# Patient Record
Sex: FEMALE | Race: WHITE | NOT HISPANIC OR LATINO | Employment: OTHER | URBAN - METROPOLITAN AREA
[De-identification: names, ages, dates, MRNs, and addresses within clinical notes are randomized per-mention and may not be internally consistent; named-entity substitution may affect disease eponyms.]

---

## 2017-02-07 ENCOUNTER — ALLSCRIPTS OFFICE VISIT (OUTPATIENT)
Dept: OTHER | Facility: OTHER | Age: 82
End: 2017-02-07

## 2017-02-21 ENCOUNTER — ALLSCRIPTS OFFICE VISIT (OUTPATIENT)
Dept: OTHER | Facility: OTHER | Age: 82
End: 2017-02-21

## 2017-02-21 DIAGNOSIS — M25.511 PAIN IN RIGHT SHOULDER: ICD-10-CM

## 2017-02-24 ENCOUNTER — TRANSCRIBE ORDERS (OUTPATIENT)
Dept: ADMINISTRATIVE | Facility: HOSPITAL | Age: 82
End: 2017-02-24

## 2017-02-24 ENCOUNTER — APPOINTMENT (OUTPATIENT)
Dept: LAB | Facility: MEDICAL CENTER | Age: 82
End: 2017-02-24
Payer: MEDICARE

## 2017-02-24 DIAGNOSIS — E78.5 HYPERLIPIDEMIA, UNSPECIFIED HYPERLIPIDEMIA TYPE: Primary | ICD-10-CM

## 2017-02-24 DIAGNOSIS — D64.9 ANEMIA, UNSPECIFIED: ICD-10-CM

## 2017-02-24 DIAGNOSIS — G31.84 MCI (MILD COGNITIVE IMPAIRMENT) WITH MEMORY LOSS: ICD-10-CM

## 2017-02-24 DIAGNOSIS — E55.9 UNSPECIFIED VITAMIN D DEFICIENCY: ICD-10-CM

## 2017-02-24 DIAGNOSIS — E53.8 OTHER B-COMPLEX DEFICIENCIES: ICD-10-CM

## 2017-02-24 DIAGNOSIS — E78.5 HYPERLIPIDEMIA, UNSPECIFIED HYPERLIPIDEMIA TYPE: ICD-10-CM

## 2017-02-24 LAB
25(OH)D3 SERPL-MCNC: 34 NG/ML (ref 30–100)
ALBUMIN SERPL BCP-MCNC: 3.4 G/DL (ref 3.5–5)
ALP SERPL-CCNC: 54 U/L (ref 46–116)
ALT SERPL W P-5'-P-CCNC: 18 U/L (ref 12–78)
ANION GAP SERPL CALCULATED.3IONS-SCNC: 6 MMOL/L (ref 4–13)
AST SERPL W P-5'-P-CCNC: 17 U/L (ref 5–45)
BILIRUB SERPL-MCNC: 0.36 MG/DL (ref 0.2–1)
BUN SERPL-MCNC: 27 MG/DL (ref 5–25)
CALCIUM SERPL-MCNC: 9 MG/DL (ref 8.3–10.1)
CHLORIDE SERPL-SCNC: 106 MMOL/L (ref 100–108)
CHOLEST SERPL-MCNC: 248 MG/DL (ref 50–200)
CO2 SERPL-SCNC: 29 MMOL/L (ref 21–32)
CREAT SERPL-MCNC: 0.81 MG/DL (ref 0.6–1.3)
ERYTHROCYTE [DISTWIDTH] IN BLOOD BY AUTOMATED COUNT: 14.7 % (ref 11.6–15.1)
GFR SERPL CREATININE-BSD FRML MDRD: >60 ML/MIN/1.73SQ M
GLUCOSE SERPL-MCNC: 107 MG/DL (ref 65–140)
HCT VFR BLD AUTO: 42.6 % (ref 34.8–46.1)
HDLC SERPL-MCNC: 73 MG/DL (ref 40–60)
HGB BLD-MCNC: 13.9 G/DL (ref 11.5–15.4)
LDLC SERPL CALC-MCNC: 144 MG/DL (ref 0–100)
MCH RBC QN AUTO: 30.8 PG (ref 26.8–34.3)
MCHC RBC AUTO-ENTMCNC: 32.6 G/DL (ref 31.4–37.4)
MCV RBC AUTO: 94 FL (ref 82–98)
PLATELET # BLD AUTO: 207 THOUSANDS/UL (ref 149–390)
PMV BLD AUTO: 11.1 FL (ref 8.9–12.7)
POTASSIUM SERPL-SCNC: 4.3 MMOL/L (ref 3.5–5.3)
PROT SERPL-MCNC: 7.4 G/DL (ref 6.4–8.2)
RBC # BLD AUTO: 4.52 MILLION/UL (ref 3.81–5.12)
SODIUM SERPL-SCNC: 141 MMOL/L (ref 136–145)
TRIGL SERPL-MCNC: 154 MG/DL
TSH SERPL DL<=0.05 MIU/L-ACNC: 2.83 UIU/ML (ref 0.36–3.74)
VIT B12 SERPL-MCNC: 787 PG/ML (ref 100–900)
WBC # BLD AUTO: 4.93 THOUSAND/UL (ref 4.31–10.16)

## 2017-02-24 PROCEDURE — 85027 COMPLETE CBC AUTOMATED: CPT

## 2017-02-24 PROCEDURE — 80061 LIPID PANEL: CPT

## 2017-02-24 PROCEDURE — 84443 ASSAY THYROID STIM HORMONE: CPT

## 2017-02-24 PROCEDURE — 80053 COMPREHEN METABOLIC PANEL: CPT

## 2017-02-24 PROCEDURE — 82306 VITAMIN D 25 HYDROXY: CPT

## 2017-02-24 PROCEDURE — 82607 VITAMIN B-12: CPT

## 2017-02-24 PROCEDURE — 36415 COLL VENOUS BLD VENIPUNCTURE: CPT

## 2017-03-01 ENCOUNTER — APPOINTMENT (OUTPATIENT)
Dept: PHYSICAL THERAPY | Facility: MEDICAL CENTER | Age: 82
End: 2017-03-01
Payer: MEDICARE

## 2017-03-01 DIAGNOSIS — M25.511 PAIN IN RIGHT SHOULDER: ICD-10-CM

## 2017-03-01 PROCEDURE — G8991 OTHER PT/OT GOAL STATUS: HCPCS

## 2017-03-01 PROCEDURE — 97110 THERAPEUTIC EXERCISES: CPT

## 2017-03-01 PROCEDURE — 97162 PT EVAL MOD COMPLEX 30 MIN: CPT

## 2017-03-01 PROCEDURE — G8990 OTHER PT/OT CURRENT STATUS: HCPCS

## 2017-03-01 PROCEDURE — 97140 MANUAL THERAPY 1/> REGIONS: CPT

## 2017-03-06 ENCOUNTER — GENERIC CONVERSION - ENCOUNTER (OUTPATIENT)
Dept: OTHER | Facility: OTHER | Age: 82
End: 2017-03-06

## 2017-03-08 ENCOUNTER — APPOINTMENT (OUTPATIENT)
Dept: PHYSICAL THERAPY | Facility: MEDICAL CENTER | Age: 82
End: 2017-03-08
Payer: MEDICARE

## 2017-03-08 PROCEDURE — 97110 THERAPEUTIC EXERCISES: CPT

## 2017-03-08 PROCEDURE — 97140 MANUAL THERAPY 1/> REGIONS: CPT

## 2017-03-15 ENCOUNTER — APPOINTMENT (OUTPATIENT)
Dept: PHYSICAL THERAPY | Facility: MEDICAL CENTER | Age: 82
End: 2017-03-15
Payer: MEDICARE

## 2017-03-15 PROCEDURE — 97140 MANUAL THERAPY 1/> REGIONS: CPT

## 2017-03-15 PROCEDURE — 97110 THERAPEUTIC EXERCISES: CPT

## 2017-03-30 ENCOUNTER — GENERIC CONVERSION - ENCOUNTER (OUTPATIENT)
Dept: OTHER | Facility: OTHER | Age: 82
End: 2017-03-30

## 2017-06-12 ENCOUNTER — ALLSCRIPTS OFFICE VISIT (OUTPATIENT)
Dept: OTHER | Facility: OTHER | Age: 82
End: 2017-06-12

## 2017-08-01 ENCOUNTER — APPOINTMENT (OUTPATIENT)
Dept: RADIOLOGY | Facility: MEDICAL CENTER | Age: 82
End: 2017-08-01
Payer: MEDICARE

## 2017-08-01 ENCOUNTER — ALLSCRIPTS OFFICE VISIT (OUTPATIENT)
Dept: OTHER | Facility: OTHER | Age: 82
End: 2017-08-01

## 2017-08-01 DIAGNOSIS — M81.0 AGE-RELATED OSTEOPOROSIS WITHOUT CURRENT PATHOLOGICAL FRACTURE: ICD-10-CM

## 2017-08-01 DIAGNOSIS — M79.644 PAIN OF FINGER OF RIGHT HAND: ICD-10-CM

## 2017-08-01 PROCEDURE — 73140 X-RAY EXAM OF FINGER(S): CPT

## 2017-08-21 ENCOUNTER — ALLSCRIPTS OFFICE VISIT (OUTPATIENT)
Dept: OTHER | Facility: OTHER | Age: 82
End: 2017-08-21

## 2017-09-07 ENCOUNTER — HOSPITAL ENCOUNTER (OUTPATIENT)
Dept: RADIOLOGY | Facility: MEDICAL CENTER | Age: 82
Discharge: HOME | End: 2017-09-07
Payer: MEDICARE

## 2017-09-07 ENCOUNTER — GENERIC CONVERSION - ENCOUNTER (OUTPATIENT)
Dept: OTHER | Facility: OTHER | Age: 82
End: 2017-09-07

## 2017-09-07 DIAGNOSIS — M81.0 AGE-RELATED OSTEOPOROSIS WITHOUT CURRENT PATHOLOGICAL FRACTURE: ICD-10-CM

## 2017-09-07 PROCEDURE — 77080 DXA BONE DENSITY AXIAL: CPT

## 2017-10-11 ENCOUNTER — ALLSCRIPTS OFFICE VISIT (OUTPATIENT)
Dept: OTHER | Facility: OTHER | Age: 82
End: 2017-10-11

## 2017-10-11 DIAGNOSIS — Z12.31 ENCOUNTER FOR SCREENING MAMMOGRAM FOR MALIGNANT NEOPLASM OF BREAST: ICD-10-CM

## 2017-10-11 PROCEDURE — G0145 SCR C/V CYTO,THINLAYER,RESCR: HCPCS | Performed by: OBSTETRICS & GYNECOLOGY

## 2017-10-12 ENCOUNTER — LAB REQUISITION (OUTPATIENT)
Dept: LAB | Facility: HOSPITAL | Age: 82
End: 2017-10-12
Payer: MEDICARE

## 2017-10-12 DIAGNOSIS — Z01.419 ENCOUNTER FOR GYNECOLOGICAL EXAMINATION WITHOUT ABNORMAL FINDING: ICD-10-CM

## 2017-10-13 NOTE — PROGRESS NOTES
Assessment  1  Pap smear, as part of routine gynecological examination (V76 2) (Z01 419)   2  Encounter for routine gynecological examination (V72 31) (Z01 419)    Pelvic exam reveals the vaginal cuff to be well supported  No masses are identified the pelvis  Her ring pessary was in place  There was no blood or discharge identified  Was normal  Rectal exam showed no blood or masses in the rectum and no nodularity in the cul-de-sac  Plan  Encounter for routine gynecological examination    · Follow-up visit in 1 year Evaluation and Treatment  Follow-up  Status: Hold For -  Scheduling  Requested for: 07HGN7316   Ordered; For: Encounter for routine gynecological examination; Ordered By: Wilda Hackett Performed:  Due: 10PXT8484  Encounter for screening mammogram for malignant neoplasm of breast    · * MAMMO SCREENING BILATERAL W CAD; Status:Active - Retrospective Authorization; Requested for:11Oct2017;    Perform:[facility]iology; Due:11Oct2018; Last Updated By:Lisbeth Tejada; 10/11/2017 8:58:09 AM;Ordered;For:Encounter for screening mammogram for malignant neoplasm of breast; Ordered By:Gerber De Luna;  Pap smear, as part of routine gynecological examination    · (1) THIN PREP PAP WITH IMAGING; Status:Active - Retrospective Authorization; Requested for:11Oct2017;    Perform:[facility] Lab In Office Collection; Due:11Oct2018; Last Updated By:Cuauhtemoc Page; 10/11/2017 1:42:56 PM;Ordered; For:Pap smear, as part of routine gynecological examination; Ordered By:Gerber De Luna; Maturation index required? : No  : hysterectomy  HPV? : if ASCUS    A vaginal Pap smear was taken at this visit  The patient was given a slip for bilateral screening mammogram to be performed this year  Discussion/Summary    She will return in one year for follow-up GYN evaluation  She does see urogynecology every 3 months for pessary removal and cleaning and replacement        Chief Complaint  annual exam no problems History of Present Illness  HPI: This 80-year-old patient has no vaginal bleeding but may have some discharge from her pessary  She does have a pessary check every 3 months with 0 gynecologist  She doesn't ring pessary in place  She has normal bowel and bladder function  She has no chronic headaches hot flashes fainting spells  GYN HM, Adult Female [facility]:   General Health:   Lifestyle:  She does not use tobacco -She denies alcohol use  -She denies drug use  Reproductive health:  hysterectomy  Screening: Cervical cancer screening includes a pap smear performed 9/30/21015  Breast cancer screening includes uncertain timing of her last mammogram  Colorectal cancer screening includes uncertain timing of the last colonoscopy  Review of Systems    Constitutional: No fever, no chills, feels well, no tiredness, no recent weight gain or loss  ENT: no ear ache, no loss of hearing, no nosebleeds or nasal discharge, no sore throat or hoarseness  Cardiovascular: no complaints of slow or fast heart rate, no chest pain, no palpitations, no leg claudication or lower extremity edema  Respiratory: no complaints of shortness of breath, no wheezing, no dyspnea on exertion, no orthopnea or PND  Breasts: no complaints of breast pain, breast lump or nipple discharge  Gastrointestinal: no complaints of abdominal pain, no constipation, no nausea or diarrhea, no vomiting, no bloody stools  Genitourinary: no complaints of dysuria, no incontinence, no pelvic pain, no dysmenorrhea, no vaginal discharge or abnormal vaginal bleeding  Musculoskeletal: no complaints of arthralgia, no myalgia, no joint swelling or stiffness, no limb pain or swelling  Integumentary: no complaints of skin rash or lesion, no itching or dry skin, no skin wounds  Neurological: no complaints of headache, no confusion, no numbness or tingling, no dizziness or fainting  Active Problems  1  Anxiety (300 00) (F41 9)   2   Blepharospasm (333 81) (G24 5)   3  Cystocele   4  Depression (311) (F32 9)   5  Encounter for routine gynecological examination (V72 31) (Z01 419)   6  Encounter for screening mammogram for malignant neoplasm of breast (V76 12)   (Z12 31)   7  Functional urinary incontinence (788 91) (R39 81)   8  Hypercholesteremia (272 0) (E78 00)   9  Hypertension (401 9) (I10)   10  Need for pneumococcal vaccination (V03 82) (Z23)   11  Osteoporosis (733 00) (M81 0)   12  Pain of finger of right hand (729 5) (M79 644)   13  Pap smear, as part of routine gynecological examination (V76 2) (Z01 419)   14  Right shoulder pain (719 41) (M25 511)   15  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)   16  Special screening for other conditions (V82 89) (Z13 89)   17  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)    Past Medical History   · History of breast lump (V13 89) (Z87 898)   · History of osteopenia (V13 59) (Z87 39)   · History of Ovarian cyst (620 2) (N83 20)    Surgical History   · History of Biopsy Breast Open   · History of Bladder Surgery   · History of Cataract Surgery   · History of Endometrial Biopsy By Suction   · History of Hysterectomy    Family History  Child    · Family history of   Family History    · Family history of Hypertension (V17 49)   · Family history of Osteoporosis (V17 81)    Social History   · Denied: History of Alcohol use   · Denied: History of Coffee   · Daily Tea Consumption (___ Cups/Day)   · Never A Smoker   · Denied: History of Patient ingests cola containing caffeine    Current Meds   1  Alendronate Sodium 70 MG Oral Tablet; TAKE AS DIRECTED  Requested for:   70Iuw0898; Last Rx:74Dth0519 Ordered   2  Amlodipine Besy-Benazepril HCl - 5-20 MG Oral Capsule; take 1 capsule daily    Requested for: 33JRD1807; Last Rx:17Apn5467; Status: ACTIVE - Transmit to Pharmacy -   Awaiting Verification Ordered   3  Calcium + D TABS; Therapy: (Ariana Cast) to Recorded   4  Daily Value Multivitamin TABS;    Therapy: (Recorded:67Srp7961) to Recorded   5  Fish Oil CAPS; Therapy: (Recorded:21Feb2017) to Recorded   6  MiraLax Oral Packet; Therapy: (Recorded:21Feb2017) to Recorded   7  Myrbetriq 50 MG Oral Tablet Extended Release 24 Hour; Therapy: (Recorded:21Feb2017) to Recorded   8  Premarin 0 625 MG/GM Vaginal Cream;   Therapy: (Recorded:21Feb2017) to Recorded   9  Sertraline HCl - 100 MG Oral Tablet; Therapy: (Recorded:24Kuv9049) to Recorded    Allergies  1  No Known Drug Allergies    Vitals   Recorded: 51TMD7192 75:20OH   Systolic 606   Diastolic 64   Height 4 ft 10 in   Weight 133 lb    BMI Calculated 27 8   BSA Calculated 1 53   LMP hysterectomy     Physical Exam    Constitutional   General appearance: No acute distress, well appearing and well nourished  Neck   Neck: Normal, supple, trachea midline, no masses  Thyroid: Normal, no thyromegaly  Pulmonary   Respiratory effort: No increased work of breathing or signs of respiratory distress  Auscultation of lungs: Clear to auscultation  Cardiovascular   Auscultation of heart: Normal rate and rhythm, normal S1 and S2, no murmurs  Peripheral vascular exam: Normal pulses Throughout  Genitourinary   External genitalia: Normal and no lesions appreciated  Vagina: Normal, no lesions or dryness appreciated  Urethra: Normal     Urethral meatus: Normal     Bladder: Normal, soft, non-tender and no prolapse or masses appreciated  Cervix: Surgically absent  Uterus: Surgically absent  Adnexa/parametria: Normal, non-tender and no fullness or masses appreciated  Anus, perineum, and rectum: Normal sphincter tone, no masses, and no prolapse  Chest   Breasts: Normal and no dimpling or skin changes noted  Abdomen   Abdomen: Normal, non-tender, and no organomegaly noted  Liver and spleen: No hepatomegaly or splenomegaly  Examination for hernias: No hernias appreciated      Lymphatic   Palpation of lymph nodes in neck, axillae, groin and/or other locations: No lymphadenopathy or masses noted  Skin   Skin and subcutaneous tissue: Normal skin turgor and no rashes  Palpation of skin and subcutaneous tissue: Normal     Psychiatric   Orientation to person, place, and time: Normal     Mood and affect: Normal        Future Appointments    Date/Time Provider Specialty Site   08/23/2018 02:00 PM ROB Pablo   [address] [facility]   10/24/2017 12:00 PM Jenn Moss MD Neurology [place] [address]     Signatures   Electronically signed by : Edwar Bardales MD; Oct 11 2017  1:46PM EST                       (Author)

## 2017-10-24 ENCOUNTER — ALLSCRIPTS OFFICE VISIT (OUTPATIENT)
Dept: OTHER | Facility: OTHER | Age: 82
End: 2017-10-24

## 2017-10-25 LAB
LAB AP GYN PRIMARY INTERPRETATION: NORMAL
Lab: NORMAL

## 2017-10-25 NOTE — PROGRESS NOTES
Chief Complaint  blinking      Current Meds   1  Alendronate Sodium 70 MG Oral Tablet; TAKE AS DIRECTED  Requested for:   60Bie9530; Last Rx:58Awu3145 Ordered   2  Amlodipine Besy-Benazepril HCl - 5-20 MG Oral Capsule; take 1 capsule daily    Requested for: 96SUH7252; Last Rx:96Fvf6479; Status: ACTIVE - Transmit to Pharmacy -   Awaiting Verification Ordered   3  Calcium + D TABS; Therapy: (Paola Rose) to Recorded   4  Daily Value Multivitamin TABS; Therapy: (Recorded:08Cop5048) to Recorded   5  Fish Oil CAPS; Therapy: (Recorded:68Pgh3826) to Recorded   6  MiraLax Oral Packet; Therapy: (Recorded:85Rux2835) to Recorded   7  Myrbetriq 50 MG Oral Tablet Extended Release 24 Hour; Therapy: (Recorded:82Qik1543) to Recorded   8  Premarin 0 625 MG/GM Vaginal Cream;   Therapy: (Recorded:83Xfv0587) to Recorded   9  Sertraline HCl - 100 MG Oral Tablet; Therapy: (Recorded:55Bmh9500) to Recorded    Allergies  1  No Known Drug Allergies    Vitals   Recorded: 47CNP8693 12:04PM   Temperature 67 6 F   Systolic 980   Diastolic 72     Procedure  Procedure: Neurotoxin Procedure for Cervical Dystonia  Indication:  Spasmodic Torticollis  -- Exam: Slight right turn and latercollis  No tremor  -- Last injection was 06/12/2017 -- good response the last injections lasting about 4 months  Risks were discussed with the patient  We discussed possible complications, including infection  Written consent was obtained prior to the procedure  The skin overlying the muscles to be injected was cleansed with alcohol  Anesthesia: No anesthesia was needed  Procedure Note:   100 --Mml of Botulinum toxin and 2 ml of preservative free, sterile saline were mixed--   The final concentration was 50 units per cc -- EMG guidance was used in identifying the injection site  25 unit(s) was injected into the left sternocleidomastoid muscle  --    50 (25 side, 25 low) unit(s) was injected into the right splenius capitus muscle  --    25 unit(s) was injected into the right levator scapulae muscle  A total of 100units were used  Botox Lot:  Lot number: [id number] -- Expiration date: APR 2020  Patient Status:  the patient tolerated the procedure well  Complications:  there were no complications  Procedure: Neurotoxin Procedure for Blepharospasm  Indication:  Blepharospasm  -- Exam: Involuntary contractions of the orbicularis oculi with closure of the eyelids  -- Last injection was 06/12/2017  -- with good results  Risks were discussed with the patient  We discussed possible complications, including infection  Written consent was obtained prior to the procedure  The skin overlying the muscles to be injected was cleansed with alcohol  Procedure Note:   100 --Mml of Botulinum toxin and 2 ml of preservative free, sterile saline were mixed  -- The final concentration was 50 units per cc -- The injection was performed with a 30G, 1/2 inch needle  2 5 unit(s) in 1 injection(s) was injected into the right  muscle  2 5 unit(s) in 1 injection(s) was injected into the left  muscle  10 unit(s) in 4 (2 5 ) injection(s) was injected into the right orbicularis oculi   10 unit(s) in 4 (x2 5) injection(s) was injected into the left orbicularis oculi   A total of 25units were used  A total of 75units were discarded  Botox Lot:  Lot number: [id number] -- Expiration date: APR 2020  Patient Status:  the patient tolerated the procedure well  Complications:  there were no complications  Assessment  1  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)   2  Blepharospasm (333 81) (G24 5)    Plan  Blepharospasm, Spasmodic torticollis    · Botox 100 UNIT Injection Solution Reconstituted   Rx By: Courtney Guzman; For: Blepharospasm, Spasmodic torticollis;  Dose of 200 UNIT; Other; JAVON = N; Administered: 10/24/2017 12:03:00 PM; Last Updated By: Darrick Gardner; 10/24/2017 12:04:47 PM   · Follow-up visit in 3 months Evaluation and Treatment Follow-up 3-4 month BINJ  Status:  Hold For - Scheduling  Requested for: 63YGN0282   Ordered; For: Blepharospasm, Spasmodic torticollis; Ordered By: Florian Delgado Performed:  Due: 17DRC4100    Future Appointments    Date/Time Provider Specialty Site   08/23/2018 02:00 PM ROB Shah   [address] [facility]   10/17/2018 01:30 PM Arabella Mcdaniel MD Obstetrics/Gynecology [facility] AND SURGICAL [facility]     Signatures   Electronically signed by : Mariann Izquierdo MD; Oct 24 2017 12:14PM EST                       (Author)

## 2017-10-27 ENCOUNTER — GENERIC CONVERSION - ENCOUNTER (OUTPATIENT)
Dept: OTHER | Facility: OTHER | Age: 82
End: 2017-10-27

## 2018-01-10 NOTE — MISCELLANEOUS
Message   Recorded as Task   Date: 11/23/2016 10:28 AM, Created By: Jennifer Santamaria   Task Name: Med Renewal Request   Assigned To: Katlin Kingsley   Regarding Patient: Sav Palacios, Status: Active   CommentCy Vicki - 23 Nov 2016 10:28 AM     TASK CREATED    recvd auto renewal for pt sent to r87 to sign off on alendronate rx        Active Problems    1  Anxiety (300 00) (F41 9)   2  Blepharospasm (333 81) (G24 5)   3  Cystocele (618 01) (N81 10)   4  Depression (311) (F32 9)   5  Dizziness, nonspecific (780 4) (R42)   6  Encounter for routine gynecological examination (V72 31) (Z01 419)   7  Encounter for screening mammogram for malignant neoplasm of breast (V76 12)   (Z12 31)   8  Functional urinary incontinence (788 91) (R39 81)   9  Hypercholesteremia (272 0) (E78 00)   10  Hypertension (401 9) (I10)   11  Osteopenia (733 90) (M85 80)   12  Osteoporosis (733 00) (M81 0)   13  Pap smear, as part of routine gynecological examination (V76 2) (Z01 419)   14  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)   15  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)    Current Meds   1  Alendronate Sodium 70 MG Oral Tablet; TAKE 1 TABLET ONCE WEEKLY; Therapy: 22RMO8813 to (Last Rx:00Elf2148)  Requested for: 45Kea9744 Ordered   2  Alendronate Sodium 70 MG Oral Tablet; TAKE AS DIRECTED; Last Rx:63Vct8318   Ordered   3  Captopril 12 5 MG Oral Tablet; TAKE 1 TABLET 3 TIMES DAILY; Therapy: (Recorded:17Jan2014) to Recorded   4  Citalopram Hydrobromide 20 MG Oral Tablet; TAKE 1 TABLET DAILY AS DIRECTED; Therapy: (Recorded:17Jan2014) to Recorded   5  ClonazePAM 0 5 MG Oral Tablet; TAKE 1 TABLET DAILY; Therapy: (Recorded:17Jan2014) to Recorded   6  Fosamax Plus D  MG-UNIT Oral Tablet; TAKE 1 TABLET Weekly; Therapy: 14IFH3170 to (Last Rx:28Apr2014)  Requested for: 28Apr2014 Ordered   7  Meloxicam 15 MG Oral Tablet; TAKE 1 TABLET DAILY; Therapy: (Recorded:17Jan2014) to Recorded   8   Pravachol 40 MG Oral Tablet (Pravastatin Sodium); TAKE 1 TABLET DAILY; Therapy: (Recorded:17Jan2014) to Recorded   9  Verapamil HCl  MG Oral Capsule Extended Release 24 Hour; TAKE 1 CAPSULE   DAILY; Therapy: (Recorded:17Jan2014) to Recorded   10  Zoloft 25 MG Oral Tablet (Sertraline HCl); TAKE 1 TABLET DAILY; Therapy: (Recorded:17Jan2014) to Recorded    Allergies    1   No Known Drug Allergies    Plan  Osteopenia    · Alendronate Sodium 70 MG Oral Tablet; TAKE AS DIRECTED    Signatures   Electronically signed by : Malou Rodriguez, ; Nov 23 2016 10:28AM EST                       (Author)

## 2018-01-11 NOTE — MISCELLANEOUS
Message   Recorded as Task   Date: 03/30/2017 10:22 AM, Created By: Hoda Grajeda   Task Name: Med Renewal Request   Assigned To: Katlin Kingsley   Regarding Patient: Meenakshi Owusu, Status: Active   Comment:    Batool Hairston - 30 Mar 2017 10:22 AM     TASK CREATED  Caller: Macarena Lenz, Pharmacist; Renew Medication  Tom from [address] [place] [address] called to have new rx for Fosamax sent over  Pt transferred from [address] [place] [address] - 30 Mar 2017 10:35 AM     TASK EDITED  sent to new pharm        Active Problems    1  Anxiety (300 00) (F41 9)   2  Blepharospasm (333 81) (G24 5)   3  Cystocele (618 01) (N81 10)   4  Depression (311) (F32 9)   5  Encounter for routine gynecological examination (V72 31) (Z01 419)   6  Encounter for screening mammogram for malignant neoplasm of breast (V76 12)   (Z12 31)   7  Functional urinary incontinence (788 91) (R39 81)   8  Hypercholesteremia (272 0) (E78 00)   9  Hypertension (401 9) (I10)   10  Osteoporosis (733 00) (M81 0)   11  Pap smear, as part of routine gynecological examination (V76 2) (Z01 419)   12  Right shoulder pain (719 41) (M25 511)   13  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)   14  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)    Current Meds   1  Alendronate Sodium 70 MG Oral Tablet; TAKE AS DIRECTED  Requested for:   23Nov2016; Last Rx:23Nov2016 Ordered   2  Amlodipine Besy-Benazepril HCl - 5-20 MG Oral Capsule; take 1 capsule daily    Requested for: 21Feb2017; Last Rx:21Feb2017; Status: ACTIVE - Transmit to Pharmacy   - Awaiting Verification Ordered   3  Calcium + D TABS; Therapy: (Ariana Cast) to Recorded   4  Daily Value Multivitamin TABS; Therapy: (Recorded:03Hzq4398) to Recorded   5  Fish Oil CAPS; Therapy: (Recorded:21Feb2017) to Recorded   6  MiraLax Oral Packet (Polyethylene Glycol 3350); Therapy: (Recorded:21Feb2017) to Recorded   7  Mirtazapine 15 MG Oral Tablet; TAKE 1/2 TABLET AT BEDTIME; Therapy: (Recorded:34Ppu7422) to Recorded   8  Myrbetriq 50 MG Oral Tablet Extended Release 24 Hour; Therapy: (Recorded:91Fsi7163) to Recorded   9  Premarin 0 625 MG/GM Vaginal Cream;   Therapy: (Recorded:25Xlb4572) to Recorded   10  Sertraline HCl - 25 MG Oral Tablet; Therapy: (Recorded:77Xoo7480) to Recorded   11  Sertraline HCl - 50 MG Oral Tablet; Therapy: (Recorded:37Uxo2864) to Recorded    Allergies    1   No Known Drug Allergies    Plan  PMH: History of osteopenia    · Alendronate Sodium 70 MG Oral Tablet; TAKE AS DIRECTED    Signatures   Electronically signed by : Ruby Alonso, ; Mar 30 2017 10:35AM EST                       (Author)

## 2018-01-11 NOTE — PROGRESS NOTES
Assessment    1  Encounter for preventive health examination (V70 0) (Z00 00)    Plan  Health Maintenance    · Follow-up visit in 1 year Evaluation and Treatment  Follow-up  Status: Complete  Done:  21Aug2017  Need for pneumococcal vaccination    · Prevnar 13 Intramuscular Suspension  Osteoporosis    · * DXA BONE DENSITY SPINE HIP AND PELVIS; Status:Active; Requested  for:21Aug2017;   Special screening for other conditions    · *VB - Fall Risk Assessment  (Dx Z13 89 Screen for Neurologic Disorder);  Status:Complete;   Done: 21Aug2017 04:48PM   · *VB - PHQ-9 Tool; Status:Temporary Deferral - Managed by other physician ;    8/21/2018   · *VB - Urinary Incontinence Screen (Dx Z13 89 Screen for UI); Status:Complete;   Done:  21Aug2017 04:48PM   · The plan of care for urinary incontinence is detailed in the plan and/or discussion section  of today's note ; Status:Complete;   Done: 21Aug2017 04:49PM    Discussion/Summary    Followed by GYN for urinary incontinence  F/U in 1yr or sooner if needed  Impression: Initial Annual Wellness Visit  Cardiovascular screening and counseling: screening is current  Diabetes screening and counseling: screening is current  Colorectal cancer screening and counseling: screening not indicated  Breast cancer screening and counseling: Has order from GYN  Cervical cancer screening and counseling: screening not indicated  Osteoporosis screening and counseling: the risks and benefits of screening were discussed and due for DXA appendicular skeleton  Immunizations: the risks and benefits of pneumococcal vaccination were discussed with the patient, pneumococcal vaccine due today, the risks and benefits of the Zostavax vaccine were discussed with the patient and Pt will call insurance to assess for coverage  Advance Directive Planning: Pt to bring in copy of living will   Patient Discussion: plan discussed with the patient, plan discussed with the patient's family, follow-up visit needed in one year  History of Present Illness  HPI: Pt presents for a Medicare Exam  No acute concerns  Welcome to [employer] and Wellness Visits: The patient is being seen for the initial annual wellness visit  Medicare Screening and Risk Factors   Hospitalizations: no previous hospitalizations  Medicare Screening Tests Risk Questions   Osteoporosis risk assessment: female gender and over 48years of age  Drug and Alcohol Use: The patient has never smoked cigarettes  The patient reports never drinking alcohol  She has never used illicit drugs  Diet and Physical Activity: Current diet includes well balanced meals  She is sedentary  Mood Disorder and Cognitive Impairment Screening: She denies feeling down, depressed, or hopeless over the past two weeks  She denies feeling little interest or pleasure in doing things over the past two weeks  Functional Ability/Level of Safety: Hearing is significantly decreased in the right ear and significantly decreased in the left ear  She reports hearing difficulties  She uses a hearing aid  The patient is currently able to do activities of daily living without limitations, able to do instrumental activities of daily living without limitations, able to participate in social activities without limitations and able to drive without limitations  Fall risk factors: The patient fell 0 times in the past 12 months  Advance Directives: Advance directives: living will  Co-Managers and Medical Equipment/Suppliers: See Patient Care Team   Preventive Quality Program 65 and Older: Falls Risk: The patient fell 0 times in the past 12 months  Urinary Incontinence Symptoms includes: urinary incontinence    Followed by Dr Tanvi Espino         Patient Care Team    Care Team Member Role Specialty Office Number   [facility]  Neurology [phone]   [facility]   [phone] [address] [phone]   Ashley Olson MD  Internal Medicine [phone]   [address] [facility] [phone]     Review of Systems    Constitutional: no fever  Cardiovascular: no chest pain  Respiratory: no shortness of breath  Active Problems    1  Anxiety (300 00) (F41 9)   2  Blepharospasm (333 81) (G24 5)   3  Cystocele   4  Depression (311) (F32 9)   5  Encounter for routine gynecological examination (V72 31) (Z01 419)   6  Encounter for screening mammogram for malignant neoplasm of breast (V76 12)   (Z12 31)   7  Functional urinary incontinence (788 91) (R39 81)   8  Hypercholesteremia (272 0) (E78 00)   9  Hypertension (401 9) (I10)   10  Osteoporosis (733 00) (M81 0)   11  Pain of finger of right hand (729 5) (M79 644)   12  Pap smear, as part of routine gynecological examination (V76 2) (Z01 419)   13  Right shoulder pain (719 41) (M25 511)   14  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)   15  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)    Past Medical History    · History of breast lump (V13 89) (X77 862)   · History of osteopenia (V13 59) (Z87 39)   · History of Ovarian cyst (620 2) (N83 20)    The active problems and past medical history were reviewed and updated today  Surgical History    · History of Biopsy Breast Open   · History of Bladder Surgery   · History of Cataract Surgery   · History of Endometrial Biopsy By Suction   · History of Hysterectomy    The surgical history was reviewed and updated today  Family History  Child    · Family history of   Family History    · Family history of Hypertension (V17 49)   · Family history of Osteoporosis (V17 81)    The family history was reviewed and updated today  Social History    · Denied: History of Alcohol use   · Denied: History of Coffee   · Daily Tea Consumption (___ Cups/Day)   · Never A Smoker   · Denied: History of Patient ingests cola containing caffeine  The social history was reviewed and updated today  Current Meds   1   Alendronate Sodium 70 MG Oral Tablet; TAKE AS DIRECTED  Requested for:   15IQR1751; Last Rx:31Mar2017; Status: ACTIVE - Transmit to Pharmacy - Awaiting   Verification Ordered   2  Amlodipine Besy-Benazepril HCl - 5-20 MG Oral Capsule; take 1 capsule daily    Requested for: 40XWA5226; Last Rx:39Wxv1242; Status: ACTIVE - Transmit to Pharmacy -   Awaiting Verification Ordered   3  Calcium + D TABS; Therapy: (Juancarlos Clements) to Recorded   4  Daily Value Multivitamin TABS; Therapy: (Recorded:86Ylj5982) to Recorded   5  Fish Oil CAPS; Therapy: (Recorded:28Csn9394) to Recorded   6  MiraLax Oral Packet; Therapy: (Recorded:33Tbw6629) to Recorded   7  Myrbetriq 50 MG Oral Tablet Extended Release 24 Hour; Therapy: (Recorded:46Qem4309) to Recorded   8  Premarin 0 625 MG/GM Vaginal Cream;   Therapy: (Recorded:76Xjz7367) to Recorded   9  Sertraline HCl - 100 MG Oral Tablet; Therapy: (Recorded:79Zgo8786) to Recorded    The medication list was reviewed and updated today  Allergies    1  No Known Drug Allergies    Vitals  Signs    Heart Rate: 76  Respiration: 16  Systolic: 090  Diastolic: 70  Weight: 340 lb 4 oz  BMI Calculated: 27 64  BSA Calculated: 1 53    Physical Exam    Constitutional   General appearance: No acute distress, well appearing and well nourished  Head and Face   Head and face: Normal     Eyes   Conjunctiva and lids: No swelling, erythema or discharge  Pupils and irises: Equal, round, reactive to light  Ears, Nose, Mouth, and Throat   External inspection of ears and nose: Normal   B/L hearing aides  Nasal mucosa, septum, and turbinates: Normal without edema or erythema  Lips, teeth, and gums: Normal, good dentition  Oropharynx: Normal with no erythema, edema, exudate or lesions  Neck   Neck: Supple, symmetric, trachea midline, no masses  Pulmonary   Respiratory effort: No increased work of breathing or signs of respiratory distress  Auscultation of lungs: Clear to auscultation  Cardiovascular   Auscultation of heart: Normal rate and rhythm, normal S1 and S2, no murmurs  Examination of extremities for edema and/or varicosities: Normal     Musculoskeletal   Gait and station: Normal     Psychiatric   Orientation to person, place, and time: Normal     Mood and affect: Normal        Procedure    Procedure:   Results: 20/30 in both eyes with corrective device, 20/30 in the right eye with corrective device, 20/30 in the left eye with corrective device       Procedure: Audiometry:  wears bilateral hearing aids  Future Appointments    Date/Time Provider Specialty Site   08/23/2018 02:00 PM ROB Shultz   [address]   10/24/2017 12:00 PM Suzie Gresham MD Neurology [address]   10/11/2017 01:00 PM Jason Escalante MD Obstetrics/Gynecology [facility] OB/ [facility] AND SURGICAL [facility]     Signatures   Electronically signed by : ROB Gil ; Aug 21 2017  4:56PM EST                       (Author)

## 2018-01-12 VITALS
RESPIRATION RATE: 18 BRPM | WEIGHT: 131 LBS | HEART RATE: 94 BPM | DIASTOLIC BLOOD PRESSURE: 68 MMHG | SYSTOLIC BLOOD PRESSURE: 142 MMHG

## 2018-01-13 VITALS
BODY MASS INDEX: 27.59 KG/M2 | SYSTOLIC BLOOD PRESSURE: 144 MMHG | RESPIRATION RATE: 16 BRPM | WEIGHT: 132 LBS | DIASTOLIC BLOOD PRESSURE: 82 MMHG | HEART RATE: 88 BPM | TEMPERATURE: 98.4 F

## 2018-01-13 VITALS — TEMPERATURE: 98.5 F | HEART RATE: 77 BPM | SYSTOLIC BLOOD PRESSURE: 152 MMHG | DIASTOLIC BLOOD PRESSURE: 82 MMHG

## 2018-01-13 VITALS — DIASTOLIC BLOOD PRESSURE: 70 MMHG | TEMPERATURE: 97.5 F | SYSTOLIC BLOOD PRESSURE: 144 MMHG

## 2018-01-13 VITALS — DIASTOLIC BLOOD PRESSURE: 72 MMHG | TEMPERATURE: 97.6 F | SYSTOLIC BLOOD PRESSURE: 144 MMHG

## 2018-01-13 VITALS
SYSTOLIC BLOOD PRESSURE: 124 MMHG | BODY MASS INDEX: 27.92 KG/M2 | WEIGHT: 133 LBS | HEIGHT: 58 IN | DIASTOLIC BLOOD PRESSURE: 64 MMHG

## 2018-01-13 NOTE — RESULT NOTES
Verified Results  * DXA BONE DENSITY SPINE HIP AND PELVIS [id number] 12:46PM Dari Atkinson Order Number: [id number]    - Patient Instructions: To schedule this appointment, please contact Central Scheduling at [phone]  Test Name Result Flag Reference   DXA BONE DENSITY SPINE HIP AND PELVIS (Report)     CENTRAL DXA SCAN     CLINICAL HISTORY:  80year old post-menopausal  female who walks and who takes calcium and vitamin D supplements, as well as Fosamax  There is a history of left arm fracture after a fall  TECHNIQUE: Bone densitometry was performed using a Hologic Horizon A bone densitometer  Regions of interest appear properly placed  There is dextroscoliosis of the lumbar spine, associated with degenerative change, which can artificially elevate bone    mineral density results  COMPARISON: None  RESULTS:    LUMBAR SPINE: L1-L4:   BMD 1 007 gm/cm2   T-score -0 4   Z-score 2 5     LEFT TOTAL HIP:   BMD 0 796 gm/cm2   T-score -1 2   Z-score 1 1     LEFT FEMORAL NECK:   BMD 0 592 gm/cm2   T-score -2 3   Z-score 0 2     LEFT FOREARM-ONE 3RD REGION:   BMD 0 598 gm/cm2   T-score -1 6       IMPRESSION:   1  Based on the [facility] classification, the T-score of -2 3 in the left femoral neck is consistent with low bone mineral density  2  Any secondary causes of low bone mineral density should be excluded prior to treatment, if clinically indicated  3  As there are no prior studies for comparison, I cannot determine if the patient's bone mineral is stable on Fosamax, improved or whether she has lost bone despite antiresorptive therapy  In cases such as this, serum and/or urinary markers of bone    resorption can be of benefit  4  A daily intake of at least 1200 mg calcium and 800 to 1000 IU of Vitamin D, as well as weight bearing and muscle strengthening exercise, fall prevention and avoidance of tobacco and excessive alcohol intake as basic preventive measures are suggested     5  Repeat DXA in 18 - 24 months, on the same machine, as clinically indicated         WHO CLASSIFICATION:   Normal (a T-score of -1 0 or higher)   Low bone mineral density (a T-score of less than -1 0 but higher than -2 5)   Osteoporosis (a T-score of -2 5 or less)   Severe osteoporosis (a T-score of -2 5 or less with a fragility fracture)             Workstation performed: [id number]     Signed by:   Drake Martinez MD   9/7/17

## 2018-01-14 VITALS
WEIGHT: 132.25 LBS | SYSTOLIC BLOOD PRESSURE: 124 MMHG | DIASTOLIC BLOOD PRESSURE: 70 MMHG | BODY MASS INDEX: 27.64 KG/M2 | RESPIRATION RATE: 16 BRPM | HEART RATE: 76 BPM

## 2018-01-15 NOTE — RESULT NOTES
Verified Results  (1) THIN PREP PAP WITH IMAGING 25Oct2017 05:24PM Kim Parada     Test Name Result Flag Reference   LAB AP CASE REPORT (Report)     Gynecologic Cytology Report            Case: [id number]                  Authorizing Provider: Jose Ross MD     Collected:      10/11/2017           First Screen:     GHADA Shields    Received:      10/13/2017 1140        Specimen:  LIQUID-BASED PAP, SCREENING, Vaginal   LAB AP GYN PRIMARY INTERPRETATION      Negative for intraepithelial lesion or malignancy  Electronically signed by GHADA Shields on 10/25/2017 at 5:24 PM   LAB AP GYN SPECIMEN ADEQUACY      Satisfactory for evaluation  LAB AP GYN ADDITIONAL INFORMATION (Report)     [employer]'s FDA approved ,  and ThinPrep Imaging System are   utilized with strict adherence to the 's instruction manual to   prepare gynecologic and non-gynecologic cytology specimens for the   production of ThinPrep slides as well as for gynecologic ThinPrep imaging  These processes have been validated by our laboratory and/or by the     The Pap test is not a diagnostic procedure and should not be used as the   sole means to detect cervical cancer  It is only a screening procedure to   aid in the detection of cervical cancer and its precursors  Both   false-negative and false-positive results have been experienced  Your   patient's test result should be interpreted in this context together with   the history and clinical findings

## 2018-01-25 ENCOUNTER — TELEPHONE (OUTPATIENT)
Dept: NEUROLOGY | Facility: CLINIC | Age: 83
End: 2018-01-25

## 2018-01-25 NOTE — TELEPHONE ENCOUNTER
----- Message from Moraga Allyssa sent at 1/25/2018 10:53 AM EST -----  Regarding: Botox Injection  Contact: [phone]  Nicolaofelia Bryant,    The patients daughter has been calling to schedule her mothers Botox Injection  She stated Dr Gabino Colmenares has told her to set up an urgent appt,  before Victoriano Cheng so the botox is done, since it's done in the eyes  She said she will continue to call back and asked if you can call her as well       Thank You! :)

## 2018-01-26 NOTE — TELEPHONE ENCOUNTER
Spoke with patients daughter and patient is scheduled for 1/29/18 @ 36 with Dr Magdalena Thacker in the [facility]

## 2018-01-29 ENCOUNTER — PROCEDURE VISIT (OUTPATIENT)
Dept: NEUROLOGY | Facility: CLINIC | Age: 83
End: 2018-01-29
Payer: MEDICARE

## 2018-01-29 VITALS
TEMPERATURE: 97.7 F | HEIGHT: 60 IN | WEIGHT: 137 LBS | BODY MASS INDEX: 26.9 KG/M2 | HEART RATE: 78 BPM | SYSTOLIC BLOOD PRESSURE: 160 MMHG | DIASTOLIC BLOOD PRESSURE: 74 MMHG

## 2018-01-29 DIAGNOSIS — G24.3 CERVICAL DYSTONIA: ICD-10-CM

## 2018-01-29 DIAGNOSIS — G24.5 BLEPHAROSPASM: ICD-10-CM

## 2018-01-29 PROCEDURE — 64616 CHEMODENERV MUSC NECK DYSTON: CPT

## 2018-01-29 PROCEDURE — 95874 GUIDE NERV DESTR NEEDLE EMG: CPT

## 2018-01-29 PROCEDURE — 67345 DESTROY NERVE OF EYE MUSCLE: CPT

## 2018-01-29 RX ORDER — LANOLIN ALCOHOL/MO/W.PET/CERES
1 CREAM (GRAM) TOPICAL DAILY
COMMUNITY

## 2018-01-29 RX ORDER — AMLODIPINE BESYLATE AND BENAZEPRIL HYDROCHLORIDE 5; 20 MG/1; MG/1
1 CAPSULE ORAL DAILY
COMMUNITY
End: 2018-07-26 | Stop reason: SDUPTHER

## 2018-01-29 RX ORDER — SERTRALINE HYDROCHLORIDE 100 MG/1
100 TABLET, FILM COATED ORAL DAILY
COMMUNITY
Start: 2018-01-03 | End: 2018-08-24 | Stop reason: ALTCHOICE

## 2018-01-29 RX ORDER — ALENDRONATE SODIUM 70 MG/1
70 TABLET ORAL WEEKLY
COMMUNITY
End: 2018-08-24 | Stop reason: ALTCHOICE

## 2018-01-29 RX ORDER — MIRTAZAPINE 7.5 MG/1
7.5 TABLET, FILM COATED ORAL
COMMUNITY
End: 2018-08-24 | Stop reason: ALTCHOICE

## 2018-01-29 RX ORDER — POLYETHYLENE GLYCOL 3350 17 G/17G
1 POWDER, FOR SOLUTION ORAL 2 TIMES DAILY
COMMUNITY

## 2018-01-29 RX ORDER — MIRABEGRON 50 MG/1
1 TABLET, FILM COATED, EXTENDED RELEASE ORAL DAILY
COMMUNITY
Start: 2018-01-03 | End: 2022-03-07

## 2018-01-29 NOTE — PROGRESS NOTES
Chemodenervation  Date/Time: 1/29/2018 8:46 AM  Performed by: Dottie Hays  Authorized by: Dottie Hays     Pre-procedure details:     Prepped With: Alcohol    Anesthesia (see MAR for exact dosages): Anesthesia method:  None  Procedure details:     Position:  Upright and supine    Guidance: EMG    Botox:     Brand:  Botox    mL's of Botulinum Toxin:  100    mL's of preservative free sterile saline:  2    Final Concentration per CC:  50 units    Needle Gauge:  30 G 2 5 inch  Procedures:     Botox Procedures: blepharospasm and cervical dystonia      Indications: blepharospasm and spasmodic torticollis      Date of last exam:  10/24/2017    Date of last injection:  10/24/2017  Injection Location:     Head / Face:  L orbicularis oculi, R orbicularis oculi, R  and L     L orbicularis oculi injection amount:  10 unit(s)    R orbicularis oculi injection amount:  10 unit(s)    Cervical Dystonia / Salivary Gland:  L sternocleidomastoid, R splenius capitis and R levator scapulae unit(s)    R levator scapulae injection amount:  25 unit(s)    R splenius capitis injection amount:  50 unit(s)    L sternocleidomastoid injection amount:  25 unit(s)  Total Units:     Total units used:  125    Total units discarded:  75  Post-procedure details:     Patient tolerance of procedure:   Tolerated well, no immediate complications  Comments:      Left  2 5 units and right  2 5 units

## 2018-04-02 DIAGNOSIS — M81.0 AGE RELATED OSTEOPOROSIS, UNSPECIFIED PATHOLOGICAL FRACTURE PRESENCE: Primary | ICD-10-CM

## 2018-04-02 NOTE — TELEPHONE ENCOUNTER
Pt called office today, left voicemail message  Voicemail message was inaudible  Pt called to request prescription refill from Dr Tali Mcgowan? Pt can be reached @ [phone]

## 2018-04-03 ENCOUNTER — TELEPHONE (OUTPATIENT)
Dept: OBGYN CLINIC | Facility: CLINIC | Age: 83
End: 2018-04-03

## 2018-04-03 RX ORDER — ALENDRONATE SODIUM 70 MG/1
70 TABLET ORAL
Qty: 12 TABLET | Refills: 1 | Status: SHIPPED | OUTPATIENT
Start: 2018-04-03 | End: 2018-09-11 | Stop reason: SDUPTHER

## 2018-06-18 ENCOUNTER — PROCEDURE VISIT (OUTPATIENT)
Dept: NEUROLOGY | Facility: CLINIC | Age: 83
End: 2018-06-18
Payer: MEDICARE

## 2018-06-18 VITALS — SYSTOLIC BLOOD PRESSURE: 128 MMHG | DIASTOLIC BLOOD PRESSURE: 60 MMHG | TEMPERATURE: 97.9 F

## 2018-06-18 DIAGNOSIS — G24.3 CERVICAL DYSTONIA: ICD-10-CM

## 2018-06-18 DIAGNOSIS — G24.5 BLEPHAROSPASM: ICD-10-CM

## 2018-06-18 PROCEDURE — 64612 DESTROY NERVE FACE MUSCLE: CPT | Performed by: PSYCHIATRY & NEUROLOGY

## 2018-06-18 PROCEDURE — 95874 GUIDE NERV DESTR NEEDLE EMG: CPT | Performed by: PSYCHIATRY & NEUROLOGY

## 2018-06-18 NOTE — PROGRESS NOTES
Chemodenervation  Date/Time: 6/18/2018 3:59 PM  Performed by: Anamaria Powell  Authorized by: Anamaria Powell     Pre-procedure details:     Prepped With: Alcohol    Procedure details:     Position:  Supine and upright    Guidance: EMG    Botox:     Brand:  Botox    mL's of Botulinum Toxin:  125    Final Concentration per CC:  50 units    Needle Gauge:  30 G 2 5 inch (27 1/2 inch)  Procedures:     Botox Procedures: blepharospasm and cervical dystonia      Indications: blepharospasm and spasmodic torticollis      Date of last injection:  1/29/2018  Total Units:     Total units used:  125    Total units discarded:  75  Post-procedure details:     Chemodenervation:  Head or face    Facial Nerve Location[de-identified]  Bilateral facial nerve    Patient tolerance of procedure: Tolerated well, no immediate complications  Comments:      Injection lasting about 4 months  Exam today with increase blinking  : Slight right turn and latercollis  No tremor    2 5 unit(s) in 1 injection(s) was injected into the right  muscle  2 5 unit(s) in 1 injection(s) was injected into the left  muscle  10 unit(s) in 4 (2 5 ) injection(s) was injected into the right orbicularis oculi   10 unit(s) in 4 (x2 5) injection(s) was injected into the left orbicularis oculi     25 unit(s) was injected into the left sternocleidomastoid muscle  --    50 (25 side, 25 low) unit(s) was injected into the right splenius capitus muscle  --    25 unit(s) was injected into the right levator scapulae muscle

## 2018-07-26 DIAGNOSIS — I10 ESSENTIAL HYPERTENSION: Primary | ICD-10-CM

## 2018-07-27 RX ORDER — AMLODIPINE BESYLATE AND BENAZEPRIL HYDROCHLORIDE 5; 20 MG/1; MG/1
CAPSULE ORAL
Qty: 30 CAPSULE | Refills: 0 | Status: SHIPPED | OUTPATIENT
Start: 2018-07-27 | End: 2018-08-24 | Stop reason: SDUPTHER

## 2018-08-09 ENCOUNTER — APPOINTMENT (OUTPATIENT)
Dept: LAB | Facility: MEDICAL CENTER | Age: 83
End: 2018-08-09
Payer: MEDICARE

## 2018-08-09 ENCOUNTER — TRANSCRIBE ORDERS (OUTPATIENT)
Dept: ADMINISTRATIVE | Facility: HOSPITAL | Age: 83
End: 2018-08-09

## 2018-08-09 DIAGNOSIS — D64.9 ANEMIA, UNSPECIFIED TYPE: ICD-10-CM

## 2018-08-09 DIAGNOSIS — F34.1 DYSTHYMIC DISORDER: ICD-10-CM

## 2018-08-09 DIAGNOSIS — E78.5 HYPERLIPIDEMIA, UNSPECIFIED HYPERLIPIDEMIA TYPE: ICD-10-CM

## 2018-08-09 DIAGNOSIS — I10 ESSENTIAL HYPERTENSION, MALIGNANT: ICD-10-CM

## 2018-08-09 DIAGNOSIS — E55.9 AVITAMINOSIS D: ICD-10-CM

## 2018-08-09 DIAGNOSIS — I10 ESSENTIAL HYPERTENSION, MALIGNANT: Primary | ICD-10-CM

## 2018-08-09 DIAGNOSIS — E53.8 BIOTIN-(PROPIONYL-COA-CARBOXYLASE) LIGASE DEFICIENCY: ICD-10-CM

## 2018-08-09 LAB
25(OH)D3 SERPL-MCNC: 43.5 NG/ML (ref 30–100)
ALBUMIN SERPL BCP-MCNC: 3.6 G/DL (ref 3.5–5)
ALP SERPL-CCNC: 49 U/L (ref 46–116)
ALT SERPL W P-5'-P-CCNC: 22 U/L (ref 12–78)
ANION GAP SERPL CALCULATED.3IONS-SCNC: 5 MMOL/L (ref 4–13)
AST SERPL W P-5'-P-CCNC: 18 U/L (ref 5–45)
BASOPHILS # BLD AUTO: 0.01 THOUSANDS/ΜL (ref 0–0.1)
BASOPHILS NFR BLD AUTO: 0 % (ref 0–1)
BILIRUB SERPL-MCNC: 0.74 MG/DL (ref 0.2–1)
BUN SERPL-MCNC: 19 MG/DL (ref 5–25)
CALCIUM SERPL-MCNC: 9.2 MG/DL (ref 8.3–10.1)
CHLORIDE SERPL-SCNC: 104 MMOL/L (ref 100–108)
CHOLEST SERPL-MCNC: 242 MG/DL (ref 50–200)
CO2 SERPL-SCNC: 30 MMOL/L (ref 21–32)
CREAT SERPL-MCNC: 0.89 MG/DL (ref 0.6–1.3)
EOSINOPHIL # BLD AUTO: 0.05 THOUSAND/ΜL (ref 0–0.61)
EOSINOPHIL NFR BLD AUTO: 1 % (ref 0–6)
ERYTHROCYTE [DISTWIDTH] IN BLOOD BY AUTOMATED COUNT: 13.8 % (ref 11.6–15.1)
GFR SERPL CREATININE-BSD FRML MDRD: 59 ML/MIN/1.73SQ M
GLUCOSE P FAST SERPL-MCNC: 100 MG/DL (ref 65–99)
HCT VFR BLD AUTO: 42.5 % (ref 34.8–46.1)
HDLC SERPL-MCNC: 71 MG/DL (ref 40–60)
HGB BLD-MCNC: 13.4 G/DL (ref 11.5–15.4)
IMM GRANULOCYTES # BLD AUTO: 0.01 THOUSAND/UL (ref 0–0.2)
IMM GRANULOCYTES NFR BLD AUTO: 0 % (ref 0–2)
LDLC SERPL CALC-MCNC: 149 MG/DL (ref 0–100)
LYMPHOCYTES # BLD AUTO: 0.89 THOUSANDS/ΜL (ref 0.6–4.47)
LYMPHOCYTES NFR BLD AUTO: 21 % (ref 14–44)
MCH RBC QN AUTO: 30.5 PG (ref 26.8–34.3)
MCHC RBC AUTO-ENTMCNC: 31.5 G/DL (ref 31.4–37.4)
MCV RBC AUTO: 97 FL (ref 82–98)
MONOCYTES # BLD AUTO: 0.34 THOUSAND/ΜL (ref 0.17–1.22)
MONOCYTES NFR BLD AUTO: 8 % (ref 4–12)
NEUTROPHILS # BLD AUTO: 2.97 THOUSANDS/ΜL (ref 1.85–7.62)
NEUTS SEG NFR BLD AUTO: 70 % (ref 43–75)
NONHDLC SERPL-MCNC: 171 MG/DL
NRBC BLD AUTO-RTO: 0 /100 WBCS
PLATELET # BLD AUTO: 193 THOUSANDS/UL (ref 149–390)
PMV BLD AUTO: 10.6 FL (ref 8.9–12.7)
POTASSIUM SERPL-SCNC: 4.3 MMOL/L (ref 3.5–5.3)
PROT SERPL-MCNC: 7.9 G/DL (ref 6.4–8.2)
RBC # BLD AUTO: 4.4 MILLION/UL (ref 3.81–5.12)
SODIUM SERPL-SCNC: 139 MMOL/L (ref 136–145)
T4 FREE SERPL-MCNC: 0.98 NG/DL (ref 0.76–1.46)
TRIGL SERPL-MCNC: 110 MG/DL
TSH SERPL DL<=0.05 MIU/L-ACNC: 3.97 UIU/ML (ref 0.36–3.74)
VIT B12 SERPL-MCNC: 834 PG/ML (ref 100–900)
WBC # BLD AUTO: 4.27 THOUSAND/UL (ref 4.31–10.16)

## 2018-08-09 PROCEDURE — 84443 ASSAY THYROID STIM HORMONE: CPT

## 2018-08-09 PROCEDURE — 85025 COMPLETE CBC W/AUTO DIFF WBC: CPT

## 2018-08-09 PROCEDURE — 84439 ASSAY OF FREE THYROXINE: CPT

## 2018-08-09 PROCEDURE — 80061 LIPID PANEL: CPT

## 2018-08-09 PROCEDURE — 80053 COMPREHEN METABOLIC PANEL: CPT

## 2018-08-09 PROCEDURE — 82306 VITAMIN D 25 HYDROXY: CPT

## 2018-08-09 PROCEDURE — 82607 VITAMIN B-12: CPT

## 2018-08-09 PROCEDURE — 36415 COLL VENOUS BLD VENIPUNCTURE: CPT

## 2018-08-15 ENCOUNTER — TRANSCRIBE ORDERS (OUTPATIENT)
Dept: PHYSICAL THERAPY | Facility: MEDICAL CENTER | Age: 83
End: 2018-08-15

## 2018-08-15 ENCOUNTER — EVALUATION (OUTPATIENT)
Dept: PHYSICAL THERAPY | Facility: MEDICAL CENTER | Age: 83
End: 2018-08-15
Payer: MEDICARE

## 2018-08-15 DIAGNOSIS — R26.9 ABNORMALITY OF GAIT: Primary | ICD-10-CM

## 2018-08-15 DIAGNOSIS — R26.9 GAIT ABNORMALITY: Primary | ICD-10-CM

## 2018-08-15 PROCEDURE — 97161 PT EVAL LOW COMPLEX 20 MIN: CPT | Performed by: PHYSICAL THERAPIST

## 2018-08-15 PROCEDURE — G8979 MOBILITY GOAL STATUS: HCPCS | Performed by: PHYSICAL THERAPIST

## 2018-08-15 PROCEDURE — 97110 THERAPEUTIC EXERCISES: CPT | Performed by: PHYSICAL THERAPIST

## 2018-08-15 PROCEDURE — G8978 MOBILITY CURRENT STATUS: HCPCS | Performed by: PHYSICAL THERAPIST

## 2018-08-15 NOTE — PROGRESS NOTES
PT Evaluation     Today's date: 8/15/2018  Patient name: Uriah Patterson  : 1931  MRN: [id number]  Referring provider: Sharyn Nieto MD  Dx:   Encounter Diagnosis     ICD-10-CM    1  Gait abnormality R26 9                   Assessment  Impairments: abnormal gait, abnormal or restricted ROM, activity intolerance, impaired balance, impaired physical strength and lacks appropriate home exercise program    Assessment details: Pt is an alert and oriented, pleasant 79 yo female, referred to out-pt T with dx of gait abnormality  Pt states she had a fall approx 2 mo ago while walking too fast to try and catch up with someone, and did not pick her foot up to clear a step  Pt states she fell forward and landed on her nose  Since then, pt states she is more fearful of falling and uses furniture to walk at home  Upon examination, pt demonstrates gait and balance deficits, and decreased LE strength  Pt will benefit from skilled PT to address the deficits listed above  Thank you for your referral   Understanding of Dx/Px/POC: good   Prognosis: good    Goals  ST: Improve GOMEZ score by 2-3 points in 4 weeks  2: Improved ROM by 25% in 4 weeks to assist with all functional activities  3: Improve strength by 1/2 grade in 4 weeks to assist with all functional activities  LT:  Improve GOMEZ score by 4-5 points in 4-6 weeks  2: Improved ROM to WFL's in 4-6 weeks to assist with all functional activities  3:  Increase strength to WFL's in 4-6 weeks to assist with all functional activities    Plan  Patient would benefit from: skilled physical therapy  Planned therapy interventions: neuromuscular re-education, stretching, therapeutic activities, strengthening, therapeutic exercise, therapeutic training and home exercise program  Frequency: 2x week  Duration in visits: 8  Duration in weeks: 4  Plan of Care beginning date: 8/15/2018  Plan of Care expiration date: 2018  Treatment plan discussed with: patient        Subjective Evaluation    Quality of life: good    Pain  No pain reported    Social Support  Steps to enter house: yes  3  12  Lives in: multiple-level home  Lives with: adult children      Diagnostic Tests  No diagnostic tests performed  Treatments  Current treatment: physical therapy  Patient Goals  Patient goals for therapy: improved balance, independence with ADLs/IADLs and increased strength          Objective     Strength/Myotome Testing     Left Hip   Planes of Motion   Flexion: 4-  Extension: 4-  Abduction: 4-  Adduction: 4-    Right Hip   Planes of Motion   Flexion: 4-  Extension: 4-  Abduction: 4-  Adduction: 4-    Left Knee   Flexion: 4-  Extension: 4-    Right Knee   Flexion: 4-  Extension: 4-    Left Ankle/Foot   Dorsiflexion: 4-  Plantar flexion: 4-    Right Ankle/Foot   Dorsiflexion: 4-  Plantar flexion: 4-    Ambulation     Observational Gait     Additional Observational Gait Details  Pt amb ind without AD, good taina, heel strike present b/l, min trunk rot and arm swing observed,mild path deviation present,  mild trunk flexion in standing      GOMEZ/56 ( Moderate Falls Risk)        Flowsheet Rows      Most Recent Value   PT/OT G-Codes   Current Score  56   Projected Score  89   FOTO information reviewed  Yes   Assessment Type  Evaluation   G code set  Other PT/OT Primary   Mobility: Walking and Moving Around Current Status ()  CI   Mobility: Walking and Moving Around Goal Status ()  CI          Precautions: HTN, Falls risk,     Daily Treatment Diary     Manual                                                                                   Exercise Diary  8/15            bike nv            LAQ 5"x10ea            Hip add iso 5"x10            Seated marches x10            marching nv            Side stepping nv            Tandem gait nv            Balance on foam EO/EC nv Modalities

## 2018-08-15 NOTE — LETTER
August 15, 2018    Rebeca Gomez MD  [address] [place] [id number]    Patient: Christian Hill   YOB: 1931   Date of Visit: 8/15/2018     Encounter Diagnosis     ICD-10-CM    1  Gait abnormality R26 9        Dear Dr Wilfredo Rosas:    Please review the attached Plan of Care from Odette Kimble recent visit  Please verify that you agree therapy should continue by signing the attached document and sending it back to our office  If you have any questions or concerns, please don't hesitate to call  Sincerely,    Donneta Cranker, PT      Referring Provider:      I certify that I have read the below Plan of Care and certify the need for these services furnished under this plan of treatment while under my care  Rebeca Gomez MD  [address] [facility] Floor  [facility] [address]  VIA Facsimile: [phone]          PT Evaluation     Today's date: 8/15/2018  Patient name: Christian Hill  : 1931  MRN: [id number]  Referring provider: Jackson Pozo MD  Dx:   Encounter Diagnosis     ICD-10-CM    1  Gait abnormality R26 9                   Assessment  Impairments: abnormal gait, abnormal or restricted ROM, activity intolerance, impaired balance, impaired physical strength and lacks appropriate home exercise program    Assessment details: Pt is an alert and oriented, pleasant 81 yo female, referred to out-pt T with dx of gait abnormality  Pt states she had a fall approx 2 mo ago while walking too fast to try and catch up with someone, and did not pick her foot up to clear a step  Pt states she fell forward and landed on her nose  Since then, pt states she is more fearful of falling and uses furniture to walk at home  Upon examination, pt demonstrates gait and balance deficits, and decreased LE strength  Pt will benefit from skilled PT to address the deficits listed above    Thank you for your referral   Understanding of Dx/Px/POC: good   Prognosis: good    Goals  ST: Improve GOMEZ score by 2-3 points in 4 weeks  2: Improved ROM by 25% in 4 weeks to assist with all functional activities  3: Improve strength by 1/2 grade in 4 weeks to assist with all functional activities  LT:  Improve GOMEZ score by 4-5 points in 4-6 weeks  2: Improved ROM to WFL's in 4-6 weeks to assist with all functional activities  3:  Increase strength to WFL's in 4-6 weeks to assist with all functional activities    Plan  Patient would benefit from: skilled physical therapy  Planned therapy interventions: neuromuscular re-education, stretching, therapeutic activities, strengthening, therapeutic exercise, therapeutic training and home exercise program  Frequency: 2x week  Duration in visits: 8  Duration in weeks: 4  Plan of Care beginning date: 8/15/2018  Plan of Care expiration date: 2018  Treatment plan discussed with: patient        Subjective Evaluation    Quality of life: good    Pain  No pain reported    Social Support  Steps to enter house: yes  3  12  Lives in: multiple-level home  Lives with: adult children      Diagnostic Tests  No diagnostic tests performed  Treatments  Current treatment: physical therapy  Patient Goals  Patient goals for therapy: improved balance, independence with ADLs/IADLs and increased strength          Objective     Strength/Myotome Testing     Left Hip   Planes of Motion   Flexion: 4-  Extension: 4-  Abduction: 4-  Adduction: 4-    Right Hip   Planes of Motion   Flexion: 4-  Extension: 4-  Abduction: 4-  Adduction: 4-    Left Knee   Flexion: 4-  Extension: 4-    Right Knee   Flexion: 4-  Extension: 4-    Left Ankle/Foot   Dorsiflexion: 4-  Plantar flexion: 4-    Right Ankle/Foot   Dorsiflexion: 4-  Plantar flexion: 4-    Ambulation     Observational Gait     Additional Observational Gait Details  Pt amb ind without AD, good taina, heel strike present b/l, min trunk rot and arm swing observed,mild path deviation present,  mild trunk flexion in standing      GOMEZ/56 ( Moderate Falls Risk)        Flowsheet Rows      Most Recent Value   PT/OT G-Codes   Current Score  56   Projected Score  89   FOTO information reviewed  Yes   Assessment Type  Evaluation   G code set  Other PT/OT Primary   Mobility: Walking and Moving Around Current Status ()  CI   Mobility: Walking and Moving Around Goal Status ()  CI          Precautions: HTN, Falls risk,     Daily Treatment Diary     Manual                                                                                   Exercise Diary  8/15            bike nv            LAQ 5"x10ea            Hip add iso 5"x10            Seated marches x10            marching nv            Side stepping nv            Tandem gait nv            Balance on foam EO/EC nv                                                                                                                                                                            Modalities

## 2018-08-20 ENCOUNTER — OFFICE VISIT (OUTPATIENT)
Dept: PHYSICAL THERAPY | Facility: MEDICAL CENTER | Age: 83
End: 2018-08-20
Payer: MEDICARE

## 2018-08-20 DIAGNOSIS — R26.9 GAIT ABNORMALITY: Primary | ICD-10-CM

## 2018-08-20 PROCEDURE — 97110 THERAPEUTIC EXERCISES: CPT | Performed by: PHYSICAL THERAPIST

## 2018-08-20 PROCEDURE — 97112 NEUROMUSCULAR REEDUCATION: CPT | Performed by: PHYSICAL THERAPIST

## 2018-08-20 NOTE — PROGRESS NOTES
Daily Note     Today's date: 2018  Patient name: Leda Leong  : 1931  MRN: [id number]  Referring provider: All Rahman MD  Dx:   Encounter Diagnosis     ICD-10-CM    1  Gait abnormality R26 9                   Subjective: Pt reports no new complaints post IE last week  Objective: See treatment diary below  Exercise Diary  8/15 8/20           bike nv x10'           LAQ 5"x10ea 1 5# 5"x20ea           Hip add iso 5"x10 5"x20           Seated marches x10 1 5# x20           marching nv x4laps           Side stepping nv x4laps           Tandem gait nv x4laps           Balance on foam FA EO/EC nv 30"x2ea                                                                                                                                                                             Assessment: Tolerated treatment well  Patient exhibited good technique with therapeutic exercises and would benefit from continued PT  Plan: Continue per plan of care

## 2018-08-22 ENCOUNTER — OFFICE VISIT (OUTPATIENT)
Dept: PHYSICAL THERAPY | Facility: MEDICAL CENTER | Age: 83
End: 2018-08-22
Payer: MEDICARE

## 2018-08-22 DIAGNOSIS — R26.9 GAIT ABNORMALITY: Primary | ICD-10-CM

## 2018-08-22 PROCEDURE — 97110 THERAPEUTIC EXERCISES: CPT | Performed by: PHYSICAL THERAPIST

## 2018-08-22 PROCEDURE — 97112 NEUROMUSCULAR REEDUCATION: CPT | Performed by: PHYSICAL THERAPIST

## 2018-08-22 NOTE — PROGRESS NOTES
Daily Note     Today's date: 2018  Patient name: Nasim Lewis  : 1931  MRN: [id number]  Referring provider: Bonnie Gracia MD  Dx:   Encounter Diagnosis     ICD-10-CM    1  Gait abnormality R26 9                   Subjective: Pt states she feels "a little wobbly" upon presentation today  Objective: See treatment diary below  Exercise Diary  8/15 8/20 8/22          bike nv x10' x10'          LAQ 5"x10ea 1 5# [place] 1 5# 5"x20          Hip add iso 5"x10 5"x20 5"x20          Seated marches x10 1 5# x20 1 5# x20          marching nv x4laps x4laps          Side stepping nv x4laps x4laps          Tandem gait nv x4laps x4laps          Balance on foam FA EO/EC nv 30"x2ea 30"x2ea          Balance on foam FA EO/EC with perts   30"x2ea          Balance on foam FT EO/EC   30"x2ea                                                                                                                                                  Assessment: Tolerated treatment well  Patient exhibited good technique with therapeutic exercises and would benefit from continued PT  Pt requires min A with FT ex on foam stating she almost feels motion sick with her EC on foam       Plan: Continue per plan of care

## 2018-08-24 ENCOUNTER — OFFICE VISIT (OUTPATIENT)
Dept: FAMILY MEDICINE CLINIC | Facility: MEDICAL CENTER | Age: 83
End: 2018-08-24
Payer: MEDICARE

## 2018-08-24 VITALS
SYSTOLIC BLOOD PRESSURE: 136 MMHG | OXYGEN SATURATION: 98 % | BODY MASS INDEX: 26.33 KG/M2 | HEART RATE: 94 BPM | DIASTOLIC BLOOD PRESSURE: 76 MMHG | WEIGHT: 134.8 LBS

## 2018-08-24 DIAGNOSIS — I10 ESSENTIAL HYPERTENSION: Primary | ICD-10-CM

## 2018-08-24 DIAGNOSIS — Z23 NEED FOR PNEUMOCOCCAL VACCINE: ICD-10-CM

## 2018-08-24 PROBLEM — Z80.3 FAMILY HISTORY OF MALIGNANT NEOPLASM OF BREAST: Status: ACTIVE | Noted: 2018-08-24

## 2018-08-24 PROCEDURE — 99213 OFFICE O/P EST LOW 20 MIN: CPT | Performed by: FAMILY MEDICINE

## 2018-08-24 PROCEDURE — G0009 ADMIN PNEUMOCOCCAL VACCINE: HCPCS | Performed by: FAMILY MEDICINE

## 2018-08-24 PROCEDURE — 90732 PPSV23 VACC 2 YRS+ SUBQ/IM: CPT | Performed by: FAMILY MEDICINE

## 2018-08-24 RX ORDER — AMLODIPINE BESYLATE AND BENAZEPRIL HYDROCHLORIDE 5; 20 MG/1; MG/1
1 CAPSULE ORAL DAILY
Qty: 90 CAPSULE | Refills: 1 | Status: SHIPPED | OUTPATIENT
Start: 2018-08-24 | End: 2019-02-01 | Stop reason: SDUPTHER

## 2018-08-24 NOTE — PROGRESS NOTES
Assessment/Plan:    No problem-specific Assessment & Plan notes found for this encounter  Diagnoses and all orders for this visit:    Essential hypertension  -     amLODIPine-benazepril (LOTREL 5-20) 5-20 MG per capsule; Take 1 capsule by mouth daily  Blood pressure is well controlled  I reviewed patient's labs and her renal function is stable  Continue medication  At her request and her daughter's request, they would like patient to be seen on a yearly basis alternating with the geriatric office and therefore will see geriatrics in six months and myself in one year  I was agreeable to that  Need for pneumococcal vaccine  -     PNEUMOCOCCAL POLYSACCHARIDE VACCINE 23-VALENT =>1YO SQ IM  Patient received the Pneumovax today  She tolerated well  Follow-up in one year sooner if needed  Subjective:      Patient ID: Christopher Pino is a 80 y o  female  Patient presents for follow-up of hypertension  She is currently on amlodipine with benazepril 50-20 mg daily  She is tolerating the medication well  No side effects such as lightheadedness, dizziness or cough  She does need medication refill  She recently had labs done through the geriatric office          The following portions of the patient's history were reviewed and updated as appropriate:   She   Patient Active Problem List    Diagnosis Date Noted    Family history of malignant neoplasm of breast 08/24/2018    Anxiety 03/25/2016    Essential hypertension 03/25/2016    MCI (mild cognitive impairment) 03/25/2016    Blepharospasm 08/04/2015    Depression 06/16/2014    Functional urinary incontinence 06/16/2014    Hypercholesteremia 06/16/2014    Osteoporosis 06/16/2014    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    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     Current Outpatient Prescriptions   Medication Sig Dispense Refill    alendronate (FOSAMAX) 70 mg tablet Take 1 tablet (70 mg total) by mouth every 7 days 12 tablet 1    amLODIPine-benazepril (LOTREL 5-20) 5-20 MG per capsule Take 1 capsule by mouth daily 90 capsule 1    calcium citrate-vitamin D (CITRACAL+D) 315-200 MG-UNIT per tablet Take 1 tablet by mouth daily      conjugated estrogens (PREMARIN) vaginal cream Insert into the vagina Ose as directed      Multiple Vitamins-Minerals (MULTIVITAL) tablet Take 1 tablet by mouth daily      MYRBETRIQ 50 MG TB24 Take 1 tablet by mouth daily      Omega-3 Fatty Acids (FISH OIL) 645 MG CAPS Take 1 tablet by mouth daily      polyethylene glycol (MIRALAX) 17 g packet Take 1 Package by mouth 2 (two) times a day       Current Facility-Administered Medications   Medication Dose Route Frequency Provider Last Rate Last Dose    onabotulinumtoxin A (BOTOX) injection 200 Units  200 Units Intramuscular Q4 Months Bridger Rhodes MD   200 Units at 01/29/18 1059     She has No Known Allergies       Review of Systems   Constitutional: Negative for fever  Respiratory: Negative for shortness of breath  Cardiovascular: Negative for chest pain  Objective:      /76 (BP Location: Left arm, Patient Position: Sitting, Cuff Size: Adult)   Pulse 94   Wt 61 1 kg (134 lb 12 8 oz)   SpO2 98%   BMI 26 33 kg/m²          Physical Exam   Constitutional: She appears well-developed and well-nourished  Cardiovascular: Normal rate, regular rhythm and normal heart sounds      Pulmonary/Chest: Effort normal and breath sounds normal

## 2018-08-27 ENCOUNTER — OFFICE VISIT (OUTPATIENT)
Dept: PHYSICAL THERAPY | Facility: MEDICAL CENTER | Age: 83
End: 2018-08-27
Payer: MEDICARE

## 2018-08-27 DIAGNOSIS — R26.9 GAIT ABNORMALITY: Primary | ICD-10-CM

## 2018-08-27 PROCEDURE — 97112 NEUROMUSCULAR REEDUCATION: CPT | Performed by: PHYSICAL THERAPIST

## 2018-08-27 PROCEDURE — 97110 THERAPEUTIC EXERCISES: CPT | Performed by: PHYSICAL THERAPIST

## 2018-08-27 NOTE — PROGRESS NOTES
Daily Note     Today's date: 2018  Patient name: Kim Jiménez  : 1931  MRN: [id number]  Referring provider: Sandoval Servin MD  Dx:   Encounter Diagnosis     ICD-10-CM    1  Gait abnormality R26 9                   Subjective: Pt states she feels her balance is slightly improving, stating she feels more confident on her feet,    Objective: See treatment diary below  Exercise Diary  8/15 8/20 8/22 8/27         bike nv x10' x10' x10'         LAQ 5"x10ea 1 5# [place] 1 5# 5"x20 1 5# 5"x20         Hip add iso 5"x10 5"x20 5"x20 5"x20         Seated marches x10 1 5# x20 1 5# x20 1 5# x20         marching nv x4laps x4laps x4laps         Side stepping nv x4laps x4laps x4laps         Tandem gait nv x4laps x4laps x4laps         Balance on foam FA EO/EC nv 30"x2ea 30"x2ea 30"x2ea         Balance on foam FA EO/EC with perts   30"x2ea 30"x2ea         Balance on foam FT EO/EC   30"x2ea 30"x2ea                                                                                                                                                 Assessment: Tolerated treatment well  Patient exhibited good technique with therapeutic exercises and would benefit from continued PT  Give pt updated HEP nv       Plan: Continue per plan of care

## 2018-08-29 ENCOUNTER — OFFICE VISIT (OUTPATIENT)
Dept: PHYSICAL THERAPY | Facility: MEDICAL CENTER | Age: 83
End: 2018-08-29
Payer: MEDICARE

## 2018-08-29 DIAGNOSIS — R26.9 GAIT ABNORMALITY: Primary | ICD-10-CM

## 2018-08-29 PROCEDURE — G8978 MOBILITY CURRENT STATUS: HCPCS | Performed by: PHYSICAL THERAPIST

## 2018-08-29 PROCEDURE — G8979 MOBILITY GOAL STATUS: HCPCS | Performed by: PHYSICAL THERAPIST

## 2018-08-29 PROCEDURE — 97112 NEUROMUSCULAR REEDUCATION: CPT | Performed by: PHYSICAL THERAPIST

## 2018-08-29 PROCEDURE — 97110 THERAPEUTIC EXERCISES: CPT | Performed by: PHYSICAL THERAPIST

## 2018-08-29 NOTE — PROGRESS NOTES
Daily Note     Today's date: 2018  Patient name: Nasim Lewis  : 1931  MRN: [id number]  Referring provider: Bonnie Gracia MD  Dx:   Encounter Diagnosis     ICD-10-CM    1  Gait abnormality R26 9                   Subjective: Pt states she is fatigued from heat today  Objective: See treatment diary below  Exercise Diary  8/15 8/20 8/22 8/27 8/29        bike nv x10' x10' x10' x10'        [place] 1 5# [place] 1 5# 5"x20 1 5# 5"x20 1 5# 5"x20        Hip add iso 5"x10 5"x20 5"x20 5"x20 5"x20        Seated marches x10 1 5# x20 1 5# x20 1 5# x20 1 5# x20         marching nv x4laps x4laps x4laps 1 5# x4laps        Side stepping nv x4laps x4laps x4laps 1 5# x4laps        Tandem gait nv x4laps x4laps x4laps x4laps        Balance on foam FA EO/EC nv 30"x2ea 30"x2ea 30"x2ea 30"x2ea        Balance on foam FA EO/EC with perts   30"x2ea 30"x2ea 30"x2        Balance on foam FT EO/EC   30"x2ea 30"x2ea 30"x2                                                                                                                                                Assessment: Tolerated treatment well  Patient exhibited good technique with therapeutic exercises and would benefit from continued PT  Pt given updated HEP at this time  Plan: Continue per plan of care

## 2018-09-05 ENCOUNTER — OFFICE VISIT (OUTPATIENT)
Dept: PHYSICAL THERAPY | Facility: MEDICAL CENTER | Age: 83
End: 2018-09-05
Payer: MEDICARE

## 2018-09-05 DIAGNOSIS — R26.9 GAIT ABNORMALITY: Primary | ICD-10-CM

## 2018-09-05 PROCEDURE — 97110 THERAPEUTIC EXERCISES: CPT | Performed by: PHYSICAL THERAPIST

## 2018-09-05 PROCEDURE — 97112 NEUROMUSCULAR REEDUCATION: CPT | Performed by: PHYSICAL THERAPIST

## 2018-09-05 NOTE — PROGRESS NOTES
Daily Note     Today's date: 2018  Patient name: Arthuro Oar  : 1931  MRN: [id number]  Referring provider: Omar Morrow MD  Dx:   Encounter Diagnosis     ICD-10-CM    1  Gait abnormality R26 9                   Subjective: Pt states she feels mild muscle soreness post tx last visit  Objective: See treatment diary below  Exercise Diary  8/15 8/20 8/22 8/27 8/29 9/5       bike nv x10' x10' x10' x10' x10'       [place] 1 5# Syed Golder 1 5# 5"x20 1 5# 5"x20 1 5# 5"x20 1 5# 5"x20       Hip add iso 5"x10 5"x20 5"x20 5"x20 5"x20 5"x20       Seated marches x10 1 5# x20 1 5# x20 1 5# x20 1 5# x20  1 5# x20       marching nv x4laps x4laps x4laps 1 5# x4laps 1 5# x4laps       Side stepping nv x4laps x4laps x4laps 1 5# x4laps 1 5# x4laps       Tandem gait nv x4laps x4laps x4laps x4laps x4laps       Balance on foam FA EO/EC nv 30"x2ea 30"x2ea 30"x2ea 30"x2ea 30"x2ea       Balance on foam FA EO/EC with perts   30"x2ea 30"x2ea 30"x2 30"x2ea       Balance on foam FT EO/EC   30"x2ea 30"x2ea 30"x2 30"x2eas                                                                                                                                               Assessment: Tolerated treatment well  Patient exhibited good technique with therapeutic exercises and would benefit from continued PT  Pt states fatigue post TE and instructed to stay hydrated due to hot weather  Plan: Continue per plan of care

## 2018-09-10 ENCOUNTER — OFFICE VISIT (OUTPATIENT)
Dept: PHYSICAL THERAPY | Facility: MEDICAL CENTER | Age: 83
End: 2018-09-10
Payer: MEDICARE

## 2018-09-10 DIAGNOSIS — R26.9 GAIT ABNORMALITY: Primary | ICD-10-CM

## 2018-09-10 PROCEDURE — 97112 NEUROMUSCULAR REEDUCATION: CPT | Performed by: PHYSICAL THERAPIST

## 2018-09-10 PROCEDURE — 97110 THERAPEUTIC EXERCISES: CPT | Performed by: PHYSICAL THERAPIST

## 2018-09-10 PROCEDURE — 97116 GAIT TRAINING THERAPY: CPT | Performed by: PHYSICAL THERAPIST

## 2018-09-10 NOTE — PROGRESS NOTES
Daily Note     Today's date: 9/10/2018  Patient name: Tate Noyola  : 1931  MRN: [id number]  Referring provider: Keith Cifuentes MD  Dx:   Encounter Diagnosis     ICD-10-CM    1  Gait abnormality R26 9                   Subjective: Pt states "my legs feel heavy today" upon presentation  Pt states she feels slow progress with her balance, but states she feels she can do most things without difficulty  Pt states feeling most imbalance when walking on uneven surfaces or standing on foam exercises in PT  Objective: See treatment diary below  Exercise Diary  8/15 8/20 8/22 8/27 8/29 9/5 9/10      bike nv x10' x10' x10' x10' x10' x10'      LAQ 5"x10ea 1 5# [place] 1 5# 5"x20 1 5# 5"x20 1 5# 5"x20 1 5# 5"x20 2# 5"x15ea      Hip add iso 5"x10 5"x20 5"x20 5"x20 5"x20 5"x20 5"x20      Seated marches x10 1 5# x20 1 5# x20 1 5# x20 1 5# x20  1 5# x20 2# x20      marching nv x4laps x4laps x4laps 1 5# x4laps 1 5# x4laps 2# x4laps      Side stepping nv x4laps x4laps x4laps 1 5# x4laps 1 5# x4laps 2# x4laps      Tandem gait nv x4laps x4laps x4laps x4laps x4laps x4laps      Balance on foam FA EO/EC nv 30"x2ea 30"x2ea 30"x2ea 30"x2ea 30"x2ea 30"x2ea      Balance on foam FA EO/EC with perts   30"x2ea 30"x2ea 30"x2 30"x2ea 30"x2ea      Balance on foam FT EO/EC   30"x2ea 30"x2ea 30"x2 30"x2eas 30"x2ea      Gait training in hallway       x10'                                                                                                                                 Assessment: Tolerated treatment well  Patient exhibited good technique with therapeutic exercises and would benefit from continued PT  Observed pt amb in hallway, and given VC's to  feet/hips when walking to clear her toes  No path deviation observed  Will RA nv       Plan: Continue per plan of care

## 2018-09-11 DIAGNOSIS — M81.0 AGE RELATED OSTEOPOROSIS, UNSPECIFIED PATHOLOGICAL FRACTURE PRESENCE: ICD-10-CM

## 2018-09-11 RX ORDER — ALENDRONATE SODIUM 70 MG/1
70 TABLET ORAL
Qty: 12 TABLET | Refills: 1 | Status: SHIPPED | OUTPATIENT
Start: 2018-09-11 | End: 2019-02-01 | Stop reason: SDUPTHER

## 2018-09-11 NOTE — TELEPHONE ENCOUNTER
Pt need a prescription for alienate sodium refilled and would like to know if she can have more then 4 tablets  Please call to advice when it's refilled  Pharmacy on file

## 2018-09-12 ENCOUNTER — EVALUATION (OUTPATIENT)
Dept: PHYSICAL THERAPY | Facility: MEDICAL CENTER | Age: 83
End: 2018-09-12
Payer: MEDICARE

## 2018-09-12 DIAGNOSIS — R26.9 GAIT ABNORMALITY: Primary | ICD-10-CM

## 2018-09-12 PROCEDURE — 97110 THERAPEUTIC EXERCISES: CPT | Performed by: PHYSICAL THERAPIST

## 2018-09-12 PROCEDURE — G8978 MOBILITY CURRENT STATUS: HCPCS | Performed by: PHYSICAL THERAPIST

## 2018-09-12 PROCEDURE — G8979 MOBILITY GOAL STATUS: HCPCS | Performed by: PHYSICAL THERAPIST

## 2018-09-12 PROCEDURE — 97112 NEUROMUSCULAR REEDUCATION: CPT | Performed by: PHYSICAL THERAPIST

## 2018-09-12 NOTE — PROGRESS NOTES
PT Re-Evaluation     Today's date: 2018  Patient name: Leda Leong  : 1931  MRN: [id number]  Referring provider: All Rahman MD  Dx:   Encounter Diagnosis     ICD-10-CM    1  Gait abnormality R26 9                   Assessment    Assessment details: Pt has attended 8 visits of skilled PT over the past 4 weeks, and states she feels her LE strength and balance have improved  Pt states she feels more confident with walking  Upon reassessment, pt demonstrates improved gait and balance, and improved LE strength  Pt states she is pleased with her progress at this time, and in agreement with D/C POC to ind HEP  Thank you for your referral   Understanding of Dx/Px/POC: good   Prognosis: good    Goals  ST: Improve GOMEZ score by 2-3 points in 4 weeks-met as of   2: Improved ROM by 25% in 4 weeks to assist with all functional activities-met as of   3: Improve strength by 1/2 grade in 4 weeks to assist with all functional activities-met by   LT:  Improve GOMEZ score by 4-5 points in 4-6 weeks  2: Improved ROM to WFL's in 4-6 weeks to assist with all functional activities  3:  Increase strength to WFL's in 4-6 weeks to assist with all functional activities    Plan  Patient would benefit from: skilled physical therapy  Planned therapy interventions: neuromuscular re-education, stretching, therapeutic activities, strengthening, therapeutic exercise and therapeutic training  Frequency: 1x week  Plan of Care beginning date: 2018  Plan of Care expiration date: 2018  Treatment plan discussed with: patient        Subjective Evaluation    Quality of life: good    Pain  No pain reported    Social Support  Steps to enter house: yes  3  12  Lives in: multiple-level home  Lives with: adult children      Diagnostic Tests  No diagnostic tests performed  Treatments  Current treatment: physical therapy  Patient Goals  Patient goals for therapy: improved balance, independence with ADLs/IADLs and increased strength          Objective     Strength/Myotome Testing     Left Hip   Normal muscle strength  Planes of Motion   Flexion: 4  Extension: 4  Abduction: 4  Adduction: 4    Right Hip   Planes of Motion   Flexion: 4  Extension: 4  Abduction: 4  Adduction: 4    Left Knee   Flexion: 4  Extension: 4    Right Knee   Flexion: 4  Extension: 4    Left Ankle/Foot   Dorsiflexion: 4  Plantar flexion: 4    Right Ankle/Foot   Dorsiflexion: 4  Plantar flexion: 4    Ambulation     Observational Gait     Additional Observational Gait Details  Pt amb ind without AD, good taina, heel strike present b/l, min trunk rot and arm swing observed,mild path deviation present,  mild trunk flexion in standing      GOMEZ/56 ( Low Falls Risk)            Precautions: HTN, Falls risk,     Daily Treatment Diary     Objective: See treatment diary below  Exercise Diary  8/15 8/20 8/22 8/27 8/29 9/5 9/10 9/12       bike nv x10' x10' x10' x10' x10' x10'  x10'       LAQ 5"x10ea 1 5# 5"x20ea 1 5# 5"x20 1 5# 5"x20 1 5# 5"x20 1 5# 5"x20 2# 5"x15ea  2# x20       Hip add iso 5"x10 5"x20 5"x20 5"x20 5"x20 5"x20 5"x20         Seated marches x10 1 5# x20 1 5# x20 1 5# x20 1 5# x20  1 5# x20 2# x20         marching nv x4laps x4laps x4laps 1 5# x4laps 1 5# x4laps 2# x4laps  x4laps       Side stepping nv x4laps x4laps x4laps 1 5# x4laps 1 5# x4laps 2# x4laps  x4laps       Tandem gait nv x4laps x4laps x4laps x4laps x4laps x4laps x4laps       Balance on foam FA EO/EC nv 30"x2ea 30"x2ea 30"x2ea 30"x2ea 30"x2ea 30"x2ea  30"x2ea       Balance on foam FA EO/EC with perts     30"x2ea 30"x2ea 30"x2 30"x2ea 30"x2ea  30"x2ea       Balance on foam FT EO/EC     30"x2ea 30"x2ea 30"x2 30"x2eas 30"x2ea  30"x2       Gait training in hallway             x10'  NP

## 2018-09-17 ENCOUNTER — APPOINTMENT (OUTPATIENT)
Dept: PHYSICAL THERAPY | Facility: MEDICAL CENTER | Age: 83
End: 2018-09-17
Payer: MEDICARE

## 2018-09-19 ENCOUNTER — APPOINTMENT (OUTPATIENT)
Dept: PHYSICAL THERAPY | Facility: MEDICAL CENTER | Age: 83
End: 2018-09-19
Payer: MEDICARE

## 2018-09-24 ENCOUNTER — APPOINTMENT (OUTPATIENT)
Dept: PHYSICAL THERAPY | Facility: MEDICAL CENTER | Age: 83
End: 2018-09-24
Payer: MEDICARE

## 2018-09-26 ENCOUNTER — APPOINTMENT (OUTPATIENT)
Dept: PHYSICAL THERAPY | Facility: MEDICAL CENTER | Age: 83
End: 2018-09-26
Payer: MEDICARE

## 2018-10-02 ENCOUNTER — CLINICAL SUPPORT (OUTPATIENT)
Dept: FAMILY MEDICINE CLINIC | Facility: MEDICAL CENTER | Age: 83
End: 2018-10-02
Payer: MEDICARE

## 2018-10-02 DIAGNOSIS — Z23 ENCOUNTER FOR IMMUNIZATION: ICD-10-CM

## 2018-10-02 PROCEDURE — G0008 ADMIN INFLUENZA VIRUS VAC: HCPCS

## 2018-10-02 PROCEDURE — 90662 IIV NO PRSV INCREASED AG IM: CPT

## 2018-10-17 ENCOUNTER — ANNUAL EXAM (OUTPATIENT)
Dept: OBGYN CLINIC | Facility: MEDICAL CENTER | Age: 83
End: 2018-10-17
Payer: MEDICARE

## 2018-10-17 VITALS
SYSTOLIC BLOOD PRESSURE: 148 MMHG | HEIGHT: 58 IN | WEIGHT: 134 LBS | BODY MASS INDEX: 28.13 KG/M2 | DIASTOLIC BLOOD PRESSURE: 80 MMHG

## 2018-10-17 DIAGNOSIS — Z12.31 SCREENING MAMMOGRAM, ENCOUNTER FOR: Primary | ICD-10-CM

## 2018-10-17 DIAGNOSIS — N76.0 ACUTE VAGINITIS: ICD-10-CM

## 2018-10-17 PROCEDURE — 99213 OFFICE O/P EST LOW 20 MIN: CPT | Performed by: OBSTETRICS & GYNECOLOGY

## 2018-10-17 PROCEDURE — 87070 CULTURE OTHR SPECIMN AEROBIC: CPT | Performed by: OBSTETRICS & GYNECOLOGY

## 2018-10-17 NOTE — PATIENT INSTRUCTIONS
A vaginal culture was taken at this visit due to the presence of a small amount of white to creamy discharge in the vagina and the presence of a pessary for several months

## 2018-10-17 NOTE — PROGRESS NOTES
Assessment/Plan: this 80year-old patient is seen because she has a pessary in place  No problem-specific Assessment & Plan notes found for this encounter  Subjective:      Patient ID: Akilah Baxter is a 80 y o  female  10year-old patient was seen today as result of her having a pessary in the vagina  She  Is seen by a urogynecologist every 3 months to have the pessary cleaned and replaced  She does not notice any abnormal discharge vaginally or bleeding  The pessary does not fall out spontaneously  She has no headaches fainting spells or hot flashes  She is in bed about 8 hours at night  She has nocturia x2  She does wear a pad for urine stress incontinence  She has had no urinary tract infections over the year  Her bowel function is normal   She did have a DEXA scan September 7, 2017 which showed osteopenia  Gynecologic Exam           Review of Systems   Constitutional: Negative  HENT: Negative  Eyes: Negative  Respiratory: Negative  Cardiovascular: Negative  Gastrointestinal: Negative  Endocrine: Negative  Genitourinary: Negative  She does wear pads daily for urine stress incontinence  Musculoskeletal: Negative  Skin: Negative  Allergic/Immunologic: Negative  Neurological: Negative  Hematological: Negative  Psychiatric/Behavioral: Negative  Objective:      /80 (BP Location: Left arm, Patient Position: Sitting)   Ht 4' 10" (1 473 m)   Wt 60 8 kg (134 lb)   Breastfeeding? No   BMI 28 01 kg/m²          Physical Exam   Constitutional: She is oriented to person, place, and time  She appears well-developed and well-nourished  HENT:   Head: Normocephalic  Neck: Normal range of motion  Neck supple  Cardiovascular: Normal rate, regular rhythm, normal heart sounds and intact distal pulses  Pulmonary/Chest: Effort normal and breath sounds normal    Abdominal: Soft   Bowel sounds are normal    Genitourinary: Genitourinary Comments:   Pessary can be felt in the vagina  Musculoskeletal: Normal range of motion  Neurological: She is alert and oriented to person, place, and time  Skin: Skin is warm and dry  Psychiatric: She has a normal mood and affect  Nursing note and vitals reviewed  Prep exam is normal   Pelvic exam reveals no masses in the pelvis  The vagina which is well position  There is no blood or abnormal discharge in the vagina  There is a small discharge vulva is normal   Rectal exam shows no bladder masses in the rectum and no nodularity in the cul-de-sac

## 2018-10-19 ENCOUNTER — TELEPHONE (OUTPATIENT)
Dept: OBGYN CLINIC | Facility: CLINIC | Age: 83
End: 2018-10-19

## 2018-10-19 LAB — BACTERIA GENITAL AEROBE CULT: NORMAL

## 2018-10-19 NOTE — TELEPHONE ENCOUNTER
----- Message from Elenita Jeter MD sent at 10/19/2018  1:47 PM EDT -----  Please call the patient regarding her vaginal culture  Her vaginal culture showed no evidence of infection  This was done because she has a pessary in the vagina for years

## 2018-10-19 NOTE — TELEPHONE ENCOUNTER
Spoke with pt daughter Sabrina Abad - she is on pt HIPAA communication consent form - lm of culture results

## 2018-11-05 ENCOUNTER — PROCEDURE VISIT (OUTPATIENT)
Dept: NEUROLOGY | Facility: CLINIC | Age: 83
End: 2018-11-05
Payer: MEDICARE

## 2018-11-05 VITALS
WEIGHT: 135 LBS | HEIGHT: 60 IN | HEART RATE: 80 BPM | DIASTOLIC BLOOD PRESSURE: 80 MMHG | BODY MASS INDEX: 26.5 KG/M2 | SYSTOLIC BLOOD PRESSURE: 132 MMHG

## 2018-11-05 DIAGNOSIS — G24.3 CERVICAL DYSTONIA: Primary | ICD-10-CM

## 2018-11-05 PROCEDURE — 64616 CHEMODENERV MUSC NECK DYSTON: CPT | Performed by: PSYCHIATRY & NEUROLOGY

## 2018-11-05 PROCEDURE — 95874 GUIDE NERV DESTR NEEDLE EMG: CPT | Performed by: PSYCHIATRY & NEUROLOGY

## 2018-11-05 NOTE — PROGRESS NOTES
Patient ID: Em Hanley is a 80 y o  female  Assessment/Plan:    No problem-specific Assessment & Plan notes found for this encounter  {Assess/PlanSmartLinks:41215}       Subjective:    HPI    {[facility] HPI texts:55779}    {Common ambulatory SmartLinks:85812}         Objective:    Blood pressure 132/80, pulse 80, height 5' (1 524 m), weight 61 2 kg (135 lb), not currently breastfeeding  Physical Exam    Neurological Exam      ROS:    Review of Systems   Constitutional: Negative  HENT: Negative  Eyes: Negative  Respiratory: Negative  Cardiovascular: Negative  Gastrointestinal: Negative  Endocrine: Negative  Genitourinary: Negative  Musculoskeletal: Negative  Skin: Negative  Allergic/Immunologic: Negative  Neurological: Negative  Hematological: Negative  Psychiatric/Behavioral: Negative

## 2018-11-05 NOTE — PROGRESS NOTES
Chemodenervation  Date/Time: 11/5/2018 8:55 PM  Performed by: Zarina Jacobs  Authorized by: Zarina Jacobs     Pre-procedure details:     Prepped With: Alcohol    Anesthesia (see MAR for exact dosages): Anesthesia method:  None  Procedure details:     Guidance: EMG    Botox:     Brand:  Botox    mL's of preservative free sterile saline:  2    Final Concentration per CC:  50 units    Needle Gauge:  30 G 2 5 inch  Procedures:     Botox Procedures: blepharospasm and cervical dystonia      Indications: blepharospasm and spasmodic torticollis      Date of last injection:  6/18/2018  Total Units:     Total units discarded:  75  Post-procedure details:     Chemodenervation:  Neck, excluding muscles of the larynx and head or face    Patient tolerance of procedure: Tolerated well, no immediate complications  Comments:      Injection lasting about 4 months  Exam today with increase blinking  : Slight right turn toritcollis and latercollis  No tremor     2 5 unit(s) in 1 injection(s) was injected into the right  muscle  2 5 unit(s) in 1 injection(s) was injected into the left  muscle  10 unit(s) in 4 (2 5 ) injection(s) was injected into the right orbicularis oculi (upper inner, upper and lower outer and lateral canthus)  10 unit(s) in 4 (x2 5) injection(s) was injected into the left orbicularis oculi      25 unit(s) was injected into the left sternocleidomastoid muscle  --    50 (25 side, 25 low) unit(s) was injected into the right splenius capitus muscle  --    25 unit(s) was injected into the right levator scapulae muscle     Total injected 125 units  Discarded 75

## 2019-02-01 ENCOUNTER — TELEPHONE (OUTPATIENT)
Dept: OBGYN CLINIC | Facility: MEDICAL CENTER | Age: 84
End: 2019-02-01

## 2019-02-01 DIAGNOSIS — M81.0 AGE RELATED OSTEOPOROSIS, UNSPECIFIED PATHOLOGICAL FRACTURE PRESENCE: Primary | ICD-10-CM

## 2019-02-01 DIAGNOSIS — I10 ESSENTIAL HYPERTENSION: ICD-10-CM

## 2019-02-01 RX ORDER — AMLODIPINE BESYLATE AND BENAZEPRIL HYDROCHLORIDE 5; 20 MG/1; MG/1
1 CAPSULE ORAL DAILY
Qty: 90 CAPSULE | Refills: 1 | Status: SHIPPED | OUTPATIENT
Start: 2019-02-01 | End: 2019-02-18 | Stop reason: SDUPTHER

## 2019-02-01 RX ORDER — ALENDRONATE SODIUM 70 MG/1
70 TABLET ORAL
Qty: 12 TABLET | Refills: 3 | Status: SHIPPED | OUTPATIENT
Start: 2019-02-01 | End: 2020-01-29 | Stop reason: SDUPTHER

## 2019-02-01 NOTE — TELEPHONE ENCOUNTER
Patient's daughter is trying to get her prescriptions onto a mail order  She stated that a fax request was sent to the office for [facility]  Please advise patient

## 2019-02-07 ENCOUNTER — TELEPHONE (OUTPATIENT)
Dept: OBGYN CLINIC | Facility: CLINIC | Age: 84
End: 2019-02-07

## 2019-02-13 DIAGNOSIS — M81.0 AGE RELATED OSTEOPOROSIS, UNSPECIFIED PATHOLOGICAL FRACTURE PRESENCE: ICD-10-CM

## 2019-02-13 RX ORDER — ALENDRONATE SODIUM 70 MG/1
TABLET ORAL
Qty: 12 TABLET | Refills: 1 | Status: SHIPPED | OUTPATIENT
Start: 2019-02-13 | End: 2019-08-26 | Stop reason: SDUPTHER

## 2019-02-18 DIAGNOSIS — I10 ESSENTIAL HYPERTENSION: ICD-10-CM

## 2019-02-19 RX ORDER — AMLODIPINE BESYLATE AND BENAZEPRIL HYDROCHLORIDE 5; 20 MG/1; MG/1
1 CAPSULE ORAL DAILY
Qty: 90 CAPSULE | Refills: 1 | Status: SHIPPED | OUTPATIENT
Start: 2019-02-19 | End: 2019-08-12 | Stop reason: SDUPTHER

## 2019-02-20 ENCOUNTER — TELEPHONE (OUTPATIENT)
Dept: OBGYN CLINIC | Facility: CLINIC | Age: 84
End: 2019-02-20

## 2019-02-20 NOTE — TELEPHONE ENCOUNTER
[phone] call daughter back re rx on back order per [facility] called rx to [facility]

## 2019-03-11 ENCOUNTER — PROCEDURE VISIT (OUTPATIENT)
Dept: NEUROLOGY | Facility: CLINIC | Age: 84
End: 2019-03-11
Payer: MEDICARE

## 2019-03-11 VITALS
BODY MASS INDEX: 28.34 KG/M2 | HEIGHT: 58 IN | DIASTOLIC BLOOD PRESSURE: 78 MMHG | SYSTOLIC BLOOD PRESSURE: 122 MMHG | TEMPERATURE: 97.3 F | WEIGHT: 135 LBS

## 2019-03-11 DIAGNOSIS — G24.3 CERVICAL DYSTONIA: ICD-10-CM

## 2019-03-11 DIAGNOSIS — G24.5 BLEPHAROSPASM: Primary | ICD-10-CM

## 2019-03-11 PROCEDURE — 64616 CHEMODENERV MUSC NECK DYSTON: CPT | Performed by: PSYCHIATRY & NEUROLOGY

## 2019-03-11 PROCEDURE — 95874 GUIDE NERV DESTR NEEDLE EMG: CPT | Performed by: PSYCHIATRY & NEUROLOGY

## 2019-03-11 PROCEDURE — 64612 DESTROY NERVE FACE MUSCLE: CPT | Performed by: PSYCHIATRY & NEUROLOGY

## 2019-03-11 NOTE — PROGRESS NOTES
Chemodenervation  Date/Time: 3/11/2019 9:38 AM  Performed by: Ace Temple MD  Authorized by: Ace Temple MD     Pre-procedure details:     Prepped With: Alcohol    Anesthesia (see MAR for exact dosages): Anesthesia method:  None  Procedure details:     Guidance: EMG    Botox:     Brand:  Botox    mL's of preservative free sterile saline:  2    Final Concentration per CC:  50 units    Needle Gauge:  30 G 2 5 inch  Procedures:     Botox Procedures: blepharospasm and cervical dystonia      Indications: blepharospasm and spasmodic torticollis      Date of last injection:  11/5/2018  Total Units:     Total units discarded:  75  Post-procedure details:     Chemodenervation:  Neck, excluding muscles of the larynx and head or face    Patient tolerance of procedure: Tolerated well, no immediate complications  Comments:         Injection lasting about 4 months  Exam today with mild lower facial movements  Slight right turn toritcollis and latercollis  No tremor     2 5 unit(s) in 1 injection(s) was injected into the right  muscle  2 5 unit(s) in 1 injection(s) was injected into the left  muscle  10 unit(s) in 4 (2 5 ) injection(s) was injected into the right orbicularis oculi (upper inner, upper and lower outer and lateral canthus)  10 unit(s) in 4 (x2 5) injection(s) was injected into the left orbicularis oculi      25 unit(s) was injected into the left sternocleidomastoid muscle  --    50 (25 side, 25 low) unit(s) was injected into the right splenius capitus muscle  --    25 unit(s) was injected into the right levator scapulae muscle     Total injected 125 units  Discarded 75

## 2019-06-18 ENCOUNTER — DOCUMENTATION (OUTPATIENT)
Dept: NEUROLOGY | Facility: CLINIC | Age: 84
End: 2019-06-18

## 2019-07-15 ENCOUNTER — PROCEDURE VISIT (OUTPATIENT)
Dept: NEUROLOGY | Facility: CLINIC | Age: 84
End: 2019-07-15
Payer: MEDICARE

## 2019-07-15 VITALS — DIASTOLIC BLOOD PRESSURE: 73 MMHG | TEMPERATURE: 97.3 F | SYSTOLIC BLOOD PRESSURE: 145 MMHG

## 2019-07-15 DIAGNOSIS — G24.8 SEGMENTAL DYSTONIA: Primary | ICD-10-CM

## 2019-07-15 DIAGNOSIS — G24.5 BLEPHAROSPASM: ICD-10-CM

## 2019-07-15 DIAGNOSIS — G24.3 SPASMODIC TORTICOLLIS: ICD-10-CM

## 2019-07-15 PROCEDURE — 64616 CHEMODENERV MUSC NECK DYSTON: CPT | Performed by: PSYCHIATRY & NEUROLOGY

## 2019-07-15 PROCEDURE — 64612 DESTROY NERVE FACE MUSCLE: CPT | Performed by: PSYCHIATRY & NEUROLOGY

## 2019-07-15 NOTE — PROGRESS NOTES
Chemodenervation  Date/Time: 7/15/2019 3:32 PM  Performed by: Ramin Vu MD  Authorized by: Ramin Vu MD     Pre-procedure details:     Prepped With: Alcohol    Anesthesia (see MAR for exact dosages): Anesthesia method:  None  Procedure details:     Position:  Upright  Botox:     Brand:  Botox    mL's of preservative free sterile saline:  2    Final Concentration per CC:  50 units    Needle Gauge:  30 G 2 5 inch  Procedures:     Botox Procedures: cervical dystonia      Indications: spasmodic torticollis    Post-procedure details:     Chemodenervation:  Neck, excluding muscles of the larynx and head or face    Patient tolerance of procedure: Tolerated well, no immediate complications  Comments:      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        Interm History:   Injections are lasting about 4 months  Exam today with mild lower facial / oral movements  Slight right turn toritcollis and latercollis  No tremor     2 5 unit(s) in 1 injection(s) was injected into the right  muscle  2 5 unit(s) in 1 injection(s) was injected into the left  muscle  10 unit(s) in 4 (2 5 ) injection(s) was injected into the right orbicularis oculi (upper inner, upper and lower outer and lateral canthus)  10 unit(s) in 4 (x2 5) injection(s) was injected into the left orbicularis oculi      25 unit(s) was injected into the left sternocleidomastoid muscle  --    50 (25 side, 25 low) unit(s) was injected into the right splenius capitus muscle  --    25 unit(s) was injected into the right levator scapulae muscle     Total injected 125 units  Discarded 75

## 2019-08-09 DIAGNOSIS — M81.0 AGE RELATED OSTEOPOROSIS, UNSPECIFIED PATHOLOGICAL FRACTURE PRESENCE: Primary | ICD-10-CM

## 2019-08-09 RX ORDER — ALENDRONATE SODIUM 70 MG/1
TABLET ORAL
Qty: 12 TABLET | Refills: 1 | Status: SHIPPED | OUTPATIENT
Start: 2019-08-09 | End: 2019-08-26 | Stop reason: SDUPTHER

## 2019-08-12 DIAGNOSIS — I10 ESSENTIAL HYPERTENSION: ICD-10-CM

## 2019-08-14 DIAGNOSIS — I10 ESSENTIAL HYPERTENSION: ICD-10-CM

## 2019-08-14 RX ORDER — AMLODIPINE BESYLATE AND BENAZEPRIL HYDROCHLORIDE 5; 20 MG/1; MG/1
CAPSULE ORAL
Qty: 30 CAPSULE | Refills: 0 | Status: SHIPPED | OUTPATIENT
Start: 2019-08-14 | End: 2019-10-25 | Stop reason: SDUPTHER

## 2019-08-14 RX ORDER — AMLODIPINE BESYLATE AND BENAZEPRIL HYDROCHLORIDE 5; 20 MG/1; MG/1
1 CAPSULE ORAL DAILY
Qty: 90 CAPSULE | Refills: 0 | Status: SHIPPED | OUTPATIENT
Start: 2019-08-14 | End: 2019-08-26 | Stop reason: SDUPTHER

## 2019-08-26 ENCOUNTER — OFFICE VISIT (OUTPATIENT)
Dept: FAMILY MEDICINE CLINIC | Facility: MEDICAL CENTER | Age: 84
End: 2019-08-26
Payer: MEDICARE

## 2019-08-26 ENCOUNTER — APPOINTMENT (OUTPATIENT)
Dept: LAB | Facility: MEDICAL CENTER | Age: 84
End: 2019-08-26
Payer: MEDICARE

## 2019-08-26 VITALS
DIASTOLIC BLOOD PRESSURE: 80 MMHG | BODY MASS INDEX: 26.51 KG/M2 | RESPIRATION RATE: 16 BRPM | HEIGHT: 59 IN | HEART RATE: 90 BPM | WEIGHT: 131.5 LBS | SYSTOLIC BLOOD PRESSURE: 150 MMHG

## 2019-08-26 DIAGNOSIS — R39.81 FUNCTIONAL URINARY INCONTINENCE: ICD-10-CM

## 2019-08-26 DIAGNOSIS — Z23 ENCOUNTER FOR IMMUNIZATION: ICD-10-CM

## 2019-08-26 DIAGNOSIS — I10 ESSENTIAL HYPERTENSION: Primary | ICD-10-CM

## 2019-08-26 DIAGNOSIS — E78.00 HYPERCHOLESTEREMIA: ICD-10-CM

## 2019-08-26 DIAGNOSIS — Z00.00 MEDICARE ANNUAL WELLNESS VISIT, SUBSEQUENT: ICD-10-CM

## 2019-08-26 DIAGNOSIS — Z91.81 HX OF FALL: ICD-10-CM

## 2019-08-26 DIAGNOSIS — R73.9 HYPERGLYCEMIA: ICD-10-CM

## 2019-08-26 DIAGNOSIS — I10 ESSENTIAL HYPERTENSION: ICD-10-CM

## 2019-08-26 LAB
ALBUMIN SERPL BCP-MCNC: 3.7 G/DL (ref 3.5–5)
ALP SERPL-CCNC: 51 U/L (ref 46–116)
ALT SERPL W P-5'-P-CCNC: 22 U/L (ref 12–78)
ANION GAP SERPL CALCULATED.3IONS-SCNC: 6 MMOL/L (ref 4–13)
AST SERPL W P-5'-P-CCNC: 17 U/L (ref 5–45)
BILIRUB SERPL-MCNC: 0.44 MG/DL (ref 0.2–1)
BUN SERPL-MCNC: 20 MG/DL (ref 5–25)
CALCIUM SERPL-MCNC: 9.8 MG/DL (ref 8.3–10.1)
CHLORIDE SERPL-SCNC: 103 MMOL/L (ref 100–108)
CHOLEST SERPL-MCNC: 250 MG/DL (ref 50–200)
CO2 SERPL-SCNC: 28 MMOL/L (ref 21–32)
CREAT SERPL-MCNC: 0.89 MG/DL (ref 0.6–1.3)
CREAT UR-MCNC: 111 MG/DL
EST. AVERAGE GLUCOSE BLD GHB EST-MCNC: 120 MG/DL
GFR SERPL CREATININE-BSD FRML MDRD: 58 ML/MIN/1.73SQ M
GLUCOSE SERPL-MCNC: 110 MG/DL (ref 65–140)
HBA1C MFR BLD: 5.8 % (ref 4.2–6.3)
HDLC SERPL-MCNC: 77 MG/DL (ref 40–60)
LDLC SERPL CALC-MCNC: 137 MG/DL (ref 0–100)
MICROALBUMIN UR-MCNC: 16.1 MG/L (ref 0–20)
MICROALBUMIN/CREAT 24H UR: 15 MG/G CREATININE (ref 0–30)
POTASSIUM SERPL-SCNC: 4.3 MMOL/L (ref 3.5–5.3)
PROT SERPL-MCNC: 8.2 G/DL (ref 6.4–8.2)
SODIUM SERPL-SCNC: 137 MMOL/L (ref 136–145)
TRIGL SERPL-MCNC: 180 MG/DL

## 2019-08-26 PROCEDURE — 83036 HEMOGLOBIN GLYCOSYLATED A1C: CPT

## 2019-08-26 PROCEDURE — 99214 OFFICE O/P EST MOD 30 MIN: CPT | Performed by: FAMILY MEDICINE

## 2019-08-26 PROCEDURE — 82043 UR ALBUMIN QUANTITATIVE: CPT | Performed by: FAMILY MEDICINE

## 2019-08-26 PROCEDURE — G0439 PPPS, SUBSEQ VISIT: HCPCS | Performed by: FAMILY MEDICINE

## 2019-08-26 PROCEDURE — 80061 LIPID PANEL: CPT

## 2019-08-26 PROCEDURE — 36415 COLL VENOUS BLD VENIPUNCTURE: CPT

## 2019-08-26 PROCEDURE — 82570 ASSAY OF URINE CREATININE: CPT | Performed by: FAMILY MEDICINE

## 2019-08-26 PROCEDURE — 80053 COMPREHEN METABOLIC PANEL: CPT

## 2019-08-26 NOTE — PROGRESS NOTES
Assessment and Plan:       Pietro Jones was seen today for medicare wellness visit  Diagnoses and all orders for this visit:      Medicare annual wellness visit, subsequent  -     Comprehensive metabolic panel; Future  -     Lipid Panel with Direct LDL reflex; Future  -     Hemoglobin A1C; Future  BMI Counseling: Body mass index is 26 56 kg/m²  Discussed the patient's BMI with her  The BMI is above average  BMI counseling and education was provided to the patient  Nutrition recommendations include decreasing overall calorie intake  Exercise recommendations include moderate aerobic physical activity for 150 minutes/week  Encounter for immunization  -     Zoster Vac Recomb Adjuvanted ([address] [facility] [address]) 50 MCG/0 5ML SUSR; Inject 50 mcg into a muscle once for 1 dose Repeat in 6mo    Hx of fall  -     Ambulatory referral to Physical Therapy; Future  Falls Plan of Care: Patient referred to physical therapy  Functional urinary incontinence  Followed by Urogynecology  No additional intervention at this time    Follow-up in six months or sooner if needed              Problem List Items Addressed This Visit        Cardiovascular and Mediastinum    Essential hypertension - Primary    Relevant Orders    Comprehensive metabolic panel    Microalbumin / creatinine urine ratio       Other    Functional urinary incontinence    Hypercholesteremia    Relevant Orders    Lipid Panel with Direct LDL reflex    Hyperglycemia    Relevant Orders    Hemoglobin A1C      Other Visit Diagnoses     Medicare annual wellness visit, subsequent        Relevant Orders    Comprehensive metabolic panel    Lipid Panel with Direct LDL reflex    Hemoglobin A1C    Encounter for immunization        Relevant Medications    Zoster Vac Recomb Adjuvanted (SHINGRIX) 50 MCG/0 5ML SUSR    Hx of fall        Relevant Orders    Ambulatory referral to Physical Therapy         History of Present Illness:     Patient presents for Medicare Annual Wellness visit    Patient Care Team:  Pema Zhang DO as PCP - General  Margie Lei MD (Neurology)  Melodie Cervantes MD (Urogynecology)     Problem List:     Patient Active Problem List   Diagnosis    Blepharospasm    Spasmodic torticollis    Anxiety    Depression    Essential hypertension    Family history of malignant neoplasm of breast    Functional urinary incontinence    Hypercholesteremia    MCI (mild cognitive impairment)    Osteoporosis    Torsion dystonia fragments    Segmental dystonia    Hyperglycemia      Past Medical and Surgical History:     Past Medical History:   Diagnosis Date    Breast lump in female     Osteopenia     Ovarian cyst      Past Surgical History:   Procedure Laterality Date    BLADDER SURGERY      BREAST BIOPSY      CATARACT EXTRACTION Bilateral 01/01/2015    , Right Eye-2017, Left eye    ENDOMETRIAL BIOPSY      by Suction    HYSTERECTOMY        Family History:     Family History   Problem Relation Age of Onset    Heart disease Mother     Heart disease Father     Hypertension Family     Osteoporosis Family       Social History:     Social History     Tobacco Use   Smoking Status Never Smoker   Smokeless Tobacco Never Used     Social History     Substance and Sexual Activity   Alcohol Use No     Social History     Substance and Sexual Activity   Drug Use No      Medications and Allergies:     Current Outpatient Medications   Medication Sig Dispense Refill    alendronate (FOSAMAX) 70 mg tablet Take 1 tablet (70 mg total) by mouth every 7 days 12 tablet 3    amLODIPine-benazepril (LOTREL 5-20) 5-20 MG per capsule TAKE 1 CAPSULE DAILY 30 capsule 0    calcium citrate-vitamin D (CITRACAL+D) 315-200 MG-UNIT per tablet Take 1 tablet by mouth daily      conjugated estrogens (PREMARIN) vaginal cream Insert into the vagina Ose as directed      Magnesium Malate 1250 (141 7 Mg) MG TABS Take by mouth      Multiple Vitamins-Minerals (MULTIVITAL) tablet Take 1 tablet by mouth daily      MYRBETRIQ 50 MG TB24 Take 1 tablet by mouth daily      Omega-3 Fatty Acids (FISH OIL) 645 MG CAPS Take 1 tablet by mouth daily      polyethylene glycol (MIRALAX) 17 g packet Take 1 Package by mouth 2 (two) times a day      Zoster Vac Recomb Adjuvanted (SHINGRIX) 50 MCG/0 5ML SUSR Inject 50 mcg into a muscle once for 1 dose Repeat in 6mo 1 each 1     Current Facility-Administered Medications   Medication Dose Route Frequency Provider Last Rate Last Dose    onabotulinumtoxin A (BOTOX) injection 200 Units  200 Units Intramuscular Q4 Months Ambrocio York MD   200 Units at 01/29/18 8865    onabotulinumtoxin A (BOTOX) injection 200 Units  200 Units Intramuscular Once Ambrocio York MD         No Known Allergies   Immunizations:     Immunization History   Administered Date(s) Administered    INFLUENZA 09/27/2013, 10/17/2014, 10/16/2015, 10/25/2016, 09/26/2017, 01/03/2018    Influenza Split High Dose Preservative Free IM 09/26/2017, 01/03/2018    Influenza, high dose seasonal 0 5 mL 10/02/2018    Pneumococcal Conjugate 13-Valent 08/21/2017    Pneumococcal Polysaccharide PPV23 08/24/2018    Zoster 01/22/2018, 01/31/2018      Medicare Screening Tests and Risk Assessments:     Joaquín Carrington is here for her Subsequent Wellness visit  Health Risk Assessment:  Patient rates overall health as good  Patient feels that their physical health rating is Same  Eyesight was rated as Same  Hearing was rated as Same  Patient feels that their emotional and mental health rating is Same  Pain experienced by patient in the last 7 days has been None  Patient states that she has experienced no weight loss or gain in last 6 months  Emotional/Mental Health:  Patient has not been feeling nervous/anxious  PHQ-9 Depression Screening:    Frequency of the following problems over the past two weeks:      1  Little interest or pleasure in doing things: 1 - several days      2   Feeling down, depressed, or hopeless: 1 - several days  PHQ-2 Score: 2          Broken Bones/Falls: Fall Risk Assessment:    In the past year, patient has experienced: History of falling in past year    Number of falls: 1    Injured during fall: No      Patient feels steady when standing or walking  Patient is not worried about falling  Bladder/Bowel:  Patient has leaked urine accidently in the last six months  Patient reports no loss of bowel control  Immunizations:  Patient has had a flu vaccination within the last year  Patient has received a pneumonia shot  Patient has received a shingles shot  Patient has not received tetanus/diphtheria shot  Home Safety:  Patient does not have trouble with stairs inside or outside of their home  Patient currently reports that there are no safety hazards present in home, working smoke alarms, working carbon monoxide detectors  Preventative Screenings:   Breast cancer screening performed, colon cancer screen completed, cholesterol screen completed, glaucoma eye exam completed,     Nutrition:  Current diet: Regular with servings of the following:    Medications:  Patient is currently taking over-the-counter supplements  List of OTC medications includes: vitamins   Patient is able to manage medications  Lifestyle Choices:  Patient reports no tobacco use  Patient has not smoked or used tobacco in the past   Patient reports no alcohol use  Patient drives a vehicle  Patient wears seat belt  Current level of exercise of physical activity described by patient as: Yard and house work           Activities of Daily Living:  Can get out of bed by his or her self, able to dress self, able to make own meals, able to do own shopping, able to bathe self, can do own laundry/housekeeping, can manage own money, pay bills and track expenses    Previous Hospitalizations:  No hospitalization or ED visit in past 12 months        Advanced Directives:  Patient has decided on a power of     Patient has spoken to designated power of   Patient has completed advanced directive  Preventative Screening/Counseling:      Cardiovascular:      General: Risks and Benefits Discussed      Counseling: Healthy Diet and Healthy Weight     Due for Labs/Analytes/Optional EKG: Lipid Panel          Diabetes:      General: Risks and Benefits Discussed      Counseling: Healthy Diet and Healthy Weight      Due for labs: Blood Glucose          Colorectal Cancer:      General: Screening Not Indicated          Breast Cancer:      General: Patient Declines          Cervical Cancer:      General: Screening Not Indicated          Osteoporosis:      General: Patient Declines          AAA:      General: Screening Not Indicated          Glaucoma:      General: Risks and Benefits Discussed          HIV:      General: Screening Not Indicated          Hepatitis C:      General: Screening Not Indicated        Advanced Directives:   5 wishes given  Immunizations:      Influenza: Influenza UTD This Year      Pneumococcal: Lifetime Vaccine Completed      Shingrix: Risks & Benefits Discussed      Zostavax: Zostavax Vaccine UTD  Additional Comments: Rx provided for Shingrix vaccine

## 2019-08-26 NOTE — PROGRESS NOTES
Assessment/Plan:    No problem-specific Assessment & Plan notes found for this encounter  Diagnoses and all orders for this visit:    Essential hypertension  -     Comprehensive metabolic panel; Future  -     Microalbumin / creatinine urine ratio  Blood pressure acceptable for age  Continue amlodipine with benazepril 5-20 mg daily  Check labs to assess renal function  Hyperglycemia  -     Hemoglobin A1C; Future  Check A1c to assess for over diabetes  Patient advised to avoid foods high in sugar  Hypercholesteremia  -     Lipid Panel with Direct LDL reflex; Future  Elevated on previous labs  Check a lipid profile  Follow-up in six months or sooner if needed  Subjective:      Patient ID: Sadiq Gonzalez is a 80 y o  female  Patient presents for follow-up  She has hypertension  She is currently on amlodipine with benazepril 5-20 mg daily  Tolerating medication well without any lightheadedness, dizziness, leg swelling or cough  She has hyperglycemia  She is not currently on any medication at this time  Does enjoy her sweets  She has hypercholesterolemia  She is not currently on any medication at this time  No history of coronary artery disease        The following portions of the patient's history were reviewed and updated as appropriate:   She  has a past medical history of Breast lump in female, Osteopenia, and Ovarian cyst   She   Patient Active Problem List    Diagnosis Date Noted    Hyperglycemia 08/26/2019    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    Family history of malignant neoplasm of breast 08/24/2018    Anxiety 03/25/2016    Essential hypertension 03/25/2016    MCI (mild cognitive impairment) 03/25/2016    Blepharospasm 08/04/2015    Depression 06/16/2014    Functional urinary incontinence 06/16/2014    Hypercholesteremia 06/16/2014    Osteoporosis 06/16/2014    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    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     She  has a past surgical history that includes Breast biopsy; Bladder surgery; Cataract extraction (Bilateral, 01/01/2015); Endometrial biopsy; and Hysterectomy  Her family history includes Heart disease in her father and mother; Hypertension in her family; Osteoporosis in her family  She  reports that she has never smoked  She has never used smokeless tobacco  She reports that she does not drink alcohol or use drugs    Current Outpatient Medications   Medication Sig Dispense Refill    alendronate (FOSAMAX) 70 mg tablet Take 1 tablet (70 mg total) by mouth every 7 days 12 tablet 3    amLODIPine-benazepril (LOTREL 5-20) 5-20 MG per capsule TAKE 1 CAPSULE DAILY 30 capsule 0    calcium citrate-vitamin D (CITRACAL+D) 315-200 MG-UNIT per tablet Take 1 tablet by mouth daily      conjugated estrogens (PREMARIN) vaginal cream Insert into the vagina Ose as directed      Magnesium Malate 1250 (141 7 Mg) MG TABS Take by mouth      Multiple Vitamins-Minerals (MULTIVITAL) tablet Take 1 tablet by mouth daily      MYRBETRIQ 50 MG TB24 Take 1 tablet by mouth daily      Omega-3 Fatty Acids (FISH OIL) 645 MG CAPS Take 1 tablet by mouth daily      polyethylene glycol (MIRALAX) 17 g packet Take 1 Package by mouth 2 (two) times a day      Zoster Vac Recomb Adjuvanted (SHINGRIX) 50 MCG/0 5ML SUSR Inject 50 mcg into a muscle once for 1 dose Repeat in 6mo 1 each 1     Current Facility-Administered Medications   Medication Dose Route Frequency Provider Last Rate Last Dose    onabotulinumtoxin A (BOTOX) injection 200 Units  200 Units Intramuscular Q4 Months Bridger Rhodes MD   200 Units at 01/29/18 6225    onabotulinumtoxin A (BOTOX) injection 200 Units  200 Units Intramuscular Once Bridger Rhodes MD         Current Outpatient Medications on File Prior to Visit   Medication Sig    alendronate (FOSAMAX) 70 mg tablet Take 1 tablet (70 mg total) by mouth every 7 days    amLODIPine-benazepril (LOTREL 5-20) 5-20 MG per capsule TAKE 1 CAPSULE DAILY    calcium citrate-vitamin D (CITRACAL+D) 315-200 MG-UNIT per tablet Take 1 tablet by mouth daily    conjugated estrogens (PREMARIN) vaginal cream Insert into the vagina Ose as directed    Magnesium Malate 1250 (141 7 Mg) MG TABS Take by mouth    Multiple Vitamins-Minerals (MULTIVITAL) tablet Take 1 tablet by mouth daily    MYRBETRIQ 50 MG TB24 Take 1 tablet by mouth daily    Omega-3 Fatty Acids (FISH OIL) 645 MG CAPS Take 1 tablet by mouth daily    polyethylene glycol (MIRALAX) 17 g packet Take 1 Package by mouth 2 (two) times a day    [DISCONTINUED] alendronate (FOSAMAX) 70 mg tablet TAKE 1 TABLET BY MOUTH EVERY 7 DAYS    [DISCONTINUED] alendronate (FOSAMAX) 70 mg tablet TAKE 1 TABLET ONCE A WEEK    [DISCONTINUED] amLODIPine-benazepril (LOTREL 5-20) 5-20 MG per capsule Take 1 capsule by mouth daily     Current Facility-Administered Medications on File Prior to Visit   Medication    onabotulinumtoxin A (BOTOX) injection 200 Units    onabotulinumtoxin A (BOTOX) injection 200 Units     She has No Known Allergies       Review of Systems   Constitutional: Negative for fever  Respiratory: Negative for shortness of breath  Cardiovascular: Negative for chest pain  Objective:      /80   Pulse 90   Resp 16   Ht 4' 11" (1 499 m)   Wt 59 6 kg (131 lb 8 oz)   BMI 26 56 kg/m²          Physical Exam   Constitutional: She appears well-developed and well-nourished  Cardiovascular: Normal rate, regular rhythm and normal heart sounds     Pulmonary/Chest: Effort normal and breath sounds normal

## 2019-09-04 ENCOUNTER — EVALUATION (OUTPATIENT)
Dept: PHYSICAL THERAPY | Facility: MEDICAL CENTER | Age: 84
End: 2019-09-04
Payer: MEDICARE

## 2019-09-04 DIAGNOSIS — Z91.81 HX OF FALL: ICD-10-CM

## 2019-09-04 DIAGNOSIS — R26.9 GAIT ABNORMALITY: Primary | ICD-10-CM

## 2019-09-04 PROCEDURE — 97112 NEUROMUSCULAR REEDUCATION: CPT | Performed by: PHYSICAL THERAPIST

## 2019-09-04 PROCEDURE — G8978 MOBILITY CURRENT STATUS: HCPCS | Performed by: PHYSICAL THERAPIST

## 2019-09-04 PROCEDURE — 97162 PT EVAL MOD COMPLEX 30 MIN: CPT | Performed by: PHYSICAL THERAPIST

## 2019-09-04 PROCEDURE — G8979 MOBILITY GOAL STATUS: HCPCS | Performed by: PHYSICAL THERAPIST

## 2019-09-04 NOTE — PROGRESS NOTES
PT Evaluation     Today's date: 2019  Patient name: Mena Goldberg  : 1931  MRN: [id number]  Referring provider: Tim Rosado DO  Dx:   Encounter Diagnosis     ICD-10-CM    1  Gait abnormality R26 9    2  Hx of fall Z91 81 Ambulatory referral to Physical Therapy                  Assessment  Assessment details: Pt is an alert and oriented 79 yo female, referred to out-pt PT with dx of gait abnormality due to recent fall  Pt states she slipped on a wet porch and landed on her knees  Pt states mild bruisning only, but is now fearful for future falls  Pt states she lives with her daughter, but is able to do most of her daily activities independently  Pt states she feels her legs are getting weak, and that is her primary concern  Upon examination, pt demonstrates b/l LE weakness, gait and balance deficits  Pt will benefit from skilled PT to address the deficits listed above and maximize function  Thank you for your referral   Impairments: abnormal gait, abnormal or restricted ROM, activity intolerance, impaired balance, impaired physical strength and lacks appropriate home exercise program    Goals  ST: Improve GOMEZ balance score by 1-2 points in 4 weeks to improve function  2: Improved ROM by 25% in 4 weeks to assist with all functional activities  3: Improve strength by 1/2 grade in 4 weeks to assist with all functional activities  LT:  Improve GOMEZ balance score by 3-4  points to improve balance with functional activities  2: Improved ROM to WFL's in 4-6 weeks to assist with all functional activities  3:  Increase strength to WFL's in 4-6 weeks to assist with all functional activities    Plan  Patient would benefit from: skilled physical therapy  Frequency: 2x week  Duration in visits: 12  Duration in weeks: 6  Plan of Care beginning date: 10/16/2019  Treatment plan discussed with: patient        Subjective Evaluation    Pain  No pain reported    Social Support  Steps to enter house: yes  3  Stairs in house: yes   14  Lives in: multiple-level home  Lives with: adult children    Treatments  Current treatment: physical therapy  Patient Goals  Patient goals for therapy: improved balance, increased strength and independence with ADLs/IADLs          Objective     Observations     Additional Observation Details  Observation: small frame, 81 yo female, decreased lordosis, mild forward head posture and trunk flexion in standing    Gait:  Pt amb ind without AD, slowed taina,     Sit-stand transfers appear to be quick and impulsive    GOMEZ/56 (Moderate Falls Risk)    Neurological Testing     Sensation     Hip   Left Hip   Intact: light touch    Right Hip   Intact: light touch    Strength/Myotome Testing     Left Hip   Planes of Motion   Flexion: 4-  Extension: 3+  Abduction: 3+  Adduction: 3+    Right Hip   Planes of Motion   Flexion: 4-  Extension: 3+  Abduction: 3+  Adduction: 3+    Left Knee   Flexion: 3+  Extension: 4-    Right Knee   Flexion: 3+  Extension: 4-    Left Ankle/Foot   Dorsiflexion: 4-  Plantar flexion: 3+  Inversion: 3+  Eversion: 3+    Right Ankle/Foot   Dorsiflexion: 4-  Plantar flexion: 3+  Inversion: 3+  Eversion: 3+      Flowsheet Rows      Most Recent Value   PT/OT G-Codes   Current Score  48   Projected Score  70   FOTO information reviewed  Yes   Assessment Type  Evaluation   G code set  Mobility: Walking & Moving Around   Mobility: Walking and Moving Around Current Status ()  CJ   Mobility: Walking and Moving Around Goal Status ()  CJ             Precautions: HTN, Falls risk,     Manual                                                                                   Exercise Diary              bike             marching             sidestepping x4laps            Tandem gait             Bal on foam FA EO/EC             Mini squats x20             heel-toe raises x20            SLS             Hip add iso             LAQ 1 5#            Seated marches 1 5# TB hip abd                                                                                                                         Modalities

## 2019-09-09 ENCOUNTER — OFFICE VISIT (OUTPATIENT)
Dept: PHYSICAL THERAPY | Facility: MEDICAL CENTER | Age: 84
End: 2019-09-09
Payer: MEDICARE

## 2019-09-09 DIAGNOSIS — Z91.81 HX OF FALL: ICD-10-CM

## 2019-09-09 DIAGNOSIS — R26.9 GAIT ABNORMALITY: Primary | ICD-10-CM

## 2019-09-09 PROCEDURE — 97110 THERAPEUTIC EXERCISES: CPT | Performed by: PHYSICAL THERAPIST

## 2019-09-09 PROCEDURE — 97112 NEUROMUSCULAR REEDUCATION: CPT | Performed by: PHYSICAL THERAPIST

## 2019-09-09 NOTE — PROGRESS NOTES
Daily Note     Today's date: 2019  Patient name: Rick Parekh  : 1931  MRN: [id number]  Referring provider: Gabbie Muñoz DO  Dx:   Encounter Diagnosis     ICD-10-CM    1  Gait abnormality R26 9    2  Hx of fall Z91 81                   Subjective: Pt states she feels good upon presentation, and no leg pain post new ex initiated for HEP  Objective: See treatment diary below      Assessment: Tolerated treatment well  Patient demonstrated fatigue post treatment and would benefit from continued PT   Pt has most difficulty with tandem gait, and balance with EC on foam activities, requiring min A to maintain balance  Plan: Continue per plan of care        Precautions: HTN, Falls risk,     Manual                                                                                   Exercise Diary             bike  x5'           marching  x3laps           sidestepping x4laps x3laps           Tandem gait  x3laps           Bal on foam FA EO/EC  30"x2ea           Mini squats x20 20x             heel-toe raises x20 x20           SLS  10" x 5            Hip add iso  5"x20           LAQ 1 5# 1 5# 5"x20           Seated marches 1 5#  1 5# x20           TB hip abd                                                                                                                         Modalities

## 2019-09-13 ENCOUNTER — OFFICE VISIT (OUTPATIENT)
Dept: PHYSICAL THERAPY | Facility: MEDICAL CENTER | Age: 84
End: 2019-09-13
Payer: MEDICARE

## 2019-09-13 DIAGNOSIS — R26.9 GAIT ABNORMALITY: Primary | ICD-10-CM

## 2019-09-13 DIAGNOSIS — Z91.81 HX OF FALL: ICD-10-CM

## 2019-09-13 PROCEDURE — 97110 THERAPEUTIC EXERCISES: CPT | Performed by: PHYSICAL THERAPIST

## 2019-09-13 PROCEDURE — 97112 NEUROMUSCULAR REEDUCATION: CPT | Performed by: PHYSICAL THERAPIST

## 2019-09-13 NOTE — PROGRESS NOTES
Daily Note     Today's date: 2019  Patient name: Sadiq Gonzalez  : 1931  MRN: [id number]  Referring provider: Reinaldo Blount DO  Dx:   Encounter Diagnosis     ICD-10-CM    1  Gait abnormality R26 9    2  Hx of fall Z91 81                   Subjective: Pt states mild fatigue after last visit, but states "it's nothing I can't handle "  Pt denies any LOB or near falls since last visit  Objective: See treatment diary below      Assessment: Tolerated treatment well  Patient demonstrated fatigue post treatment and would benefit from continued PT   Pt states most difficulty with FT on foam with EC, stating she feels "very shaky"  Plan: Continue per plan of care        Precautions: HTN, Falls risk,     Manual                                                                                   Exercise Diary            bike  x5' x7'          marching  x3laps x3laps          sidestepping x4laps x3laps x3laps          Tandem gait  x3laps x3laps          Bal on foam FA EO/EC  30"x2ea 30"x2ea w/perts          Mini squats x20 20x  x20           heel-toe raises x20 x20 x20          SLS  10" x 5  10"x5          Hip add iso  5"x20 5"x20          LAQ 1 5# 1 5# 5"x20 1 5# 5"x20          Seated marches 1 5#  1 5# x20 1 5# x20          TB hip abd                                                                                                                         Modalities

## 2019-09-16 ENCOUNTER — OFFICE VISIT (OUTPATIENT)
Dept: PHYSICAL THERAPY | Facility: MEDICAL CENTER | Age: 84
End: 2019-09-16
Payer: MEDICARE

## 2019-09-16 DIAGNOSIS — Z91.81 HX OF FALL: ICD-10-CM

## 2019-09-16 DIAGNOSIS — R26.9 GAIT ABNORMALITY: Primary | ICD-10-CM

## 2019-09-16 PROCEDURE — 97112 NEUROMUSCULAR REEDUCATION: CPT | Performed by: PHYSICAL THERAPIST

## 2019-09-16 PROCEDURE — 97110 THERAPEUTIC EXERCISES: CPT | Performed by: PHYSICAL THERAPIST

## 2019-09-16 NOTE — PROGRESS NOTES
Daily Note     Today's date: 2019  Patient name: Juan Christiansen  : 1931  MRN: [id number]  Referring provider: Tyshawn Laura DO  Dx:   Encounter Diagnosis     ICD-10-CM    1  Gait abnormality R26 9    2  Hx of fall Z91 81                   Subjective: Pt states she is tired upon presentation due to doing a lot of laundry and housework prior to PT today  Pt states she very rarely feels off balance anymore  Objective: See treatment diary below      Assessment: Tolerated treatment well  Patient demonstrated fatigue post treatment and would benefit from continued PT   Initiated TB hip abd as charted  Pt states most difficulty/unsteadiness persists with foam activities  Plan: Continue per plan of care        Precautions: HTN, Falls risk,     Manual                                                                                   Exercise Diary           bike  x5' x7' x7'         marching  x3laps x3laps 1 5# x3laps         sidestepping x4laps x3laps x3laps 1 5# x3laps         Tandem gait  x3laps x3laps x3laps         Bal on foam FA EO/EC  30"x2ea 30"x2ea w/perts 30"x2ea w/perts         Mini squats x20 20x  x20 x20          heel-toe raises x20 x20 x20 x20         SLS  10" x 5  10"x5 10"x5         Hip add iso  5"x20 5"x20 5"x20         LAQ 1 5# 1 5# 5"x20 1 5# 5"x20 1 5# 5"x20         Seated marches 1 5#  1 5# x20 1 5# x20 1 5# x20         TB hip abd    GTB 5"x20                                                                                                                     Modalities

## 2019-09-18 ENCOUNTER — OFFICE VISIT (OUTPATIENT)
Dept: PHYSICAL THERAPY | Facility: MEDICAL CENTER | Age: 84
End: 2019-09-18
Payer: MEDICARE

## 2019-09-18 DIAGNOSIS — Z91.81 HX OF FALL: ICD-10-CM

## 2019-09-18 DIAGNOSIS — R26.9 GAIT ABNORMALITY: Primary | ICD-10-CM

## 2019-09-18 PROCEDURE — 97110 THERAPEUTIC EXERCISES: CPT | Performed by: PHYSICAL THERAPIST

## 2019-09-18 PROCEDURE — G8978 MOBILITY CURRENT STATUS: HCPCS | Performed by: PHYSICAL THERAPIST

## 2019-09-18 PROCEDURE — G8979 MOBILITY GOAL STATUS: HCPCS | Performed by: PHYSICAL THERAPIST

## 2019-09-18 PROCEDURE — 97112 NEUROMUSCULAR REEDUCATION: CPT | Performed by: PHYSICAL THERAPIST

## 2019-09-18 NOTE — PROGRESS NOTES
Daily Note     Today's date: 2019  Patient name: Shakeel Villegas  : 1931  MRN: [id number]  Referring provider: Ricci Fulton DO  Dx:   Encounter Diagnosis     ICD-10-CM    1  Gait abnormality R26 9    2  Hx of fall Z91 81                   Subjective: Pt states she feels her balance is improving and is not fearful of falling at this time  Discussed with pt RA next week with possible D/C POC  Objective: See treatment diary below      Assessment: Tolerated treatment well  Patient demonstrated fatigue post treatment and would benefit from continued PT   Pt required decreased UE support with marching and tandem gait activities, however balance on foam cont to be her most challenging  Plan: Continue per plan of care        Precautions: HTN, Falls risk,     Manual                                                                                   Exercise Diary          bike  x5' x7' x7' x7'        marching  x3laps x3laps 1 5# x3laps 1 5# x3laps        sidestepping x4laps x3laps x3laps 1 5# x3laps x3laps        Tandem gait  x3laps x3laps x3laps x3laps        Bal on foam FA EO/EC  30"x2ea 30"x2ea w/perts 30"x2ea w/perts 30"x2 ea with perts        Mini squats x20 20x  x20 x20 x20         heel-toe raises x20 x20 x20 x20 x20        SLS  10" x 5  10"x5 10"x5 NP        Hip add iso  5"x20 5"x20 5"x20 5"x20        LAQ 1 5# 1 5# 5"x20 1 5# 5"x20 1 5# 5"x20 1 5# 5"x20        Seated marches 1 5#  1 5# x20 1 5# x20 1 5# x20 1 5# x20        TB hip abd    GTB 5"x20 GTB 5"x20                                                                                                                    Modalities

## 2019-09-23 ENCOUNTER — OFFICE VISIT (OUTPATIENT)
Dept: PHYSICAL THERAPY | Facility: MEDICAL CENTER | Age: 84
End: 2019-09-23
Payer: MEDICARE

## 2019-09-23 DIAGNOSIS — Z91.81 HX OF FALL: ICD-10-CM

## 2019-09-23 DIAGNOSIS — R26.9 GAIT ABNORMALITY: Primary | ICD-10-CM

## 2019-09-23 PROCEDURE — 97110 THERAPEUTIC EXERCISES: CPT | Performed by: PHYSICAL THERAPIST

## 2019-09-23 PROCEDURE — 97112 NEUROMUSCULAR REEDUCATION: CPT | Performed by: PHYSICAL THERAPIST

## 2019-09-23 NOTE — PROGRESS NOTES
Daily Note     Today's date: 2019  Patient name: Christian Hill  : 1931  MRN: [id number]  Referring provider: Sera Ochoa DO  Dx:   Encounter Diagnosis     ICD-10-CM    1  Gait abnormality R26 9    2  Hx of fall Z91 81                   Subjective:   Pt states she feels more steady on her feet at this time and is no longer fearful of falling  Pt feels steady gains in LE strength, and states the only functional limitation she has is she has to go slow on the stairs otherwise feels unsteady  Pt is ind with all adl's/IADL's  Pt denies any difficulty with driving, car or tub transfers  Objective: See treatment diary below      Assessment: Tolerated treatment well  Patient exhibited good technique with therapeutic exercises   GOMEZ/56 (IE score was 31/56); pt is a low falls risk  Pt did well on stairs with use of 1 HR without feeling of unsteadiness  Plan: Potential discharge next visit       Precautions: HTN, Falls risk,     Manual                                                                                   Exercise Diary         bike  x5' x7' x7' x7' x7'       marching  x3laps x3laps 1 5# x3laps 1 5# x3laps 1 5# x20       sidestepping x4laps x3laps x3laps 1 5# x3laps x3laps x3laps       Tandem gait  x3laps x3laps x3laps x3laps x3laps       Bal on foam FA EO/EC  30"x2ea 30"x2ea w/perts 30"x2ea w/perts 30"x2 ea with perts 30"x2ea       Mini squats x20 20x  x20 x20 x20 x20        heel-toe raises x20 x20 x20 x20 x20 x20       SLS  10" x 5  10"x5 10"x5 NP        Hip add iso  5"x20 5"x20 5"x20 5"x20 5"x20       LAQ 1 5# 1 5# 5"x20 1 5# 5"x20 1 5# 5"x20 1 5# 5"x20 1 5# 5"x20       Seated marches 1 5#  1 5# x20 1 5# x20 1 5# x20 1 5# x20 1 5# x20       TB hip abd    GTB 5"x20 GTB 5"x20        Stair training      x5laps                                                                                                      Modalities

## 2019-09-25 ENCOUNTER — OFFICE VISIT (OUTPATIENT)
Dept: PHYSICAL THERAPY | Facility: MEDICAL CENTER | Age: 84
End: 2019-09-25
Payer: MEDICARE

## 2019-09-25 DIAGNOSIS — Z91.81 HX OF FALL: ICD-10-CM

## 2019-09-25 DIAGNOSIS — R26.9 GAIT ABNORMALITY: Primary | ICD-10-CM

## 2019-09-25 PROCEDURE — 97116 GAIT TRAINING THERAPY: CPT | Performed by: PHYSICAL THERAPIST

## 2019-09-25 PROCEDURE — 97112 NEUROMUSCULAR REEDUCATION: CPT | Performed by: PHYSICAL THERAPIST

## 2019-09-25 PROCEDURE — 97110 THERAPEUTIC EXERCISES: CPT | Performed by: PHYSICAL THERAPIST

## 2019-09-25 NOTE — PROGRESS NOTES
Daily Note     Today's date: 2019  Patient name: Leda Leong  : 1931  MRN: [id number]  Referring provider: Reese Chung DO  Dx:   Encounter Diagnosis     ICD-10-CM    1  Gait abnormality R26 9    2  Hx of fall Z91 81                   Subjective:   Spoke with pt's daughter earlier today regarding her concern that her mom has to ascend a curb and was fearful, and grabbed onto her daughter before ascending curb  Told daughter how she has been progressing in PT and that today is her last scheduled day of PT, and that her balance score sig improved over the past month  She also inquired about a fitness program for her mom to keep her active, and told her I would discuss at next appt  Objective: See treatment diary below      Assessment: Tolerated treatment well  Patient exhibited good technique with therapeutic exercises   Performed stair training today without use of UE support on 4" step and 6" step  Also performed stair case without UE support one foot at a time  Pt states she feels more confident that she would be able to ascend a curb out in community even if she didn't have her daughter to hold on to  Plan: D/C to ind HEP       Precautions: HTN, Falls risk,     Manual                                                                                   Exercise Diary        bike  x5' x7' x7' x7' x7'       marching  x3laps x3laps 1 5# x3laps 1 5# x3laps 1 5# x20 x3laps      sidestepping x4laps x3laps x3laps 1 5# x3laps x3laps x3laps x3laps      Tandem gait  x3laps x3laps x3laps x3laps x3laps x3laps      Bal on foam FA EO/EC  30"x2ea 30"x2ea w/perts 30"x2ea w/perts 30"x2 ea with perts 30"x2ea 30"x2      Mini squats x20 20x  x20 x20 x20 x20 x20       heel-toe raises x20 x20 x20 x20 x20 x20 x20      SLS  10" x 5  10"x5 10"x5 NP        Hip add iso  5"x20 5"x20 5"x20 5"x20 5"x20 5"x20      LAQ 1 5# 1 5# 5"x20 1 5# 5"x20 1 5# 5"x20 1 5# 5"x20 1 5# 5"x20 5"x20 Seated marches 1 5#  1 5# x20 1 5# x20 1 5# x20 1 5# x20 1 5# x20       TB hip abd    GTB 5"x20 GTB 5"x20        Stair training      x5laps No UE support x5 laps      Step ups no UE support       4"x20             6"x10                                                                           Modalities

## 2019-09-30 ENCOUNTER — APPOINTMENT (OUTPATIENT)
Dept: PHYSICAL THERAPY | Facility: MEDICAL CENTER | Age: 84
End: 2019-09-30
Payer: MEDICARE

## 2019-10-05 ENCOUNTER — OFFICE VISIT (OUTPATIENT)
Dept: URGENT CARE | Facility: MEDICAL CENTER | Age: 84
End: 2019-10-05
Payer: MEDICARE

## 2019-10-05 ENCOUNTER — APPOINTMENT (EMERGENCY)
Dept: CT IMAGING | Facility: HOSPITAL | Age: 84
End: 2019-10-05
Payer: MEDICARE

## 2019-10-05 ENCOUNTER — HOSPITAL ENCOUNTER (EMERGENCY)
Facility: HOSPITAL | Age: 84
Discharge: HOME | End: 2019-10-05
Attending: EMERGENCY MEDICINE | Admitting: EMERGENCY MEDICINE
Payer: MEDICARE

## 2019-10-05 VITALS
HEART RATE: 84 BPM | HEIGHT: 59 IN | DIASTOLIC BLOOD PRESSURE: 86 MMHG | RESPIRATION RATE: 16 BRPM | OXYGEN SATURATION: 96 % | WEIGHT: 129.6 LBS | TEMPERATURE: 98 F | SYSTOLIC BLOOD PRESSURE: 152 MMHG | BODY MASS INDEX: 26.13 KG/M2

## 2019-10-05 VITALS
WEIGHT: 130.6 LBS | SYSTOLIC BLOOD PRESSURE: 168 MMHG | OXYGEN SATURATION: 94 % | TEMPERATURE: 99.8 F | BODY MASS INDEX: 26.33 KG/M2 | HEART RATE: 84 BPM | RESPIRATION RATE: 18 BRPM | HEIGHT: 59 IN | DIASTOLIC BLOOD PRESSURE: 88 MMHG

## 2019-10-05 DIAGNOSIS — N39.0 URINARY TRACT INFECTION: Primary | ICD-10-CM

## 2019-10-05 DIAGNOSIS — R42 LIGHTHEADEDNESS: ICD-10-CM

## 2019-10-05 DIAGNOSIS — R42 DIZZINESS AND GIDDINESS: Primary | ICD-10-CM

## 2019-10-05 LAB
ALBUMIN SERPL BCP-MCNC: 3.7 G/DL (ref 3.5–5)
ALP SERPL-CCNC: 50 U/L (ref 46–116)
ALT SERPL W P-5'-P-CCNC: 20 U/L (ref 12–78)
ANION GAP SERPL CALCULATED.3IONS-SCNC: 8 MMOL/L (ref 4–13)
APTT PPP: 27 SECONDS (ref 23–37)
AST SERPL W P-5'-P-CCNC: 22 U/L (ref 5–45)
BACTERIA UR QL AUTO: ABNORMAL /HPF
BASOPHILS # BLD AUTO: 0.02 THOUSANDS/ΜL (ref 0–0.1)
BASOPHILS NFR BLD AUTO: 0 % (ref 0–1)
BILIRUB SERPL-MCNC: 0.4 MG/DL (ref 0.2–1)
BILIRUB UR QL STRIP: NEGATIVE
BUN SERPL-MCNC: 17 MG/DL (ref 5–25)
CALCIUM SERPL-MCNC: 9.7 MG/DL (ref 8.3–10.1)
CHLORIDE SERPL-SCNC: 102 MMOL/L (ref 100–108)
CLARITY UR: ABNORMAL
CO2 SERPL-SCNC: 30 MMOL/L (ref 21–32)
COLOR UR: YELLOW
CREAT SERPL-MCNC: 0.81 MG/DL (ref 0.6–1.3)
EOSINOPHIL # BLD AUTO: 0.06 THOUSAND/ΜL (ref 0–0.61)
EOSINOPHIL NFR BLD AUTO: 1 % (ref 0–6)
ERYTHROCYTE [DISTWIDTH] IN BLOOD BY AUTOMATED COUNT: 14 % (ref 11.6–15.1)
GFR SERPL CREATININE-BSD FRML MDRD: 66 ML/MIN/1.73SQ M
GLUCOSE SERPL-MCNC: 101 MG/DL (ref 65–140)
GLUCOSE UR STRIP-MCNC: NEGATIVE MG/DL
HCT VFR BLD AUTO: 40.5 % (ref 34.8–46.1)
HGB BLD-MCNC: 13.3 G/DL (ref 11.5–15.4)
HGB UR QL STRIP.AUTO: ABNORMAL
IMM GRANULOCYTES # BLD AUTO: 0.01 THOUSAND/UL (ref 0–0.2)
IMM GRANULOCYTES NFR BLD AUTO: 0 % (ref 0–2)
INR PPP: 0.98 (ref 0.84–1.19)
KETONES UR STRIP-MCNC: NEGATIVE MG/DL
LEUKOCYTE ESTERASE UR QL STRIP: ABNORMAL
LYMPHOCYTES # BLD AUTO: 0.94 THOUSANDS/ΜL (ref 0.6–4.47)
LYMPHOCYTES NFR BLD AUTO: 17 % (ref 14–44)
MCH RBC QN AUTO: 31.4 PG (ref 26.8–34.3)
MCHC RBC AUTO-ENTMCNC: 32.8 G/DL (ref 31.4–37.4)
MCV RBC AUTO: 96 FL (ref 82–98)
MONOCYTES # BLD AUTO: 0.48 THOUSAND/ΜL (ref 0.17–1.22)
MONOCYTES NFR BLD AUTO: 9 % (ref 4–12)
NEUTROPHILS # BLD AUTO: 4.06 THOUSANDS/ΜL (ref 1.85–7.62)
NEUTS SEG NFR BLD AUTO: 73 % (ref 43–75)
NITRITE UR QL STRIP: NEGATIVE
NON-SQ EPI CELLS URNS QL MICRO: ABNORMAL /HPF
NRBC BLD AUTO-RTO: 0 /100 WBCS
PH UR STRIP.AUTO: 7 [PH] (ref 4.5–8)
PLATELET # BLD AUTO: 213 THOUSANDS/UL (ref 149–390)
PMV BLD AUTO: 9.7 FL (ref 8.9–12.7)
POTASSIUM SERPL-SCNC: 4.5 MMOL/L (ref 3.5–5.3)
PROT SERPL-MCNC: 8.1 G/DL (ref 6.4–8.2)
PROT UR STRIP-MCNC: NEGATIVE MG/DL
PROTHROMBIN TIME: 12.4 SECONDS (ref 11.6–14.5)
RBC # BLD AUTO: 4.23 MILLION/UL (ref 3.81–5.12)
RBC #/AREA URNS AUTO: ABNORMAL /HPF
SODIUM SERPL-SCNC: 140 MMOL/L (ref 136–145)
SP GR UR STRIP.AUTO: 1.01 (ref 1–1.03)
TROPONIN I SERPL-MCNC: <0.02 NG/ML
UROBILINOGEN UR QL STRIP.AUTO: 0.2 E.U./DL
WBC # BLD AUTO: 5.57 THOUSAND/UL (ref 4.31–10.16)
WBC #/AREA URNS AUTO: ABNORMAL /HPF

## 2019-10-05 PROCEDURE — 80053 COMPREHEN METABOLIC PANEL: CPT | Performed by: PHYSICIAN ASSISTANT

## 2019-10-05 PROCEDURE — 85610 PROTHROMBIN TIME: CPT | Performed by: PHYSICIAN ASSISTANT

## 2019-10-05 PROCEDURE — 99284 EMERGENCY DEPT VISIT MOD MDM: CPT

## 2019-10-05 PROCEDURE — 99213 OFFICE O/P EST LOW 20 MIN: CPT | Performed by: PHYSICIAN ASSISTANT

## 2019-10-05 PROCEDURE — 84484 ASSAY OF TROPONIN QUANT: CPT | Performed by: PHYSICIAN ASSISTANT

## 2019-10-05 PROCEDURE — 81001 URINALYSIS AUTO W/SCOPE: CPT

## 2019-10-05 PROCEDURE — 96360 HYDRATION IV INFUSION INIT: CPT

## 2019-10-05 PROCEDURE — 70450 CT HEAD/BRAIN W/O DYE: CPT

## 2019-10-05 PROCEDURE — 93005 ELECTROCARDIOGRAM TRACING: CPT | Performed by: PHYSICIAN ASSISTANT

## 2019-10-05 PROCEDURE — 36415 COLL VENOUS BLD VENIPUNCTURE: CPT | Performed by: PHYSICIAN ASSISTANT

## 2019-10-05 PROCEDURE — 85730 THROMBOPLASTIN TIME PARTIAL: CPT | Performed by: PHYSICIAN ASSISTANT

## 2019-10-05 PROCEDURE — 93005 ELECTROCARDIOGRAM TRACING: CPT

## 2019-10-05 PROCEDURE — 87086 URINE CULTURE/COLONY COUNT: CPT

## 2019-10-05 PROCEDURE — 99284 EMERGENCY DEPT VISIT MOD MDM: CPT | Performed by: PHYSICIAN ASSISTANT

## 2019-10-05 PROCEDURE — 85025 COMPLETE CBC W/AUTO DIFF WBC: CPT | Performed by: PHYSICIAN ASSISTANT

## 2019-10-05 PROCEDURE — G0463 HOSPITAL OUTPT CLINIC VISIT: HCPCS | Performed by: PHYSICIAN ASSISTANT

## 2019-10-05 RX ORDER — CEPHALEXIN 500 MG/1
500 CAPSULE ORAL EVERY 12 HOURS SCHEDULED
Qty: 14 CAPSULE | Refills: 0 | Status: SHIPPED | OUTPATIENT
Start: 2019-10-05 | End: 2019-10-05 | Stop reason: SDUPTHER

## 2019-10-05 RX ORDER — CEPHALEXIN 500 MG/1
500 CAPSULE ORAL EVERY 12 HOURS SCHEDULED
Qty: 14 CAPSULE | Refills: 0 | Status: SHIPPED | OUTPATIENT
Start: 2019-10-05 | End: 2019-10-12

## 2019-10-05 RX ADMIN — SODIUM CHLORIDE 500 ML: 0.9 INJECTION, SOLUTION INTRAVENOUS at 17:30

## 2019-10-05 NOTE — ED NOTES
Pt  Ambulated out of dept , vss, normal gait, no acute distress       Sandy Arrington, LUCIUS  10/05/19 1921

## 2019-10-05 NOTE — PROGRESS NOTES
[facility]        NAME: Thong Trotter is a 80 y o  female  : 1931    MRN: [id number]  DATE: 2019  TIME: 2:56 PM    Assessment and Plan   Dizziness and giddiness [R42]  1  Dizziness and giddiness           Patient Instructions     Dizziness  Referred to ER for evaluation  Follow up with PCP in 3-5 days  Proceed to  ER if symptoms worsen  Chief Complaint     Chief Complaint   Patient presents with    Dizziness     Pt  C/O lightheadedness that began yesterday  History of Present Illness       81 y/o female presents with sister c/o dizziness "lightheadedness" that started yesterday  Patient denies chest pain, SOB, palpitations, nausea, vomiting, unsteady gait      Review of Systems   Review of Systems   Constitutional: Negative  HENT: Negative  Eyes: Negative  Respiratory: Negative  Negative for cough, chest tightness, shortness of breath, wheezing and stridor  Cardiovascular: Negative  Negative for chest pain, palpitations and leg swelling  Neurological: Positive for dizziness and light-headedness  Negative for tremors, seizures, syncope, facial asymmetry, speech difficulty, weakness, numbness and headaches           Current Medications       Current Outpatient Medications:     alendronate (FOSAMAX) 70 mg tablet, Take 1 tablet (70 mg total) by mouth every 7 days, Disp: 12 tablet, Rfl: 3    amLODIPine-benazepril (LOTREL 5-20) 5-20 MG per capsule, TAKE 1 CAPSULE DAILY, Disp: 30 capsule, Rfl: 0    calcium citrate-vitamin D (CITRACAL+D) 315-200 MG-UNIT per tablet, Take 1 tablet by mouth daily, Disp: , Rfl:     conjugated estrogens (PREMARIN) vaginal cream, Insert into the vagina Ose as directed, Disp: , Rfl:     Magnesium Malate 1250 (141 7 Mg) MG TABS, Take by mouth, Disp: , Rfl:     Multiple Vitamins-Minerals (MULTIVITAL) tablet, Take 1 tablet by mouth daily, Disp: , Rfl:     MYRBETRIQ 50 MG TB24, Take 1 tablet by mouth daily, Disp: , Rfl:     Omega-3 Fatty Acids (FISH OIL) 645 MG CAPS, Take 1 tablet by mouth daily, Disp: , Rfl:     polyethylene glycol (MIRALAX) 17 g packet, Take 1 Package by mouth 2 (two) times a day, Disp: , Rfl:     Current Facility-Administered Medications:     onabotulinumtoxin A (BOTOX) injection 200 Units, 200 Units, Intramuscular, Q4 Months, Khris Fritz MD, 200 Units at 01/29/18 2703    onabotulinumtoxin A (BOTOX) injection 200 Units, 200 Units, Intramuscular, Once, Khris Fritz MD    Current Allergies     Allergies as of 10/05/2019    (No Known Allergies)            The following portions of the patient's history were reviewed and updated as appropriate: allergies, current medications, past family history, past medical history, past social history, past surgical history and problem list      Past Medical History:   Diagnosis Date    Breast lump in female     Osteopenia     Ovarian cyst        Past Surgical History:   Procedure Laterality Date    BLADDER SURGERY      BREAST BIOPSY      CATARACT EXTRACTION Bilateral 01/01/2015    , Right Eye-2017, Left eye    ENDOMETRIAL BIOPSY      by Suction    HYSTERECTOMY         Family History   Problem Relation Age of Onset    Heart disease Mother     Heart disease Father     Hypertension Family     Osteoporosis Family          Medications have been verified  Objective   /88   Pulse 84   Temp 99 8 °F (37 7 °C) (Temporal)   Resp 18   Ht 4' 11" (1 499 m)   Wt 59 2 kg (130 lb 9 6 oz)   SpO2 94%   BMI 26 38 kg/m²        Physical Exam     Physical Exam   Constitutional: She is oriented to person, place, and time  She appears well-developed and well-nourished  HENT:   Head: Normocephalic and atraumatic  Right Ear: External ear normal    Left Ear: External ear normal    Nose: Nose normal    Mouth/Throat: Oropharynx is clear and moist  No oropharyngeal exudate  Neck: Normal range of motion  Neck supple     Cardiovascular: Normal rate, regular rhythm, normal heart sounds and intact distal pulses  Pulmonary/Chest: Effort normal and breath sounds normal  No respiratory distress  She has no wheezes  She has no rales  She exhibits no tenderness  Lymphadenopathy:     She has no cervical adenopathy  Neurological: She is alert and oriented to person, place, and time  She has normal strength  No cranial nerve deficit or sensory deficit  She displays a negative Romberg sign  GCS eye subscore is 4  GCS verbal subscore is 5  GCS motor subscore is 6         EKG sinus rhythm, rate 78, no ST abnormalities

## 2019-10-05 NOTE — ED NOTES
ORTHOSTATIC VITAL SIGNS BLOOD PRESSURE HEART RATE           LYING 174/79 73           SITTING 171/84 79           STANDING 152/86 87     Pt reports no changes in how she feels when going from laying to sitting as well as sitting to standing         [id number]  10/05/19 0617

## 2019-10-05 NOTE — ED PROVIDER NOTES
History  Chief Complaint   Patient presents with    Dizziness     Pt complains of light headedness since yesterday morning  Pt states that while she was reading the paper it was a bit blurry and thats when she started to feel this way  Pt has hx of dificulty speaking and is not a sudden change     The patient is an 80year old female who reports sudden onset of lightheadedness while reading the paper yesterday morning  The patient describes the lightheadedness as feeling like she is unable to focus and feels fuzzy, however denies feeling as if she is going to faint and denies dizziness  The patient states that as the day went on, the symptoms improved, however she continues to have ongoing symptoms  The patient was seen at an urgent care this morning and was advised to come to the ED for evaluation  The patient reports that exertion or movement does not change the symptoms  The patient denies headache, blurry vision, vertigo, chest pain, shortness of breath, nausea or vomiting  History provided by:  Patient and relative  Dizziness   Quality:  Unable to specify  Severity:  Mild  Onset quality:  Sudden  Duration:  2 days  Timing:  Intermittent  Progression:  Waxing and waning  Chronicity:  New  Context: not when bending over, not with head movement, not with loss of consciousness and not when standing up    Ineffective treatments:  None tried  Associated symptoms: no chest pain, no diarrhea, no headaches, no hearing loss, no nausea, no palpitations, no shortness of breath, no vision changes and no vomiting        Prior to Admission Medications   Prescriptions Last Dose Informant Patient Reported? Taking?    MYRBETRIQ 50 MG TB24   Yes No   Sig: Take 1 tablet by mouth daily   Magnesium Malate 1250 (141 7 Mg) MG TABS   Yes No   Sig: Take by mouth   Multiple Vitamins-Minerals (MULTIVITAL) tablet   Yes No   Sig: Take 1 tablet by mouth daily   Omega-3 Fatty Acids (FISH OIL) 645 MG CAPS   Yes No   Sig: Take 1 tablet by mouth daily   alendronate (FOSAMAX) 70 mg tablet   No No   Sig: Take 1 tablet (70 mg total) by mouth every 7 days   amLODIPine-benazepril (LOTREL 5-20) 5-20 MG per capsule   No No   Sig: TAKE 1 CAPSULE DAILY   calcium citrate-vitamin D (CITRACAL+D) 315-200 MG-UNIT per tablet   Yes No   Sig: Take 1 tablet by mouth daily   conjugated estrogens (PREMARIN) vaginal cream   Yes No   Sig: Insert into the vagina Ose as directed   polyethylene glycol (MIRALAX) 17 g packet   Yes No   Sig: Take 1 Package by mouth 2 (two) times a day      Facility-Administered Medications Last Administration Doses Remaining   onabotulinumtoxin A (BOTOX) injection 200 Units 1/29/2018  8:38 AM    onabotulinumtoxin A (BOTOX) injection 200 Units None recorded 1          Past Medical History:   Diagnosis Date    Breast lump in female     Osteopenia     Ovarian cyst        Past Surgical History:   Procedure Laterality Date    BLADDER SURGERY      BREAST BIOPSY      CATARACT EXTRACTION Bilateral 01/01/2015    , Right [id number], Left eye    ENDOMETRIAL BIOPSY      by Suction    HYSTERECTOMY         Family History   Problem Relation Age of Onset    Heart disease Mother     Heart disease Father     Hypertension Family     Osteoporosis Family      I have reviewed and agree with the history as documented  Social History     Tobacco Use    Smoking status: Never Smoker    Smokeless tobacco: Never Used   Substance Use Topics    Alcohol use: No    Drug use: No        Review of Systems   Constitutional: Negative for activity change, appetite change, chills and fever  HENT: Negative for congestion, ear discharge, ear pain and hearing loss  Eyes: Negative for pain, redness and visual disturbance  Respiratory: Negative for cough and shortness of breath  Cardiovascular: Negative for chest pain and palpitations  Gastrointestinal: Negative for abdominal pain, diarrhea, nausea and vomiting     Genitourinary: Negative for difficulty urinating, dysuria and frequency  Musculoskeletal: Negative for back pain, gait problem and neck pain  Skin: Negative for color change and rash  Neurological: Positive for light-headedness  Negative for dizziness, syncope and headaches  Physical Exam  Physical Exam   Constitutional: She is oriented to person, place, and time  She appears well-developed and well-nourished  She does not have a sickly appearance  No distress  HENT:   Head: Normocephalic and atraumatic  Right Ear: Tympanic membrane normal    Left Ear: A middle ear effusion is present  Nose: Nose normal    Mouth/Throat: Uvula is midline and oropharynx is clear and moist  Mucous membranes are dry  Eyes: Pupils are equal, round, and reactive to light  Conjunctivae, EOM and lids are normal    Neck: Normal range of motion  Neck supple  Cardiovascular: Normal rate, regular rhythm and normal pulses  Pulmonary/Chest: Effort normal and breath sounds normal    Neurological: She is alert and oriented to person, place, and time  She has normal strength  No cranial nerve deficit or sensory deficit  She displays a negative Romberg sign  Coordination and gait normal  GCS eye subscore is 4  GCS verbal subscore is 5  GCS motor subscore is 6  Skin: Skin is warm and dry         Vital Signs  ED Triage Vitals [10/05/19 1552]   Temperature Pulse Respirations Blood Pressure SpO2   98 °F (36 7 °C) 83 16 (!) 193/117 96 %      Temp Source Heart Rate Source Patient Position - Orthostatic VS BP Location FiO2 (%)   Oral Monitor Lying Left arm --      Pain Score       No Pain           Vitals:    10/05/19 1552 10/05/19 1715   BP: (!) 193/117 152/86   Pulse: 83 84   Patient Position - Orthostatic VS: Lying          Visual Acuity  Visual Acuity      Most Recent Value   Visual acuity R eye is  20/20   Visual acuity Left eye is  20/20   Visual acuity in both eyes is  20/20          ED Medications  Medications   sodium chloride 0 9 % bolus 500 mL (0 mL Intravenous Stopped 10/5/19 1830)       Diagnostic Studies  Results Reviewed     Procedure Component Value Units Date/Time    Urine Microscopic [[id number]]  (Abnormal) Collected:  10/05/19 1702    Lab Status:  Final result Specimen:  Urine, Clean Catch Updated:  10/05/19 1723     RBC, UA 10-20 /hpf      WBC, UA Innumerable /hpf      Epithelial Cells Moderate /hpf      Bacteria, UA Moderate /hpf     Urine culture [[id number]] Collected:  10/05/19 1702    Lab Status:   In process Specimen:  Urine, Clean Catch Updated:  10/05/19 1723    Troponin I [[id number]]  (Normal) Collected:  10/05/19 1654    Lab Status:  Final result Specimen:  Blood from Arm, Left Updated:  10/05/19 1722     Troponin I <0 02 ng/mL     Comprehensive metabolic panel [[id number]] Collected:  10/05/19 1654    Lab Status:  Final result Specimen:  Blood from Arm, Left Updated:  10/05/19 1720     Sodium 140 mmol/L      Potassium 4 5 mmol/L      Chloride 102 mmol/L      CO2 30 mmol/L      ANION GAP 8 mmol/L      BUN 17 mg/dL      Creatinine 0 81 mg/dL      Glucose 101 mg/dL      Calcium 9 7 mg/dL      AST 22 U/L      ALT 20 U/L      Alkaline Phosphatase 50 U/L      Total Protein 8 1 g/dL      Albumin 3 7 g/dL      Total Bilirubin 0 40 mg/dL      eGFR 66 ml/min/1 73sq m     Narrative:       [place] guidelines for Chronic Kidney Disease (CKD):     Stage 1 with normal or high GFR (GFR > 90 mL/min/1 73 square meters)    Stage 2 Mild CKD (GFR = 60-89 mL/min/1 73 square meters)    Stage 3A Moderate CKD (GFR = 45-59 mL/min/1 73 square meters)    Stage 3B Moderate CKD (GFR = 30-44 mL/min/1 73 square meters)    Stage 4 Severe CKD (GFR = 15-29 mL/min/1 73 square meters)    Stage 5 End Stage CKD (GFR <15 mL/min/1 73 square meters)  Note: GFR calculation is accurate only with a steady state creatinine    Protime-INR [[id number]]  (Normal) Collected:  10/05/19 1654    Lab Status:  Final result Specimen:  Blood from Arm, Left Updated: 10/05/19 1713     Protime 12 4 seconds      INR 0 98    APTT [[id number]]  (Normal) Collected:  10/05/19 1654    Lab Status:  Final result Specimen:  Blood from Arm, Left Updated:  10/05/19 1713     PTT 27 seconds     CBC and differential [[id number]] Collected:  10/05/19 1654    Lab Status:  Final result Specimen:  Blood from Arm, Left Updated:  10/05/19 1704     WBC 5 57 Thousand/uL      RBC 4 23 Million/uL      Hemoglobin 13 3 g/dL      Hematocrit 40 5 %      MCV 96 fL      MCH 31 4 pg      MCHC 32 8 g/dL      RDW 14 0 %      MPV 9 7 fL      Platelets 054 Thousands/uL      nRBC 0 /100 WBCs      Neutrophils Relative 73 %      Immat GRANS % 0 %      Lymphocytes Relative 17 %      Monocytes Relative 9 %      Eosinophils Relative 1 %      Basophils Relative 0 %      Neutrophils Absolute 4 06 Thousands/µL      Immature Grans Absolute 0 01 Thousand/uL      Lymphocytes Absolute 0 94 Thousands/µL      Monocytes Absolute 0 48 Thousand/µL      Eosinophils Absolute 0 06 Thousand/µL      Basophils Absolute 0 02 Thousands/µL     ED Urine Macroscopic [[id number]]  (Abnormal) Collected:  10/05/19 1702    Lab Status:  Final result Specimen:  Urine Updated:  10/05/19 1647     Color, UA Yellow     Clarity, UA Slightly Cloudy     pH, UA 7 0     Leukocytes, UA Large     Nitrite, UA Negative     Protein, UA Negative mg/dl      Glucose, UA Negative mg/dl      Ketones, UA Negative mg/dl      Urobilinogen, UA 0 2 E U /dl      Bilirubin, UA Negative     Blood, UA Trace     Specific [place], UA 1 015    Narrative:       CLINITEK RESULT                 CT head without contrast   Final Result by Mart Cochran MD (10/05 1753)      No acute intracranial abnormality  Mild to moderate chronic small vessel ischemic changes  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  Workstation performed: [id number]                    Procedures  ECG 12 Lead Documentation Only  Date/Time: 10/5/2019 9:01 PM  Performed by:  Dionne LARSEN Rachell Zimmerman PA-C  Authorized by: Dionne Blackman PA-C     Indications / Diagnosis:  Lightheadedness  ECG reviewed by me, the ED Provider: yes    Patient location:  ED  Previous ECG:     Previous ECG:  Compared to current    Similarity:  No change  Interpretation:     Interpretation: abnormal    Rate:     ECG rate:  72    ECG rate assessment: normal    Rhythm:     Rhythm: sinus rhythm    Ectopy:     Ectopy: PVCs      PVCs:  Infrequent  QRS:     QRS axis:  Normal  ST segments:     ST segments:  Normal  T waves:     T waves: non-specific             ED Course  ED Course as of Oct 05 2105   Sat Oct 05, 2019   1804 Patient reevaluated and is feeling well  BP improved to 156/88  500mL fluid ordered  1839 Patient reevaluated again  States she is feeling well and ready to go home  MDM  Number of Diagnoses or Management Options  Lightheadedness: new and requires workup  Urinary tract infection: new and requires workup  Diagnosis management comments: Patient with reported lightheadedness for two days  Differential includes but not limited to: dehydration vs stroke vs TIA    Patient initially noted to be hypertensive in the ED  The patient states she has a known history of hypertension and takes medication for it  She denies headache, chest pain or shortness of breath  Patient exhibits no neurologic deficit  Patient does have speech abnormality, however she and her sister state this is her baseline  Patient appears to be dehydrated on exam as mucous membranes are dry  Patient admits she does not drink enough water  On repeat check, BP is now in the 150s/80s  500 mL of fluids ordered  Orthostatics completed  While patient had a 26JLQS drop in systolic from sitting to standing, she denies any lightheadedness or dizziness  Labs reviewed and only notable for probable UTI  Patient denies any symptoms  WBC is within normal limits       On reevaluation, the patient states she is feeling well with no symptoms and would like to go home  Will treat patient's UTI with Keflex  Advised patient to follow up with her PMD next week for repeat BP check as well as to ensure her UTI is clearing  Additionally advised patient to return to the ED immediately with any new or worsening symptoms  Patient lives with her daughter and states she feels safe to go home  Patient given opportunity to ask questions  Patient appropriate for discharge at this time  Amount and/or Complexity of Data Reviewed  Clinical lab tests: ordered and reviewed  Tests in the radiology section of CPT®: ordered and reviewed  Obtain history from someone other than the patient: yes  Review and summarize past medical records: yes  Discuss the patient with other providers: yes    Risk of Complications, Morbidity, and/or Mortality  Presenting problems: low  Diagnostic procedures: low  Management options: low    Patient Progress  Patient progress: stable      Disposition  Final diagnoses:   Urinary tract infection   Lightheadedness     Time reflects when diagnosis was documented in both MDM as applicable and the Disposition within this note     Time User Action Codes Description Comment    10/5/2019  6:47 PM Yoshi Blow Add [N39 0] Urinary tract infection     10/5/2019  6:47 PM Yoshi Blow Add [[id number]] [address] [place] [address]       ED Disposition     ED Disposition Condition Date/Time Comment    Discharge Stable Sat Oct 5, 2019  6:47 PM Lena Vogt discharge to home/self care              Follow-up Information     Follow up With Specialties Details Why Contact Zoie Marie DO Family Medicine Schedule an appointment as soon as possible for a visit   [address]  [phone]            Discharge Medication List as of 10/5/2019  6:48 PM      START taking these medications    Details   cephalexin (KEFLEX) 500 mg capsule Take 1 capsule (500 mg total) by mouth every 12 (twelve) hours for 7 days, Starting Sat 10/5/2019, Until Sat 10/12/2019, Print         CONTINUE these medications which have NOT CHANGED    Details   alendronate (FOSAMAX) 70 mg tablet Take 1 tablet (70 mg total) by mouth every 7 days, Starting Fri 2/1/2019, Print      amLODIPine-benazepril (LOTREL 5-20) 5-20 MG per capsule TAKE 1 CAPSULE DAILY, Normal      calcium citrate-vitamin D (CITRACAL+D) 315-200 MG-UNIT per tablet Take 1 tablet by mouth daily, Historical Med      conjugated estrogens (PREMARIN) vaginal cream Insert into the vagina Ose as directed, Historical Med      Magnesium Malate 1250 (141 7 Mg) MG TABS Take by mouth, Historical Med      Multiple Vitamins-Minerals (MULTIVITAL) tablet Take 1 tablet by mouth daily, Historical Med      MYRBETRIQ 50 MG TB24 Take 1 tablet by mouth daily, Starting Wed 1/3/2018, Historical Med      Omega-3 Fatty Acids (FISH OIL) 645 MG CAPS Take 1 tablet by mouth daily, Historical Med      polyethylene glycol (MIRALAX) 17 g packet Take 1 Package by mouth 2 (two) times a day, Historical Med           No discharge procedures on file      ED Provider  Electronically Signed by           Dai Tran PA-C  10/05/19 4029

## 2019-10-06 LAB — BACTERIA UR CULT: NORMAL

## 2019-10-07 LAB
ATRIAL RATE: 72 BPM
P AXIS: 37 DEGREES
PR INTERVAL: 140 MS
QRS AXIS: -20 DEGREES
QRSD INTERVAL: 96 MS
QT INTERVAL: 364 MS
QTC INTERVAL: 398 MS
T WAVE AXIS: 24 DEGREES
VENTRICULAR RATE: 72 BPM

## 2019-10-07 PROCEDURE — 93010 ELECTROCARDIOGRAM REPORT: CPT | Performed by: INTERNAL MEDICINE

## 2019-10-08 LAB
ATRIAL RATE: 78 BPM
P AXIS: 27 DEGREES
PR INTERVAL: 138 MS
QRS AXIS: -29 DEGREES
QRSD INTERVAL: 104 MS
QT INTERVAL: 380 MS
QTC INTERVAL: 433 MS
T WAVE AXIS: 9 DEGREES
VENTRICULAR RATE: 78 BPM

## 2019-10-08 PROCEDURE — 93010 ELECTROCARDIOGRAM REPORT: CPT | Performed by: INTERNAL MEDICINE

## 2019-10-09 ENCOUNTER — OFFICE VISIT (OUTPATIENT)
Dept: FAMILY MEDICINE CLINIC | Facility: MEDICAL CENTER | Age: 84
End: 2019-10-09
Payer: MEDICARE

## 2019-10-09 VITALS
SYSTOLIC BLOOD PRESSURE: 154 MMHG | DIASTOLIC BLOOD PRESSURE: 80 MMHG | RESPIRATION RATE: 16 BRPM | WEIGHT: 130.6 LBS | BODY MASS INDEX: 26.38 KG/M2 | HEART RATE: 96 BPM | TEMPERATURE: 98.4 F

## 2019-10-09 DIAGNOSIS — R31.9 HEMATURIA, UNSPECIFIED TYPE: Primary | ICD-10-CM

## 2019-10-09 PROCEDURE — 99213 OFFICE O/P EST LOW 20 MIN: CPT | Performed by: FAMILY MEDICINE

## 2019-10-09 NOTE — PROGRESS NOTES
Assessment/Plan:    No problem-specific Assessment & Plan notes found for this encounter  Diagnoses and all orders for this visit:    Hematuria, unspecified type  -     UA (URINE) with reflex to Scope; Future    Urgent care and ER records reviewed  Patient did have hematuria on urinalysis in symptoms may have been secondary to a urinary tract infection causing her dizziness  Symptoms have since resolved  She is on antibiotics with Keflex  She was encouraged to finish the entire course of antibiotics  No additional medication at this time  If dizziness returns we can try adding an allergy medication to see if it helps symptoms such as loratadine  Will plan on repeating a urinalysis in about one month to assess for resolution of the hematuria  Patient was encouraged to get her flu vaccine from the pharmacy as we do not currently have high dose Fluzone in stock  Follow-up in one month if symptoms persist or sooner if needed  Subjective:      Patient ID: Jett Flanagan is a 80 y o  female  Patient presents for urgent care and ER follow-up  She was feeling dizzy and proceed to the urgent care who then referred patient to the ER for further evaluation  She was told she has fluid in the ear and also urinary tract infection causing her symptoms  She was started on antibiotics and is feeling better today  She is here with her daughter who wants to know what should be done for the fluid behind her ear        The following portions of the patient's history were reviewed and updated as appropriate:   She  has a past medical history of Breast lump in female, Osteopenia, and Ovarian cyst   She   Patient Active Problem List    Diagnosis Date Noted    Hyperglycemia 08/26/2019    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    Family history of malignant neoplasm of breast 08/24/2018    Anxiety 03/25/2016    Essential hypertension 03/25/2016    MCI (mild cognitive impairment) 03/25/2016    Blepharospasm 08/04/2015    Depression 06/16/2014    Functional urinary incontinence 06/16/2014    Hypercholesteremia 06/16/2014    Osteoporosis 06/16/2014    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    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     She  has a past surgical history that includes Breast biopsy; Bladder surgery; Cataract extraction (Bilateral, 01/01/2015); Endometrial biopsy; and Hysterectomy  Her family history includes Heart disease in her father and mother; Hypertension in her family; Osteoporosis in her family  She  reports that she has never smoked  She has never used smokeless tobacco  She reports that she does not drink alcohol or use drugs    Current Outpatient Medications   Medication Sig Dispense Refill    alendronate (FOSAMAX) 70 mg tablet Take 1 tablet (70 mg total) by mouth every 7 days 12 tablet 3    amLODIPine-benazepril (LOTREL 5-20) 5-20 MG per capsule TAKE 1 CAPSULE DAILY 30 capsule 0    calcium citrate-vitamin D (CITRACAL+D) 315-200 MG-UNIT per tablet Take 1 tablet by mouth daily      cephalexin (KEFLEX) 500 mg capsule Take 1 capsule (500 mg total) by mouth every 12 (twelve) hours for 7 days 14 capsule 0    conjugated estrogens (PREMARIN) vaginal cream Insert into the vagina Ose as directed      Magnesium Malate 1250 (141 7 Mg) MG TABS Take by mouth      Multiple Vitamins-Minerals (MULTIVITAL) tablet Take 1 tablet by mouth daily      MYRBETRIQ 50 MG TB24 Take 1 tablet by mouth daily      Omega-3 Fatty Acids (FISH OIL) 645 MG CAPS Take 1 tablet by mouth daily      polyethylene glycol (MIRALAX) 17 g packet Take 1 Package by mouth 2 (two) times a day       Current Facility-Administered Medications   Medication Dose Route Frequency Provider Last Rate Last Dose    onabotulinumtoxin A (BOTOX) injection 200 Units  200 Units Intramuscular Q4 Months Jazmyne Garcia MD   200 Units at 01/29/18 0838    onabotulinumtoxin A (BOTOX) injection 200 Units  200 Units Intramuscular Once Jazmyne Garcia MD Current Outpatient Medications on File Prior to Visit   Medication Sig    alendronate (FOSAMAX) 70 mg tablet Take 1 tablet (70 mg total) by mouth every 7 days    amLODIPine-benazepril (LOTREL 5-20) 5-20 MG per capsule TAKE 1 CAPSULE DAILY    calcium citrate-vitamin D (CITRACAL+D) 315-200 MG-UNIT per tablet Take 1 tablet by mouth daily    cephalexin (KEFLEX) 500 mg capsule Take 1 capsule (500 mg total) by mouth every 12 (twelve) hours for 7 days    conjugated estrogens (PREMARIN) vaginal cream Insert into the vagina Ose as directed    Magnesium Malate 1250 (141 7 Mg) MG TABS Take by mouth    Multiple Vitamins-Minerals (MULTIVITAL) tablet Take 1 tablet by mouth daily    MYRBETRIQ 50 MG TB24 Take 1 tablet by mouth daily    Omega-3 Fatty Acids (FISH OIL) 645 MG CAPS Take 1 tablet by mouth daily    polyethylene glycol (MIRALAX) 17 g packet Take 1 Package by mouth 2 (two) times a day     Current Facility-Administered Medications on File Prior to Visit   Medication    onabotulinumtoxin A (BOTOX) injection 200 Units    onabotulinumtoxin A (BOTOX) injection 200 Units     She has No Known Allergies       Review of Systems   Constitutional: Negative for fever  Respiratory: Negative for shortness of breath  Cardiovascular: Negative for chest pain  Objective:      /80 (Cuff Size: Standard)   Pulse 96   Temp 98 4 °F (36 9 °C)   Resp 16   Wt 59 2 kg (130 lb 9 6 oz)   BMI 26 38 kg/m²          Physical Exam   Constitutional: She appears well-developed and well-nourished  HENT:   Right Ear: Tympanic membrane, external ear and ear canal normal    Left Ear: Tympanic membrane, external ear and ear canal normal    Psychiatric: She has a normal mood and affect   Her behavior is normal  Judgment and thought content normal

## 2019-10-14 ENCOUNTER — TELEPHONE (OUTPATIENT)
Dept: FAMILY MEDICINE CLINIC | Facility: MEDICAL CENTER | Age: 84
End: 2019-10-14

## 2019-10-14 NOTE — TELEPHONE ENCOUNTER
Daughter Noreen Saucedo states pt finished her antibiotics on Saturday but she is still feeling woozy and dizzy from her earache  Noreen Saucedo stated Dr Nat Yepez can prescribe a histamine to be sent to [facility] in [address] [place] [address]  Noreen Saucedo will like a call back

## 2019-10-15 NOTE — TELEPHONE ENCOUNTER
No prescription is needed as antihistamines are over-the-counter such as Claritin, Zyrtec or Xyzal   Patient is free to try any of these to see if her symptoms improve

## 2019-10-16 PROBLEM — R42 VERTIGO: Status: ACTIVE | Noted: 2019-10-16

## 2019-10-16 PROBLEM — H90.3 SENSORINEURAL HEARING LOSS (SNHL), BILATERAL: Status: ACTIVE | Noted: 2019-10-16

## 2019-10-16 PROBLEM — IMO0001 ASYMMETRICAL HEARING LOSS OF RIGHT EAR: Status: ACTIVE | Noted: 2019-10-16

## 2019-10-25 DIAGNOSIS — I10 ESSENTIAL HYPERTENSION: ICD-10-CM

## 2019-10-25 RX ORDER — AMLODIPINE BESYLATE AND BENAZEPRIL HYDROCHLORIDE 5; 20 MG/1; MG/1
1 CAPSULE ORAL DAILY
Qty: 90 CAPSULE | Refills: 1 | Status: SHIPPED | OUTPATIENT
Start: 2019-10-25 | End: 2020-01-16 | Stop reason: SDUPTHER

## 2019-10-30 ENCOUNTER — ANNUAL EXAM (OUTPATIENT)
Dept: OBGYN CLINIC | Facility: MEDICAL CENTER | Age: 84
End: 2019-10-30
Payer: MEDICARE

## 2019-10-30 VITALS
SYSTOLIC BLOOD PRESSURE: 140 MMHG | WEIGHT: 130.6 LBS | HEIGHT: 59 IN | DIASTOLIC BLOOD PRESSURE: 80 MMHG | BODY MASS INDEX: 26.33 KG/M2

## 2019-10-30 DIAGNOSIS — Z01.419 ENCOUNTER FOR GYNECOLOGICAL EXAMINATION WITHOUT ABNORMAL FINDING: ICD-10-CM

## 2019-10-30 DIAGNOSIS — Z12.39 ENCOUNTER FOR SCREENING FOR MALIGNANT NEOPLASM OF BREAST: Primary | ICD-10-CM

## 2019-10-30 PROCEDURE — G0145 SCR C/V CYTO,THINLAYER,RESCR: HCPCS | Performed by: OBSTETRICS & GYNECOLOGY

## 2019-10-30 PROCEDURE — G0101 CA SCREEN;PELVIC/BREAST EXAM: HCPCS | Performed by: OBSTETRICS & GYNECOLOGY

## 2019-10-30 NOTE — PROGRESS NOTES
Assessment/Plan:    No problem-specific Assessment & Plan notes found for this encounter  Subjective:      Patient ID: Cherri Soto is a 80 y o  female      HPI    {Common ambulatory [email or web address]:[id number]}    Review of Systems      Objective:      /80 (BP Location: Left arm, Patient Position: Sitting, Cuff Size: Standard)   Ht 4' 11" (1 499 m)   Wt 59 2 kg (130 lb 9 6 oz)   BMI 26 38 kg/m²          Physical Exam

## 2019-10-30 NOTE — PATIENT INSTRUCTIONS
This 80year-old patient was told that her breast and exam normal   Will be she yearly due to the presence of a pessary and using Premarin vaginal cream

## 2019-10-30 NOTE — PROGRESS NOTES
Assessment/Plan: This 49-year-old patient is seen on a regular basis due to the presence of pessary vaginally  She also uses Premarin vaginal cream at times  No problem-specific Assessment & Plan notes found for this encounter  Subjective:      Patient ID: Timo Ellison is a 80 y o  female  She  has had no vaginal bleeding or episodes of vaginitis over the past year  She is seen by a urogynecologist every 3 months to have her pessary removed cleaned and reinserted  She has been told by her surgeon that she does not need mammograms any longer  She does wear pads daily for urine stress incontinence  She has had 1 urinary tract infection over the year  Her bowel function is normal   She was able to drive here on her own today  Review of Systems   Constitutional: Negative  HENT: Negative  Eyes: Negative  Respiratory: Negative  Cardiovascular: Negative  Gastrointestinal: Negative  Endocrine: Negative  Genitourinary: Negative  Musculoskeletal: Negative  Skin: Negative  Allergic/Immunologic: Negative  Neurological: Negative  Hematological: Negative  Psychiatric/Behavioral: Negative  Objective:      /80 (BP Location: Left arm, Patient Position: Sitting, Cuff Size: Standard)   Ht 4' 11" (1 499 m)   Wt 59 2 kg (130 lb 9 6 oz)   BMI 26 38 kg/m²          Physical Exam   Constitutional: She is oriented to person, place, and time  She appears well-developed and well-nourished  HENT:   Head: Normocephalic  Neck: Normal range of motion  Neck supple  Cardiovascular: Normal rate, regular rhythm, normal heart sounds and intact distal pulses  Pulmonary/Chest: Effort normal and breath sounds normal    Abdominal: Soft  Bowel sounds are normal    Genitourinary: Uterus normal    Genitourinary Comments: Ring pessary can be palpable inside the vagina  Musculoskeletal: Normal range of motion     Neurological: She is alert and oriented to person, place, and time  Skin: Skin is warm and dry  Psychiatric: She has a normal mood and affect  Nursing note and vitals reviewed  no pelvic masses are identifiable  The ring pessary is in place  There is a small amount of white creamy vaginal discharge  There is no blood in the vagina  The vulva is normal   The pessary prevents the cervix from being seen  Rectal exam shows no masses or blood in the rectum and no nodularity in the cul-de-sac

## 2019-10-31 ENCOUNTER — APPOINTMENT (OUTPATIENT)
Dept: LAB | Facility: MEDICAL CENTER | Age: 84
End: 2019-10-31
Payer: MEDICARE

## 2019-10-31 DIAGNOSIS — R31.9 HEMATURIA, UNSPECIFIED TYPE: ICD-10-CM

## 2019-10-31 LAB
BACTERIA UR QL AUTO: ABNORMAL /HPF
BILIRUB UR QL STRIP: NEGATIVE
CLARITY UR: ABNORMAL
COLOR UR: YELLOW
GLUCOSE UR STRIP-MCNC: NEGATIVE MG/DL
HGB UR QL STRIP.AUTO: NEGATIVE
HYALINE CASTS #/AREA URNS LPF: ABNORMAL /LPF
KETONES UR STRIP-MCNC: NEGATIVE MG/DL
LEUKOCYTE ESTERASE UR QL STRIP: ABNORMAL
NITRITE UR QL STRIP: NEGATIVE
NON-SQ EPI CELLS URNS QL MICRO: ABNORMAL /HPF
PH UR STRIP.AUTO: 7.5 [PH]
PROT UR STRIP-MCNC: NEGATIVE MG/DL
RBC #/AREA URNS AUTO: ABNORMAL /HPF
SP GR UR STRIP.AUTO: 1.01 (ref 1–1.03)
UROBILINOGEN UR QL STRIP.AUTO: 0.2 E.U./DL
WBC #/AREA URNS AUTO: ABNORMAL /HPF

## 2019-10-31 PROCEDURE — 81001 URINALYSIS AUTO W/SCOPE: CPT

## 2019-11-05 LAB
LAB AP GYN PRIMARY INTERPRETATION: NORMAL
Lab: NORMAL

## 2019-11-12 ENCOUNTER — DOCUMENTATION (OUTPATIENT)
Dept: NEUROLOGY | Facility: CLINIC | Age: 84
End: 2019-11-12

## 2019-11-12 NOTE — PROGRESS NOTES
Type Date User Summary Attachment   General 11/11/2019  3:33 PM Anjelica Fry care coordination  -   Note    Botox- no authorization needed   Please use our stock      Thank you,     Mera Holden

## 2019-11-27 ENCOUNTER — TELEPHONE (OUTPATIENT)
Dept: NEUROLOGY | Facility: CLINIC | Age: 84
End: 2019-11-27

## 2019-12-02 ENCOUNTER — PROCEDURE VISIT (OUTPATIENT)
Dept: NEUROLOGY | Facility: CLINIC | Age: 84
End: 2019-12-02

## 2019-12-02 ENCOUNTER — TELEPHONE (OUTPATIENT)
Dept: NEUROLOGY | Facility: CLINIC | Age: 84
End: 2019-12-02

## 2019-12-02 VITALS
TEMPERATURE: 97.8 F | DIASTOLIC BLOOD PRESSURE: 67 MMHG | HEART RATE: 79 BPM | SYSTOLIC BLOOD PRESSURE: 136 MMHG | HEIGHT: 59 IN | BODY MASS INDEX: 26.38 KG/M2

## 2019-12-02 DIAGNOSIS — G24.8 SEGMENTAL DYSTONIA: ICD-10-CM

## 2019-12-02 DIAGNOSIS — G24.5 BLEPHAROSPASM: Primary | ICD-10-CM

## 2019-12-02 DIAGNOSIS — G24.3 CERVICAL DYSTONIA: ICD-10-CM

## 2019-12-02 DIAGNOSIS — G24.3 SPASMODIC TORTICOLLIS: ICD-10-CM

## 2019-12-02 PROCEDURE — 64616 CHEMODENERV MUSC NECK DYSTON: CPT | Performed by: PSYCHIATRY & NEUROLOGY

## 2019-12-02 PROCEDURE — 95874 GUIDE NERV DESTR NEEDLE EMG: CPT | Performed by: PSYCHIATRY & NEUROLOGY

## 2019-12-02 PROCEDURE — 64612 DESTROY NERVE FACE MUSCLE: CPT | Performed by: PSYCHIATRY & NEUROLOGY

## 2020-01-16 DIAGNOSIS — I10 ESSENTIAL HYPERTENSION: ICD-10-CM

## 2020-01-16 RX ORDER — AMLODIPINE BESYLATE AND BENAZEPRIL HYDROCHLORIDE 5; 20 MG/1; MG/1
1 CAPSULE ORAL DAILY
Qty: 90 CAPSULE | Refills: 0 | Status: SHIPPED | OUTPATIENT
Start: 2020-01-16 | End: 2020-04-21 | Stop reason: SDUPTHER

## 2020-01-24 DIAGNOSIS — M81.0 AGE RELATED OSTEOPOROSIS, UNSPECIFIED PATHOLOGICAL FRACTURE PRESENCE: ICD-10-CM

## 2020-01-29 DIAGNOSIS — M81.0 AGE RELATED OSTEOPOROSIS, UNSPECIFIED PATHOLOGICAL FRACTURE PRESENCE: ICD-10-CM

## 2020-01-29 RX ORDER — ALENDRONATE SODIUM 70 MG/1
70 TABLET ORAL
Qty: 12 TABLET | Refills: 3 | Status: SHIPPED | OUTPATIENT
Start: 2020-01-29

## 2020-02-11 ENCOUNTER — TELEPHONE (OUTPATIENT)
Dept: OBGYN CLINIC | Facility: CLINIC | Age: 85
End: 2020-02-11

## 2020-02-26 ENCOUNTER — DOCUMENTATION (OUTPATIENT)
Dept: NEUROLOGY | Facility: CLINIC | Age: 85
End: 2020-02-26

## 2020-02-26 NOTE — PROGRESS NOTES
General 2020 11:11 AM Elen Buchanan MA CARE COORDINATION -   Note    BOTOX 200 UNITS - NO AUTH NEEDED    STOCK

## 2020-03-03 ENCOUNTER — OFFICE VISIT (OUTPATIENT)
Dept: FAMILY MEDICINE CLINIC | Facility: MEDICAL CENTER | Age: 85
End: 2020-03-03
Payer: MEDICARE

## 2020-03-03 VITALS
SYSTOLIC BLOOD PRESSURE: 160 MMHG | HEART RATE: 70 BPM | BODY MASS INDEX: 26.61 KG/M2 | HEIGHT: 59 IN | RESPIRATION RATE: 16 BRPM | WEIGHT: 132 LBS | DIASTOLIC BLOOD PRESSURE: 74 MMHG

## 2020-03-03 DIAGNOSIS — I10 ESSENTIAL HYPERTENSION: Primary | ICD-10-CM

## 2020-03-03 PROCEDURE — 1036F TOBACCO NON-USER: CPT | Performed by: FAMILY MEDICINE

## 2020-03-03 PROCEDURE — 1160F RVW MEDS BY RX/DR IN RCRD: CPT | Performed by: FAMILY MEDICINE

## 2020-03-03 PROCEDURE — 99213 OFFICE O/P EST LOW 20 MIN: CPT | Performed by: FAMILY MEDICINE

## 2020-03-03 PROCEDURE — 3077F SYST BP >= 140 MM HG: CPT | Performed by: FAMILY MEDICINE

## 2020-03-03 PROCEDURE — 4040F PNEUMOC VAC/ADMIN/RCVD: CPT | Performed by: FAMILY MEDICINE

## 2020-03-03 PROCEDURE — 3078F DIAST BP <80 MM HG: CPT | Performed by: FAMILY MEDICINE

## 2020-03-03 PROCEDURE — 3008F BODY MASS INDEX DOCD: CPT | Performed by: FAMILY MEDICINE

## 2020-03-03 NOTE — PROGRESS NOTES
Assessment/Plan:       Diagnoses and all orders for this visit:    Essential hypertension    Blood pressure remained elevated on repeat measurement but did improve  Blood pressures therefore acceptable for age  Continue current dose of amlodipine with benazepril  BMI Counseling: Body mass index is 26 66 kg/m²  The BMI is above normal  Exercise recommendations include moderate aerobic physical activity for 150 minutes/week  Follow-up in about six months for a Medicare wellness exam or sooner if needed  Subjective:      Patient ID: Reema Ribera is a 80 y o  female  Patient presents for follow-up of hypertension  Currently she is on amlodipine with benazepril for treatment  Tolerating medication well  Does not complain of lightheadedness, dizziness, cough or leg swelling  Believes her blood pressure is elevated as she does not like coming to the doctor's office  No acute concerns today  The following portions of the patient's history were reviewed and updated as appropriate: She  has a past medical history of Bladder infection (09/2019), Breast lump in female, Osteopenia, and Ovarian cyst   She   Patient Active Problem List    Diagnosis Date Noted    Vertigo 10/16/2019    Sensorineural hearing loss (SNHL), bilateral 10/16/2019    Asymmetrical hearing loss of right ear 10/16/2019    Hyperglycemia 08/26/2019    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    Family history of malignant neoplasm of breast 08/24/2018    Anxiety 03/25/2016    Essential hypertension 03/25/2016    MCI (mild cognitive impairment) 03/25/2016    Blepharospasm 08/04/2015    Depression 06/16/2014    Functional urinary incontinence 06/16/2014    Hypercholesteremia 06/16/2014    Osteoporosis 06/16/2014    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    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     She  has a past surgical history that includes Breast biopsy; Bladder surgery; Cataract extraction (Bilateral, 01/01/2015);  Endometrial biopsy; and Hysterectomy  Her family history includes Heart disease in her father and mother; Hypertension in her family; Osteoporosis in her family  She  reports that she has never smoked  She has never used smokeless tobacco  She reports that she does not drink alcohol or use drugs  Current Outpatient Medications   Medication Sig Dispense Refill    alendronate (FOSAMAX) 70 mg tablet Take 1 tablet (70 mg total) by mouth every 7 days 12 tablet 3    amLODIPine-benazepril (LOTREL 5-20) 5-20 MG per capsule Take 1 capsule by mouth daily 90 capsule 0    calcium citrate-vitamin D (CITRACAL+D) 315-200 MG-UNIT per tablet Take 1 tablet by mouth daily      conjugated estrogens (PREMARIN) vaginal cream Insert into the vagina Ose as directed      Multiple Vitamins-Minerals (MULTIVITAL) tablet Take 1 tablet by mouth daily      MYRBETRIQ 50 MG TB24 Take 1 tablet by mouth daily      Omega-3 Fatty Acids (FISH OIL) 645 MG CAPS Take 1 tablet by mouth daily      polyethylene glycol (MIRALAX) 17 g packet Take 1 Package by mouth 2 (two) times a day       No current facility-administered medications for this visit        Current Outpatient Medications on File Prior to Visit   Medication Sig    alendronate (FOSAMAX) 70 mg tablet Take 1 tablet (70 mg total) by mouth every 7 days    amLODIPine-benazepril (LOTREL 5-20) 5-20 MG per capsule Take 1 capsule by mouth daily    calcium citrate-vitamin D (CITRACAL+D) 315-200 MG-UNIT per tablet Take 1 tablet by mouth daily    conjugated estrogens (PREMARIN) vaginal cream Insert into the vagina Ose as directed    Multiple Vitamins-Minerals (MULTIVITAL) tablet Take 1 tablet by mouth daily    MYRBETRIQ 50 MG TB24 Take 1 tablet by mouth daily    Omega-3 Fatty Acids (FISH OIL) 645 MG CAPS Take 1 tablet by mouth daily    polyethylene glycol (MIRALAX) 17 g packet Take 1 Package by mouth 2 (two) times a day    [DISCONTINUED] Magnesium Malate 1250 (141 7 Mg) MG TABS Take by mouth     No current facility-administered medications on file prior to visit  She has No Known Allergies       Review of Systems   Constitutional: Negative for fever  Respiratory: Negative for shortness of breath  Cardiovascular: Negative for chest pain  Neurological: Negative for weakness  Objective:      /74 (BP Location: Left arm, Patient Position: Sitting, Cuff Size: Standard)   Pulse 70   Resp 16   Ht 4' 11" (1 499 m)   Wt 59 9 kg (132 lb)   BMI 26 66 kg/m²          Physical Exam   Constitutional: She appears well-developed and well-nourished  Cardiovascular: Normal rate, regular rhythm and normal heart sounds  Pulmonary/Chest: Effort normal and breath sounds normal    Psychiatric: She has a normal mood and affect

## 2020-03-25 ENCOUNTER — TELEPHONE (OUTPATIENT)
Dept: NEUROLOGY | Facility: CLINIC | Age: 85
End: 2020-03-25

## 2020-03-25 NOTE — TELEPHONE ENCOUNTER
Daughter called and wants to cancel botox injection that is to be scheduled for Monday, 3/30 due to virus  I reached out to [facility] via skype with number to call back and reschedule

## 2020-03-26 ENCOUNTER — TELEPHONE (OUTPATIENT)
Dept: NEUROLOGY | Facility: CLINIC | Age: 85
End: 2020-03-26

## 2020-03-26 NOTE — TELEPHONE ENCOUNTER
Noted thank you- please let me know once the patient is rescheduled so I can attach a referral     Thank you!

## 2020-03-26 NOTE — TELEPHONE ENCOUNTER
Received a voicemail from the patient's daughter, Pavel Galicia- she is requesting a call back to reschedule her mom's Botox appointment on Monday 3/30/2020  She states due to COVID 19 she would like to reschedule      Please contact her at [phone]

## 2020-03-27 ENCOUNTER — TELEPHONE (OUTPATIENT)
Dept: NEUROLOGY | Facility: CLINIC | Age: 85
End: 2020-03-27

## 2020-03-27 NOTE — TELEPHONE ENCOUNTER
Pt daughter called back to r/s Erika's botox appt for a month later so Erika's new appt is now on 4/30/2020 at 12 PM and I also scheduled the next botox appt as well which is now on 08/31/2020 at 3:00 PM  Pt daughter was thankful that we could get [facility] scheduled

## 2020-03-27 NOTE — TELEPHONE ENCOUNTER
Pt daughter called back to r/s Erika's botox appt for a month later so Erika's new appt is now on 4/30/2020 at 12 PM and I also scheduled the next botox appt as well which is now on 08/31/2020 at 3:00 PM  Pt daughter was thankful that we could get [facility] scheduled

## 2020-04-21 DIAGNOSIS — I10 ESSENTIAL HYPERTENSION: ICD-10-CM

## 2020-04-21 RX ORDER — AMLODIPINE BESYLATE AND BENAZEPRIL HYDROCHLORIDE 5; 20 MG/1; MG/1
1 CAPSULE ORAL DAILY
Qty: 90 CAPSULE | Refills: 1 | Status: SHIPPED | OUTPATIENT
Start: 2020-04-21 | End: 2020-09-01

## 2020-04-30 ENCOUNTER — PROCEDURE VISIT (OUTPATIENT)
Dept: NEUROLOGY | Facility: CLINIC | Age: 85
End: 2020-04-30
Payer: MEDICARE

## 2020-04-30 VITALS — TEMPERATURE: 97.6 F | SYSTOLIC BLOOD PRESSURE: 134 MMHG | DIASTOLIC BLOOD PRESSURE: 76 MMHG | HEART RATE: 79 BPM

## 2020-04-30 DIAGNOSIS — G24.3 CERVICAL DYSTONIA: ICD-10-CM

## 2020-04-30 DIAGNOSIS — G24.5 BLEPHAROSPASM: Primary | ICD-10-CM

## 2020-04-30 PROCEDURE — 64612 DESTROY NERVE FACE MUSCLE: CPT | Performed by: PSYCHIATRY & NEUROLOGY

## 2020-07-07 ENCOUNTER — DOCUMENTATION (OUTPATIENT)
Dept: NEUROLOGY | Facility: CLINIC | Age: 85
End: 2020-07-07

## 2020-07-07 NOTE — PROGRESS NOTES
General 07/07/2020 12:51 PM Rodrigo Campo MA CARE COORDINATION -   Note    BOTOX 200 UNITS - NO AUTH NEEDED    STOCK

## 2020-07-30 ENCOUNTER — TELEPHONE (OUTPATIENT)
Dept: GERIATRICS | Age: 85
End: 2020-07-30

## 2020-07-30 NOTE — TELEPHONE ENCOUNTER
Left voicemail for Luke Leaver to contact office to schedule ivy assessment (and family conference) WITH DR LOONEY per referral from Nelda Neal at [place] [id number] [facility]

## 2020-07-31 NOTE — TELEPHONE ENCOUNTER
Patient's daughter, Ciara Singleton, returned call  She will speak to patient and will call back if gives "ok"

## 2020-08-26 DIAGNOSIS — I10 ESSENTIAL HYPERTENSION: ICD-10-CM

## 2020-08-31 ENCOUNTER — PROCEDURE VISIT (OUTPATIENT)
Dept: NEUROLOGY | Facility: CLINIC | Age: 85
End: 2020-08-31
Payer: MEDICARE

## 2020-08-31 ENCOUNTER — TELEPHONE (OUTPATIENT)
Dept: NEUROLOGY | Facility: CLINIC | Age: 85
End: 2020-08-31

## 2020-08-31 VITALS
HEART RATE: 78 BPM | BODY MASS INDEX: 26.66 KG/M2 | DIASTOLIC BLOOD PRESSURE: 74 MMHG | SYSTOLIC BLOOD PRESSURE: 132 MMHG | TEMPERATURE: 97.8 F | HEIGHT: 59 IN

## 2020-08-31 DIAGNOSIS — G24.3 CERVICAL DYSTONIA: Primary | ICD-10-CM

## 2020-08-31 DIAGNOSIS — G24.5 BLEPHAROSPASM: ICD-10-CM

## 2020-08-31 PROCEDURE — 64612 DESTROY NERVE FACE MUSCLE: CPT | Performed by: PSYCHIATRY & NEUROLOGY

## 2020-08-31 NOTE — TELEPHONE ENCOUNTER
Guy Best,  Could you please help me schedule her for her botox in December and then a 3m and 6m botox after that? Talked to Dr Kandice Graves we cannot find anything and she does not want to come to [facility]  Thank you

## 2020-08-31 NOTE — PROGRESS NOTES
Chemodenervation    Date/Time: 8/31/2020 3:26 PM  Performed by: Iram Duncan MD  Authorized by: Iram Duncan MD     Pre-procedure details:     Prepped With: Alcohol    Anesthesia (see MAR for exact dosages): Anesthesia method:  None  Procedure details:     Position:  Supine  Botox:     Brand:  Botox    mL's of preservative free sterile saline:  2    Final Concentration per CC:  50 units    Needle gauge: 30G 1/2 inch  Procedures:     Botox Procedures: blepharospasm and cervical dystonia      Indications: blepharospasm and spasmodic torticollis      Date of last injection:  4/30/2020  Total Units:     Total units discarded:  75  Post-procedure details:     Chemodenervation:  Neck, excluding muscles of the larynx and head or face    Patient tolerance of procedure: Tolerated well, no immediate complications  Comments:      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         Injection lasting about 4 months  Exam today with mild blinking  Slight right turn toritcollis and latercollis  No spasmodic tremor     2 5 unit(s) in 1 injection(s) was injected into the right  muscle  2 5 unit(s) in 1 injection(s) was injected into the left  muscle  10 unit(s) in 4 (2 5 ) injection(s) was injected into the right orbicularis oculi (upper inner, upper and lower outer and lateral canthus)  10 unit(s) in 4 (x2 5) injection(s) was injected into the left orbicularis oculi      25 unit(s) was injected into the left sternocleidomastoid muscle  --    50 (25 side, 25 low) unit(s) was injected into the right splenius capitus muscle  --    25 unit(s) was injected into the right levator scapulae muscle     Total injected 125 units  Discarded 75

## 2020-09-01 ENCOUNTER — OFFICE VISIT (OUTPATIENT)
Dept: FAMILY MEDICINE CLINIC | Facility: MEDICAL CENTER | Age: 85
End: 2020-09-01
Payer: MEDICARE

## 2020-09-01 ENCOUNTER — APPOINTMENT (OUTPATIENT)
Dept: RADIOLOGY | Facility: MEDICAL CENTER | Age: 85
End: 2020-09-01
Payer: MEDICARE

## 2020-09-01 VITALS
BODY MASS INDEX: 26.3 KG/M2 | WEIGHT: 130.2 LBS | TEMPERATURE: 99 F | DIASTOLIC BLOOD PRESSURE: 70 MMHG | SYSTOLIC BLOOD PRESSURE: 142 MMHG | HEART RATE: 88 BPM | RESPIRATION RATE: 16 BRPM

## 2020-09-01 DIAGNOSIS — R06.02 SOB (SHORTNESS OF BREATH) ON EXERTION: ICD-10-CM

## 2020-09-01 DIAGNOSIS — R73.9 HYPERGLYCEMIA: ICD-10-CM

## 2020-09-01 DIAGNOSIS — R53.83 FATIGUE, UNSPECIFIED TYPE: ICD-10-CM

## 2020-09-01 DIAGNOSIS — Z00.00 MEDICARE ANNUAL WELLNESS VISIT, SUBSEQUENT: ICD-10-CM

## 2020-09-01 DIAGNOSIS — I10 ESSENTIAL HYPERTENSION: Primary | ICD-10-CM

## 2020-09-01 DIAGNOSIS — E78.2 MIXED HYPERLIPIDEMIA: ICD-10-CM

## 2020-09-01 PROCEDURE — 99214 OFFICE O/P EST MOD 30 MIN: CPT | Performed by: FAMILY MEDICINE

## 2020-09-01 PROCEDURE — 71046 X-RAY EXAM CHEST 2 VIEWS: CPT

## 2020-09-01 PROCEDURE — G0439 PPPS, SUBSEQ VISIT: HCPCS | Performed by: FAMILY MEDICINE

## 2020-09-01 RX ORDER — AMLODIPINE BESYLATE AND BENAZEPRIL HYDROCHLORIDE 5; 20 MG/1; MG/1
CAPSULE ORAL
Qty: 90 CAPSULE | Refills: 1 | Status: SHIPPED | OUTPATIENT
Start: 2020-09-01 | End: 2021-03-01 | Stop reason: SDUPTHER

## 2020-09-01 NOTE — PROGRESS NOTES
Assessment and Plan:     Problem List Items Addressed This Visit        Cardiovascular and Mediastinum    Essential hypertension - Primary    Relevant Orders    Comprehensive metabolic panel       Other    Hyperlipidemia    Relevant Orders    Lipid Panel with Direct LDL reflex    Hyperglycemia    Relevant Orders    Comprehensive metabolic panel    Hemoglobin A1C      Other Visit Diagnoses     Fatigue, unspecified type        Relevant Orders    TSH, 3rd generation    T4, free    CBC and differential    SOB (shortness of breath) on exertion        Relevant Orders    XR chest pa & lateral    Echo complete with contrast if indicated    Medicare annual wellness visit, subsequent        Relevant Orders    Lipid Panel with Direct LDL reflex    Comprehensive metabolic panel    Hemoglobin A1C    TSH, 3rd generation    T4, free    CBC and differential        BMI Counseling: Body mass index is 26 3 kg/m²  The BMI is above normal  Nutrition recommendations include encouraging healthy choices of fruits and vegetables  Exercise recommendations include moderate physical activity 150 minutes/week  Preventive health issues were discussed with patient, and age appropriate screening tests were ordered as noted in patient's After Visit Summary  Personalized health advice and appropriate referrals for health education or preventive services given if needed, as noted in patient's After Visit Summary  Patient was encouraged to get the flu vaccine the earliest the end of September but no later than Thanksgiving  Follow-up in six months or sooner if needed         History of Present Illness:     Patient presents for Medicare Annual Wellness visit    Patient Care Team:  Fauzia Barron DO as PCP - General  Alvin Floyd MD (Neurology)  Lisa Phillips MD (Urogynecology)     Problem List:     Patient Active Problem List   Diagnosis    Blepharospasm    Spasmodic torticollis    Anxiety    Depression    Essential hypertension    Family history of malignant neoplasm of breast    Functional urinary incontinence    Hyperlipidemia    MCI (mild cognitive impairment)    Osteoporosis    Torsion dystonia fragments    Segmental dystonia    Hyperglycemia    Vertigo    Sensorineural hearing loss (SNHL), bilateral    Asymmetrical hearing loss of right ear      Past Medical and Surgical History:     Past Medical History:   Diagnosis Date    Bladder infection 09/2019    Breast lump in female     Osteopenia     Ovarian cyst      Past Surgical History:   Procedure Laterality Date    BLADDER SURGERY      BREAST BIOPSY      CATARACT EXTRACTION Bilateral 01/01/2015    , Right Eye-2017, Left eye    ENDOMETRIAL BIOPSY      by Suction    HYSTERECTOMY        Family History:     Family History   Problem Relation Age of Onset    Heart disease Mother     Heart disease Father     Hypertension Family     Osteoporosis Family       Social History:        Social History     Socioeconomic History    Marital status: /Civil Union     Spouse name: None    Number of children: None    Years of education: None    Highest education level: None   Occupational History    None   Social Needs    Financial resource strain: None    Food insecurity     Worry: None     Inability: None    Transportation needs     Medical: None     Non-medical: None   Tobacco Use    Smoking status: Never Smoker    Smokeless tobacco: Never Used   Substance and Sexual Activity    Alcohol use: No    Drug use: No    Sexual activity: Never   Lifestyle    Physical activity     Days per week: None     Minutes per session: None    Stress: None   Relationships    Social connections     Talks on phone: None     Gets together: None     Attends Mandaeism service: None     Active member of club or organization: None     Attends meetings of clubs or organizations: None     Relationship status: None    Intimate partner violence     Fear of current or ex partner: None     Emotionally abused: None     Physically abused: None     Forced sexual activity: None   Other Topics Concern    None   Social History Narrative    Denied consumption of coffee    Active daily tea consumption    Denied ingesting cola containing caffeine          Medications and Allergies:     Current Outpatient Medications   Medication Sig Dispense Refill    alendronate (FOSAMAX) 70 mg tablet Take 1 tablet (70 mg total) by mouth every 7 days 12 tablet 3    calcium citrate-vitamin D (CITRACAL+D) 315-200 MG-UNIT per tablet Take 1 tablet by mouth daily      conjugated estrogens (PREMARIN) vaginal cream Insert into the vagina Ose as directed      Multiple Vitamins-Minerals (MULTIVITAL) tablet Take 1 tablet by mouth daily      MYRBETRIQ 50 MG TB24 Take 1 tablet by mouth daily      Omega-3 Fatty Acids (FISH OIL) 645 MG CAPS Take 1 tablet by mouth daily      polyethylene glycol (MIRALAX) 17 g packet Take 1 Package by mouth 2 (two) times a day      amLODIPine-benazepril (LOTREL 5-20) 5-20 MG per capsule TAKE 1 CAPSULE EVERY DAY 90 capsule 1     No current facility-administered medications for this visit  No Known Allergies   Immunizations:     Immunization History   Administered Date(s) Administered    INFLUENZA 09/27/2013, 10/17/2014, 10/16/2015, 10/25/2016, 09/26/2017, 01/03/2018, 10/09/2019    Influenza Split High Dose Preservative Free IM 09/26/2017, 01/03/2018    Influenza, high dose seasonal 0 7 mL 10/02/2018    Pneumococcal Conjugate 13-Valent 08/21/2017    Pneumococcal Polysaccharide PPV23 08/24/2018    Zoster 01/22/2018, 01/31/2018      Health Maintenance: There are no preventive care reminders to display for this patient        Topic Date Due    DTaP,Tdap,and Td Vaccines (1 - Tdap) 11/27/1952    Influenza Vaccine  07/01/2020      Medicare Health Risk Assessment:     /70 (Cuff Size: Standard)   Pulse 88   Temp 99 °F (37 2 °C)   Resp 16   Wt 59 1 kg (130 lb 3 2 oz) BMI 26 30 kg/m²      Savanah Buck is here for her Subsequent Wellness visit  Health Risk Assessment:   Patient rates overall health as good  Patient feels that their physical health rating is same  Eyesight was rated as same  Hearing was rated as same  Patient feels that their emotional and mental health rating is same  Pain experienced in the last 7 days has been none  Patient states that she has experienced no weight loss or gain in last 6 months  Depression Screening:   PHQ-2 Score: 1      Fall Risk Screening: In the past year, patient has experienced: history of falling in past year    Number of falls: 1  Injured during fall?: No      Urinary Incontinence Screening:   Patient has leaked urine accidently in the last six months  Followed by Urology    Home Safety:  Patient has trouble with stairs inside or outside of their home  Patient has working smoke alarms and has working carbon monoxide detector  Home safety hazards include: loose rugs on the floor  Nutrition:   Current diet is Regular  Medications:   Patient is currently taking over-the-counter supplements  OTC medications include: see medication list  Patient is able to manage medications  Activities of Daily Living (ADLs)/Instrumental Activities of Daily Living (IADLs):   Walk and transfer into and out of bed and chair?: Yes  Dress and groom yourself?: Yes    Bathe or shower yourself?: Yes    Feed yourself? Yes  Do your laundry/housekeeping?: Yes  Manage your money, pay your bills and track your expenses?: Yes  Make your own meals?: Yes    Do your own shopping?: No    Previous Hospitalizations:   Any hospitalizations or ED visits within the last 12 months?: No      Advance Care Planning:   Living will: Yes    Durable POA for healthcare:  Yes    Advanced directive: Yes      PREVENTIVE SCREENINGS      Cardiovascular Screening:    General: Screening Not Indicated, History Lipid Disorder and Risks and Benefits Discussed    Due for: Lipid Panel      Diabetes Screening:     General: Screening Current and Risks and Benefits Discussed    Due for: Blood Glucose      Colorectal Cancer Screening:     General: Screening Not Indicated      Breast Cancer Screening:     General: Patient Declines      Cervical Cancer Screening:    General: Screening Not Indicated      Osteoporosis Screening:    General: Screening Not Indicated and History Osteoporosis      Abdominal Aortic Aneurysm (AAA) Screening:        General: Screening Not Indicated      Lung Cancer Screening:     General: Screening Not Indicated      Hepatitis C Screening:    General: Screening Not Indicated      Denise Esparza DO

## 2020-09-01 NOTE — PROGRESS NOTES
Assessment/Plan:       Diagnoses and all orders for this visit:    Essential hypertension  -     Comprehensive metabolic panel; Future  Blood pressure acceptable for age  Continue amlodipine with benazepril  Check labs  Mixed hyperlipidemia  -     Lipid Panel with Direct LDL reflex; Future  Diet controlled  Check lipid profile  Patient encouraged to exercise more as well  Hyperglycemia  -     Comprehensive metabolic panel; Future  -     Hemoglobin A1C; Future  Diet controlled  Check labs  Patient encouraged to increase activity level  Fatigue, unspecified type  -     TSH, 3rd generation; Future  -     T4, free; Future  -     CBC and differential; Future  Likely secondary to sedentary lifestyle  Check thyroid labs and blood count  SOB (shortness of breath) on exertion  -     XR chest pa & lateral; Future  -     Echo complete with contrast if indicated; Future  Likely secondary to sedentary lifestyle  Patient encouraged to exercise more often by at least walking  Check a chest x-ray and echocardiogram to assess for pulmonary and cardiac problems  Follow-up in six months or sooner if needed  Subjective:      Patient ID: Arnoldo Kaminski is a 80 y o  female  Patient presents for follow-up  She has hypertension  Current treatment includes amlodipine with benazepril  Tolerating medication well  She has hyperlipidemia  Not on any medication at this time  Tries to eat healthy  She is not very physically active  She has hyperglycemia  Not on any medication at this time  Tries to eat healthy  Does not get much physical activity  She has fatigue  Admits to sedentary lifestyle  She is wondering if it could be her thyroid  She does complain of shortness of breath with exertion  Patient is here today with her daughter  She is very sedentary  Her daughter agrees with this  Patient will VAC but will get short of breath  No chest pain        The following portions of the patient's history were reviewed and updated as appropriate: She  has a past medical history of Bladder infection (09/2019), Breast lump in female, Osteopenia, and Ovarian cyst   She   Patient Active Problem List    Diagnosis Date Noted    Vertigo 10/16/2019    Sensorineural hearing loss (SNHL), bilateral 10/16/2019    Asymmetrical hearing loss of right ear 10/16/2019    Hyperglycemia 08/26/2019    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    Family history of malignant neoplasm of breast 08/24/2018    Anxiety 03/25/2016    Essential hypertension 03/25/2016    MCI (mild cognitive impairment) 03/25/2016    Blepharospasm 08/04/2015    Depression 06/16/2014    Functional urinary incontinence 06/16/2014    Hyperlipidemia 06/16/2014    Osteoporosis 06/16/2014    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    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     She  has a past surgical history that includes Breast biopsy; Bladder surgery; Cataract extraction (Bilateral, 01/01/2015); Endometrial biopsy; and Hysterectomy  Her family history includes Heart disease in her father and mother; Hypertension in her family; Osteoporosis in her family  She  reports that she has never smoked  She has never used smokeless tobacco  She reports that she does not drink alcohol or use drugs    Current Outpatient Medications   Medication Sig Dispense Refill    alendronate (FOSAMAX) 70 mg tablet Take 1 tablet (70 mg total) by mouth every 7 days 12 tablet 3    calcium citrate-vitamin D (CITRACAL+D) 315-200 MG-UNIT per tablet Take 1 tablet by mouth daily      conjugated estrogens (PREMARIN) vaginal cream Insert into the vagina Ose as directed      Multiple Vitamins-Minerals (MULTIVITAL) tablet Take 1 tablet by mouth daily      MYRBETRIQ 50 MG TB24 Take 1 tablet by mouth daily      Omega-3 Fatty Acids (FISH OIL) 645 MG CAPS Take 1 tablet by mouth daily      polyethylene glycol (MIRALAX) 17 g packet Take 1 Package by mouth 2 (two) times a day      amLODIPine-benazepril (LOTREL 5-20) 5-20 MG per capsule TAKE 1 CAPSULE EVERY DAY 90 capsule 1     No current facility-administered medications for this visit  Current Outpatient Medications on File Prior to Visit   Medication Sig    alendronate (FOSAMAX) 70 mg tablet Take 1 tablet (70 mg total) by mouth every 7 days    calcium citrate-vitamin D (CITRACAL+D) 315-200 MG-UNIT per tablet Take 1 tablet by mouth daily    conjugated estrogens (PREMARIN) vaginal cream Insert into the vagina Ose as directed    Multiple Vitamins-Minerals (MULTIVITAL) tablet Take 1 tablet by mouth daily    MYRBETRIQ 50 MG TB24 Take 1 tablet by mouth daily    Omega-3 Fatty Acids (FISH OIL) 645 MG CAPS Take 1 tablet by mouth daily    polyethylene glycol (MIRALAX) 17 g packet Take 1 Package by mouth 2 (two) times a day    [DISCONTINUED] amLODIPine-benazepril (LOTREL 5-20) 5-20 MG per capsule Take 1 capsule by mouth daily    amLODIPine-benazepril (LOTREL 5-20) 5-20 MG per capsule TAKE 1 CAPSULE EVERY DAY     Current Facility-Administered Medications on File Prior to Visit   Medication    [COMPLETED] onabotulinumtoxin A (BOTOX) injection 200 Units     She has No Known Allergies       Review of Systems   Constitutional: Positive for fatigue  Negative for fever  Respiratory: Positive for shortness of breath  Cardiovascular: Negative for chest pain  Objective:      /70 (Cuff Size: Standard)   Pulse 88   Temp 99 °F (37 2 °C)   Resp 16   Wt 59 1 kg (130 lb 3 2 oz)   BMI 26 30 kg/m²          Physical Exam  Constitutional:       Appearance: She is well-developed  Cardiovascular:      Rate and Rhythm: Normal rate and regular rhythm  Heart sounds: Normal heart sounds  Pulmonary:      Effort: Pulmonary effort is normal       Breath sounds: Normal breath sounds

## 2020-09-02 ENCOUNTER — APPOINTMENT (OUTPATIENT)
Dept: LAB | Facility: MEDICAL CENTER | Age: 85
End: 2020-09-02
Payer: MEDICARE

## 2020-09-02 DIAGNOSIS — R53.83 FATIGUE, UNSPECIFIED TYPE: ICD-10-CM

## 2020-09-02 DIAGNOSIS — R73.9 HYPERGLYCEMIA: ICD-10-CM

## 2020-09-02 DIAGNOSIS — I10 ESSENTIAL HYPERTENSION: ICD-10-CM

## 2020-09-02 DIAGNOSIS — Z00.00 MEDICARE ANNUAL WELLNESS VISIT, SUBSEQUENT: ICD-10-CM

## 2020-09-02 DIAGNOSIS — E78.2 MIXED HYPERLIPIDEMIA: ICD-10-CM

## 2020-09-02 LAB
ALBUMIN SERPL BCP-MCNC: 3.7 G/DL (ref 3.5–5)
ALP SERPL-CCNC: 40 U/L (ref 46–116)
ALT SERPL W P-5'-P-CCNC: 18 U/L (ref 12–78)
ANION GAP SERPL CALCULATED.3IONS-SCNC: 6 MMOL/L (ref 4–13)
AST SERPL W P-5'-P-CCNC: 18 U/L (ref 5–45)
BASOPHILS # BLD AUTO: 0.03 THOUSANDS/ΜL (ref 0–0.1)
BASOPHILS NFR BLD AUTO: 1 % (ref 0–1)
BILIRUB SERPL-MCNC: 0.57 MG/DL (ref 0.2–1)
BUN SERPL-MCNC: 20 MG/DL (ref 5–25)
CALCIUM SERPL-MCNC: 9.6 MG/DL (ref 8.3–10.1)
CHLORIDE SERPL-SCNC: 103 MMOL/L (ref 100–108)
CHOLEST SERPL-MCNC: 251 MG/DL (ref 50–200)
CO2 SERPL-SCNC: 30 MMOL/L (ref 21–32)
CREAT SERPL-MCNC: 0.82 MG/DL (ref 0.6–1.3)
EOSINOPHIL # BLD AUTO: 0.06 THOUSAND/ΜL (ref 0–0.61)
EOSINOPHIL NFR BLD AUTO: 1 % (ref 0–6)
ERYTHROCYTE [DISTWIDTH] IN BLOOD BY AUTOMATED COUNT: 13.9 % (ref 11.6–15.1)
EST. AVERAGE GLUCOSE BLD GHB EST-MCNC: 126 MG/DL
GFR SERPL CREATININE-BSD FRML MDRD: 64 ML/MIN/1.73SQ M
GLUCOSE P FAST SERPL-MCNC: 100 MG/DL (ref 65–99)
HBA1C MFR BLD: 6 %
HCT VFR BLD AUTO: 41.5 % (ref 34.8–46.1)
HDLC SERPL-MCNC: 72 MG/DL
HGB BLD-MCNC: 13.1 G/DL (ref 11.5–15.4)
IMM GRANULOCYTES # BLD AUTO: 0.02 THOUSAND/UL (ref 0–0.2)
IMM GRANULOCYTES NFR BLD AUTO: 1 % (ref 0–2)
LDLC SERPL CALC-MCNC: 155 MG/DL (ref 0–100)
LYMPHOCYTES # BLD AUTO: 1.11 THOUSANDS/ΜL (ref 0.6–4.47)
LYMPHOCYTES NFR BLD AUTO: 26 % (ref 14–44)
MCH RBC QN AUTO: 31.3 PG (ref 26.8–34.3)
MCHC RBC AUTO-ENTMCNC: 31.6 G/DL (ref 31.4–37.4)
MCV RBC AUTO: 99 FL (ref 82–98)
MONOCYTES # BLD AUTO: 0.4 THOUSAND/ΜL (ref 0.17–1.22)
MONOCYTES NFR BLD AUTO: 9 % (ref 4–12)
NEUTROPHILS # BLD AUTO: 2.71 THOUSANDS/ΜL (ref 1.85–7.62)
NEUTS SEG NFR BLD AUTO: 62 % (ref 43–75)
NRBC BLD AUTO-RTO: 0 /100 WBCS
PLATELET # BLD AUTO: 199 THOUSANDS/UL (ref 149–390)
PMV BLD AUTO: 10.4 FL (ref 8.9–12.7)
POTASSIUM SERPL-SCNC: 4.1 MMOL/L (ref 3.5–5.3)
PROT SERPL-MCNC: 7.7 G/DL (ref 6.4–8.2)
RBC # BLD AUTO: 4.19 MILLION/UL (ref 3.81–5.12)
SODIUM SERPL-SCNC: 139 MMOL/L (ref 136–145)
T4 FREE SERPL-MCNC: 1.06 NG/DL (ref 0.76–1.46)
TRIGL SERPL-MCNC: 118 MG/DL
TSH SERPL DL<=0.05 MIU/L-ACNC: 2.73 UIU/ML (ref 0.36–3.74)
WBC # BLD AUTO: 4.33 THOUSAND/UL (ref 4.31–10.16)

## 2020-09-02 PROCEDURE — 80053 COMPREHEN METABOLIC PANEL: CPT

## 2020-09-02 PROCEDURE — 80061 LIPID PANEL: CPT

## 2020-09-02 PROCEDURE — 83036 HEMOGLOBIN GLYCOSYLATED A1C: CPT

## 2020-09-02 PROCEDURE — 84439 ASSAY OF FREE THYROXINE: CPT

## 2020-09-02 PROCEDURE — 85025 COMPLETE CBC W/AUTO DIFF WBC: CPT

## 2020-09-02 PROCEDURE — 36415 COLL VENOUS BLD VENIPUNCTURE: CPT

## 2020-09-02 PROCEDURE — 84443 ASSAY THYROID STIM HORMONE: CPT

## 2020-09-04 ENCOUNTER — TELEPHONE (OUTPATIENT)
Dept: FAMILY MEDICINE CLINIC | Facility: MEDICAL CENTER | Age: 85
End: 2020-09-04

## 2020-09-09 ENCOUNTER — DOCUMENTATION (OUTPATIENT)
Dept: NEUROLOGY | Facility: CLINIC | Age: 85
End: 2020-09-09

## 2020-09-09 NOTE — PROGRESS NOTES
General  09/09/2020 11:59 AM  Adam Taylor MA  CARE COORDINATION  -    Note     BOTOX 200 UNITS - NO AUTH NEEDED    STOCK

## 2020-09-22 RX ORDER — ALENDRONATE SODIUM 70 MG/1
TABLET ORAL
Qty: 12 TABLET | Refills: 0 | OUTPATIENT
Start: 2020-09-22

## 2020-10-22 ENCOUNTER — HOSPITAL ENCOUNTER (OUTPATIENT)
Dept: NON INVASIVE DIAGNOSTICS | Facility: MEDICAL CENTER | Age: 85
Discharge: HOME | End: 2020-10-22
Payer: MEDICARE

## 2020-10-22 DIAGNOSIS — R06.02 SOB (SHORTNESS OF BREATH) ON EXERTION: ICD-10-CM

## 2020-10-22 PROCEDURE — 93306 TTE W/DOPPLER COMPLETE: CPT

## 2020-10-22 PROCEDURE — 93306 TTE W/DOPPLER COMPLETE: CPT | Performed by: INTERNAL MEDICINE

## 2020-10-23 ENCOUNTER — APPOINTMENT (OUTPATIENT)
Dept: LAB | Facility: MEDICAL CENTER | Age: 85
End: 2020-10-23
Payer: MEDICARE

## 2020-10-23 ENCOUNTER — OFFICE VISIT (OUTPATIENT)
Dept: FAMILY MEDICINE CLINIC | Facility: MEDICAL CENTER | Age: 85
End: 2020-10-23
Payer: MEDICARE

## 2020-10-23 VITALS
TEMPERATURE: 97.8 F | BODY MASS INDEX: 26 KG/M2 | WEIGHT: 129 LBS | HEART RATE: 91 BPM | OXYGEN SATURATION: 96 % | HEIGHT: 59 IN | SYSTOLIC BLOOD PRESSURE: 166 MMHG | DIASTOLIC BLOOD PRESSURE: 90 MMHG

## 2020-10-23 DIAGNOSIS — N89.8 VAGINAL DISCHARGE: Primary | ICD-10-CM

## 2020-10-23 DIAGNOSIS — R42 DIZZINESS: ICD-10-CM

## 2020-10-23 DIAGNOSIS — I27.20 PULMONARY HTN (HCC): ICD-10-CM

## 2020-10-23 LAB
ALBUMIN SERPL BCP-MCNC: 3.9 G/DL (ref 3.5–5)
ALP SERPL-CCNC: 48 U/L (ref 46–116)
ALT SERPL W P-5'-P-CCNC: 22 U/L (ref 12–78)
ANION GAP SERPL CALCULATED.3IONS-SCNC: 1 MMOL/L (ref 4–13)
AST SERPL W P-5'-P-CCNC: 17 U/L (ref 5–45)
BASOPHILS # BLD AUTO: 0.04 THOUSANDS/ΜL (ref 0–0.1)
BASOPHILS NFR BLD AUTO: 1 % (ref 0–1)
BILIRUB SERPL-MCNC: 0.48 MG/DL (ref 0.2–1)
BUN SERPL-MCNC: 20 MG/DL (ref 5–25)
CALCIUM SERPL-MCNC: 10.5 MG/DL (ref 8.3–10.1)
CHLORIDE SERPL-SCNC: 105 MMOL/L (ref 100–108)
CO2 SERPL-SCNC: 33 MMOL/L (ref 21–32)
CREAT SERPL-MCNC: 0.97 MG/DL (ref 0.6–1.3)
EOSINOPHIL # BLD AUTO: 0.03 THOUSAND/ΜL (ref 0–0.61)
EOSINOPHIL NFR BLD AUTO: 1 % (ref 0–6)
ERYTHROCYTE [DISTWIDTH] IN BLOOD BY AUTOMATED COUNT: 13.6 % (ref 11.6–15.1)
GFR SERPL CREATININE-BSD FRML MDRD: 52 ML/MIN/1.73SQ M
GLUCOSE SERPL-MCNC: 116 MG/DL (ref 65–140)
HCT VFR BLD AUTO: 42.5 % (ref 34.8–46.1)
HGB BLD-MCNC: 13.2 G/DL (ref 11.5–15.4)
IMM GRANULOCYTES # BLD AUTO: 0.01 THOUSAND/UL (ref 0–0.2)
IMM GRANULOCYTES NFR BLD AUTO: 0 % (ref 0–2)
LYMPHOCYTES # BLD AUTO: 1.2 THOUSANDS/ΜL (ref 0.6–4.47)
LYMPHOCYTES NFR BLD AUTO: 22 % (ref 14–44)
MCH RBC QN AUTO: 30.6 PG (ref 26.8–34.3)
MCHC RBC AUTO-ENTMCNC: 31.1 G/DL (ref 31.4–37.4)
MCV RBC AUTO: 99 FL (ref 82–98)
MONOCYTES # BLD AUTO: 0.49 THOUSAND/ΜL (ref 0.17–1.22)
MONOCYTES NFR BLD AUTO: 9 % (ref 4–12)
NEUTROPHILS # BLD AUTO: 3.81 THOUSANDS/ΜL (ref 1.85–7.62)
NEUTS SEG NFR BLD AUTO: 67 % (ref 43–75)
NRBC BLD AUTO-RTO: 0 /100 WBCS
PLATELET # BLD AUTO: 230 THOUSANDS/UL (ref 149–390)
PMV BLD AUTO: 10.3 FL (ref 8.9–12.7)
POTASSIUM SERPL-SCNC: 4.4 MMOL/L (ref 3.5–5.3)
PROT SERPL-MCNC: 8.7 G/DL (ref 6.4–8.2)
RBC # BLD AUTO: 4.31 MILLION/UL (ref 3.81–5.12)
SL AMB  POCT GLUCOSE, UA: ABNORMAL
SL AMB LEUKOCYTE ESTERASE,UA: ABNORMAL
SL AMB POCT BILIRUBIN,UA: ABNORMAL
SL AMB POCT BLOOD,UA: ABNORMAL
SL AMB POCT KETONES,UA: ABNORMAL
SL AMB POCT NITRITE,UA: ABNORMAL
SL AMB POCT PH,UA: 7
SL AMB POCT SPECIFIC GRAVITY,UA: 1.01
SL AMB POCT URINE PROTEIN: ABNORMAL
SL AMB POCT UROBILINOGEN: 3.5
SODIUM SERPL-SCNC: 139 MMOL/L (ref 136–145)
WBC # BLD AUTO: 5.58 THOUSAND/UL (ref 4.31–10.16)

## 2020-10-23 PROCEDURE — 36415 COLL VENOUS BLD VENIPUNCTURE: CPT

## 2020-10-23 PROCEDURE — 87086 URINE CULTURE/COLONY COUNT: CPT | Performed by: FAMILY MEDICINE

## 2020-10-23 PROCEDURE — 81002 URINALYSIS NONAUTO W/O SCOPE: CPT | Performed by: FAMILY MEDICINE

## 2020-10-23 PROCEDURE — 85025 COMPLETE CBC W/AUTO DIFF WBC: CPT

## 2020-10-23 PROCEDURE — 80053 COMPREHEN METABOLIC PANEL: CPT

## 2020-10-23 PROCEDURE — 99213 OFFICE O/P EST LOW 20 MIN: CPT | Performed by: FAMILY MEDICINE

## 2020-10-23 RX ORDER — SULFAMETHOXAZOLE AND TRIMETHOPRIM 800; 160 MG/1; MG/1
1 TABLET ORAL EVERY 12 HOURS SCHEDULED
Qty: 6 TABLET | Refills: 0 | Status: SHIPPED | OUTPATIENT
Start: 2020-10-23 | End: 2020-10-26

## 2020-10-24 LAB — BACTERIA UR CULT: NORMAL

## 2020-10-26 ENCOUNTER — TELEPHONE (OUTPATIENT)
Dept: FAMILY MEDICINE CLINIC | Facility: MEDICAL CENTER | Age: 85
End: 2020-10-26

## 2020-10-26 DIAGNOSIS — M81.0 OSTEOPOROSIS, UNSPECIFIED OSTEOPOROSIS TYPE, UNSPECIFIED PATHOLOGICAL FRACTURE PRESENCE: ICD-10-CM

## 2020-10-26 DIAGNOSIS — R73.9 HYPERGLYCEMIA: Primary | ICD-10-CM

## 2020-10-28 ENCOUNTER — LAB (OUTPATIENT)
Dept: LAB | Facility: MEDICAL CENTER | Age: 85
End: 2020-10-28
Payer: MEDICARE

## 2020-10-28 DIAGNOSIS — M81.0 OSTEOPOROSIS, UNSPECIFIED OSTEOPOROSIS TYPE, UNSPECIFIED PATHOLOGICAL FRACTURE PRESENCE: ICD-10-CM

## 2020-10-28 DIAGNOSIS — R73.9 HYPERGLYCEMIA: ICD-10-CM

## 2020-10-28 LAB
25(OH)D3 SERPL-MCNC: 35.9 NG/ML (ref 30–100)
CA-I BLD-SCNC: 1.2 MMOL/L (ref 1.12–1.32)
CALCIUM SERPL-MCNC: 10.4 MG/DL (ref 8.3–10.1)
PTH-INTACT SERPL-MCNC: 45.5 PG/ML (ref 18.4–80.1)

## 2020-10-28 PROCEDURE — 82310 ASSAY OF CALCIUM: CPT

## 2020-10-28 PROCEDURE — 82330 ASSAY OF CALCIUM: CPT

## 2020-10-28 PROCEDURE — 82306 VITAMIN D 25 HYDROXY: CPT

## 2020-10-28 PROCEDURE — 83970 ASSAY OF PARATHORMONE: CPT

## 2020-10-28 PROCEDURE — 36415 COLL VENOUS BLD VENIPUNCTURE: CPT

## 2020-11-03 ENCOUNTER — TELEPHONE (OUTPATIENT)
Dept: FAMILY MEDICINE CLINIC | Facility: MEDICAL CENTER | Age: 85
End: 2020-11-03

## 2020-11-03 DIAGNOSIS — R73.9 HYPERGLYCEMIA: Primary | ICD-10-CM

## 2020-11-03 DIAGNOSIS — E83.52 HYPERCALCEMIA: ICD-10-CM

## 2020-11-04 ENCOUNTER — ANNUAL EXAM (OUTPATIENT)
Dept: OBGYN CLINIC | Facility: MEDICAL CENTER | Age: 85
End: 2020-11-04
Payer: MEDICARE

## 2020-11-04 VITALS
HEIGHT: 59 IN | DIASTOLIC BLOOD PRESSURE: 80 MMHG | TEMPERATURE: 97.8 F | WEIGHT: 130 LBS | SYSTOLIC BLOOD PRESSURE: 160 MMHG | BODY MASS INDEX: 26.21 KG/M2

## 2020-11-04 DIAGNOSIS — M81.0 OSTEOPOROSIS WITHOUT CURRENT PATHOLOGICAL FRACTURE, UNSPECIFIED OSTEOPOROSIS TYPE: ICD-10-CM

## 2020-11-04 DIAGNOSIS — Z01.419 ENCOUNTER FOR GYNECOLOGICAL EXAMINATION (GENERAL) (ROUTINE) WITHOUT ABNORMAL FINDINGS: Primary | ICD-10-CM

## 2020-11-04 DIAGNOSIS — Z78.0 POSTMENOPAUSAL STATUS: ICD-10-CM

## 2020-11-04 DIAGNOSIS — Z96.0 PRESENCE OF PESSARY: ICD-10-CM

## 2020-11-04 PROCEDURE — G0101 CA SCREEN;PELVIC/BREAST EXAM: HCPCS | Performed by: NURSE PRACTITIONER

## 2020-11-10 ENCOUNTER — OFFICE VISIT (OUTPATIENT)
Dept: PULMONOLOGY | Facility: CLINIC | Age: 85
End: 2020-11-10
Payer: MEDICARE

## 2020-11-10 VITALS
DIASTOLIC BLOOD PRESSURE: 88 MMHG | HEART RATE: 100 BPM | BODY MASS INDEX: 26.25 KG/M2 | HEIGHT: 59 IN | TEMPERATURE: 96.6 F | OXYGEN SATURATION: 99 % | RESPIRATION RATE: 16 BRPM | WEIGHT: 130.2 LBS | SYSTOLIC BLOOD PRESSURE: 155 MMHG

## 2020-11-10 DIAGNOSIS — I27.20 PULMONARY HTN (HCC): ICD-10-CM

## 2020-11-10 DIAGNOSIS — R06.02 SHORTNESS OF BREATH: Primary | ICD-10-CM

## 2020-11-10 PROCEDURE — 99204 OFFICE O/P NEW MOD 45 MIN: CPT | Performed by: INTERNAL MEDICINE

## 2020-12-30 ENCOUNTER — PROCEDURE VISIT (OUTPATIENT)
Dept: NEUROLOGY | Facility: CLINIC | Age: 85
End: 2020-12-30
Payer: MEDICARE

## 2020-12-30 VITALS — SYSTOLIC BLOOD PRESSURE: 132 MMHG | HEART RATE: 86 BPM | DIASTOLIC BLOOD PRESSURE: 80 MMHG | TEMPERATURE: 98.4 F

## 2020-12-30 DIAGNOSIS — G24.5 BLEPHAROSPASM: ICD-10-CM

## 2020-12-30 DIAGNOSIS — G24.3 CERVICAL DYSTONIA: Primary | ICD-10-CM

## 2020-12-30 PROCEDURE — 64612 DESTROY NERVE FACE MUSCLE: CPT | Performed by: PSYCHIATRY & NEUROLOGY

## 2020-12-30 NOTE — PROGRESS NOTES
Universal Protocol   Consent: Written consent obtained  Risks and benefits: risks, benefits and alternatives were discussed  Consent given by: patient        Chemodenervation     Date/Time 12/30/2020 2:42 PM     Performed by  India Wylie MD     Authorized by India Wylie MD        Pre-procedure details      Prepped With: Alcohol     Anesthesia  (see MAR for exact dosages): Anesthesia method:  None   Procedure details     Position:  Upright   Botox     Brand:  Botox    mL's of preservative free sterile saline:  2    Final Concentration per CC:  50 units    Needle Gauge:  30 G 2 5 inch   Procedures     Botox Procedures: blepharospasm and cervical dystonia      Indications: blepharospasm and spasmodic torticollis      Date of last injection:  8/31/2020   Total Units     Total units discarded:  75   Post-procedure details      Chemodenervation:  Head or face and neck, excluding muscles of the larynx    Patient tolerance of procedure: Tolerated well, no immediate complications   Comments      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      Overall good response to injections  Injection lasting about 4 months  Exam today with mild blinking  Slight right turn toritcollis and latercollis  No spasmodic tremor  Mild dysphonia     2 5 unit(s) in 1 injection(s) was injected into the right  muscle  2 5 unit(s) in 1 injection(s) was injected into the left  muscle  10 unit(s) in 4 (2 5 ) injection(s) was injected into the right orbicularis oculi (upper inner, upper and lower outer and lateral canthus)  10 unit(s) in 4 (x2 5) injection(s) was injected into the left orbicularis oculi      25 unit(s) was injected into the left sternocleidomastoid muscle  --    50 (25 side, 25 low) unit(s) was injected into the right splenius capitus muscle  --    25 unit(s) was injected into the right levator scapulae muscle     Total injected 125 units  Discarded 75

## 2021-01-20 DIAGNOSIS — Z23 ENCOUNTER FOR IMMUNIZATION: ICD-10-CM

## 2021-01-21 ENCOUNTER — IMMUNIZATIONS (OUTPATIENT)
Dept: FAMILY MEDICINE CLINIC | Facility: HOSPITAL | Age: 86
End: 2021-01-21

## 2021-01-21 DIAGNOSIS — Z23 ENCOUNTER FOR IMMUNIZATION: Primary | ICD-10-CM

## 2021-01-21 PROCEDURE — 0001A SARS-COV-2 / COVID-19 MRNA VACCINE (PFIZER-BIONTECH) 30 MCG: CPT

## 2021-01-21 PROCEDURE — 91300 SARS-COV-2 / COVID-19 MRNA VACCINE (PFIZER-BIONTECH) 30 MCG: CPT

## 2021-02-11 ENCOUNTER — IMMUNIZATIONS (OUTPATIENT)
Dept: FAMILY MEDICINE CLINIC | Facility: HOSPITAL | Age: 86
End: 2021-02-11

## 2021-02-11 DIAGNOSIS — Z23 ENCOUNTER FOR IMMUNIZATION: Primary | ICD-10-CM

## 2021-02-11 PROCEDURE — 0002A SARS-COV-2 / COVID-19 MRNA VACCINE (PFIZER-BIONTECH) 30 MCG: CPT

## 2021-02-11 PROCEDURE — 91300 SARS-COV-2 / COVID-19 MRNA VACCINE (PFIZER-BIONTECH) 30 MCG: CPT

## 2021-03-01 ENCOUNTER — OFFICE VISIT (OUTPATIENT)
Dept: FAMILY MEDICINE CLINIC | Facility: MEDICAL CENTER | Age: 86
End: 2021-03-01
Payer: MEDICARE

## 2021-03-01 VITALS
HEART RATE: 100 BPM | DIASTOLIC BLOOD PRESSURE: 90 MMHG | RESPIRATION RATE: 16 BRPM | TEMPERATURE: 97.3 F | HEIGHT: 59 IN | BODY MASS INDEX: 27.01 KG/M2 | SYSTOLIC BLOOD PRESSURE: 150 MMHG | WEIGHT: 134 LBS

## 2021-03-01 DIAGNOSIS — I10 ESSENTIAL HYPERTENSION: Primary | ICD-10-CM

## 2021-03-01 DIAGNOSIS — E78.2 MIXED HYPERLIPIDEMIA: ICD-10-CM

## 2021-03-01 DIAGNOSIS — R73.9 HYPERGLYCEMIA: ICD-10-CM

## 2021-03-01 DIAGNOSIS — I27.20 PULMONARY HTN (HCC): ICD-10-CM

## 2021-03-01 PROCEDURE — 99214 OFFICE O/P EST MOD 30 MIN: CPT | Performed by: FAMILY MEDICINE

## 2021-03-01 RX ORDER — AMLODIPINE BESYLATE AND BENAZEPRIL HYDROCHLORIDE 5; 20 MG/1; MG/1
1 CAPSULE ORAL DAILY
Qty: 90 CAPSULE | Refills: 1 | Status: SHIPPED | OUTPATIENT
Start: 2021-03-01 | End: 2021-09-07 | Stop reason: SDUPTHER

## 2021-03-01 NOTE — PROGRESS NOTES
Assessment/Plan:       Diagnoses and all orders for this visit:    Essential hypertension  -     amLODIPine-benazepril (LOTREL 5-20) 5-20 MG per capsule; Take 1 capsule by mouth daily  Blood pressure acceptable for age  Continue amlodipine with benazepril  Mixed hyperlipidemia  Patient encouraged to eat healthy and stay physically active  No medication at this time  Hyperglycemia  Patient encouraged to eat healthy and stay physically active  No medication at this time  Pulmonary HTN ([facility] [address] )  Was seen by pulmonology  No additional intervention at this time  BMI Counseling: Body mass index is 27 06 kg/m²  The BMI is above normal  Nutrition recommendations include 3-5 servings of fruits/vegetables daily  Exercise recommendations include exercising 3-5 times per week  Follow-up in six months or sooner if needed  Subjective:      Patient ID: Lena Vogt is a 80 y o  female  Patient presents for follow-up  She has hypertension  Treatment includes amlodipine with benazepril  Tolerating medication well  Does need a refill  She has hyperlipidemia  Not on any medication at this time  Basically eats what she wants  She has hyperglycemia  Not on any medication at this time  She does like her sweet foods  She has pulmonary hypertension  Has seen pulmonology  No further workup at this time            The following portions of the patient's history were reviewed and updated as appropriate: She  has a past medical history of Bladder infection (09/2019), Breast lump in female, Osteopenia, and Ovarian cyst   She   Patient Active Problem List    Diagnosis Date Noted    Presence of pessary 11/04/2020    Osteoporosis without current pathological fracture 11/04/2020    Pulmonary HTN ([facility] [address] ) 10/23/2020    Vertigo 10/16/2019    Sensorineural hearing loss (SNHL), bilateral 10/16/2019    Asymmetrical hearing loss of right ear 10/16/2019    Hyperglycemia 08/26/2019    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    Family history of malignant neoplasm of breast 08/24/2018    Anxiety 03/25/2016    Essential hypertension 03/25/2016    MCI (mild cognitive impairment) 03/25/2016    Blepharospasm 08/04/2015    Depression 06/16/2014    Functional urinary incontinence 06/16/2014    Hyperlipidemia 06/16/2014    Osteoporosis 06/16/2014    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    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     She  has a past surgical history that includes Breast biopsy; Bladder surgery; Cataract extraction (Bilateral, 01/01/2015); Endometrial biopsy; and Hysterectomy  Her family history includes Heart disease in her father and mother; Hypertension in her family; Osteoporosis in her family  She  reports that she has never smoked  She has never used smokeless tobacco  She reports that she does not drink alcohol or use drugs  Current Outpatient Medications   Medication Sig Dispense Refill    alendronate (FOSAMAX) 70 mg tablet Take 1 tablet (70 mg total) by mouth every 7 days 12 tablet 3    amLODIPine-benazepril (LOTREL 5-20) 5-20 MG per capsule Take 1 capsule by mouth daily 90 capsule 1    calcium citrate-vitamin D (CITRACAL+D) 315-200 MG-UNIT per tablet Take 1 tablet by mouth daily      conjugated estrogens (PREMARIN) vaginal cream Insert into the vagina Ose as directed      Multiple Vitamins-Minerals (MULTIVITAL) tablet Take 1 tablet by mouth daily      MYRBETRIQ 50 MG TB24 Take 1 tablet by mouth daily      Omega-3 Fatty Acids (FISH OIL) 645 MG CAPS Take 1 tablet by mouth daily      polyethylene glycol (MIRALAX) 17 g packet Take 1 Package by mouth 2 (two) times a day       No current facility-administered medications for this visit        Current Outpatient Medications on File Prior to Visit   Medication Sig    alendronate (FOSAMAX) 70 mg tablet Take 1 tablet (70 mg total) by mouth every 7 days    calcium citrate-vitamin D (CITRACAL+D) 315-200 MG-UNIT per tablet Take 1 tablet by mouth daily    conjugated estrogens (PREMARIN) vaginal cream Insert into the vagina Ose as directed    Multiple Vitamins-Minerals (MULTIVITAL) tablet Take 1 tablet by mouth daily    MYRBETRIQ 50 MG TB24 Take 1 tablet by mouth daily    Omega-3 Fatty Acids (FISH OIL) 645 MG CAPS Take 1 tablet by mouth daily    polyethylene glycol (MIRALAX) 17 g packet Take 1 Package by mouth 2 (two) times a day    [DISCONTINUED] amLODIPine-benazepril (LOTREL 5-20) 5-20 MG per capsule TAKE 1 CAPSULE EVERY DAY     No current facility-administered medications on file prior to visit  She has No Known Allergies       Review of Systems   Constitutional: Negative for fever  Respiratory: Negative for shortness of breath  Cardiovascular: Negative for chest pain  Objective:      /90 (BP Location: Left arm, Patient Position: Sitting, Cuff Size: Adult)   Pulse 100   Temp (!) 97 3 °F (36 3 °C)   Resp 16   Ht 4' 11" (1 499 m)   Wt 60 8 kg (134 lb)   BMI 27 06 kg/m²          Physical Exam  Constitutional:       General: She is not in acute distress  Appearance: She is not ill-appearing  Cardiovascular:      Rate and Rhythm: Normal rate and regular rhythm  Heart sounds: Normal heart sounds  Pulmonary:      Effort: Pulmonary effort is normal       Breath sounds: Normal breath sounds

## 2021-03-19 ENCOUNTER — DOCUMENTATION (OUTPATIENT)
Dept: NEUROLOGY | Facility: CLINIC | Age: 86
End: 2021-03-19

## 2021-03-19 NOTE — PROGRESS NOTES
Type Date User Summary Attachment   General 03/18/2021 10:45 AM Grayson Yoon care coordination  -   Note    Botox- no authorization needed   Please use our stock      Thank you,     Eneida Duran

## 2021-03-29 ENCOUNTER — TELEPHONE (OUTPATIENT)
Dept: FAMILY MEDICINE CLINIC | Facility: MEDICAL CENTER | Age: 86
End: 2021-03-29

## 2021-03-29 DIAGNOSIS — M54.9 BACK PAIN, UNSPECIFIED BACK LOCATION, UNSPECIFIED BACK PAIN LATERALITY, UNSPECIFIED CHRONICITY: Primary | ICD-10-CM

## 2021-03-29 NOTE — TELEPHONE ENCOUNTER
Carlie Silva from [place] [id number] called to ask if Dr Radha Dye would please put a referral in for patient for physical therapy    She is having low back pain and they are scheduling her to start PT on 4/5/21    Routed to Dr Radha Dye - also, they said congratulations

## 2021-04-01 ENCOUNTER — TELEPHONE (OUTPATIENT)
Dept: FAMILY MEDICINE CLINIC | Facility: MEDICAL CENTER | Age: 86
End: 2021-04-01

## 2021-04-01 ENCOUNTER — APPOINTMENT (OUTPATIENT)
Dept: RADIOLOGY | Facility: MEDICAL CENTER | Age: 86
End: 2021-04-01
Payer: MEDICARE

## 2021-04-01 ENCOUNTER — OFFICE VISIT (OUTPATIENT)
Dept: FAMILY MEDICINE CLINIC | Facility: MEDICAL CENTER | Age: 86
End: 2021-04-01
Payer: MEDICARE

## 2021-04-01 VITALS
WEIGHT: 129.8 LBS | HEART RATE: 98 BPM | SYSTOLIC BLOOD PRESSURE: 155 MMHG | BODY MASS INDEX: 26.22 KG/M2 | OXYGEN SATURATION: 94 % | TEMPERATURE: 98 F | DIASTOLIC BLOOD PRESSURE: 88 MMHG

## 2021-04-01 DIAGNOSIS — M54.41 ACUTE RIGHT-SIDED LOW BACK PAIN WITH RIGHT-SIDED SCIATICA: Primary | ICD-10-CM

## 2021-04-01 DIAGNOSIS — M54.41 ACUTE RIGHT-SIDED LOW BACK PAIN WITH RIGHT-SIDED SCIATICA: ICD-10-CM

## 2021-04-01 PROCEDURE — 72100 X-RAY EXAM L-S SPINE 2/3 VWS: CPT

## 2021-04-01 PROCEDURE — 99213 OFFICE O/P EST LOW 20 MIN: CPT | Performed by: FAMILY MEDICINE

## 2021-04-01 RX ORDER — METHYLPREDNISOLONE 4 MG/1
TABLET ORAL
Qty: 21 EACH | Refills: 0 | Status: SHIPPED | OUTPATIENT
Start: 2021-04-01 | End: 2021-06-28

## 2021-04-01 NOTE — TELEPHONE ENCOUNTER
I called Abelino Mcbride and spoke with her, she said that it is her right buttock that hurts, only when she sits down  She said it "aches"  Denies leg pain and says she can walk  No redness or swelling, no known injury  Says she does not think she has had sciatica in the past  Please advise

## 2021-04-01 NOTE — PROGRESS NOTES
Patient complains of a 1 month history of low back pain  It hurts particularly on the right  Daughter thinks it started when she was picking up sticks  Pain is worse with getting up or with sleeping  Pain radiates to the posterior right thigh  There are no paresthesias  She is taking 2 Tylenol 3 times daily  O: /88 (BP Location: Left arm, Patient Position: Sitting, Cuff Size: Adult)   Pulse 98   Temp 98 °F (36 7 °C)   Wt 58 9 kg (129 lb 12 8 oz)   SpO2 94%   BMI 26 22 kg/m²      Mobility is normal   there is no spinal tenderness  No spasm palpable  There is positive straight leg raising on the left   motor and reflexes are normal in lower extremities  Assessment    Low back pain-likely sciatica    Plan    Check x-ray LS spine  Rx for Medrol Dosepak  She may continue Tylenol 3 times daily  May use Advil 1 dose daily as well  Will be trying IceeHot   Call with progress next week

## 2021-04-05 ENCOUNTER — TELEPHONE (OUTPATIENT)
Dept: FAMILY MEDICINE CLINIC | Facility: MEDICAL CENTER | Age: 86
End: 2021-04-05

## 2021-04-05 DIAGNOSIS — M54.41 ACUTE RIGHT-SIDED LOW BACK PAIN WITH RIGHT-SIDED SCIATICA: Primary | ICD-10-CM

## 2021-04-05 RX ORDER — TRAMADOL HYDROCHLORIDE 50 MG/1
50 TABLET ORAL EVERY 6 HOURS PRN
Qty: 30 TABLET | Refills: 1 | Status: SHIPPED | OUTPATIENT
Start: 2021-04-05 | End: 2021-06-28

## 2021-04-05 NOTE — TELEPHONE ENCOUNTER
Triaged- still with significant pain , pain scale a 8  I called the reading room for a reort on the L/s spine

## 2021-04-05 NOTE — TELEPHONE ENCOUNTER
Does show advanced degenerative changes  And possible old compression fracture L2  Will send in Rx for tramadol  May use up to 4 times daily  May take Tylenol with this if needed   If no better may need to refer to pain management

## 2021-04-05 NOTE — TELEPHONE ENCOUNTER
Pt called to ask to speak with Dr Alma Boyle about her sciatica  She didn't want to leave a message, she just wanted a phone call      Routed to Clinical

## 2021-04-06 ENCOUNTER — TELEPHONE (OUTPATIENT)
Dept: FAMILY MEDICINE CLINIC | Facility: MEDICAL CENTER | Age: 86
End: 2021-04-06

## 2021-04-06 DIAGNOSIS — R93.7 ABNORMAL X-RAY OF LUMBAR SPINE: ICD-10-CM

## 2021-04-06 DIAGNOSIS — M54.9 BACK PAIN, UNSPECIFIED BACK LOCATION, UNSPECIFIED BACK PAIN LATERALITY, UNSPECIFIED CHRONICITY: Primary | ICD-10-CM

## 2021-04-06 DIAGNOSIS — M54.41 ACUTE RIGHT-SIDED LOW BACK PAIN WITH RIGHT-SIDED SCIATICA: ICD-10-CM

## 2021-04-06 NOTE — TELEPHONE ENCOUNTER
mirthai-Discussed Erika's results and treatment plan  Did not start the Tramadol yet  Went over medication schedule  Asking if this is Sciatica what would be suggested  Should she have a muscle relaxer  Explained to June Jerez , they can cause sedation putting elderly at risk for falling   Explain to June Jerez the course of treatment is the same, again if she is not feeling better in a week we will need her to see pain management   Aware I will let Dr Clinton Tong know of her concerns and if further instructions will call her back    Can you send this back to me and I'll call her Thursday to see how she is

## 2021-04-07 ENCOUNTER — TELEPHONE (OUTPATIENT)
Dept: NEUROLOGY | Facility: CLINIC | Age: 86
End: 2021-04-07

## 2021-04-07 NOTE — TELEPHONE ENCOUNTER
Called pt and informed her that I do have an opening for a sooner injection if she would like as I have an appt opening on 04/12/2021 in the [place] location at 2:00 PM  The patient stated she will call back to inform us if she can make this appt

## 2021-04-08 NOTE — TELEPHONE ENCOUNTER
S/w January Herminio  Believes her mom is doing slightly better  Has taken medication for only 24 hours  Asked to call Monday with an update  January Durham is asking if another course of prednisone would be good?  Finished it yesterday

## 2021-04-09 NOTE — TELEPHONE ENCOUNTER
Generally do not use more prednisone    It was a call next week with progress; will decide if needs referral

## 2021-04-12 NOTE — TELEPHONE ENCOUNTER
Update- the pain is somewhat better when laying and sitting but when tryingto ambulate she c/o significant pain  Refer to pain management?

## 2021-04-29 ENCOUNTER — TRANSCRIBE ORDERS (OUTPATIENT)
Dept: PAIN MEDICINE | Facility: CLINIC | Age: 86
End: 2021-04-29

## 2021-04-29 ENCOUNTER — CONSULT (OUTPATIENT)
Dept: PAIN MEDICINE | Facility: CLINIC | Age: 86
End: 2021-04-29
Payer: MEDICARE

## 2021-04-29 VITALS
SYSTOLIC BLOOD PRESSURE: 170 MMHG | DIASTOLIC BLOOD PRESSURE: 89 MMHG | BODY MASS INDEX: 26.05 KG/M2 | HEART RATE: 115 BPM | WEIGHT: 129 LBS

## 2021-04-29 DIAGNOSIS — M54.41 ACUTE RIGHT-SIDED LOW BACK PAIN WITH RIGHT-SIDED SCIATICA: ICD-10-CM

## 2021-04-29 DIAGNOSIS — M54.9 BACK PAIN, UNSPECIFIED BACK LOCATION, UNSPECIFIED BACK PAIN LATERALITY, UNSPECIFIED CHRONICITY: Primary | ICD-10-CM

## 2021-04-29 DIAGNOSIS — R93.7 ABNORMAL X-RAY OF LUMBAR SPINE: ICD-10-CM

## 2021-04-29 DIAGNOSIS — S32.020A CLOSED COMPRESSION FRACTURE OF L2 VERTEBRA, INITIAL ENCOUNTER (HCC): ICD-10-CM

## 2021-04-29 PROCEDURE — 99204 OFFICE O/P NEW MOD 45 MIN: CPT | Performed by: PHYSICAL MEDICINE & REHABILITATION

## 2021-04-29 NOTE — PROGRESS NOTES
Assessment  1  Back pain, unspecified back location, unspecified back pain laterality, unspecified chronicity    2  Acute right-sided low back pain with right-sided sciatica    3  Abnormal x-ray of lumbar spine    4  Closed compression fracture of L2 vertebra, initial encounter [facility])        Plan  Ms Luis Felipe Jones is a pleasant 61-year-old female who presents for initial evaluation regarding several months duration of worsening low back pain with radiating symptoms into the right lower extremity  During today's evaluation she is demonstrating clinical evidence of suspected lumbar radiculopathy with x-ray evidence of multilevel degenerative spondylosis as well as a L2 superior endplate compression deformity  At this time further diagnostic workup would be beneficial and warranted  As such we will order a lumbar MRI without contrast to better identify disc and spine pathology contributing to her radicular symptoms  Will hold off on medication management as she is 80years old  Advised Tylenol no more than 3000 mg per day  She would likely benefit from an interventional approach pending MRI    My impressions and treatment recommendations were discussed in detail with the patient who verbalized understanding and had no further questions  Discharge instructions were provided  I personally saw and examined the patient and I agree with the above discussed plan of care  Orders Placed This Encounter   Procedures    MRI lumbar spine without contrast     Standing Status:   Future     Standing Expiration Date:   4/29/2025     Scheduling Instructions: There is no preparation for this test  Please leave your jewelry and valuables at home, wedding rings are the exception  Magnetic nail polish must be removed prior to arrival for your test  Please bring your insurance cards, a form of photo ID and a list of your medications with you  Arrive 15 minutes prior to your appointment time in order to register   Please bring any prior CT or MRI studies of this area that were not performed at a [facility] facility  To schedule this appointment, please contact Central Scheduling at [phone]  Prior to your appointment, please make sure you complete the MRI Screening Form when you e-Check in for your appointment  This will be available starting 7 days before your appointment in [address]  You may receive an e-mail with an activation code if you do not have a [employer] account  If you do not have access to a device, we will complete your screening at your appointment  Order Specific Question:   What is the patient's sedation requirement? Answer:   No Sedation     Order Specific Question:   Release to patient through [email or web address]     Answer:   Immediate     Order Specific Question:   Is order priority selected as STAT? Answer:   No     Order Specific Question:   Reason for Exam (FREE TEXT)     Answer:   L2 compression fracture, lumbar radiculopathy     No orders of the defined types were placed in this encounter  History of Present Illness    Marian Garg is a 80 y o  female presents to [place] [address] [facility] for initial evaluation regarding low back pain with radiating symptoms into the right lower extremity of several months duration  Patient denies any significant inciting event or recent trauma  Today she reports moderate to severe pain rated 5 to 8/10 and interfering with daily activities  Pain is intermittent 30-60% of the time with no specific pattern of morning, afternoon or evening  Describes symptoms of dull aching, throbbing pain  Admits to lower extremity weakness but denies falls  Requires a cane for ambulation  Symptoms are worse with standing, bending, walking, coughing, sneezing  She has had no significant relief with exercise  Heat and ice provide moderate relief  Previously had oral steroids and currently takes tramadol which is provided minimal relief    Presents today for initial evaluation  I have personally reviewed and/or updated the patient's past medical history, past surgical history, family history, social history, current medications, allergies, and vital signs today  Review of Systems   Constitutional: Negative for fever and unexpected weight change  HENT: Negative for trouble swallowing  Eyes: Negative for visual disturbance  Respiratory: Negative for shortness of breath and wheezing  Cardiovascular: Negative for chest pain and palpitations  Gastrointestinal: Negative for constipation, diarrhea, nausea and vomiting  Endocrine: Negative for cold intolerance, heat intolerance and polydipsia  Genitourinary: Negative for difficulty urinating and frequency  Musculoskeletal: Negative for arthralgias, gait problem, joint swelling and myalgias  Skin: Negative for rash  Neurological: Negative for dizziness, seizures, syncope, weakness and headaches  Hematological: Does not bruise/bleed easily  Psychiatric/Behavioral: Negative for dysphoric mood  All other systems reviewed and are negative        Patient Active Problem List   Diagnosis    Blepharospasm    Spasmodic torticollis    Anxiety    Depression    Essential hypertension    Family history of malignant neoplasm of breast    Functional urinary incontinence    Hyperlipidemia    MCI (mild cognitive impairment)    Osteoporosis    Torsion dystonia fragments    Segmental dystonia    Hyperglycemia    Vertigo    Sensorineural hearing loss (SNHL), bilateral    Asymmetrical hearing loss of right ear    Pulmonary HTN ([address] )    Presence of pessary    Osteoporosis without current pathological fracture       Past Medical History:   Diagnosis Date    Anxiety 2010    Bladder infection 09/2019    Breast lump in female     HL (hearing loss) 2010    Hypertension 2010    Osteopenia     Ovarian cyst        Past Surgical History:   Procedure Laterality Date    BLADDER SURGERY      BREAST BIOPSY      CATARACT EXTRACTION Bilateral 01/01/2015    , Right Eye-2017, Left eye    ENDOMETRIAL BIOPSY      by Suction    HYSTERECTOMY         Family History   Problem Relation Age of Onset    Heart disease Mother     Heart disease Father     Hypertension Family     Osteoporosis Family        Social History     Occupational History    Not on file   Tobacco Use    Smoking status: Never Smoker    Smokeless tobacco: Never Used   Substance and Sexual Activity    Alcohol use: No    Drug use: No    Sexual activity: Not Currently       Current Outpatient Medications on File Prior to Visit   Medication Sig    alendronate (FOSAMAX) 70 mg tablet Take 1 tablet (70 mg total) by mouth every 7 days    amLODIPine-benazepril (LOTREL 5-20) 5-20 MG per capsule Take 1 capsule by mouth daily    calcium citrate-vitamin D (CITRACAL+D) 315-200 MG-UNIT per tablet Take 1 tablet by mouth daily    conjugated estrogens (PREMARIN) vaginal cream Insert into the vagina Ose as directed    methylPREDNISolone 4 MG tablet therapy pack Use as directed on package    Multiple Vitamins-Minerals (MULTIVITAL) tablet Take 1 tablet by mouth daily    MYRBETRIQ 50 MG TB24 Take 1 tablet by mouth daily    Omega-3 Fatty Acids (FISH OIL) 645 MG CAPS Take 1 tablet by mouth daily    polyethylene glycol (MIRALAX) 17 g packet Take 1 Package by mouth 2 (two) times a day    traMADol (ULTRAM) 50 mg tablet Take 1 tablet (50 mg total) by mouth every 6 (six) hours as needed for moderate pain     No current facility-administered medications on file prior to visit  No Known Allergies    Physical Exam    /89   Pulse (!) 115   Wt 58 5 kg (129 lb)   BMI 26 05 kg/m²     Constitutional: normal, well developed, well nourished, alert, in no distress and non-toxic and no overt pain behavior    Eyes: anicteric  HEENT: grossly intact  Neck: supple, symmetric, trachea midline and no masses   Pulmonary:even and unlabored  Cardiovascular:No edema or pitting edema present  Skin:Normal without rashes or lesions and well hydrated  Psychiatric:Mood and affect appropriate  Neurologic:Cranial Nerves II-XII grossly intact  Musculoskeletal:antalgic, tenderness to palpation right-sided lumbar paraspinals, decreased active and passive range of motion with lumbar flexion and extension limited by pain, MMT 5/5 bilateral lower extremities, sensation grossly intact to light touch, DTRs within normal limits, straight leg raise positive with radicular symptoms into the right lower extremity in supine position    Imaging      LUMBAR SPINE     INDICATION:   [id number]: Lumbago with sciatica, right side      COMPARISON:  None     VIEWS:  XR SPINE LUMBAR 2 OR 3 VIEWS INJURY  Images: 2     FINDINGS:  Diffuse osteopenia  Mild superior endplate compression deformity left side L2, uncertain chronicity     There are 5 non rib bearing lumbar vertebral bodies       There is no evidence of destructive osseous lesion      Moderate dextroscoliosis, apex L3-4     Advanced multilevel degenerative spondylosis     The pedicles appear intact      Soft tissues are unremarkable  Extensive vascular calcifications     IMPRESSION:  Advanced multilevel degenerative spondylosis  Dextroscoliosis      Diffuse osteopenia    Mild superior endplate compression deformity L2, uncertain chronicity

## 2021-04-29 NOTE — PATIENT INSTRUCTIONS
Lumbar Radiculopathy   WHAT YOU NEED TO KNOW:   Lumbar radiculopathy is a painful condition that happens when a nerve in your lumbar spine (lower back) is pinched or irritated  Nerves control feeling and movement in your body  You may have numbness or pain that shoots down from your lower back towards your foot  DISCHARGE INSTRUCTIONS:   Medicines:   · Medicines:     ? NSAIDs , such as ibuprofen, help decrease swelling, pain, and fever  This medicine is available with or without a doctor's order  NSAIDs can cause stomach bleeding or kidney problems in certain people  If you take blood thinner medicine, always ask your healthcare provider if NSAIDs are safe for you  Always read the medicine label and follow directions  ? Muscle relaxers  help decrease pain and muscle spasms  ? Opioids: This is a strong medicine given to reduce severe pain  It is also called narcotic pain medicine  Take this medicine exactly as directed by your healthcare provider  ? Oral steroids: Steroids may also be given to reduce pain and swelling  ? Take your medicine as directed  Contact your healthcare provider if you think your medicine is not helping or if you have side effects  Tell him of her if you are allergic to any medicine  Keep a list of the medicines, vitamins, and herbs you take  Include the amounts, and when and why you take them  Bring the list or the pill bottles to follow-up visits  Carry your medicine list with you in case of an emergency  Follow up with your healthcare provider or spine specialist within 1 to 3 weeks:  After your first follow-up appointment, return to your healthcare provider or spine specialist every 2 weeks until you have healed  Ask for information about physical therapy for your condition  Write down your questions so you remember to ask them during your visits  Physical therapy:  You may need physical therapy to improve your condition   Your physical therapist may teach you certain exercises to improve posture (the way you stand and sit), flexibility, and strength in your lower back  Self care:   · Stay active: It is best to be active when you have lumbar radiculopathy  Your physical therapist or healthcare provider may tell you to take walks to ease yourself back into your daily routine  Avoid long periods of bed rest  Bed rest could worsen your symptoms  Do not move in ways that increase your pain  Ask for more information about the best ways to stay active  · Use ice or heat packs:  Use ice or heat packs as directed on the sore area of your body to decrease the pain and swelling  Put ice in a plastic bag covered with a towel on your low back  Cover heated items with a towel to avoid burns  Use ice and heat as directed  · Avoid heavy lifting: Your condition may worsen if you lift heavy things  Avoid lifting if possible  · Maintain a healthy weight:  Excess body weight may strain your back  Talk with your healthcare provider about ways to lose excess weight if you are overweight  Contact your healthcare provider or spine specialist if:   · Your pain does not improve within 1 to 3 weeks after treatment  · Your pain and weakness keep you from your normal activities at work, home, or school  · You lose more than 10 pounds in 6 months without trying  · You become depressed or sad because of the pain  · You have questions or concerns about your condition or care  Return to the emergency department if:   · You have a fever greater than 100 4°F for longer than 2 days  · You have new, severe back or leg pain, or your pain spreads to both legs  · You have any new signs of numbness or weakness, especially in your lower back, legs, arms, or genital area  · You have new trouble controlling your urine and bowel movements  · You do not feel like your bladder empties when you urinate      © Copyright [address] 2020 Information is for End User's use only and may not be sold, redistributed or otherwise used for commercial purposes  All illustrations and images included in CareNotes® are the copyrighted property of A D A M [employer]  or Lashanda Kelly  The above information is an  only  It is not intended as medical advice for individual conditions or treatments  Talk to your doctor, nurse or pharmacist before following any medical regimen to see if it is safe and effective for you

## 2021-05-03 ENCOUNTER — HOSPITAL ENCOUNTER (OUTPATIENT)
Dept: MRI IMAGING | Facility: HOSPITAL | Age: 86
Discharge: HOME | End: 2021-05-03
Attending: PHYSICAL MEDICINE & REHABILITATION
Payer: MEDICARE

## 2021-05-03 ENCOUNTER — PROCEDURE VISIT (OUTPATIENT)
Dept: NEUROLOGY | Facility: CLINIC | Age: 86
End: 2021-05-03
Payer: MEDICARE

## 2021-05-03 VITALS — DIASTOLIC BLOOD PRESSURE: 70 MMHG | TEMPERATURE: 91.2 F | HEART RATE: 100 BPM | SYSTOLIC BLOOD PRESSURE: 150 MMHG

## 2021-05-03 DIAGNOSIS — M54.41 ACUTE RIGHT-SIDED LOW BACK PAIN WITH RIGHT-SIDED SCIATICA: ICD-10-CM

## 2021-05-03 DIAGNOSIS — R93.7 ABNORMAL X-RAY OF LUMBAR SPINE: ICD-10-CM

## 2021-05-03 DIAGNOSIS — M54.9 BACK PAIN, UNSPECIFIED BACK LOCATION, UNSPECIFIED BACK PAIN LATERALITY, UNSPECIFIED CHRONICITY: ICD-10-CM

## 2021-05-03 DIAGNOSIS — G24.3 CERVICAL DYSTONIA: Primary | ICD-10-CM

## 2021-05-03 DIAGNOSIS — S32.020A CLOSED COMPRESSION FRACTURE OF L2 VERTEBRA, INITIAL ENCOUNTER (HCC): ICD-10-CM

## 2021-05-03 DIAGNOSIS — G24.5 BLEPHAROSPASM: ICD-10-CM

## 2021-05-03 PROCEDURE — G1004 CDSM NDSC: HCPCS

## 2021-05-03 PROCEDURE — 64612 DESTROY NERVE FACE MUSCLE: CPT | Performed by: PSYCHIATRY & NEUROLOGY

## 2021-05-03 PROCEDURE — 72148 MRI LUMBAR SPINE W/O DYE: CPT

## 2021-05-03 NOTE — PROGRESS NOTES
Universal Protocol   Consent: Written consent obtained  Consent given by: patient        Chemodenervation     Date/Time 5/3/2021 3:22 PM     Performed by  Ann Menendez MD     Authorized by Ann Menendez MD        Pre-procedure details      Prepped With: Alcohol     Anesthesia  (see MAR for exact dosages): Anesthesia method:  None   Procedure details     Position:  Upright   Botox     Brand:  Botox    mL's of preservative free sterile saline:  2    Final Concentration per CC:  50 units    Needle gauge: 30G 1/2 inch  Procedures     Botox Procedures: blepharospasm and cervical dystonia      Indications: blepharospasm and spasmodic torticollis      Date of last injection:  12/30/1921   Total Units     Total units discarded:  75   Post-procedure details      Chemodenervation:  Head or face and neck, excluding muscles of the larynx    Patient tolerance of procedure: Tolerated well, no immediate complications   Comments             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      Overall good response to injections  Injection lasting about 4 months  Exam today with mild blinking  Slight right turn toritcollis and latercollis  No spasmodic tremor  Mild dysphonia     2 5 unit(s) in 1 injection(s) was injected into the right  muscle  2 5 unit(s) in 1 injection(s) was injected into the left  muscle  10 unit(s) in 4 (2 5 ) injection(s) was injected into the right orbicularis oculi (upper inner, upper and lower outer and lateral canthus)  10 unit(s) in 4 (x2 5) injection(s) was injected into the left orbicularis oculi      25 unit(s) was injected into the left sternocleidomastoid muscle  --    50 (25 side, 25 low) unit(s) was injected into the right splenius capitus muscle  --    25 unit(s) was injected into the right levator scapulae muscle     Total injected 125 units  Discarded 75

## 2021-05-07 ENCOUNTER — TELEPHONE (OUTPATIENT)
Dept: PAIN MEDICINE | Facility: CLINIC | Age: 86
End: 2021-05-07

## 2021-05-07 DIAGNOSIS — G89.4 CHRONIC PAIN SYNDROME: ICD-10-CM

## 2021-05-07 DIAGNOSIS — M51.16 LUMBAR DISC DISEASE WITH RADICULOPATHY: ICD-10-CM

## 2021-05-07 DIAGNOSIS — R93.7 ABNORMAL MRI, LUMBAR SPINE: Primary | ICD-10-CM

## 2021-05-07 NOTE — TELEPHONE ENCOUNTER
MRI lumbar spine results reviewed   Order placed for MRI lumbar spine with contrast post gadolinium study   Order placed for neuro surgical evaluation   Results and plan discussed with patient   Please assist patient with arranging consult and MRI   Thank you

## 2021-05-22 ENCOUNTER — TELEPHONE (OUTPATIENT)
Dept: PAIN MEDICINE | Facility: MEDICAL CENTER | Age: 86
End: 2021-05-22

## 2021-05-22 NOTE — TELEPHONE ENCOUNTER
[place] [facility] radiologist tech Billie Alarcon called requesting blood work to be done first before patient does MRI with contrast due to her age  She will call patient to reschedule her MRI due to blood work       Thank you     [phone]

## 2021-05-24 DIAGNOSIS — R73.9 HYPERGLYCEMIA: ICD-10-CM

## 2021-05-24 DIAGNOSIS — R93.7 ABNORMAL MRI, LUMBAR SPINE: Primary | ICD-10-CM

## 2021-05-24 DIAGNOSIS — R93.89 IMAGING ABNORMALITY: ICD-10-CM

## 2021-05-24 NOTE — TELEPHONE ENCOUNTER
S/w pt's daughter, informed her that pt will be blood work prior to the MRI on Saturday  Pt will need to fast prior to blood work as per instructions  Pt's daughter will take her tomorrow to [id number] [facility] lab

## 2021-05-25 ENCOUNTER — APPOINTMENT (OUTPATIENT)
Dept: LAB | Facility: MEDICAL CENTER | Age: 86
End: 2021-05-25
Payer: MEDICARE

## 2021-05-25 DIAGNOSIS — R93.89 IMAGING ABNORMALITY: ICD-10-CM

## 2021-05-25 DIAGNOSIS — E83.52 HYPERCALCEMIA: ICD-10-CM

## 2021-05-25 DIAGNOSIS — R73.9 HYPERGLYCEMIA: ICD-10-CM

## 2021-05-25 DIAGNOSIS — R93.7 ABNORMAL MRI, LUMBAR SPINE: ICD-10-CM

## 2021-05-25 LAB
25(OH)D3 SERPL-MCNC: 37 NG/ML (ref 30–100)
ALBUMIN SERPL BCP-MCNC: 4 G/DL (ref 3.5–5)
ALP SERPL-CCNC: 41 U/L (ref 46–116)
ALT SERPL W P-5'-P-CCNC: 22 U/L (ref 12–78)
ANION GAP SERPL CALCULATED.3IONS-SCNC: 2 MMOL/L (ref 4–13)
AST SERPL W P-5'-P-CCNC: 18 U/L (ref 5–45)
BASOPHILS # BLD AUTO: 0.04 THOUSANDS/ΜL (ref 0–0.1)
BASOPHILS NFR BLD AUTO: 1 % (ref 0–1)
BILIRUB SERPL-MCNC: 0.57 MG/DL (ref 0.2–1)
BUN SERPL-MCNC: 19 MG/DL (ref 5–25)
CA-I BLD-SCNC: 1.18 MMOL/L (ref 1.12–1.32)
CALCIUM SERPL-MCNC: 10 MG/DL (ref 8.3–10.1)
CHLORIDE SERPL-SCNC: 107 MMOL/L (ref 100–108)
CO2 SERPL-SCNC: 30 MMOL/L (ref 21–32)
CREAT SERPL-MCNC: 0.78 MG/DL (ref 0.6–1.3)
EOSINOPHIL # BLD AUTO: 0.14 THOUSAND/ΜL (ref 0–0.61)
EOSINOPHIL NFR BLD AUTO: 2 % (ref 0–6)
ERYTHROCYTE [DISTWIDTH] IN BLOOD BY AUTOMATED COUNT: 14.7 % (ref 11.6–15.1)
GFR SERPL CREATININE-BSD FRML MDRD: 68 ML/MIN/1.73SQ M
GLUCOSE P FAST SERPL-MCNC: 111 MG/DL (ref 65–99)
HCT VFR BLD AUTO: 40.5 % (ref 34.8–46.1)
HGB BLD-MCNC: 12.8 G/DL (ref 11.5–15.4)
IMM GRANULOCYTES # BLD AUTO: 0.02 THOUSAND/UL (ref 0–0.2)
IMM GRANULOCYTES NFR BLD AUTO: 0 % (ref 0–2)
LYMPHOCYTES # BLD AUTO: 0.92 THOUSANDS/ΜL (ref 0.6–4.47)
LYMPHOCYTES NFR BLD AUTO: 16 % (ref 14–44)
MCH RBC QN AUTO: 31.4 PG (ref 26.8–34.3)
MCHC RBC AUTO-ENTMCNC: 31.6 G/DL (ref 31.4–37.4)
MCV RBC AUTO: 99 FL (ref 82–98)
MONOCYTES # BLD AUTO: 0.52 THOUSAND/ΜL (ref 0.17–1.22)
MONOCYTES NFR BLD AUTO: 9 % (ref 4–12)
NEUTROPHILS # BLD AUTO: 4.11 THOUSANDS/ΜL (ref 1.85–7.62)
NEUTS SEG NFR BLD AUTO: 72 % (ref 43–75)
NRBC BLD AUTO-RTO: 0 /100 WBCS
PLATELET # BLD AUTO: 252 THOUSANDS/UL (ref 149–390)
PMV BLD AUTO: 10 FL (ref 8.9–12.7)
POTASSIUM SERPL-SCNC: 3.9 MMOL/L (ref 3.5–5.3)
PROT SERPL-MCNC: 8 G/DL (ref 6.4–8.2)
PTH-INTACT SERPL-MCNC: 59.4 PG/ML (ref 18.4–80.1)
RBC # BLD AUTO: 4.08 MILLION/UL (ref 3.81–5.12)
SODIUM SERPL-SCNC: 139 MMOL/L (ref 136–145)
WBC # BLD AUTO: 5.75 THOUSAND/UL (ref 4.31–10.16)

## 2021-05-25 PROCEDURE — 85025 COMPLETE CBC W/AUTO DIFF WBC: CPT

## 2021-05-25 PROCEDURE — 36415 COLL VENOUS BLD VENIPUNCTURE: CPT

## 2021-05-25 PROCEDURE — 83970 ASSAY OF PARATHORMONE: CPT

## 2021-05-25 PROCEDURE — 80053 COMPREHEN METABOLIC PANEL: CPT

## 2021-05-25 PROCEDURE — 82306 VITAMIN D 25 HYDROXY: CPT

## 2021-05-25 PROCEDURE — 82330 ASSAY OF CALCIUM: CPT

## 2021-05-27 ENCOUNTER — TELEPHONE (OUTPATIENT)
Dept: FAMILY MEDICINE CLINIC | Facility: MEDICAL CENTER | Age: 86
End: 2021-05-27

## 2021-05-29 ENCOUNTER — HOSPITAL ENCOUNTER (OUTPATIENT)
Dept: RADIOLOGY | Facility: HOSPITAL | Age: 86
Discharge: HOME | End: 2021-05-29
Attending: PHYSICAL MEDICINE & REHABILITATION
Payer: MEDICARE

## 2021-05-29 DIAGNOSIS — G89.4 CHRONIC PAIN SYNDROME: ICD-10-CM

## 2021-05-29 DIAGNOSIS — R93.7 ABNORMAL MRI, LUMBAR SPINE: ICD-10-CM

## 2021-05-29 DIAGNOSIS — M51.16 LUMBAR DISC DISEASE WITH RADICULOPATHY: ICD-10-CM

## 2021-05-29 PROCEDURE — G1004 CDSM NDSC: HCPCS

## 2021-05-29 PROCEDURE — A9585 GADOBUTROL INJECTION: HCPCS | Performed by: PHYSICAL MEDICINE & REHABILITATION

## 2021-05-29 PROCEDURE — 72149 MRI LUMBAR SPINE W/DYE: CPT

## 2021-05-29 RX ADMIN — GADOBUTROL 6 ML: 604.72 INJECTION INTRAVENOUS at 14:04

## 2021-06-01 ENCOUNTER — OFFICE VISIT (OUTPATIENT)
Dept: NEUROSURGERY | Facility: CLINIC | Age: 86
End: 2021-06-01
Payer: MEDICARE

## 2021-06-01 ENCOUNTER — TELEPHONE (OUTPATIENT)
Dept: PAIN MEDICINE | Facility: CLINIC | Age: 86
End: 2021-06-01

## 2021-06-01 VITALS — RESPIRATION RATE: 16 BRPM

## 2021-06-01 DIAGNOSIS — G89.4 CHRONIC PAIN SYNDROME: ICD-10-CM

## 2021-06-01 DIAGNOSIS — D49.2 NERVE SHEATH TUMOR: ICD-10-CM

## 2021-06-01 DIAGNOSIS — R93.7 ABNORMAL MRI, LUMBAR SPINE: ICD-10-CM

## 2021-06-01 DIAGNOSIS — M51.16 LUMBAR DISC DISEASE WITH RADICULOPATHY: ICD-10-CM

## 2021-06-01 DIAGNOSIS — D49.2 LUMBAR SPINE TUMOR: Primary | ICD-10-CM

## 2021-06-01 PROCEDURE — 99203 OFFICE O/P NEW LOW 30 MIN: CPT | Performed by: NEUROLOGICAL SURGERY

## 2021-06-01 NOTE — TELEPHONE ENCOUNTER
Dr Isha Phillips told pt daughter Ba Garcia) that pt should come back to [facility], [facility]  Dr Isha Phillips told them it is whatever he decides  Could not get pt in until 6/29/21  Please call Ryann Bonner back      Ryann Bonner # [phone]

## 2021-06-01 NOTE — LETTER
June 1, 2021     Ashley Mcmanus DO  [facility] [address]    Patient: Leanne Ruiz   YOB: 1931   Date of Visit: 6/1/2021       Dear Dr Kendall Dela Cruz: Thank you for referring Florin Hayness to me for evaluation  Below are my notes for this consultation  If you have questions, please do not hesitate to call me  I look forward to following your patient along with you  Sincerely,        Josselyn Chen MD        CC: DO Rebeca Augustine PA-C  6/1/2021  2:09 PM  Incomplete  Patient ID: Leanne Ruiz is a 80 y o  female  Diagnoses and all orders for this visit:    Lumbar spine tumor    Abnormal MRI, lumbar spine  -     Ambulatory referral to Neurosurgery    Nerve sheath tumor    Lumbar disc disease with radiculopathy  -     Ambulatory referral to Neurosurgery    Chronic pain syndrome  -     Ambulatory referral to Neurosurgery          Assessment/Plan:  Very pleasant 51-year-old female, accompanied by her daughter Akua Encarnacion  Referred by Dr Soledad Treadwell to evaluate abnormal MRI lumbar spine  Patient has a history of right buttocks, right lateral thigh pain ongoing for several months  She denies gait or balance disturbance, bowel or bladder incontinence, perineal anesthesia, motor or sensory difficulties in the lower extremities  Her past medical history includes pulmonary hypertension, essential hypertension, hyperglycemia, (hemoglobin A1c 6 0, 9/2/20), osteoporosis, dyslipidemia, mild cognitive impairment, anxiety and depression  She had plain films of the lumbar spine, which revealed superior endplate compression deformity at L2  Uncertain in the chronicity and or etiology  MRI with and without contrast (5/3/21 and 5/29/21) was requested to further evaluate this abnormality    This study was very carefully reviewed in detail by Dr Kaylie Fernandez, and revealed a mass within the spinal canal extending his with wide left foramen is placed in the left psoas muscle, measuring approximately 5 5 cm transverse by 2 2 cm AP and 3 3 cm cranial caudally  This was thought to most likely represent a nerve sheath tumor, and based on its size is been there for an extended period time  The studies were reviewed in detail with the patient and her daughter  On examination today she is awake, alert, oriented x3, motor examination lower extremities is 5 x 5 for power, reflexes, patella and Achilles are intact and symmetric, there was no ankle clonus, straight leg raise is negative bilaterally, Luis F's test was also negative  Sensation was grossly intact throughout  Dr Durham reviewed this films with the patient, the most likely diagnosis, and recommendations for management, he suggested based on her age, multiple medical conditions, that observation may be the most prudent choice  Her case will be referred to [facility] for review and further management or follow-up recommendations  This was reviewed with Thuan Johnson RN, [facility] nurse navigator and will be reviewed at the 6/9/21 conference  Her daughter is aware that I will call with the recommendations after the committee has met to review and make further recommendations  In the interim the patient and her daughter understand to continue to follow with Dr Sam Santos for further management recommendations relative to her right buttock/hip pain  This may include trigger point injections, neuromodulators, or transforaminal injection  Further follow-up with Neurosurgery is to be determined  These findings, impressions recommendations reviewed in great detail with the patient and her daughter  They do understand I will contact them after the [facility] conference for further follow-up recommendation  They expressed understanding and agreement with this plan  Return if symptoms worsen or fail to improve  Chief Complaint  Right hip and buttocks pain, abnormal MRI lumbar spine      HPI       The following portions of the patient's history were reviewed and updated as appropriate: allergies, current medications, past family history, past medical history, past social history, past surgical history and problem list     Review of Systems   Constitutional: Negative  HENT: Negative  Eyes: Negative  Respiratory: Negative  Cardiovascular: Negative  Gastrointestinal: Negative  Endocrine: Negative  Genitourinary: Negative  Musculoskeletal: Positive for gait problem (uses cane)  Back pain: 2 months right buttock pain and pain radiates down right leg when walking  Unaware of any injury, but buttock pain for 2 months    0/10 sitting    7/10 bottock pain    ERICK: NONE, Seen by Ana Maria Wells who recommended Neurosurgery consult    PT: NONE, referral placed   Skin: Negative  Allergic/Immunologic: Negative  Neurological: Negative for weakness and numbness  Hematological: Does not bruise/bleed easily (fish oil)  Psychiatric/Behavioral: Negative  Objective:    Physical Exam  Constitutional:       Appearance: She is well-developed  HENT:      Head: Normocephalic and atraumatic  Eyes:      Pupils: Pupils are equal, round, and reactive to light  Cardiovascular:      Rate and Rhythm: Normal rate  Pulmonary:      Effort: Pulmonary effort is normal       Breath sounds: Normal breath sounds  Skin:     General: Skin is warm and dry  Neurological:      Mental Status: She is alert and oriented to person, place, and time  Neurologic Exam     Mental Status   Oriented to person, place, and time  Cranial Nerves     CN III, IV, VI   Pupils are equal, round, and reactive to light           MRI LUMBAR SPINE WITHOUT CONTRAST   5/3/21     INDICATION: M54 9: Dorsalgia, unspecified  M54 41: Lumbago with sciatica, right side  R93 7: Abnormal findings on diagnostic imaging of other parts of musculoskeletal system  S32 020A: Wedge compression fracture of second lumbar vertebra, initial encounter for closed fracture  Unspecified back pain  Acute right-sided low back pain with right-sided sciatica  Low back pain in right leg pain with buttock pain for 2 months      COMPARISON:  None      TECHNIQUE:  Sagittal T1, sagittal T2, sagittal inversion recovery, axial T1 and axial T2, coronal T2     IMAGE QUALITY:  Diagnostic     FINDINGS:     VERTEBRAL BODIES:  There are 5 lumbar type vertebral bodies  Dextroscoliosis apex L2-3 with anterolisthesis L5 on S1 measuring 5 mm  Normal marrow signal is identified within the visualized bony structures  No discrete marrow lesion      SACRUM:  Normal signal within the sacrum  No evidence of insufficiency or stress fracture      DISTAL CORD AND CONUS:  Normal size and signal within the distal cord and conus      PARASPINAL SOFT TISSUES:  Please see below under L2-3     LOWER THORACIC DISC SPACES:  Normal disc height and signal   No disc herniation, canal stenosis or foraminal narrowing      LUMBAR DISC SPACES:     L1-L2:  Degenerative disc osteophyte complex with marginal osteophytes result in moderate left foraminal narrowing  Central canal is patent      L2-L3:  There is a mass identified within the spinal canal extending through the widened left foramen and displacing the left psoas muscle  This T2 hyperintense mass measures 5 5 cm in transverse dimension by 2 2 cm in AP dimension by 3 3 cm in   craniocaudad dimension extending from the L1-2 endplate level 2 slightly inferior to the L2-3 disc level  Nerve roots of the filum terminale are displaced to the right and displaced anteriorly with thecal sac compression noted  This mass has a dumbbell   shape, remodeling the L2 vertebral body with imaging characteristics typical for nerve sheath tumor such as neurofibroma or schwannoma  Post gadolinium imaging is recommended to further evaluate  Spinal surgery consultation recommended  Severe   central stenosis noted  Right foramen is patent      L3-L4:  Mild annular bulge  Moderate facet hypertrophy is noted  There is minimal central stenosis  Foramina are patent      L4-L5:  Severe right and moderate left facet degenerative change is identified  There is minimal anterolisthesis  Minimal central stenosis noted without foraminal narrowing      L5-S1:  Severe facet degenerative change noted with slight anterolisthesis  Uncovering the posterior disc margin noted  Mild foraminal narrowing  Minimal central stenosis      IMPRESSION:     5 5 x 2 2 x 3 3 cm mass centered in the left foramen of L2-3 with widening of the foramen  The mass extends into the spinal canal displacing the thecal sac to the right and anteriorly and compressing the sac with lateral extension into displacing the   left psoas muscle  Imaging characteristics are typical for slow growing nerve sheath tumor such as schwannoma or neurofibroma and post gadolinium imaging is recommended to further evaluate  Spinal surgery consultation recommended      Severe facet degenerative changes are seen in L5-S1 resulting anterolisthesis  Spondylotic degenerative changes result in moderate left foraminal narrowing at L1-2      The study was marked in EPIC for significant notification  MRI lumbar spine with contrast   5/29/21     HISTORY: Follow-up mass seen on prior exam      COMPARISON: 5/3/2021      TECHNIQUE: Sagittal T1 precontrast   Sagittal and axial T1 postcontrast   6 mL of Gadavist was injected intravenously without immediate consequence      FINDINGS: The previously identified mass which appears within the central canal at the level of the L2-3 disc space and extend laterally through the left neural foramen and into the paraspinal soft tissues demonstrates homogeneous enhancement after   administration of contrast   This mass measures approximately 5 7 cm in long axis and 2 2 cm in short axis on series 5 image 24    Continued compression and displacement of the thecal sac and cauda equina anteriorly and towards the right  There is   expansion of the left neural foramen suggesting a chronic, slow growing process      No other abnormal enhancement noted within the lower thoracic canal or lumbar canal      Stable lumbar degenerative change better evaluated on the original study dated 5/3/2021      IMPRESSION:     Homogeneous enhancement of the large mass seen at the L2-3 level, as described above  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

## 2021-06-01 NOTE — PROGRESS NOTES
Patient ID: Akilah Baxter is a 80 y o  female  Diagnoses and all orders for this visit:    Lumbar spine tumor    Abnormal MRI, lumbar spine  -     Ambulatory referral to Neurosurgery    Nerve sheath tumor    Lumbar disc disease with radiculopathy  -     Ambulatory referral to Neurosurgery    Chronic pain syndrome  -     Ambulatory referral to Neurosurgery          Assessment/Plan:  Very pleasant 19-year-old female, accompanied by her daughter Treasure Blood  Referred by Dr Russell Doherty to evaluate abnormal MRI lumbar spine  Patient has a history of right buttocks, right lateral thigh pain ongoing for several months  She denies gait or balance disturbance, bowel or bladder incontinence, perineal anesthesia, motor or sensory difficulties in the lower extremities  Her past medical history includes pulmonary hypertension, essential hypertension, hyperglycemia, (hemoglobin A1c 6 0, 9/2/20), osteoporosis, dyslipidemia, mild cognitive impairment, anxiety and depression  She had plain films of the lumbar spine, which revealed superior endplate compression deformity at L2  Uncertain in the chronicity and or etiology  MRI with and without contrast (5/3/21 and 5/29/21) was requested to further evaluate this abnormality  This study was very carefully reviewed in detail by Dr Barry Yadav, and revealed a mass within the spinal canal extending his with wide left foramen is placed in the left psoas muscle, measuring approximately 5 5 cm transverse by 2 2 cm AP and 3 3 cm cranial caudally  This was thought to most likely represent a nerve sheath tumor, and based on its size is been there for an extended period time  The studies were reviewed in detail with the patient and her daughter      On examination today she is awake, alert, oriented x3, motor examination lower extremities is 5 x 5 for power, reflexes, patella and Achilles are intact and symmetric, there was no ankle clonus, straight leg raise is negative bilaterally, Luis F's test was also negative  Sensation was grossly intact throughout  Dr Shanell Castrejon reviewed this films with the patient, the most likely diagnosis, and recommendations for management, he suggested based on her age, multiple medical conditions, that observation may be the most prudent choice  Her case will be referred to [facility] for review and further management or follow-up recommendations  This was reviewed with Baron Antoinette RN, [facility] nurse navigator and will be reviewed at the 6/9/21 conference  Her daughter is aware that I will call with the recommendations after the committee has met to review and make further recommendations  In the interim the patient and her daughter understand to continue to follow with Dr Corrie Bunch for further management recommendations relative to her right buttock/hip pain  This may include trigger point injections, neuromodulators, or transforaminal injection  Further follow-up with Neurosurgery is to be determined  These findings, impressions recommendations reviewed in great detail with the patient and her daughter  They do understand I will contact them after the [facility] conference for further follow-up recommendation  They expressed understanding and agreement with this plan  Return if symptoms worsen or fail to improve  Chief Complaint  Right hip and buttocks pain, abnormal MRI lumbar spine  HPI       The following portions of the patient's history were reviewed and updated as appropriate: allergies, current medications, past family history, past medical history, past social history, past surgical history and problem list     Review of Systems   Constitutional: Negative  HENT: Negative  Eyes: Negative  Respiratory: Negative  Cardiovascular: Negative  Gastrointestinal: Negative  Endocrine: Negative  Genitourinary: Negative  Musculoskeletal: Positive for gait problem (uses cane)   Back pain: 2 months right buttock pain and pain radiates down right leg when walking  Unaware of any injury, but buttock pain for 2 months    0/10 sitting    7/10 bottock pain    ERICK: NONE, Seen by Marquis Hernandez who recommended Neurosurgery consult    PT: NONE, referral placed   Skin: Negative  Allergic/Immunologic: Negative  Neurological: Negative for weakness and numbness  Hematological: Does not bruise/bleed easily (fish oil)  Psychiatric/Behavioral: Negative  Objective:    Physical Exam  Constitutional:       Appearance: She is well-developed  HENT:      Head: Normocephalic and atraumatic  Eyes:      Pupils: Pupils are equal, round, and reactive to light  Cardiovascular:      Rate and Rhythm: Normal rate  Pulmonary:      Effort: Pulmonary effort is normal       Breath sounds: Normal breath sounds  Skin:     General: Skin is warm and dry  Neurological:      Mental Status: She is alert and oriented to person, place, and time  Neurologic Exam     Mental Status   Oriented to person, place, and time  Cranial Nerves     CN III, IV, VI   Pupils are equal, round, and reactive to light  MRI LUMBAR SPINE WITHOUT CONTRAST   5/3/21     INDICATION: M54 9: Dorsalgia, unspecified  M54 41: Lumbago with sciatica, right side  R93 7: Abnormal findings on diagnostic imaging of other parts of musculoskeletal system  S32 020A: Wedge compression fracture of second lumbar vertebra, initial encounter for closed fracture  Unspecified back pain  Acute right-sided low back pain with right-sided sciatica  Low back pain in right leg pain with buttock pain for 2 months      COMPARISON:  None      TECHNIQUE:  Sagittal T1, sagittal T2, sagittal inversion recovery, axial T1 and axial T2, coronal T2     IMAGE QUALITY:  Diagnostic     FINDINGS:     VERTEBRAL BODIES:  There are 5 lumbar type vertebral bodies  Dextroscoliosis apex L2-3 with anterolisthesis L5 on S1 measuring 5 mm    Normal marrow signal is identified within the visualized bony structures  No discrete marrow lesion      SACRUM:  Normal signal within the sacrum  No evidence of insufficiency or stress fracture      DISTAL CORD AND CONUS:  Normal size and signal within the distal cord and conus      PARASPINAL SOFT TISSUES:  Please see below under L2-3     LOWER THORACIC DISC SPACES:  Normal disc height and signal   No disc herniation, canal stenosis or foraminal narrowing      LUMBAR DISC SPACES:     L1-L2:  Degenerative disc osteophyte complex with marginal osteophytes result in moderate left foraminal narrowing  Central canal is patent      L2-L3:  There is a mass identified within the spinal canal extending through the widened left foramen and displacing the left psoas muscle  This T2 hyperintense mass measures 5 5 cm in transverse dimension by 2 2 cm in AP dimension by 3 3 cm in   craniocaudad dimension extending from the L1-2 endplate level 2 slightly inferior to the L2-3 disc level  Nerve roots of the filum terminale are displaced to the right and displaced anteriorly with thecal sac compression noted  This mass has a dumbbell   shape, remodeling the L2 vertebral body with imaging characteristics typical for nerve sheath tumor such as neurofibroma or schwannoma  Post gadolinium imaging is recommended to further evaluate  Spinal surgery consultation recommended  Severe   central stenosis noted  Right foramen is patent      L3-L4:  Mild annular bulge  Moderate facet hypertrophy is noted  There is minimal central stenosis  Foramina are patent      L4-L5:  Severe right and moderate left facet degenerative change is identified  There is minimal anterolisthesis  Minimal central stenosis noted without foraminal narrowing      L5-S1:  Severe facet degenerative change noted with slight anterolisthesis  Uncovering the posterior disc margin noted  Mild foraminal narrowing    Minimal central stenosis      IMPRESSION:     5 5 x 2 2 x 3 3 cm mass centered in the left foramen of L2-3 with widening of the foramen  The mass extends into the spinal canal displacing the thecal sac to the right and anteriorly and compressing the sac with lateral extension into displacing the   left psoas muscle  Imaging characteristics are typical for slow growing nerve sheath tumor such as schwannoma or neurofibroma and post gadolinium imaging is recommended to further evaluate  Spinal surgery consultation recommended      Severe facet degenerative changes are seen in L5-S1 resulting anterolisthesis  Spondylotic degenerative changes result in moderate left foraminal narrowing at L1-2      The study was marked in EPIC for significant notification  MRI lumbar spine with contrast   5/29/21     HISTORY: Follow-up mass seen on prior exam      COMPARISON: 5/3/2021      TECHNIQUE: Sagittal T1 precontrast   Sagittal and axial T1 postcontrast   6 mL of Gadavist was injected intravenously without immediate consequence      FINDINGS: The previously identified mass which appears within the central canal at the level of the L2-3 disc space and extend laterally through the left neural foramen and into the paraspinal soft tissues demonstrates homogeneous enhancement after   administration of contrast   This mass measures approximately 5 7 cm in long axis and 2 2 cm in short axis on series 5 image 24  Continued compression and displacement of the thecal sac and cauda equina anteriorly and towards the right  There is   expansion of the left neural foramen suggesting a chronic, slow growing process      No other abnormal enhancement noted within the lower thoracic canal or lumbar canal      Stable lumbar degenerative change better evaluated on the original study dated 5/3/2021      IMPRESSION:     Homogeneous enhancement of the large mass seen at the L2-3 level, as described above  This likely represents a nerve sheath tumor such as schwannoma or neurofibroma    No other enhancing lesions are identified

## 2021-06-01 NOTE — PATIENT INSTRUCTIONS
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

## 2021-06-01 NOTE — TELEPHONE ENCOUNTER
See below  Review MRI  Review Dr Dempsey's note from today  Should pt be brought in earlier than 6/29?   Please advise

## 2021-06-09 ENCOUNTER — DOCUMENTATION (OUTPATIENT)
Dept: NEUROSURGERY | Facility: CLINIC | Age: 86
End: 2021-06-09

## 2021-06-09 NOTE — PROGRESS NOTES
Patient was presented at the Neuro Oncology Case Review this morning  Marianela Smith PA-C and Dr Lilian Ace requested for case to be discussed  The neuroradiologist believes the mass is a schwannoma vs neurofibroma  Dr Alex Melendez does not recommend surgery and Dr Scott Ag does not recommend RT at this time since the patient is asymptomatic on the left side  Dr Alex Melendez said the patient should continue to follow up with pain management, Dr Marquis Hernandez, and they could initiate gabapentin if he thinks that is appropriate  Dr Alex Melendez said no MRI follow up is needed unless the patient become symptomatic  Notified Marianela Smith PA-C of the discussion  Agreed this RN will call the patient's daughter, Argentina Kingsley, to notify her of the discussion  Called Argentina Kingsley and notified her of the above  She expressed understanding and was very appreciative for the update  She will call with any questions or concerns  The final treatment plan will be left to the discretion of the patient and the treating physician  DISCLAIMERS:  TO THE TREATING PHYSICIAN:  This conference is a meeting of clinicians from various specialty areas who evaluate and discuss patients for whom a multidisciplinary treatment approach is being considered  Please note that the above opinion was a consensus of the conference attendees and is intended only to assist in quality care of the discussed patient  The responsibility for follow up on the input given during the conference, along with any final decisions regarding plan of care, is that of the patient and the patient's provider  Accordingly, appointments have only been recommended based on this information and have NOT been scheduled unless otherwise noted  TO THE PATIENT:  This summary is a brief record of major aspects of your cancer treatment  You may choose to share a copy with any of your doctors or nurses  However, this is not a detailed or comprehensive record of your care

## 2021-06-29 ENCOUNTER — OFFICE VISIT (OUTPATIENT)
Dept: PAIN MEDICINE | Facility: CLINIC | Age: 86
End: 2021-06-29
Payer: MEDICARE

## 2021-06-29 ENCOUNTER — TELEPHONE (OUTPATIENT)
Dept: PAIN MEDICINE | Facility: CLINIC | Age: 86
End: 2021-06-29

## 2021-06-29 ENCOUNTER — APPOINTMENT (OUTPATIENT)
Dept: RADIOLOGY | Facility: MEDICAL CENTER | Age: 86
End: 2021-06-29
Payer: MEDICARE

## 2021-06-29 VITALS
WEIGHT: 129 LBS | SYSTOLIC BLOOD PRESSURE: 156 MMHG | HEART RATE: 87 BPM | BODY MASS INDEX: 26.05 KG/M2 | DIASTOLIC BLOOD PRESSURE: 76 MMHG

## 2021-06-29 DIAGNOSIS — G89.4 CHRONIC PAIN SYNDROME: ICD-10-CM

## 2021-06-29 DIAGNOSIS — G89.29 HIP PAIN, CHRONIC, RIGHT: ICD-10-CM

## 2021-06-29 DIAGNOSIS — M25.551 HIP PAIN, CHRONIC, RIGHT: ICD-10-CM

## 2021-06-29 DIAGNOSIS — M70.61 GREATER TROCHANTERIC BURSITIS OF RIGHT HIP: Primary | ICD-10-CM

## 2021-06-29 DIAGNOSIS — F33.9 DEPRESSION, RECURRENT (HCC): ICD-10-CM

## 2021-06-29 DIAGNOSIS — M70.61 GREATER TROCHANTERIC BURSITIS OF RIGHT HIP: ICD-10-CM

## 2021-06-29 PROCEDURE — 73521 X-RAY EXAM HIPS BI 2 VIEWS: CPT

## 2021-06-29 PROCEDURE — 99214 OFFICE O/P EST MOD 30 MIN: CPT | Performed by: PHYSICAL MEDICINE & REHABILITATION

## 2021-06-29 NOTE — PATIENT INSTRUCTIONS
Hip Pain   WHAT YOU NEED TO KNOW:   Hip pain can be caused by a number of conditions, such as bursitis, arthritis, or muscle or tendon strain  You may have swelling in the fluid-filled sacs that protect your muscles and tendons  Hip pain can also be caused by a lower back problem  Hip pain may be caused by trauma, playing sports, or running  Pain may start in your hip and go to your thigh, buttock, or groin  DISCHARGE INSTRUCTIONS:   Medicines:   · NSAIDs , such as ibuprofen, help decrease swelling, pain, and fever  This medicine is available with or without a doctor's order  NSAIDs can cause stomach bleeding or kidney problems in certain people  If you take blood thinner medicine, always ask your healthcare provider if NSAIDs are safe for you  Always read the medicine label and follow directions  · Take your medicine as directed  Contact your healthcare provider if you think your medicine is not helping or if you have side effects  Tell him of her if you are allergic to any medicine  Keep a list of the medicines, vitamins, and herbs you take  Include the amounts, and when and why you take them  Bring the list or the pill bottles to follow-up visits  Carry your medicine list with you in case of an emergency  Return to the emergency department if:   · Your pain gets worse  · You have numbness in your leg or toes  · You cannot put any weight on or move your hip  Contact your healthcare provider if:   · You have a fever  · Your pain does not decrease, even after treatment  · You have questions or concerns about your condition or care  Follow up with your healthcare provider as directed: You may need physical therapy, an injection, or more testing  You may need to see an orthopedic specialist  Write down your questions so you remember to ask them during your visits    Manage your hip pain:   · Rest  your injured hip so that it can heal  You may need to avoid putting any weight on your hip for at least 48 hours  Return to normal activities as directed  · Ice  the injury for 20 minutes every 4 hours, or as directed  Use an ice pack, or put crushed ice in a plastic bag  Cover it with a towel to protect your skin  Ice helps prevent tissue damage and decreases swelling and pain  · Elevate  your injured hip above the level of your heart as often as you can  This will help decrease swelling and pain  If possible, prop your hip and leg on pillows or blankets to keep the area elevated comfortably  · Maintain a healthy weight  Extra body weight can cause pressure and pain in your hip, knee, and ankle joints  Ask your healthcare provider how much you should weigh  Ask him or her to help you create a weight loss plan if you are overweight  · Use assistive devices as directed  You may need to use a cane or crutches  Assistive devices help decrease pain and pressure on your hip when you walk  Ask your healthcare provider for more information about assistive devices and how to use them correctly  © Copyright [address] Information is for End User's use only and may not be sold, redistributed or otherwise used for commercial purposes  All illustrations and images included in CareNotes® are the copyrighted property of [employer]  or [address] [place], [place] [address]   The above information is an  only  It is not intended as medical advice for individual conditions or treatments  Talk to your doctor, nurse or pharmacist before following any medical regimen to see if it is safe and effective for you

## 2021-06-29 NOTE — PROGRESS NOTES
Assessment  1  Greater trochanteric bursitis of right hip    2  Hip pain, chronic, right    3  Chronic pain syndrome    4  Depression, recurrent [facility])        Plan  Ms Froilan Lombardo is a pleasant 79-year-old female who presents for follow-up and re-evaluation regarding right-sided low back and buttock pain  She previously saw Dr Enrico Boucher regarding the neural sheath tumor at L2-L3 to the left and nonsurgical management was advised especially as her pain is all right-sided  During today's evaluation she is demonstrating clinical evidence of right-sided greater trochanteric bursitis as well as right intra-articular hip pain  At this time we will plan for an ultrasound-guided right greater trochanteric bursa steroid injection and will also order a right hip x-ray to evaluate for bony pathology contributing to her ongoing pain  All questions answered, patient is agreeable with plan  Complete risks and benefits including bleeding, infection, tissue reaction, nerve injury and allergic reaction were discussed  The approach was demonstrated using models and literature was provided  Verbal and written consent was obtained  My impressions and treatment recommendations were discussed in detail with the patient who verbalized understanding and had no further questions  Discharge instructions were provided  I personally saw and examined the patient and I agree with the above discussed plan of care  Orders Placed This Encounter   Procedures    XR hips bilateral 2 vw w pelvis if performed     Standing Status:   Future     Standing Expiration Date:   6/29/2025     Scheduling Instructions:      Bring along any outside films relating to this procedure   US guidance     Right GTB USGI     Standing Status:   Future     Standing Expiration Date:   6/29/2025     Scheduling Instructions:      No prep required  Please bring your insurance cards, a form of photo ID and a list of your medications with you   Arrive 15 minutes prior to your appointment time in order to register  To schedule this appointment, please contact Central Scheduling at [phone]  Order Specific Question:   What is the patient's sedation requirement? Answer:   No Sedation     No orders of the defined types were placed in this encounter  History of Present Illness    Otf Chavira is a 80 y o  female presents to [place] [address] [facility] for follow-up and re-evaluation regarding right-sided low back and buttock pain currently rated 8/10 and interfering with daily activities  Pain is described as a constant throbbing, aching, shooting, stabbing pain  Presents today for follow-up and re-evaluation  I have personally reviewed and/or updated the patient's past medical history, past surgical history, family history, social history, current medications, allergies, and vital signs today  Review of Systems   Constitutional: Negative for fever and unexpected weight change  HENT: Negative for trouble swallowing  Eyes: Negative for visual disturbance  Respiratory: Negative for shortness of breath and wheezing  Cardiovascular: Positive for leg swelling  Negative for chest pain and palpitations  Gastrointestinal: Negative for constipation, diarrhea, nausea and vomiting  Endocrine: Negative for cold intolerance, heat intolerance and polydipsia  Genitourinary: Negative for difficulty urinating and frequency  Musculoskeletal: Positive for back pain, gait problem and joint swelling  Negative for arthralgias and myalgias  Skin: Negative for rash  Neurological: Negative for dizziness, seizures, syncope, weakness and headaches  Hematological: Does not bruise/bleed easily  Psychiatric/Behavioral: Negative for dysphoric mood  All other systems reviewed and are negative        Patient Active Problem List   Diagnosis    Blepharospasm    Spasmodic torticollis    Anxiety    Depression, recurrent ([facility] [address] )    Essential hypertension    Family history of malignant neoplasm of breast    Functional urinary incontinence    Hyperlipidemia    MCI (mild cognitive impairment)    Osteoporosis    Torsion dystonia fragments    Segmental dystonia    Hyperglycemia    Vertigo    Sensorineural hearing loss (SNHL), bilateral    Asymmetrical hearing loss of right ear    Pulmonary HTN ([address] )    Presence of pessary    Osteoporosis without current pathological fracture    Nerve sheath tumor    Lumbar spine tumor       Past Medical History:   Diagnosis Date    Anxiety 2010    Bladder infection 09/2019    Breast lump in female     HL (hearing loss) 2010    Hypertension 2010    Osteopenia     Ovarian cyst        Past Surgical History:   Procedure Laterality Date    BLADDER SURGERY      BREAST BIOPSY      CATARACT EXTRACTION Bilateral 01/01/2015    , Right Eye-2017, Left eye    ENDOMETRIAL BIOPSY      by Suction    HYSTERECTOMY         Family History   Problem Relation Age of Onset    Heart disease Mother     Heart disease Father     Hypertension Family     Osteoporosis Family        Social History     Occupational History    Not on file   Tobacco Use    Smoking status: Never Smoker    Smokeless tobacco: Never Used   Substance and Sexual Activity    Alcohol use: No    Drug use: No    Sexual activity: Not Currently       Current Outpatient Medications on File Prior to Visit   Medication Sig    alendronate (FOSAMAX) 70 mg tablet Take 1 tablet (70 mg total) by mouth every 7 days (Patient not taking: Reported on 6/1/2021)    amLODIPine-benazepril (LOTREL 5-20) 5-20 MG per capsule Take 1 capsule by mouth daily    calcium citrate-vitamin D (CITRACAL+D) 315-200 MG-UNIT per tablet Take 1 tablet by mouth daily    conjugated estrogens (PREMARIN) vaginal cream Insert into the vagina Ose as directed    Multiple Vitamins-Minerals (MULTIVITAL) tablet Take 1 tablet by mouth daily    MYRBETRIQ 50 MG TB24 Take 1 tablet by mouth daily    Omega-3 Fatty Acids (FISH OIL) 645 MG CAPS Take 1 tablet by mouth daily    polyethylene glycol (MIRALAX) 17 g packet Take 1 Package by mouth 2 (two) times a day     Current Facility-Administered Medications on File Prior to Visit   Medication    Botulinum Toxin Type A SOLR 200 Units       No Known Allergies    Physical Exam    /76   Pulse 87   Wt 58 5 kg (129 lb)   BMI 26 05 kg/m²     Constitutional: normal, well developed, well nourished, alert, in no distress and non-toxic and no overt pain behavior    Eyes: anicteric  HEENT: grossly intact  Neck: supple, symmetric, trachea midline and no masses   Pulmonary:even and unlabored  Cardiovascular:No edema or pitting edema present  Skin:Normal without rashes or lesions and well hydrated and  normal  Psychiatric:Mood and affect appropriate  Neurologic:Cranial Nerves II-XII grossly intact  Musculoskeletal:antalgic    Imaging

## 2021-06-29 NOTE — TELEPHONE ENCOUNTER
Pt was scheduled for rt hip injection for 8/2/21  Went over pre-procedure instructions below:  Nothing to eat or drink 1 hr prior to procedure  Need to arrange transportation  Proper clothing for procedure  If ill or placed on antibiotics please call to reschedule  Covid/travel/ and vaccine instructions      Pt was rescheduled for  RT GTB USGI for 8/24/21

## 2021-07-08 ENCOUNTER — TELEPHONE (OUTPATIENT)
Dept: PAIN MEDICINE | Facility: CLINIC | Age: 86
End: 2021-07-08

## 2021-07-08 NOTE — TELEPHONE ENCOUNTER
Zakiya Blood, DO  [facility]  Attempted to call patient today at both billable numbers at 1:50 p m  with no response   She is demonstrating severe hip arthritis as identified on the hip x-ray   Would advise being seen by Orthopedics for surgical consideration   If she is interested and amenable I will place a consult for Melvin Jean, if she does not want surgery and surgery is not an option we may try a right hip steroid injection under fluoro guidance   Please advise   Thank you

## 2021-07-12 NOTE — TELEPHONE ENCOUNTER
S/W Padmini Genao  Advised her of the same  She asked about getting the injection scheduled  Advised her the  will call to schedule  She verbalized understanding

## 2021-07-12 NOTE — TELEPHONE ENCOUNTER
S/W Fortino Mcclendon as per JULIENNE  Advised her of the same  She stated for the pt to try a hip injection first   Advised the  will call to schedule the injection  She is also asking if the pt should still get the injection on 8/3 too? She verbalized understanding  Please advise

## 2021-07-30 ENCOUNTER — DOCUMENTATION (OUTPATIENT)
Dept: NEUROLOGY | Facility: CLINIC | Age: 86
End: 2021-07-30

## 2021-07-30 NOTE — PROGRESS NOTES
Type Date User Summary Attachment   General 07/29/2021 11:30 AM [facility] cooridnation -   Note    Botox - no authorization needed   Please use our stock      Thanks  [place]

## 2021-08-02 ENCOUNTER — HOSPITAL ENCOUNTER (OUTPATIENT)
Dept: RADIOLOGY | Facility: CLINIC | Age: 86
Discharge: HOME | End: 2021-08-02
Attending: PHYSICAL MEDICINE & REHABILITATION | Admitting: PHYSICAL MEDICINE & REHABILITATION
Payer: MEDICARE

## 2021-08-02 VITALS
OXYGEN SATURATION: 93 % | RESPIRATION RATE: 18 BRPM | DIASTOLIC BLOOD PRESSURE: 79 MMHG | SYSTOLIC BLOOD PRESSURE: 166 MMHG | TEMPERATURE: 99.2 F | HEART RATE: 85 BPM

## 2021-08-02 DIAGNOSIS — M16.11 PRIMARY OSTEOARTHRITIS OF RIGHT HIP: ICD-10-CM

## 2021-08-02 PROCEDURE — 20610 DRAIN/INJ JOINT/BURSA W/O US: CPT | Performed by: PHYSICAL MEDICINE & REHABILITATION

## 2021-08-02 PROCEDURE — 77002 NEEDLE LOCALIZATION BY XRAY: CPT

## 2021-08-02 PROCEDURE — 77002 NEEDLE LOCALIZATION BY XRAY: CPT | Performed by: PHYSICAL MEDICINE & REHABILITATION

## 2021-08-02 RX ORDER — LIDOCAINE HYDROCHLORIDE 10 MG/ML
5 INJECTION, SOLUTION EPIDURAL; INFILTRATION; INTRACAUDAL; PERINEURAL ONCE
Status: DISCONTINUED | OUTPATIENT
Start: 2021-08-02 | End: 2021-08-02

## 2021-08-02 RX ORDER — BUPIVACAINE HCL/PF 2.5 MG/ML
10 VIAL (ML) INJECTION ONCE
Status: COMPLETED | OUTPATIENT
Start: 2021-08-02 | End: 2021-08-02

## 2021-08-02 RX ORDER — 0.9 % SODIUM CHLORIDE 0.9 %
10 VIAL (ML) INJECTION ONCE
Status: COMPLETED | OUTPATIENT
Start: 2021-08-02 | End: 2021-08-02

## 2021-08-02 RX ORDER — METHYLPREDNISOLONE ACETATE 80 MG/ML
80 INJECTION, SUSPENSION INTRA-ARTICULAR; INTRALESIONAL; INTRAMUSCULAR; PARENTERAL; SOFT TISSUE ONCE
Status: COMPLETED | OUTPATIENT
Start: 2021-08-02 | End: 2021-08-02

## 2021-08-02 RX ADMIN — METHYLPREDNISOLONE ACETATE 80 MG: 80 INJECTION, SUSPENSION INTRA-ARTICULAR; INTRALESIONAL; INTRAMUSCULAR; PARENTERAL; SOFT TISSUE at 14:04

## 2021-08-02 RX ADMIN — BUPIVACAINE HYDROCHLORIDE 4 ML: 2.5 INJECTION, SOLUTION EPIDURAL; INFILTRATION; INTRACAUDAL at 14:04

## 2021-08-02 RX ADMIN — SODIUM CHLORIDE 1 ML: 9 INJECTION, SOLUTION INTRAMUSCULAR; INTRAVENOUS; SUBCUTANEOUS at 14:03

## 2021-08-02 RX ADMIN — IOHEXOL 1 ML: 300 INJECTION, SOLUTION INTRAVENOUS at 14:04

## 2021-08-02 RX ADMIN — Medication 1 ML: at 14:03

## 2021-08-02 NOTE — H&P
History of Present Illness:  The patient is a 80 y o  female who presents with complaints of right hip pain    Patient Active Problem List   Diagnosis    Blepharospasm    Spasmodic torticollis    Anxiety    Depression, recurrent ([address] )    Essential hypertension    Family history of malignant neoplasm of breast    Functional urinary incontinence    Hyperlipidemia    MCI (mild cognitive impairment)    Osteoporosis    Torsion dystonia fragments    Segmental dystonia    Hyperglycemia    Vertigo    Sensorineural hearing loss (SNHL), bilateral    Asymmetrical hearing loss of right ear    Pulmonary HTN ([address] )    Presence of pessary    Osteoporosis without current pathological fracture    Nerve sheath tumor    Lumbar spine tumor       Past Medical History:   Diagnosis Date    Anxiety 2010    Bladder infection 09/2019    Breast lump in female     HL (hearing loss) 2010    Hypertension 2010    Osteopenia     Ovarian cyst        Past Surgical History:   Procedure Laterality Date    BLADDER SURGERY      BREAST BIOPSY      CATARACT EXTRACTION Bilateral 01/01/2015    , Right Eye-2017, Left eye    ENDOMETRIAL BIOPSY      by Suction    HYSTERECTOMY           Current Outpatient Medications:     alendronate (FOSAMAX) 70 mg tablet, Take 1 tablet (70 mg total) by mouth every 7 days (Patient not taking: Reported on 6/1/2021), Disp: 12 tablet, Rfl: 3    amLODIPine-benazepril (LOTREL 5-20) 5-20 MG per capsule, Take 1 capsule by mouth daily, Disp: 90 capsule, Rfl: 1    calcium citrate-vitamin D (CITRACAL+D) 315-200 MG-UNIT per tablet, Take 1 tablet by mouth daily, Disp: , Rfl:     conjugated estrogens (PREMARIN) vaginal cream, Insert into the vagina Ose as directed, Disp: , Rfl:     Multiple Vitamins-Minerals (MULTIVITAL) tablet, Take 1 tablet by mouth daily, Disp: , Rfl:     MYRBETRIQ 50 MG TB24, Take 1 tablet by mouth daily, Disp: , Rfl:     Omega-3 Fatty Acids (FISH OIL) 645 MG CAPS, Take 1 tablet by mouth daily, Disp: , Rfl:     polyethylene glycol (MIRALAX) 17 g packet, Take 1 Package by mouth 2 (two) times a day, Disp: , Rfl:     Current Facility-Administered Medications:     Botulinum Toxin Type A SOLR 200 Units, 200 Units, Injection, Q3 Months, Liberty Brewster MD, 200 Units at 05/03/21 1619    bupivacaine (PF) (MARCAINE) 0 25 % injection 10 mL, 10 mL, Intra-articular, Once, Jace Iraheta DO    iohexol (OMNIPAQUE) 300 mg/mL injection 50 mL, 50 mL, Intra-articular, Once, Jace Iraheta DO    lidocaine (PF) (XYLOCAINE-MPF) 1 % injection 5 mL, 5 mL, Infiltration, Once, Jace Iraheta DO    methylPREDNISolone acetate (DEPO-MEDROL) injection 80 mg, 80 mg, Intra-articular, Once, Jace Iraheta DO    No Known Allergies    Physical Exam: There were no vitals filed for this visit  General: Awake, Alert, Oriented x 3, Mood and affect appropriate  Respiratory: Respirations even and unlabored  Cardiovascular: Peripheral pulses intact; no edema  Musculoskeletal Exam:  Tenderness to palpation right lateral hip and medial groin    ASA Score: 2  Patient has been made explicitly aware that the injection will consist of steroid which may cause a temporary suppression in immune system activity  This, in turn, may increase the patient's risk of adalgisa corona virus  He was advised to continue with social distancing and self quarantine  Patient wishes to proceed with the injection         Assessment:   1   Primary osteoarthritis of right hip        Plan: Rt hip injection

## 2021-08-02 NOTE — DISCHARGE INSTR - LAB
1  Do not apply heat to any area that is numb  If you have discomfort or soreness at the injection site, you may apply ice today, 20 minutes on and 20 minutes off  Tomorrow you may use ice or warm, moist heat  Do not apply ice or heat directly to the skin  2  If you experience severe shortness of breath, go to the Emergency Room  3  You may have numbness for several hours from the local anesthetic  Please use caution and common sense, especially with weight-bearing activities  4  You may have an increase or change in the discomfort for 36-48 hours after your treatment  Apply ice and continue with any pain medicine you have been prescribed  5  Do not do anything strenuous today  You may shower, but no tub baths or hot tubs today  You may resume your normal activities tomorrow, but do not overdo it  Resume normal activities slowly when you are feeling better  6  If you experience redness, drainage or swelling at the injection site, or if you develop a fever above 100 degrees, please call [facility] at [phone] or go to the Emergency Room  7  Continue to take all routine medicines prescribed by your primary care physician unless otherwise instructed by our staff  Most blood thinners should be started again according to your regularly scheduled dosing  If you have any questions, please give our office a call  As no general anesthesia was used in today's procedure, you should not experience any side effects related to anesthesia  If you have a problem specifically related to your procedure, please call our office at [phone]  Problems not related to your procedure should be directed to your primary care physician

## 2021-08-09 ENCOUNTER — TELEPHONE (OUTPATIENT)
Dept: PAIN MEDICINE | Facility: CLINIC | Age: 86
End: 2021-08-09

## 2021-08-09 NOTE — TELEPHONE ENCOUNTER
Patient calling to report 20 % improvement and pain level 4/10     Question she caught her leg in her 4 prong manning should she use Heat or Ice for the pain?     Please advise    [phone]     Thank you

## 2021-08-09 NOTE — TELEPHONE ENCOUNTER
S/w with daughter Hilda (ok per JULIENNE). Per Hilda pt's skin is intact and there is no swelling. Hilda advised trying ice first 20 mins on 20 mins off then  use whichever feels better ice or heat. Daughter verbalized understanding.

## 2021-08-24 ENCOUNTER — PROCEDURE VISIT (OUTPATIENT)
Dept: PAIN MEDICINE | Facility: CLINIC | Age: 86
End: 2021-08-24
Payer: MEDICARE

## 2021-08-24 VITALS
BODY MASS INDEX: 26.05 KG/M2 | HEART RATE: 86 BPM | DIASTOLIC BLOOD PRESSURE: 74 MMHG | WEIGHT: 129 LBS | SYSTOLIC BLOOD PRESSURE: 132 MMHG

## 2021-08-24 DIAGNOSIS — M70.61 TROCHANTERIC BURSITIS OF RIGHT HIP: Primary | ICD-10-CM

## 2021-08-24 PROCEDURE — 20611 DRAIN/INJ JOINT/BURSA W/US: CPT | Performed by: PHYSICAL MEDICINE & REHABILITATION

## 2021-08-24 RX ORDER — BUPIVACAINE HYDROCHLORIDE 2.5 MG/ML
10 INJECTION, SOLUTION EPIDURAL; INFILTRATION; INTRACAUDAL ONCE
Status: COMPLETED | OUTPATIENT
Start: 2021-08-24 | End: 2021-08-24

## 2021-08-24 RX ORDER — METHYLPREDNISOLONE ACETATE 40 MG/ML
40 INJECTION, SUSPENSION INTRA-ARTICULAR; INTRALESIONAL; INTRAMUSCULAR; SOFT TISSUE ONCE
Status: COMPLETED | OUTPATIENT
Start: 2021-08-24 | End: 2021-08-24

## 2021-08-24 RX ADMIN — BUPIVACAINE HYDROCHLORIDE 10 ML: 2.5 INJECTION, SOLUTION EPIDURAL; INFILTRATION; INTRACAUDAL at 15:48

## 2021-08-24 RX ADMIN — METHYLPREDNISOLONE ACETATE 40 MG: 40 INJECTION, SUSPENSION INTRA-ARTICULAR; INTRALESIONAL; INTRAMUSCULAR; SOFT TISSUE at 15:48

## 2021-08-24 NOTE — PATIENT INSTRUCTIONS
Hip Bursitis   WHAT YOU NEED TO KNOW:   Hip bursitis is inflammation of the bursa in your hip  The bursa is a fluid-filled sac that acts as a cushion between a bone and a tendon  A tendon is a cord of strong tissue that connects muscles to bones  DISCHARGE INSTRUCTIONS:   Call your doctor if:   · Your pain and swelling increase  · Your symptoms do not improve with treatment  · You have a fever  · You have questions or concerns about your condition or care  Medicines: You may need any of the following:  · NSAIDs , such as ibuprofen, help decrease swelling, pain, and fever  NSAIDs can cause stomach bleeding or kidney problems in certain people  If you take blood thinner medicine, always ask your healthcare provider if NSAIDs are safe for you  Always read the medicine label and follow directions  · Aspirin  helps relieve pain and swelling  Take aspirin exactly as directed by your healthcare provider  · Antibiotics  help fight an infection caused by bacteria       · Steroid  pills may be given for a short time to relieve pain and swelling  · Take your medicine as directed  Contact your healthcare provider if you think your medicine is not helping or if you have side effects  Tell him of her if you are allergic to any medicine  Keep a list of the medicines, vitamins, and herbs you take  Include the amounts, and when and why you take them  Bring the list or the pill bottles to follow-up visits  Carry your medicine list with you in case of an emergency  Manage hip bursitis:   · Rest your hip as much as possible to decrease pain and swelling  Slowly start to do more each day  Return to your daily activities as directed  You may be able to use a cane or other device to take pressure off the hip as you walk  · Apply ice to help decrease swelling and pain  Use an ice pack, or put crushed ice in a plastic bag  Cover the bag with a towel before you place it on your elbow   Apply ice for 15 to 20 minutes, 3 to 4 times each day, as directed  · Do not lie on your injured hip  You may be more comfortable if you sleep on your stomach or back  · Go to physical therapy, if directed  A physical therapist teaches you exercises to help improve movement and strength, and to decrease pain  Prevent hip bursitis:   · Do not overuse your hips  Shorten the time you spend running, climbing stairs, or riding a bike  Take breaks as you do these activities  Try not to do the same activities each day  For example, run every other day or every 3 days instead of daily  · Stretch, warm up, and cool down  Always stretch and do warmup and cool-down exercises before and after you exercise  This will help loosen your muscles and decrease stress on your hip  Rest between workouts  · Wear proper shoes  Wear shoes that fit properly and support your feet  You may need to wear shoe inserts called orthotics  Orthotics help position your foot correctly as you walk or exercise  · Maintain a healthy weight  Ask your healthcare provider what a healthy weight is for you  Ask him or her to help you create a weight loss plan if you are overweight  · Keep pressure off your hips  Do not stand or sit for long periods of time  Sit on padded surfaces, such as a cushion or pad, whenever possible  Bend your knees when you  objects from the ground  Follow up with your doctor as directed:  Write down your questions so you remember to ask them during your visits  © Copyright [employer] 2021 Information is for End User's use only and may not be sold, redistributed or otherwise used for commercial purposes  All illustrations and images included in CareNotes® are the copyrighted property of [employer]  or Lashanda Gamino   The above information is an  only  It is not intended as medical advice for individual conditions or treatments   Talk to your doctor, nurse or pharmacist before following any medical regimen to see if it is safe and effective for you

## 2021-08-24 NOTE — PROGRESS NOTES
Indication:  Lateral hip pain  Preprocedure diagnosis:  1  Trochanteric bursitis                                                   2   Lateral hip pain  Postprocedure diagnosis:  1  Trochanteric bursitis                                                   2   Lateral hip pain    Procedure: Ultrasound-guided right trochanteric bursa injection(s)    After discussing the risks, benefits, and alternatives to the procedure, the patient expressed understanding and wished to proceed  The patient was brought to the procedure suite and placed in the sidelying position  A procedural pause was conducted to verify:  correct patient identity, procedure to be performed and as applicable, correct side and site, correct patient position, and availability of implants, special equipment or special requirements  A simple surgical tray was used  Prior to the procedure, the lateral hip was examined with a 3 75 MHz curvilinear transducer to visualize the greater trochanter, tendons, and bursa and to determine the optimal needle path  Following this, the lateral hip was prepared with a ChloraPrep scrub, then re-examined using the same transducer, a sterile ultrasound transducer cover, and sterile ultrasound transducer gel  Thereafter, using continuous ultrasound guidance, a 2 5 in 25 gauge needle was advanced into the peritrochanteric bursa region  After visualization of the tip in the target area and negative aspiration for blood, a mixture of 40 mg Depo-Medrol in 3 mL of 0 25% bupivacaine was injected into the trochanteric bursa  Following the injection, the needle was withdrawn  The patient tolerated the procedure well and there were no apparent complications  After an appropriate amount of observation, the patient was dismissed from the clinic in good condition under their own power

## 2021-08-25 ENCOUNTER — EVALUATION (OUTPATIENT)
Dept: PHYSICAL THERAPY | Facility: MEDICAL CENTER | Age: 86
End: 2021-08-25
Payer: MEDICARE

## 2021-08-25 DIAGNOSIS — M70.61 TROCHANTERIC BURSITIS OF RIGHT HIP: Primary | ICD-10-CM

## 2021-08-25 DIAGNOSIS — M76.31 ILIOTIBIAL BAND SYNDROME OF RIGHT SIDE: ICD-10-CM

## 2021-08-25 DIAGNOSIS — M54.9 BACK PAIN, UNSPECIFIED BACK LOCATION, UNSPECIFIED BACK PAIN LATERALITY, UNSPECIFIED CHRONICITY: ICD-10-CM

## 2021-08-25 PROCEDURE — 97140 MANUAL THERAPY 1/> REGIONS: CPT | Performed by: PHYSICAL THERAPIST

## 2021-08-25 PROCEDURE — 97162 PT EVAL MOD COMPLEX 30 MIN: CPT | Performed by: PHYSICAL THERAPIST

## 2021-08-25 NOTE — PROGRESS NOTES
PT Evaluation     Today's date: 2021  Patient name: Tate Noyola  : 1931  MRN: [id number]  Referring provider: Juan Francisco Suarez DO  Dx:   Encounter Diagnosis     ICD-10-CM    1  Trochanteric bursitis of right hip  M70 61    2  Iliotibial band syndrome of right side  M76 31                   Assessment  Assessment details: Pt is a pleasant 79 yo female, referred to out-pt PT with dx of R ITB syndrome and trochanteric bursitis  Pt states pain in R lat hip started approx 6 mo ago with insidious onset, and has severe pain when she steps onto R LE  Pt states since injection she feels steady relief of pain in R hip  MRI of l-spine shows 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 through L foramen  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  Upon examination, pt demonstrates pain, tissue tenderness, decreased R hip strength and ROM, gait and balance deficits and (+) ITB tightness  (+) R post pelvic rot present, which corrected with MET  Pt will benefit from skilled PT to address the deficits listed above and maximize function  Thank you for your referral   Impairments: abnormal gait, abnormal or restricted ROM, activity intolerance, impaired physical strength, lacks appropriate home exercise program, pain with function and weight-bearing intolerance  Understanding of Dx/Px/POC: good   Prognosis: good    Goals  ST: Decrease pain by 25% in 4 weeks with all functional activities  2: Improved ROM by 25% in 4 weeks to assist with all functional activities  3: Improve strength by 1/2 grade in 4 weeks to assist with all functional activities  LT:  Decrease pain to 0-1/10 with all functional activities in 4-6 weeks  2: Improved ROM to WFL's in 4-6 weeks to assist with all functional activities  3:  Increase strength to WFL's in 4-6 weeks to assist with all functional activities    Plan  Patient would benefit from: skilled physical therapy  Planned modality interventions: cryotherapy  Planned therapy interventions: manual therapy, joint mobilization, balance, patient education, neuromuscular re-education and home exercise program  Frequency: 2x week  Duration in visits: 12  Duration in weeks: 8  Plan of Care beginning date: 2021  Plan of Care expiration date: 10/13/2021  Treatment plan discussed with: family and patient        Subjective Evaluation    Pain  Current pain ratin  At best pain ratin  At worst pain ratin  Quality: dull ache  Relieving factors: ice and medications  Aggravating factors: standing and walking    Social Support  Steps to enter house: no  Stairs in house: yes   14  Lives in: multiple-level home  Lives with: adult children      Diagnostic Tests  MRI studies: normal  Treatments  Current treatment: injection treatment and physical therapy  Patient Goals  Patient goals for therapy: decreased pain, independence with ADLs/IADLs, increased strength, increased motion and improved balance          Objective     Observations     Additional Observation Details  Posture/observation:  Decreased lordosis, R hip ER in standing, R pelvis appears rotated with R ilium elevated    Gait:  Pt amb ind with WBQC, slowed taina, WBOS, mild out-toeing on R    Palpation     Additional Palpation Details  Palpation:  Mild tenderness R greater troch and glut med/min musculature    Neurological Testing     Sensation     Hip   Left Hip   Intact: light touch    Right Hip   Intact: light touch    Reflexes   Left   Patellar (L4): trace (1+)  Achilles (S1): normal (2+)    Right   Patellar (L4): normal (2+)  Achilles (S1): normal (2+)    Active Range of Motion     Right Hip   Flexion: 110 degrees   Extension: 10 degrees   Abduction: 30 degrees     Strength/Myotome Testing     Right Hip   Planes of Motion   Flexion: 3+  Extension: 4-  Abduction: 3+  Adduction: 4-    Tests     Right Hip   Positive long sit and Adriel  Negative BERNARDINO and FADIR  Additional Tests Details  (+) long sit for R post pelvic rot             Precautions: Hearing loss R, OP      Manuals 8/25            R MET for post pelvis BG            Foam roller to R ITB BG                                      Neuro Re-Ed             Step taps  6"           Step ups  4"           Bal FA/FT EO/EC                                                                 Ther Ex 8/25            ITB stretch             LTR             bridges             Hip add iso             TB hip abd                                                    Ther Activity                                       Gait Training                                       Modalities 8/25            CP x10'

## 2021-08-30 ENCOUNTER — OFFICE VISIT (OUTPATIENT)
Dept: PHYSICAL THERAPY | Facility: MEDICAL CENTER | Age: 86
End: 2021-08-30
Payer: MEDICARE

## 2021-08-30 DIAGNOSIS — M70.61 TROCHANTERIC BURSITIS OF RIGHT HIP: Primary | ICD-10-CM

## 2021-08-30 DIAGNOSIS — M76.31 ILIOTIBIAL BAND SYNDROME OF RIGHT SIDE: ICD-10-CM

## 2021-08-30 DIAGNOSIS — M54.9 BACK PAIN, UNSPECIFIED BACK LOCATION, UNSPECIFIED BACK PAIN LATERALITY, UNSPECIFIED CHRONICITY: ICD-10-CM

## 2021-08-30 PROCEDURE — 97140 MANUAL THERAPY 1/> REGIONS: CPT | Performed by: PHYSICAL THERAPIST

## 2021-08-30 PROCEDURE — 97110 THERAPEUTIC EXERCISES: CPT | Performed by: PHYSICAL THERAPIST

## 2021-08-30 NOTE — PROGRESS NOTES
Daily Note     Today's date: 2021  Patient name: Leda Leong  : 1931  MRN: [id number]  Referring provider: Reese Chung DO  Dx:   Encounter Diagnosis     ICD-10-CM    1  Trochanteric bursitis of right hip  M70 61    2  Iliotibial band syndrome of right side  M76 31    3  Back pain, unspecified back location, unspecified back pain laterality, unspecified chronicity  M54 9                   Subjective:  Pt states she was painfree for a couple of days, however lat hip pain returned  Pt states prolonged walking (>5-10min) causes increased pain  Objective: See treatment diary below    (+) long -sit for R post rot present    Assessment: Tolerated treatment well  Patient demonstrated fatigue post treatment and would benefit from continued PT   Pt's alignment corrected with MET  Mild soreness with MFR with foam roller to greater troch region  Progressed DLS ex as charted  Plan: Continue per plan of care        Precautions: Hearing loss R, OP      Manuals            R MET for post pelvis BG BG           Foam roller to R ITB BG BG                                     Neuro Re-Ed             Step taps  6" nv          Step ups  4" nv          Bal FA/FT EO/EC   nv                                                              Ther Ex            ITB stretch             LTR  10"x10ea           bridges  5"x10           Hip add iso  5"x20           TB hip abd  RTB 5"x10           clamshells  5"x10                                     Ther Activity                                       Gait Training                                       Modalities            CP x10' x10'

## 2021-09-01 ENCOUNTER — OFFICE VISIT (OUTPATIENT)
Dept: PHYSICAL THERAPY | Facility: MEDICAL CENTER | Age: 86
End: 2021-09-01
Payer: MEDICARE

## 2021-09-01 DIAGNOSIS — M76.31 ILIOTIBIAL BAND SYNDROME OF RIGHT SIDE: ICD-10-CM

## 2021-09-01 DIAGNOSIS — M70.61 TROCHANTERIC BURSITIS OF RIGHT HIP: Primary | ICD-10-CM

## 2021-09-01 PROCEDURE — 97110 THERAPEUTIC EXERCISES: CPT | Performed by: PHYSICAL THERAPIST

## 2021-09-01 PROCEDURE — 97140 MANUAL THERAPY 1/> REGIONS: CPT | Performed by: PHYSICAL THERAPIST

## 2021-09-01 NOTE — PROGRESS NOTES
Daily Note     Today's date: 2021  Patient name: Ciara Zaragoza  : 1931  MRN: [id number]  Referring provider: Yolanda Andrade DO  Dx:   Encounter Diagnosis     ICD-10-CM    1  Trochanteric bursitis of right hip  M70 61    2  Iliotibial band syndrome of right side  M76 31                   Subjective:  Pt states she has been painfree since Monday  Objective: See treatment diary below    (-) long -sit for R post rot present    Assessment: Tolerated treatment well  Patient demonstrated fatigue post treatment and would benefit from continued PT  Pt in correct alignment upon presentation  Mild soreness with MFR with foam roller to greater troch region  Progressed DLS ex as charted  Plan: Continue per plan of care        Precautions: Hearing loss R, OP      Manuals           R MET for post pelvis BG BG NP          Foam roller to R ITB BG BG BG                                    Neuro Re-Ed            Step taps  6" nv          Step ups  4" nv          Bal FA/FT EO/EC   nv                                                              Ther Ex           ITB stretch             LTR  10"x10ea 10"x10          bridges  5"x10 5"x10          Hip add iso  5"x20 5"x20          TB hip abd  RTB 5"x10 RTB 5"x15           clamshells  5"x10 5"x15                                    Ther Activity                                       Gait Training                                       Modalities           CP x10' x10' x10'

## 2021-09-03 NOTE — PROGRESS NOTES
Assessment and Plan:     Problem List Items Addressed This Visit     None           Preventive health issues were discussed with patient, and age appropriate screening tests were ordered as noted in patient's After Visit Summary  Personalized health advice and appropriate referrals for health education or preventive services given if needed, as noted in patient's After Visit Summary       History of Present Illness:     Patient presents for Medicare Annual Wellness visit    Patient Care Team:  Elias Krause DO as PCP - General  Charles Jackson MD (Neurology)  July Phillip MD (Urogynecology)     Problem List:     Patient Active Problem List   Diagnosis    Blepharospasm    Spasmodic torticollis    Anxiety    Depression, recurrent ([address] )    Essential hypertension    Family history of malignant neoplasm of breast    Functional urinary incontinence    Hyperlipidemia    MCI (mild cognitive impairment)    Osteoporosis    Torsion dystonia fragments    Segmental dystonia    Hyperglycemia    Vertigo    Sensorineural hearing loss (SNHL), bilateral    Asymmetrical hearing loss of right ear    Pulmonary HTN ([address] )    Presence of pessary    Osteoporosis without current pathological fracture    Nerve sheath tumor    Lumbar spine tumor    Primary osteoarthritis of right hip    Trochanteric bursitis of right hip      Past Medical and Surgical History:     Past Medical History:   Diagnosis Date    Anxiety 2010    Bladder infection 09/2019    Breast lump in female     HL (hearing loss) 2010    Hypertension 2010    Osteopenia     Ovarian cyst      Past Surgical History:   Procedure Laterality Date    BLADDER SURGERY      BREAST BIOPSY      CATARACT EXTRACTION Bilateral 01/01/2015    , Right Eye-2017, Left eye    ENDOMETRIAL BIOPSY      by Suction    HYSTERECTOMY        Family History:     Family History   Problem Relation Age of Onset    Heart disease Mother     Heart disease Father    Learta January Hypertension Family     Osteoporosis Family       Social History:     Social History     Socioeconomic History    Marital status:      Spouse name: Not on file    Number of children: Not on file    Years of education: Not on file    Highest education level: Not on file   Occupational History    Not on file   Tobacco Use    Smoking status: Never Smoker    Smokeless tobacco: Never Used   Substance and Sexual Activity    Alcohol use: No    Drug use: No    Sexual activity: Not Currently   Other Topics Concern    Not on file   Social History Narrative    Denied consumption of coffee    Active daily tea consumption    Denied ingesting cola containing caffeine         Social Determinants of Health     Financial Resource Strain:     Difficulty of Paying Living Expenses:    Food Insecurity:     Worried About Running Out of Food in the Last Year:     Ran Out of Food in the Last Year:    Transportation Needs:     Lack of Transportation (Medical):      Lack of Transportation (Non-Medical):    Physical Activity:     Days of Exercise per Week:     Minutes of Exercise per Session:    Stress:     Feeling of Stress :    Social Connections:     Frequency of Communication with Friends and Family:     Frequency of Social Gatherings with Friends and Family:     Attends Bahai Services:     Active Member of Clubs or Organizations:     Attends Club or Organization Meetings:     Marital Status:    Intimate Partner Violence:     Fear of Current or Ex-Partner:     Emotionally Abused:     Physically Abused:     Sexually Abused:       Medications and Allergies:     Current Outpatient Medications   Medication Sig Dispense Refill    alendronate (FOSAMAX) 70 mg tablet Take 1 tablet (70 mg total) by mouth every 7 days (Patient not taking: Reported on 6/1/2021) 12 tablet 3    amLODIPine-benazepril (LOTREL 5-20) 5-20 MG per capsule Take 1 capsule by mouth daily 90 capsule 1    calcium citrate-vitamin D (CITRACAL+D) 315-200 MG-UNIT per tablet Take 1 tablet by mouth daily      conjugated estrogens (PREMARIN) vaginal cream Insert into the vagina Ose as directed      Multiple Vitamins-Minerals (MULTIVITAL) tablet Take 1 tablet by mouth daily      MYRBETRIQ 50 MG TB24 Take 1 tablet by mouth daily      Omega-3 Fatty Acids (FISH OIL) 645 MG CAPS Take 1 tablet by mouth daily      polyethylene glycol (MIRALAX) 17 g packet Take 1 Package by mouth 2 (two) times a day       Current Facility-Administered Medications   Medication Dose Route Frequency Provider Last Rate Last Admin    Botulinum Toxin Type A SOLR 200 Units  200 Units Injection Q3 Months Rachel Turner MD   200 Units at 05/03/21 1619     No Known Allergies   Immunizations:     Immunization History   Administered Date(s) Administered    INFLUENZA 09/27/2013, 10/17/2014, 10/16/2015, 10/25/2016, 09/26/2017, 01/03/2018, 10/09/2019    Influenza Split High Dose Preservative Free IM 09/26/2017, 01/03/2018    Influenza, high dose seasonal 0 7 mL 10/02/2018, 10/05/2020    Pneumococcal Conjugate 13-Valent 08/21/2017    Pneumococcal Polysaccharide PPV23 08/24/2018    SARS-CoV-2 / COVID-19 mRNA IM (Pfizer-BioNTech) 01/21/2021, 02/11/2021    Zoster 01/22/2018, 01/31/2018      Health Maintenance: There are no preventive care reminders to display for this patient  Topic Date Due    DTaP,Tdap,and Td Vaccines (1 - Tdap) Never done    Influenza Vaccine (1) 09/01/2021      Medicare Health Risk Assessment:     There were no vitals taken for this visit           PREVENTIVE SCREENINGS      Cardiovascular Screening:    General: Screening Not Indicated and History Lipid Disorder      Diabetes Screening:     General: Screening Current      Colorectal Cancer Screening:     General: Screening Not Indicated      Cervical Cancer Screening:    General: Screening Not Indicated      Osteoporosis Screening:    General: Screening Not Indicated and History Osteoporosis Lung Cancer Screening:     General: Screening Not Indicated      Kailee Chilsd DO

## 2021-09-07 ENCOUNTER — OFFICE VISIT (OUTPATIENT)
Dept: FAMILY MEDICINE CLINIC | Facility: MEDICAL CENTER | Age: 86
End: 2021-09-07
Payer: MEDICARE

## 2021-09-07 VITALS
HEIGHT: 59 IN | HEART RATE: 92 BPM | TEMPERATURE: 98.7 F | WEIGHT: 129 LBS | SYSTOLIC BLOOD PRESSURE: 150 MMHG | BODY MASS INDEX: 26 KG/M2 | DIASTOLIC BLOOD PRESSURE: 86 MMHG | OXYGEN SATURATION: 96 %

## 2021-09-07 DIAGNOSIS — E78.2 MIXED HYPERLIPIDEMIA: ICD-10-CM

## 2021-09-07 DIAGNOSIS — Z00.00 MEDICARE ANNUAL WELLNESS VISIT, SUBSEQUENT: ICD-10-CM

## 2021-09-07 DIAGNOSIS — I27.20 PULMONARY HTN (HCC): ICD-10-CM

## 2021-09-07 DIAGNOSIS — I10 ESSENTIAL HYPERTENSION: Primary | ICD-10-CM

## 2021-09-07 DIAGNOSIS — R73.9 HYPERGLYCEMIA: ICD-10-CM

## 2021-09-07 LAB — SL AMB POCT HEMOGLOBIN AIC: 6 (ref ?–6.5)

## 2021-09-07 PROCEDURE — 1123F ACP DISCUSS/DSCN MKR DOCD: CPT | Performed by: FAMILY MEDICINE

## 2021-09-07 PROCEDURE — 83036 HEMOGLOBIN GLYCOSYLATED A1C: CPT | Performed by: FAMILY MEDICINE

## 2021-09-07 PROCEDURE — 99214 OFFICE O/P EST MOD 30 MIN: CPT | Performed by: FAMILY MEDICINE

## 2021-09-07 PROCEDURE — G0439 PPPS, SUBSEQ VISIT: HCPCS | Performed by: FAMILY MEDICINE

## 2021-09-07 RX ORDER — AMLODIPINE BESYLATE AND BENAZEPRIL HYDROCHLORIDE 5; 20 MG/1; MG/1
1 CAPSULE ORAL DAILY
Qty: 90 CAPSULE | Refills: 1 | Status: SHIPPED | OUTPATIENT
Start: 2021-09-07 | End: 2022-03-07 | Stop reason: SDUPTHER

## 2021-09-07 NOTE — PROGRESS NOTES
Assessment and Plan:     Problem List Items Addressed This Visit        Cardiovascular and Mediastinum    Essential hypertension - Primary    Relevant Medications    amLODIPine-benazepril (LOTREL 5-20) 5-20 MG per capsule    Pulmonary HTN ([address] )       Other    Hyperlipidemia    Hyperglycemia    Relevant Orders    POCT hemoglobin A1c (Completed)      Other Visit Diagnoses     Medicare annual wellness visit, subsequent            BMI Counseling: Body mass index is 26 05 kg/m²  The BMI is above normal  Nutrition recommendations include encouraging healthy choices of fruits and vegetables  Exercise recommendations include moderate physical activity 150 minutes/week  Preventive health issues were discussed with patient, and age appropriate screening tests were ordered as noted in patient's After Visit Summary  Personalized health advice and appropriate referrals for health education or preventive services given if needed, as noted in patient's After Visit Summary  Follow-up in six months or sooner if needed           History of Present Illness:     Patient presents for Medicare Annual Wellness visit    Patient Care Team:  Amada Andrade DO as PCP - General  Al Kearney MD (Neurology)  Jayson Wright MD (Urogynecology)     Problem List:     Patient Active Problem List   Diagnosis    Blepharospasm    Spasmodic torticollis    Anxiety    Depression, recurrent ([address] )    Essential hypertension    Family history of malignant neoplasm of breast    Functional urinary incontinence    Hyperlipidemia    MCI (mild cognitive impairment)    Osteoporosis    Torsion dystonia fragments    Segmental dystonia    Hyperglycemia    Vertigo    Sensorineural hearing loss (SNHL), bilateral    Asymmetrical hearing loss of right ear    Pulmonary HTN ([address] )    Presence of pessary    Osteoporosis without current pathological fracture    Nerve sheath tumor    Lumbar spine tumor    Primary osteoarthritis of right hip    Trochanteric bursitis of right hip      Past Medical and Surgical History:     Past Medical History:   Diagnosis Date    Anxiety 2010    Bladder infection 09/2019    Breast lump in female     HL (hearing loss) 2010    Hypertension 2010    Osteopenia     Ovarian cyst      Past Surgical History:   Procedure Laterality Date    BLADDER SURGERY      BREAST BIOPSY      CATARACT EXTRACTION Bilateral 01/01/2015    , Right Eye-2017, Left eye    ENDOMETRIAL BIOPSY      by Suction    HYSTERECTOMY        Family History:     Family History   Problem Relation Age of Onset    Heart disease Mother     Heart disease Father     Hypertension Family     Osteoporosis Family       Social History:     Social History     Socioeconomic History    Marital status:      Spouse name: None    Number of children: None    Years of education: None    Highest education level: None   Occupational History    None   Tobacco Use    Smoking status: Never Smoker    Smokeless tobacco: Never Used   Substance and Sexual Activity    Alcohol use: No    Drug use: No    Sexual activity: Not Currently   Other Topics Concern    None   Social History Narrative    Denied consumption of coffee    Active daily tea consumption    Denied ingesting cola containing caffeine         Social Determinants of Health     Financial Resource Strain:     Difficulty of Paying Living Expenses:    Food Insecurity:     Worried About Running Out of Food in the Last Year:     Ran Out of Food in the Last Year:    Transportation Needs:     Lack of Transportation (Medical):      Lack of Transportation (Non-Medical):    Physical Activity:     Days of Exercise per Week:     Minutes of Exercise per Session:    Stress:     Feeling of Stress :    Social Connections:     Frequency of Communication with Friends and Family:     Frequency of Social Gatherings with Friends and Family:     Attends Gnosticism Services:     Active Member of Clubs or Organizations:     Attends Club or Organization Meetings:     Marital Status:    Intimate Partner Violence:     Fear of Current or Ex-Partner:     Emotionally Abused:     Physically Abused:     Sexually Abused:       Medications and Allergies:     Current Outpatient Medications   Medication Sig Dispense Refill    alendronate (FOSAMAX) 70 mg tablet Take 1 tablet (70 mg total) by mouth every 7 days 12 tablet 3    amLODIPine-benazepril (LOTREL 5-20) 5-20 MG per capsule Take 1 capsule by mouth daily 90 capsule 1    calcium citrate-vitamin D (CITRACAL+D) 315-200 MG-UNIT per tablet Take 1 tablet by mouth daily      conjugated estrogens (PREMARIN) vaginal cream Insert into the vagina Ose as directed      Multiple Vitamins-Minerals (MULTIVITAL) tablet Take 1 tablet by mouth daily      Omega-3 Fatty Acids (FISH OIL) 645 MG CAPS Take 1 tablet by mouth daily      polyethylene glycol (MIRALAX) 17 g packet Take 1 Package by mouth 2 (two) times a day      MYRBETRIQ 50 MG TB24 Take 1 tablet by mouth daily       Current Facility-Administered Medications   Medication Dose Route Frequency Provider Last Rate Last Admin    Botulinum Toxin Type A SOLR 200 Units  200 Units Injection Q3 Months Liberty Brewster MD   200 Units at 05/03/21 1619     No Known Allergies   Immunizations:     Immunization History   Administered Date(s) Administered    INFLUENZA 09/27/2013, 10/17/2014, 10/16/2015, 10/25/2016, 09/26/2017, 01/03/2018, 10/09/2019    Influenza Split High Dose Preservative Free IM 09/26/2017, 01/03/2018    Influenza, high dose seasonal 0 7 mL 10/02/2018, 10/05/2020    Pneumococcal Conjugate 13-Valent 08/21/2017    Pneumococcal Polysaccharide PPV23 08/24/2018    SARS-CoV-2 / COVID-19 mRNA IM (Pfizer-BioNTech) 01/21/2021, 02/11/2021    Zoster 01/22/2018, 01/31/2018      Health Maintenance: There are no preventive care reminders to display for this patient        Topic Date Due    DTaP,Tdap,and Td Vaccines (1 - Tdap) Never done    Influenza Vaccine (1) 09/01/2021      Medicare Health Risk Assessment:     /86 (BP Location: Left arm, Patient Position: Sitting, Cuff Size: Standard)   Pulse 92   Temp 98 7 °F (37 1 °C)   Ht 4' 11" (1 499 m)   Wt 58 5 kg (129 lb)   SpO2 96%   BMI 26 05 kg/m²      Rg Nevarez is here for her Subsequent Wellness visit  Health Risk Assessment:   Patient rates overall health as good  Patient feels that their physical health rating is same  Patient is satisfied with their life  Eyesight was rated as same  Hearing was rated as same  Patient feels that their emotional and mental health rating is same  Patients states they are never, rarely angry  Patient states they are sometimes unusually tired/fatigued  Pain experienced in the last 7 days has been some  Patient's pain rating has been 5/10  Patient states that she has experienced no weight loss or gain in last 6 months  Currently doing physical therapy for pain in leg    Fall Risk Screening: In the past year, patient has experienced: no history of falling in past year      Urinary Incontinence Screening:   Patient has leaked urine accidently in the last six months  Being treated for this by another doctor  Home Safety:  Patient does not have trouble with stairs inside or outside of their home  Patient has working smoke alarms and has working carbon monoxide detector  Home safety hazards include: none  Nutrition:   Current diet is Regular  Medications:   Patient is not currently taking any over-the-counter supplements  Patient is able to manage medications  Activities of Daily Living (ADLs)/Instrumental Activities of Daily Living (IADLs):   Walk and transfer into and out of bed and chair?: Yes  Dress and groom yourself?: Yes    Bathe or shower yourself?: Yes    Feed yourself?  Yes  Do your laundry/housekeeping?: Yes  Manage your money, pay your bills and track your expenses?: Yes  Make your own meals?: Yes    Do your own shopping?: Yes    Previous Hospitalizations:   Any hospitalizations or ED visits within the last 12 months?: No      Advance Care Planning:   Living will: Yes    Durable POA for healthcare: Yes    Advanced directive: No      Cognitive Screening:   Provider or family/friend/caregiver concerned regarding cognition?: No    PREVENTIVE SCREENINGS      Cardiovascular Screening:    General: Screening Not Indicated and History Lipid Disorder      Diabetes Screening:     General: Screening Current      Colorectal Cancer Screening:     General: Screening Not Indicated      Breast Cancer Screening:     General: Patient Declines      Cervical Cancer Screening:    General: Screening Not Indicated      Osteoporosis Screening:    General: Screening Not Indicated and History Osteoporosis      Abdominal Aortic Aneurysm (AAA) Screening:        General: Screening Not Indicated      Lung Cancer Screening:     General: Screening Not Indicated      Hepatitis C Screening:    General: Screening Not Indicated    Screening, Brief Intervention, and Referral to Treatment (SBIRT)    Screening  Typical number of drinks in a day: 0  Typical number of drinks in a week: 0  Interpretation: Low risk drinking behavior      AUDIT-C Screenin) How often did you have a drink containing alcohol in the past year? never  2) How many drinks did you have on a typical day when you were drinking in the past year? never  3) How often did you have 6 or more drinks on one occasion in the past year? never    AUDIT-C Score: 0  Interpretation: Score 0-2 (female): Negative screen for alcohol misuse    Single Item Drug Screening:  How often have you used an illegal drug (including marijuana) or a prescription medication for non-medical reasons in the past year? never    Single Item Drug Screen Score: 0  Interpretation: Negative screen for possible drug use disorder      Jaya Delgado, DO

## 2021-09-07 NOTE — PROGRESS NOTES
Assessment/Plan:       Diagnoses and all orders for this visit:    Essential hypertension  -     amLODIPine-benazepril (LOTREL 5-20) 5-20 MG per capsule; Take 1 capsule by mouth daily  Blood pressure acceptable for age  Continue amlodipine with benazepril  Mixed hyperlipidemia  Continue with lifestyle modification  Hyperglycemia  -     POCT hemoglobin A1c  Stable  Continue with lifestyle modification  Pulmonary HTN ([facility] [address] )  Has seen pulmonology  No intervention at this time  Patient asymptomatic  Follow-up in six months or sooner if needed  Subjective:      Patient ID: Sabino Ybarra is a 80 y o  female  Patient presents for follow-up  She has hypertension  Currently on amlodipine with benazepril  Tolerating medication well  Does need a refill  She has hyperlipidemia  Not on medication  Controlled via lifestyle modification  She has hyperglycemia  Not on medication  Controlled via lifestyle modification            The following portions of the patient's history were reviewed and updated as appropriate: She  has a past medical history of Anxiety (2010), Bladder infection (09/2019), Breast lump in female, HL (hearing loss) (2010), Hypertension (2010), Osteopenia, and Ovarian cyst   She   Patient Active Problem List    Diagnosis Date Noted    Trochanteric bursitis of right hip     Primary osteoarthritis of right hip     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    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    Presence of pessary 11/04/2020    Osteoporosis without current pathological fracture 11/04/2020    [facility] HTN ([facility] ) 10/23/2020    Vertigo 10/16/2019    Sensorineural hearing loss (SNHL), bilateral 10/16/2019    Asymmetrical hearing loss of right ear 10/16/2019    Hyperglycemia 08/26/2019    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    Family history of malignant neoplasm of breast 08/24/2018    Anxiety 03/25/2016    Essential hypertension 03/25/2016    MCI (mild cognitive impairment) 03/25/2016    Blepharospasm 08/04/2015    Depression, recurrent ([facility] [address] ) 06/16/2014    Functional urinary incontinence 06/16/2014    Hyperlipidemia 06/16/2014    Osteoporosis 06/16/2014    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    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     She  has a past surgical history that includes Breast biopsy; Bladder surgery; Cataract extraction (Bilateral, 01/01/2015); Endometrial biopsy; and Hysterectomy  Her family history includes Heart disease in her father and mother; Hypertension in her family; Osteoporosis in her family  She  reports that she has never smoked  She has never used smokeless tobacco  She reports that she does not drink alcohol and does not use drugs    Current Outpatient Medications   Medication Sig Dispense Refill    alendronate (FOSAMAX) 70 mg tablet Take 1 tablet (70 mg total) by mouth every 7 days 12 tablet 3    amLODIPine-benazepril (LOTREL 5-20) 5-20 MG per capsule Take 1 capsule by mouth daily 90 capsule 1    calcium citrate-vitamin D (CITRACAL+D) 315-200 MG-UNIT per tablet Take 1 tablet by mouth daily      conjugated estrogens (PREMARIN) vaginal cream Insert into the vagina Ose as directed      Multiple Vitamins-Minerals (MULTIVITAL) tablet Take 1 tablet by mouth daily      Omega-3 Fatty Acids (FISH OIL) 645 MG CAPS Take 1 tablet by mouth daily      polyethylene glycol (MIRALAX) 17 g packet Take 1 Package by mouth 2 (two) times a day      MYRBETRIQ 50 MG TB24 Take 1 tablet by mouth daily       Current Facility-Administered Medications   Medication Dose Route Frequency Provider Last Rate Last Admin    Botulinum Toxin Type A SOLR 200 Units  200 Units Injection Q3 Months Rachel Turner MD   200 Units at 05/03/21 1619     Current Outpatient Medications on File Prior to Visit   Medication Sig    alendronate (FOSAMAX) 70 mg tablet Take 1 tablet (70 mg total) by mouth every 7 days    calcium citrate-vitamin D (CITRACAL+D) 315-200 MG-UNIT per tablet Take 1 tablet by mouth daily    conjugated estrogens (PREMARIN) vaginal cream Insert into the vagina Ose as directed    Multiple Vitamins-Minerals (MULTIVITAL) tablet Take 1 tablet by mouth daily    Omega-3 Fatty Acids (FISH OIL) 645 MG CAPS Take 1 tablet by mouth daily    polyethylene glycol (MIRALAX) 17 g packet Take 1 Package by mouth 2 (two) times a day    [DISCONTINUED] amLODIPine-benazepril (LOTREL 5-20) 5-20 MG per capsule Take 1 capsule by mouth daily    MYRBETRIQ 50 MG TB24 Take 1 tablet by mouth daily     Current Facility-Administered Medications on File Prior to Visit   Medication    Botulinum Toxin Type A SOLR 200 Units     She has No Known Allergies       Review of Systems   Constitutional: Negative for fever  Respiratory: Negative for shortness of breath  Cardiovascular: Negative for chest pain  Objective:      /86 (BP Location: Left arm, Patient Position: Sitting, Cuff Size: Standard)   Pulse 92   Temp 98 7 °F (37 1 °C)   Ht 4' 11" (1 499 m)   Wt 58 5 kg (129 lb)   SpO2 96%   BMI 26 05 kg/m²          Physical Exam  Constitutional:       General: She is not in acute distress  Appearance: She is not ill-appearing  Cardiovascular:      Rate and Rhythm: Normal rate and regular rhythm  Heart sounds: Normal heart sounds  Pulmonary:      Effort: Pulmonary effort is normal       Breath sounds: Normal breath sounds

## 2021-09-08 ENCOUNTER — OFFICE VISIT (OUTPATIENT)
Dept: PHYSICAL THERAPY | Facility: MEDICAL CENTER | Age: 86
End: 2021-09-08
Payer: MEDICARE

## 2021-09-08 DIAGNOSIS — M76.31 ILIOTIBIAL BAND SYNDROME OF RIGHT SIDE: ICD-10-CM

## 2021-09-08 DIAGNOSIS — M70.61 TROCHANTERIC BURSITIS OF RIGHT HIP: Primary | ICD-10-CM

## 2021-09-08 PROCEDURE — 97110 THERAPEUTIC EXERCISES: CPT | Performed by: PHYSICAL THERAPIST

## 2021-09-08 PROCEDURE — 97140 MANUAL THERAPY 1/> REGIONS: CPT | Performed by: PHYSICAL THERAPIST

## 2021-09-08 NOTE — PROGRESS NOTES
Daily Note     Today's date: 2021  Patient name: Kim Jiménez  : 1931  MRN: [id number]  Referring provider: Masood Carr DO  Dx:   Encounter Diagnosis     ICD-10-CM    1  Trochanteric bursitis of right hip  M70 61    2  Iliotibial band syndrome of right side  M76 31                   Subjective:  Pt states she has mild lat hip soreness with walking only  Pts daughter states she has much difficulty with stairs and grabs onto her due to fear of falling  She would like her to work on stair training in PT, as they have a lot of stairs at home  Objective: See treatment diary below    (-) long -sit for R post rot present    Assessment: Tolerated treatment well  Patient demonstrated fatigue post treatment and would benefit from continued PT  Mild soreness with MFR with foam roller to greater troch region persists  Progressed DLS ex as charted  Pt very anxious about step taps and step ups on low step without UE support  Instructed daughter to start with simple step taps and how to provide guarding to her for safety  Plan: Continue per plan of care        Precautions: Hearing loss R, OP      Manuals          R MET for post pelvis BG BG NP NP         Foam roller to R ITB BG BG BG BG                                   Neuro Re-Ed            Step taps  6" nv          Step ups  4" nv x10 no UE support         Bal FA/FT EO/EC   nv          Step ups    x5 no UE support                                                Ther Ex          ITB stretch             LTR  10"x10ea 10"x10 10"x10         bridges  5"x10 5"x10 5"x10         Hip add iso  5"x20 5"x20 5"x20         TB hip abd  RTB 5"x10 RTB 5"x15  RTB 5"x20         clamshells  5"x10 5"x15 5"x20         DLS marches    U62         S/l hip abd    x10         ITB stretch    nv                                   Gait Training                                       Modalities          CP x10' x10' x10' x10'

## 2021-09-13 ENCOUNTER — PROCEDURE VISIT (OUTPATIENT)
Dept: NEUROLOGY | Facility: CLINIC | Age: 86
End: 2021-09-13
Payer: MEDICARE

## 2021-09-13 ENCOUNTER — OFFICE VISIT (OUTPATIENT)
Dept: PHYSICAL THERAPY | Facility: MEDICAL CENTER | Age: 86
End: 2021-09-13
Payer: MEDICARE

## 2021-09-13 VITALS — HEART RATE: 88 BPM | SYSTOLIC BLOOD PRESSURE: 142 MMHG | TEMPERATURE: 97.6 F | DIASTOLIC BLOOD PRESSURE: 72 MMHG

## 2021-09-13 DIAGNOSIS — G24.5 BLEPHAROSPASM: ICD-10-CM

## 2021-09-13 DIAGNOSIS — M70.61 TROCHANTERIC BURSITIS OF RIGHT HIP: Primary | ICD-10-CM

## 2021-09-13 DIAGNOSIS — M76.31 ILIOTIBIAL BAND SYNDROME OF RIGHT SIDE: ICD-10-CM

## 2021-09-13 DIAGNOSIS — G24.3 CERVICAL DYSTONIA: Primary | ICD-10-CM

## 2021-09-13 PROCEDURE — 64616 CHEMODENERV MUSC NECK DYSTON: CPT | Performed by: PSYCHIATRY & NEUROLOGY

## 2021-09-13 PROCEDURE — 97116 GAIT TRAINING THERAPY: CPT | Performed by: PHYSICAL THERAPIST

## 2021-09-13 PROCEDURE — 97140 MANUAL THERAPY 1/> REGIONS: CPT | Performed by: PHYSICAL THERAPIST

## 2021-09-13 NOTE — PROGRESS NOTES
Daily Note     Today's date: 2021  Patient name: Leda Leong  : 1931  MRN: [id number]  Referring provider: Reese Chung DO  Dx:   Encounter Diagnosis     ICD-10-CM    1  Trochanteric bursitis of right hip  M70 61    2  Iliotibial band syndrome of right side  M76 31                   Subjective:  Pt states some soreness persists in lat hip when she is walking, but doesn't always happen  Objective: See treatment diary below    (+) long -sit for R post rot present    Assessment: Tolerated treatment well  Patient demonstrated fatigue post treatment and would benefit from continued PT  Pt states soreness with addition of manual ITb stretch  Performed reciprocal stair training with use of only 1 HR and without HR  Performed curb training outside with CG assist     Plan: Continue per plan of care        Precautions: Hearing loss R, OP    Pt unsupervised from 6-6:30pm  Manuals         R MET for post pelvis BG BG NP NP BG        Foam roller to R ITB BG BG BG BG BG        Manual ITB stretch     BG                     Neuro Re-Ed          Step taps  6" nv  6"x20        Step ups  4" nv x10 no UE support         Bal FA/FT EO/EC   nv          Step ups    x5 no UE support         Stair training      1 HR x3laps        Curb training     Outside x5'                     Ther Ex         ITB stretch             LTR  10"x10ea 10"x10 10"x10 10"x10        bridges  5"x10 5"x10 5"x10 5"x20        Hip add iso  5"x20 5"x20 5"x20 5'x20        TB hip abd  RTB 5"x10 RTB 5"x15  RTB 5"x20 GTB 5"x20        clamshells  5"x10 5"x15 5"x20 5"x20        DLS marches    E56 W26        S/l hip abd    x10         ITB stretch    nv manual                                  Gait Training                                       Modalities         CP x10' x10' x10' x10' x10'

## 2021-09-14 NOTE — PROGRESS NOTES
Universal Protocol   Consent: Written consent obtained  Consent given by: patient  Patient identity confirmed: verbally with patient        Chemodenervation     Date/Time 9/14/2021 11:49 AM     Performed by  Stoney Nieves MD     Authorized by Stoney Nieves MD        Pre-procedure details      Prepped With: Alcohol     Anesthesia  (see MAR for exact dosages): Anesthesia method:  None   Botox     Brand:  Botox    mL's of preservative free sterile saline:  2    Final Concentration per CC:  50 units and 100 units (1cc into every 100 units (NEXT TIME RETURN TO 2cc iinto every 100 units))    Needle gauge: 30G 1/2 inch  Procedures     Botox Procedures: cervical dystonia      Indications: spasmodic torticollis and orofacial dyskinesia      Date of last injection:  5/3/2021   Total Units     Total units discarded:  75   Post-procedure details      Chemodenervation:  Head or face and neck, excluding muscles of the larynx    Patient tolerance of procedure: Tolerated well, no immediate complications   Comments      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      Overall good response to injections  Injection lasting about 4 months  Exam today with mild blinking  Slight right turn toritcollis and latercollis  No spasmodic tremor  Mild dysphonia   Injections: Dilution was different this visit therefore eye injection may have received slightly more than typical    2 5 unit(s) in 1 injection(s) was injected into the right  muscle  2 5 unit(s) in 1 injection(s) was injected into the left  muscle  10 unit(s) in 4 (2 5 ) injection(s) was injected into the right orbicularis oculi (upper inner, upper and lower outer and lateral canthus)  10 unit(s) in 4 (x2 5) injection(s) was injected into the left orbicularis oculi      25 unit(s) was injected into the left sternocleidomastoid muscle  --    50 (25 side, 25 low) unit(s) was injected into the right splenius capitus muscle  --    25 unit(s) was injected into the right levator scapulae muscle     Total injected 125 units  Discarded 75

## 2021-09-15 ENCOUNTER — OFFICE VISIT (OUTPATIENT)
Dept: PHYSICAL THERAPY | Facility: MEDICAL CENTER | Age: 86
End: 2021-09-15
Payer: MEDICARE

## 2021-09-15 DIAGNOSIS — M76.31 ILIOTIBIAL BAND SYNDROME OF RIGHT SIDE: ICD-10-CM

## 2021-09-15 DIAGNOSIS — M70.61 TROCHANTERIC BURSITIS OF RIGHT HIP: Primary | ICD-10-CM

## 2021-09-15 PROCEDURE — 97530 THERAPEUTIC ACTIVITIES: CPT | Performed by: PHYSICAL THERAPIST

## 2021-09-15 PROCEDURE — 97110 THERAPEUTIC EXERCISES: CPT | Performed by: PHYSICAL THERAPIST

## 2021-09-15 NOTE — PROGRESS NOTES
Daily Note     Today's date: 9/15/2021  Patient name: Ayo Jackson  : 1931  MRN: [id number]  Referring provider: Ashley Anders DO  Dx:   Encounter Diagnosis     ICD-10-CM    1  Trochanteric bursitis of right hip  M70 61    2  Iliotibial band syndrome of right side  M76 31                   Subjective:  Pt amb to PT holding WBQC, not using it  Pt demonstrated good taina, and states she doesn't use it around her house  Objective: See treatment diary below    (-) long -sit for R post rot present    Assessment: Tolerated treatment well  Patient demonstrated fatigue post treatment and would benefit from continued PT  Performed gait training and curb training without any use of AD or HR, which pt states feeling nervous with but was able to perform without LOB  Plan: Continue per plan of care        Precautions: Hearing loss R, OP    Pt unsupervised from 6-6:30pm  Manuals 8/25 8/30 9/1 9/8 9/13 9/15       R MET for post pelvis BG BG NP NP BG BG       Foam roller to R ITB BG BG BG BG BG BG       Manual ITB stretch     BG BG                    Neuro Re-Ed  8/30 9/1  9/13 9/15       Step taps  6" nv  6"x20 8"       Step ups  4" nv x10 no UE support         Bal FA/FT EO/EC   nv          Step ups    x5 no UE support         Stair training      1 HR x3laps        Curb training     Outside x5' outside curbs and stairs no HR, CG assist x5'                    Ther Ex 8/25 8/30 9/1 9/8 9/13 9/15       ITB stretch             LTR  10"x10ea 10"x10 10"x10 10"x10 10"x10       bridges  5"x10 5"x10 5"x10 5"x20 5"x20       Hip add iso  5"x20 5"x20 5"x20 5'x20 5"x20       TB hip abd  RTB 5"x10 RTB 5"x15  RTB 5"x20 GTB 5"x20 GTB 5"x20       clamshells  5"x10 5"x15 5"x20 5"x20 5"x20       DLS marches    Q39 H28 x20       S/l hip abd    x10         ITB stretch    nv manual manual                                 Gait Training      No AD x5'                                 Modalities 8/25 8/30 9/1 9/8 9/13 9/15       CP x10' x10' x10' x10' x10' x10'

## 2021-09-20 ENCOUNTER — EVALUATION (OUTPATIENT)
Dept: PHYSICAL THERAPY | Facility: MEDICAL CENTER | Age: 86
End: 2021-09-20
Payer: MEDICARE

## 2021-09-20 DIAGNOSIS — M54.9 BACK PAIN, UNSPECIFIED BACK LOCATION, UNSPECIFIED BACK PAIN LATERALITY, UNSPECIFIED CHRONICITY: ICD-10-CM

## 2021-09-20 DIAGNOSIS — M70.61 TROCHANTERIC BURSITIS OF RIGHT HIP: Primary | ICD-10-CM

## 2021-09-20 DIAGNOSIS — M76.31 ILIOTIBIAL BAND SYNDROME OF RIGHT SIDE: ICD-10-CM

## 2021-09-20 PROCEDURE — 97110 THERAPEUTIC EXERCISES: CPT | Performed by: PHYSICAL THERAPIST

## 2021-09-20 PROCEDURE — 97140 MANUAL THERAPY 1/> REGIONS: CPT | Performed by: PHYSICAL THERAPIST

## 2021-09-20 PROCEDURE — 97116 GAIT TRAINING THERAPY: CPT | Performed by: PHYSICAL THERAPIST

## 2021-09-20 NOTE — PROGRESS NOTES
PT Re-Evaluation     Today's date: 2021  Patient name: Indiana Arshad  : 1931  MRN: [id number]  Referring provider: Jayant Trejo DO  Dx:   Encounter Diagnosis     ICD-10-CM    1  Trochanteric bursitis of right hip  M70 61    2  Iliotibial band syndrome of right side  M76 31    3  Back pain, unspecified back location, unspecified back pain laterality, unspecified chronicity  M54 9                   Assessment  Assessment details: Pt has attended 7 visits of skilled PT over the past 4 weeks  Pt states she feels steady improvements in R lat hip pain, gomez with stepping up using R LE  Pt states she cont to be anxious of stair training without UE support  Upon reassessment, pt demonstrates decreased pain/tissue tenderness, improved R hip strength and ROM, gait and balance deficits persists as well as (+) ITB tightness  (-) R post pelvic rot present, which was present at IE  Pt will benefit from skilled PT to address the deficits listed above and maximize function  Thank you for your referral   Impairments: abnormal gait, abnormal or restricted ROM, activity intolerance, impaired physical strength and pain with function  Understanding of Dx/Px/POC: good   Prognosis: good    Goals  ST: Decrease pain by 25% in 4 weeks with all functional activities-partially met as of   2: Improved ROM by 25% in 4 weeks to assist with all functional activities-partially met as of   3: Improve strength by 1/2 grade in 4 weeks to assist with all functional activities-partially met as of   LT:  Decrease pain to 0-1/10 with all functional activities in 4-6 weeks  2: Improved ROM to WFL's in 4-6 weeks to assist with all functional activities  3:  Increase strength to WFL's in 4-6 weeks to assist with all functional activities    Plan  Patient would benefit from: skilled physical therapy  Planned modality interventions: cryotherapy  Planned therapy interventions: manual therapy, joint mobilization, balance, patient education, neuromuscular re-education and home exercise program  Frequency: 2x week  Duration in visits: 12  Duration in weeks: 8  Plan of Care beginning date: 2021  Plan of Care expiration date: 10/25/2021  Treatment plan discussed with: patient        Subjective Evaluation    Pain  Current pain ratin  At best pain ratin  At worst pain ratin  Quality: dull ache  Relieving factors: ice and medications  Aggravating factors: standing and walking    Social Support  Steps to enter house: no  Stairs in house: yes   14  Lives in: multiple-level home  Lives with: adult children      Diagnostic Tests  MRI studies: normal  Treatments  Current treatment: injection treatment and physical therapy  Patient Goals  Patient goals for therapy: decreased pain, independence with ADLs/IADLs, increased strength and improved balance          Objective     Observations     Additional Observation Details  Posture/observation:  Decreased lordosis, R hip ER in standing, pelvis appears level and symmetrical    Gait:  Pt amb ind with WBQC, slowed taina, WBOS, mild out-toeing on R-persists as     GOMEZ/56 (Mod Falls risk)    Palpation     Additional Palpation Details  Palpation:  decreased tenderness R greater troch and glut med/min musculature    Neurological Testing     Sensation     Hip   Left Hip   Intact: light touch    Right Hip   Intact: light touch    Active Range of Motion     Right Hip   Flexion: 115 degrees   Extension: 12 degrees   Abduction: 30 degrees     Strength/Myotome Testing     Right Hip   Planes of Motion   Flexion: 4-  Extension: 4-  Abduction: 4-  Adduction: 4-    Tests     Right Hip   Positive Adriel  Negative BERNARDINO, FADIR and long sit                Precautions: Hearing loss R, OP    Manuals 8/25 8/30 9/1 9/8 9/13 9/15  9/20         R MET for post pelvis BG BG NP NP BG BG           Foam roller to R ITB BG BG BG BG BG BG  BG         Manual ITB stretch         BG BG  BG                                 Neuro Re-Ed   8/30 9/1   9/13 9/15 9/20         Step taps   6" nv   6"x20 8"           Step ups   4" nv x10 no UE support      stair training nv         Bal FA/FT EO/EC     nv        nv         Step ups       x5 no UE support               Stair training          1 HR x3laps             Curb training         Outside x5' outside curbs and stairs no HR, CG assist x5'  outside curbs no HR, x5'          sidestepping, marching, tandem gait             nv         Ther Ex 8/25 8/30 9/1 9/8 9/13 9/15  9/20         ITB stretch                       LTR   10"x10ea 10"x10 10"x10 10"x10 10"x10  10"x10         bridges   5"x10 5"x10 5"x10 5"x20 5"x20  5"x20         Hip add iso   5"x20 5"x20 5"x20 5'x20 5"x20  5"x20         TB hip abd   RTB 5"x10 RTB 5"x15  RTB 5"x20 GTB 5"x20 GTB 5"x20  GTB 5"x20         clamshells   5"x10 5"x15 5"x20 5"x20 5"x20 5"x20         DLS marches       A35 x20 x20  x20         S/l hip abd       x10               ITB stretch       nv manual manual  manual          mini squats              nv          HR              nv         Gait Training           No AD x5'  No AD x5'                                                         Modalities 8/25 8/30 9/1 9/8 9/13 9/15  9/20         CP x10' x10' x10' x10' x10' x10'  x10'

## 2021-09-22 ENCOUNTER — OFFICE VISIT (OUTPATIENT)
Dept: PHYSICAL THERAPY | Facility: MEDICAL CENTER | Age: 86
End: 2021-09-22
Payer: MEDICARE

## 2021-09-22 DIAGNOSIS — M54.9 BACK PAIN, UNSPECIFIED BACK LOCATION, UNSPECIFIED BACK PAIN LATERALITY, UNSPECIFIED CHRONICITY: ICD-10-CM

## 2021-09-22 DIAGNOSIS — M70.61 TROCHANTERIC BURSITIS OF RIGHT HIP: Primary | ICD-10-CM

## 2021-09-22 DIAGNOSIS — M76.31 ILIOTIBIAL BAND SYNDROME OF RIGHT SIDE: ICD-10-CM

## 2021-09-22 PROCEDURE — 97112 NEUROMUSCULAR REEDUCATION: CPT | Performed by: PHYSICAL THERAPIST

## 2021-09-22 PROCEDURE — 97140 MANUAL THERAPY 1/> REGIONS: CPT | Performed by: PHYSICAL THERAPIST

## 2021-09-22 NOTE — PROGRESS NOTES
Daily Note     Today's date: 2021  Patient name: Sabino Ybarra  : 1931  MRN: [id number]  Referring provider: Florin Washington DO  Dx:   Encounter Diagnosis     ICD-10-CM    1  Trochanteric bursitis of right hip  M70 61    2  Iliotibial band syndrome of right side  M76 31    3  Back pain, unspecified back location, unspecified back pain laterality, unspecified chronicity  M54 9                   Subjective: Pt reports no new complaints post RA on Monday  Objective: See treatment diary below      Assessment: Tolerated treatment well  Patient demonstrated fatigue post treatment and would benefit from continued PT  Pt states mild r lat hip soreness with curb training when ascending stair on R LE  Progressed balance as charted with CG assist required  Plan: Continue per plan of care        Precautions: Hearing loss R, OP    Manuals 8/25 8/30 9/1 9/8 9/13 9/15  9/20  9/22       R MET for post pelvis BG BG NP NP BG BG           Foam roller to R ITB BG BG BG BG BG BG  BG  BG       Manual ITB stretch         BG BG  BG  BG        inf hip mobs                BG       Neuro Re-Ed   8/30 9/1   9/13 9/15 9/20  9/22       Step taps   6" nv   6"x20 8"           Step ups   4" nv x10 no UE support      stair training nv  x3laps       Bal FA/FT EO/EC     nv        nv  foam FA EO/EC 30"x2, FT EO 30"x2       Step ups       x5 no UE support               Stair training          1 HR x3laps             Curb training         Outside x5' outside curbs and stairs no HR, CG assist x5'  outside curbs no HR, x5'  outside no HR x5'        sidestepping, marching, tandem gait             nv  x2laps ea CG assist       Ther Ex 8/25 8/30 9/1 9/8 9/13 9/15  9/20  9/22       ITB stretch                       LTR   10"x10ea 10"x10 10"x10 10"x10 10"x10  10"x10         bridges   5"x10 5"x10 5"x10 5"x20 5"x20  5"x20  5"x20       Hip add iso   5"x20 5"x20 5"x20 5'x20 5"x20  5"x20  5"x20       TB hip abd   RTB 5"x10 RTB 5"x15  RTB 5"x20 GTB 5"x20 GTB 5"x20  GTB 5"x20         clamshells   5"x10 5"x15 5"x20 5"x20 5"x20 5"x20         DLS marches       O21 x20 x20  x20 x20       S/l hip abd       x10               ITB stretch       nv manual manual  manual  manual        mini squats              nv  nv        HR              nv  nv       Gait Training           No AD x5'  No AD x5'                                                         Modalities 8/25 8/30 9/1 9/8 9/13 9/15  9/20  9/22       CP x10' x10' x10' x10' x10' x10'  x10'  x10'

## 2021-09-27 ENCOUNTER — OFFICE VISIT (OUTPATIENT)
Dept: PHYSICAL THERAPY | Facility: MEDICAL CENTER | Age: 86
End: 2021-09-27
Payer: MEDICARE

## 2021-09-27 DIAGNOSIS — M76.31 ILIOTIBIAL BAND SYNDROME OF RIGHT SIDE: ICD-10-CM

## 2021-09-27 DIAGNOSIS — M70.61 TROCHANTERIC BURSITIS OF RIGHT HIP: Primary | ICD-10-CM

## 2021-09-27 PROCEDURE — 97112 NEUROMUSCULAR REEDUCATION: CPT | Performed by: PHYSICAL THERAPIST

## 2021-09-27 PROCEDURE — 97140 MANUAL THERAPY 1/> REGIONS: CPT | Performed by: PHYSICAL THERAPIST

## 2021-09-27 PROCEDURE — 97116 GAIT TRAINING THERAPY: CPT | Performed by: PHYSICAL THERAPIST

## 2021-09-27 NOTE — PROGRESS NOTES
Daily Note     Today's date: 2021  Patient name: Leda Leong  : 1931  MRN: [id number]  Referring provider: Reese Chung DO  Dx:   Encounter Diagnosis     ICD-10-CM    1  Trochanteric bursitis of right hip  M70 61    2  Iliotibial band syndrome of right side  M76 31                   Subjective: Pt reports she woke up this morning, and her initial steps she had mod pain in R lat hip and foot  Objective: See treatment diary below      Assessment: Tolerated treatment well  Patient demonstrated fatigue post treatment and would benefit from continued PT  Pt has most difficulty with foam activities, and requires cueing to maintain EC  Pt is able to amb curbs outside with cueing and close supervision assist, however curbs are not normal height  Pt states relief of hip pain post manual intervention  Plan: Continue per plan of care        Precautions: Hearing loss R, OP    Pt supervised by PT PK from 5:55-6:15pm    Manuals 8/25 8/30 9/1 9/8 9/13 9/15  9/20  9/22 9/27     R MET for post pelvis BG BG NP NP BG BG           Foam roller to R ITB BG BG BG BG BG BG  BG  BG  BG     Manual ITB stretch         BG BG  BG  BG  BG      inf hip mobs                BG  BG     Neuro Re-Ed   8/30 9/1   9/13 9/15 9/20  9/22  9/27     Step taps   6" nv   6"x20 8"           Step ups   4" nv x10 no UE support      stair training nv  x3laps  x3laps     Bal FA/FT EO/EC     nv        nv  foam FA EO/EC 30"x2, FT EO 30"x2  foam FA/FT EO/EC 30"x2ea     Step ups       x5 no UE support               Stair training          1 HR x3laps             Curb training         Outside x5' outside curbs and stairs no HR, CG assist x5'  outside curbs no HR, x5'  outside no HR x5'  outside no HR x5'      sidestepping, marching, tandem gait             nv  x2laps ea CG assist  x2laps ea CG A     Ther Ex 8/25 8/30 9/1 9/8 9/13 9/15  9/20  9/22  9/27     ITB stretch                       LTR   10"x10ea 10"x10 10"x10 10"x10 10"x10  10"x10    10"x10     bridges   5"x10 5"x10 5"x10 5"x20 5"x20  5"x20  5"x20  5"x20     Hip add iso   5"x20 5"x20 5"x20 5'x20 5"x20  5"x20  5"x20  5"x20     TB hip abd   RTB 5"x10 RTB 5"x15  RTB 5"x20 GTB 5"x20 GTB 5"x20  GTB 5"x20         clamshells   5"x10 5"x15 5"x20 5"x20 5"x20 5"x20    held     DLS marches       A86 x20 x20  x20 x20  x20     S/l hip abd       x10               ITB stretch       nv manual manual  manual  manual  manual      mini squats              nv  nv  x10      HR              nv  nv  x10     Gait Training           No AD x5'  No AD x5'                                                         Modalities 8/25 8/30 9/1 9/8 9/13 9/15  9/20  9/22  9/27     CP x10' x10' x10' x10' x10' x10'  x10'  x10'  x10'

## 2021-09-29 ENCOUNTER — OFFICE VISIT (OUTPATIENT)
Dept: PHYSICAL THERAPY | Facility: MEDICAL CENTER | Age: 86
End: 2021-09-29
Payer: MEDICARE

## 2021-09-29 DIAGNOSIS — M70.61 TROCHANTERIC BURSITIS OF RIGHT HIP: Primary | ICD-10-CM

## 2021-09-29 DIAGNOSIS — M76.31 ILIOTIBIAL BAND SYNDROME OF RIGHT SIDE: ICD-10-CM

## 2021-09-29 PROCEDURE — 97112 NEUROMUSCULAR REEDUCATION: CPT | Performed by: PHYSICAL THERAPIST

## 2021-09-29 PROCEDURE — 97110 THERAPEUTIC EXERCISES: CPT | Performed by: PHYSICAL THERAPIST

## 2021-09-29 PROCEDURE — 97140 MANUAL THERAPY 1/> REGIONS: CPT | Performed by: PHYSICAL THERAPIST

## 2021-09-29 NOTE — PROGRESS NOTES
Daily Note     Today's date: 2021  Patient name: Mariella Walker  : 1931  MRN: [id number]  Referring provider: Sandra Oviedo DO  Dx:   Encounter Diagnosis     ICD-10-CM    1  Trochanteric bursitis of right hip  M70 61    2  Iliotibial band syndrome of right side  M76 31                   Subjective: Pt reports she cont to have mild lat hip pain, and states she doesn't use her cane at home  Objective: See treatment diary below      Assessment: Tolerated treatment well  Patient demonstrated fatigue post treatment and would benefit from continued PT  Pt does report feeling unsteady on her feet, however requires less support with gait, curb and stair training  Plan: Continue per plan of care        Precautions: Hearing loss R, OP        Manuals 8/25 8/30 9/1 9/8 9/13 9/15  9/20  9/22 9/27  9/29   R MET for post pelvis BG BG NP NP BG BG           Foam roller to R ITB BG BG BG BG BG BG  BG  BG  BG  BG   Manual ITB stretch         BG BG  BG  BG  BG  BG    inf hip mobs                BG  BG BG   Neuro Re-Ed   8/30 9/1   9/13 9/15 9/20  9/22  9/27  9/29   Step taps   6" nv   6"x20 8"           Step ups   4" nv x10 no UE support      stair training nv  x3laps  x3laps  x3laps   Bal FA/FT EO/EC     nv        nv  foam FA EO/EC 30"x2, FT EO 30"x2  foam FA/FT EO/EC 30"x2ea  foam FA/FT EO/EC 30"x2ea   Step ups       x5 no UE support               Stair training          1 HR x3laps             Curb training         Outside x5' outside curbs and stairs no HR, CG assist x5'  outside curbs no HR, x5'  outside no HR x5'  outside no HR x5'  outside no HR x5'    sidestepping, marching, tandem gait             nv  x2laps ea CG assist  x2laps ea CG A  x3laps ea, CS A   Ther Ex 8/25 8/30 9/1 9/8 9/13 9/15  9/20  9/22  9/27  9/29   ITB stretch                       LTR   10"x10ea 10"x10 10"x10 10"x10 10"x10  10"x10    10"x10  10"x10   bridges   5"x10 5"x10 5"x10 5"x20 5"x20  5"x20  5"x20  5"x20  5"x20   Hip add iso   5"x20 5"x20 5"x20 5'x20 5"x20  5"x20  5"x20  5"x20 5"x20   TB hip abd   RTB 5"x10 RTB 5"x15  RTB 5"x20 GTB 5"x20 GTB 5"x20  GTB 5"x20         clamshells   5"x10 5"x15 5"x20 5"x20 5"x20 5"x20    held  5"x20   DLS marches       C59 x20 x20  x20 x20  x20  x20   S/l hip abd       x10               ITB stretch       nv manual manual  manual  manual  manual  manual    mini squats              nv  nv  x10  x10    HR              nv  nv  x10  x10   Gait Training           No AD x5'  No AD x5'                                                         Modalities 8/25 8/30 9/1 9/8 9/13 9/15  9/20  9/22  9/27  9/29   CP x10' x10' x10' x10' x10' x10'  x10'  x10'  x10'  x10'

## 2021-10-04 ENCOUNTER — OFFICE VISIT (OUTPATIENT)
Dept: PHYSICAL THERAPY | Facility: MEDICAL CENTER | Age: 86
End: 2021-10-04
Payer: MEDICARE

## 2021-10-04 DIAGNOSIS — M76.31 ILIOTIBIAL BAND SYNDROME OF RIGHT SIDE: ICD-10-CM

## 2021-10-04 DIAGNOSIS — M70.61 TROCHANTERIC BURSITIS OF RIGHT HIP: Primary | ICD-10-CM

## 2021-10-04 DIAGNOSIS — M54.9 BACK PAIN, UNSPECIFIED BACK LOCATION, UNSPECIFIED BACK PAIN LATERALITY, UNSPECIFIED CHRONICITY: ICD-10-CM

## 2021-10-04 PROCEDURE — 97112 NEUROMUSCULAR REEDUCATION: CPT | Performed by: PHYSICAL THERAPIST

## 2021-10-04 PROCEDURE — 97140 MANUAL THERAPY 1/> REGIONS: CPT | Performed by: PHYSICAL THERAPIST

## 2021-10-06 ENCOUNTER — OFFICE VISIT (OUTPATIENT)
Dept: PHYSICAL THERAPY | Facility: MEDICAL CENTER | Age: 86
End: 2021-10-06
Payer: MEDICARE

## 2021-10-06 DIAGNOSIS — M70.61 TROCHANTERIC BURSITIS OF RIGHT HIP: Primary | ICD-10-CM

## 2021-10-06 DIAGNOSIS — M76.31 ILIOTIBIAL BAND SYNDROME OF RIGHT SIDE: ICD-10-CM

## 2021-10-06 PROCEDURE — 97140 MANUAL THERAPY 1/> REGIONS: CPT | Performed by: PHYSICAL THERAPIST

## 2021-10-06 PROCEDURE — 97110 THERAPEUTIC EXERCISES: CPT | Performed by: PHYSICAL THERAPIST

## 2021-10-11 ENCOUNTER — OFFICE VISIT (OUTPATIENT)
Dept: PHYSICAL THERAPY | Facility: MEDICAL CENTER | Age: 86
End: 2021-10-11
Payer: MEDICARE

## 2021-10-11 DIAGNOSIS — M70.61 TROCHANTERIC BURSITIS OF RIGHT HIP: Primary | ICD-10-CM

## 2021-10-11 PROCEDURE — 97140 MANUAL THERAPY 1/> REGIONS: CPT | Performed by: PHYSICAL THERAPIST

## 2021-10-11 PROCEDURE — 97110 THERAPEUTIC EXERCISES: CPT | Performed by: PHYSICAL THERAPIST

## 2021-10-13 ENCOUNTER — OFFICE VISIT (OUTPATIENT)
Dept: PHYSICAL THERAPY | Facility: MEDICAL CENTER | Age: 86
End: 2021-10-13
Payer: MEDICARE

## 2021-10-13 DIAGNOSIS — M54.9 BACK PAIN, UNSPECIFIED BACK LOCATION, UNSPECIFIED BACK PAIN LATERALITY, UNSPECIFIED CHRONICITY: ICD-10-CM

## 2021-10-13 DIAGNOSIS — M76.31 ILIOTIBIAL BAND SYNDROME OF RIGHT SIDE: ICD-10-CM

## 2021-10-13 DIAGNOSIS — M70.61 TROCHANTERIC BURSITIS OF RIGHT HIP: Primary | ICD-10-CM

## 2021-10-13 PROCEDURE — 97110 THERAPEUTIC EXERCISES: CPT | Performed by: PHYSICAL THERAPIST

## 2021-10-13 PROCEDURE — 97140 MANUAL THERAPY 1/> REGIONS: CPT | Performed by: PHYSICAL THERAPIST

## 2021-10-18 ENCOUNTER — EVALUATION (OUTPATIENT)
Dept: PHYSICAL THERAPY | Facility: MEDICAL CENTER | Age: 86
End: 2021-10-18
Payer: MEDICARE

## 2021-10-18 DIAGNOSIS — M70.61 TROCHANTERIC BURSITIS OF RIGHT HIP: Primary | ICD-10-CM

## 2021-10-18 DIAGNOSIS — M76.31 ILIOTIBIAL BAND SYNDROME OF RIGHT SIDE: ICD-10-CM

## 2021-10-18 DIAGNOSIS — M54.9 BACK PAIN, UNSPECIFIED BACK LOCATION, UNSPECIFIED BACK PAIN LATERALITY, UNSPECIFIED CHRONICITY: ICD-10-CM

## 2021-10-18 PROCEDURE — 97110 THERAPEUTIC EXERCISES: CPT | Performed by: PHYSICAL THERAPIST

## 2021-10-18 PROCEDURE — 97140 MANUAL THERAPY 1/> REGIONS: CPT | Performed by: PHYSICAL THERAPIST

## 2021-10-18 PROCEDURE — 97116 GAIT TRAINING THERAPY: CPT | Performed by: PHYSICAL THERAPIST

## 2021-10-20 ENCOUNTER — OFFICE VISIT (OUTPATIENT)
Dept: PHYSICAL THERAPY | Facility: MEDICAL CENTER | Age: 86
End: 2021-10-20
Payer: MEDICARE

## 2021-10-20 DIAGNOSIS — M70.61 TROCHANTERIC BURSITIS OF RIGHT HIP: Primary | ICD-10-CM

## 2021-10-20 DIAGNOSIS — M76.31 ILIOTIBIAL BAND SYNDROME OF RIGHT SIDE: ICD-10-CM

## 2021-10-20 DIAGNOSIS — M54.9 BACK PAIN, UNSPECIFIED BACK LOCATION, UNSPECIFIED BACK PAIN LATERALITY, UNSPECIFIED CHRONICITY: ICD-10-CM

## 2021-10-20 PROCEDURE — 97110 THERAPEUTIC EXERCISES: CPT | Performed by: PHYSICAL THERAPIST

## 2021-10-20 PROCEDURE — 97140 MANUAL THERAPY 1/> REGIONS: CPT | Performed by: PHYSICAL THERAPIST

## 2021-10-25 ENCOUNTER — OFFICE VISIT (OUTPATIENT)
Dept: PHYSICAL THERAPY | Facility: MEDICAL CENTER | Age: 86
End: 2021-10-25
Payer: MEDICARE

## 2021-10-25 DIAGNOSIS — M70.61 TROCHANTERIC BURSITIS OF RIGHT HIP: Primary | ICD-10-CM

## 2021-10-25 DIAGNOSIS — M54.9 BACK PAIN, UNSPECIFIED BACK LOCATION, UNSPECIFIED BACK PAIN LATERALITY, UNSPECIFIED CHRONICITY: ICD-10-CM

## 2021-10-25 DIAGNOSIS — M76.31 ILIOTIBIAL BAND SYNDROME OF RIGHT SIDE: ICD-10-CM

## 2021-10-25 PROCEDURE — 97140 MANUAL THERAPY 1/> REGIONS: CPT | Performed by: PHYSICAL THERAPIST

## 2021-10-25 PROCEDURE — 97110 THERAPEUTIC EXERCISES: CPT | Performed by: PHYSICAL THERAPIST

## 2021-10-27 ENCOUNTER — OFFICE VISIT (OUTPATIENT)
Dept: PHYSICAL THERAPY | Facility: MEDICAL CENTER | Age: 86
End: 2021-10-27
Payer: MEDICARE

## 2021-10-27 DIAGNOSIS — M76.31 ILIOTIBIAL BAND SYNDROME OF RIGHT SIDE: ICD-10-CM

## 2021-10-27 DIAGNOSIS — M70.61 TROCHANTERIC BURSITIS OF RIGHT HIP: Primary | ICD-10-CM

## 2021-10-27 PROCEDURE — 97140 MANUAL THERAPY 1/> REGIONS: CPT | Performed by: PHYSICAL THERAPIST

## 2021-10-27 PROCEDURE — 97110 THERAPEUTIC EXERCISES: CPT | Performed by: PHYSICAL THERAPIST

## 2021-10-27 PROCEDURE — 97112 NEUROMUSCULAR REEDUCATION: CPT | Performed by: PHYSICAL THERAPIST

## 2021-11-08 ENCOUNTER — OFFICE VISIT (OUTPATIENT)
Dept: PHYSICAL THERAPY | Facility: MEDICAL CENTER | Age: 86
End: 2021-11-08
Payer: MEDICARE

## 2021-11-08 DIAGNOSIS — M76.31 ILIOTIBIAL BAND SYNDROME OF RIGHT SIDE: ICD-10-CM

## 2021-11-08 DIAGNOSIS — M54.9 BACK PAIN, UNSPECIFIED BACK LOCATION, UNSPECIFIED BACK PAIN LATERALITY, UNSPECIFIED CHRONICITY: ICD-10-CM

## 2021-11-08 DIAGNOSIS — M70.61 TROCHANTERIC BURSITIS OF RIGHT HIP: Primary | ICD-10-CM

## 2021-11-08 PROCEDURE — 97140 MANUAL THERAPY 1/> REGIONS: CPT | Performed by: PHYSICAL THERAPIST

## 2021-11-08 PROCEDURE — 97110 THERAPEUTIC EXERCISES: CPT | Performed by: PHYSICAL THERAPIST

## 2021-11-08 PROCEDURE — 97116 GAIT TRAINING THERAPY: CPT | Performed by: PHYSICAL THERAPIST

## 2021-11-10 ENCOUNTER — OFFICE VISIT (OUTPATIENT)
Dept: PHYSICAL THERAPY | Facility: MEDICAL CENTER | Age: 86
End: 2021-11-10
Payer: MEDICARE

## 2021-11-10 DIAGNOSIS — M70.61 TROCHANTERIC BURSITIS OF RIGHT HIP: Primary | ICD-10-CM

## 2021-11-10 DIAGNOSIS — M54.9 BACK PAIN, UNSPECIFIED BACK LOCATION, UNSPECIFIED BACK PAIN LATERALITY, UNSPECIFIED CHRONICITY: ICD-10-CM

## 2021-11-10 DIAGNOSIS — M76.31 ILIOTIBIAL BAND SYNDROME OF RIGHT SIDE: ICD-10-CM

## 2021-11-10 PROCEDURE — 97116 GAIT TRAINING THERAPY: CPT | Performed by: PHYSICAL THERAPIST

## 2021-11-10 PROCEDURE — 97140 MANUAL THERAPY 1/> REGIONS: CPT | Performed by: PHYSICAL THERAPIST

## 2021-11-10 PROCEDURE — 97110 THERAPEUTIC EXERCISES: CPT | Performed by: PHYSICAL THERAPIST

## 2021-11-15 ENCOUNTER — OFFICE VISIT (OUTPATIENT)
Dept: PHYSICAL THERAPY | Facility: MEDICAL CENTER | Age: 86
End: 2021-11-15
Payer: MEDICARE

## 2021-11-15 DIAGNOSIS — M76.31 ILIOTIBIAL BAND SYNDROME OF RIGHT SIDE: ICD-10-CM

## 2021-11-15 DIAGNOSIS — M70.61 TROCHANTERIC BURSITIS OF RIGHT HIP: Primary | ICD-10-CM

## 2021-11-15 DIAGNOSIS — M54.9 BACK PAIN, UNSPECIFIED BACK LOCATION, UNSPECIFIED BACK PAIN LATERALITY, UNSPECIFIED CHRONICITY: ICD-10-CM

## 2021-11-15 PROCEDURE — 97112 NEUROMUSCULAR REEDUCATION: CPT | Performed by: PHYSICAL THERAPIST

## 2021-11-15 PROCEDURE — 97110 THERAPEUTIC EXERCISES: CPT | Performed by: PHYSICAL THERAPIST

## 2021-11-15 PROCEDURE — 97140 MANUAL THERAPY 1/> REGIONS: CPT | Performed by: PHYSICAL THERAPIST

## 2021-11-17 ENCOUNTER — EVALUATION (OUTPATIENT)
Dept: PHYSICAL THERAPY | Facility: MEDICAL CENTER | Age: 86
End: 2021-11-17
Payer: MEDICARE

## 2021-11-17 DIAGNOSIS — M76.31 ILIOTIBIAL BAND SYNDROME OF RIGHT SIDE: ICD-10-CM

## 2021-11-17 DIAGNOSIS — M70.61 TROCHANTERIC BURSITIS OF RIGHT HIP: Primary | ICD-10-CM

## 2021-11-17 DIAGNOSIS — M54.9 BACK PAIN, UNSPECIFIED BACK LOCATION, UNSPECIFIED BACK PAIN LATERALITY, UNSPECIFIED CHRONICITY: ICD-10-CM

## 2021-11-17 PROCEDURE — 97110 THERAPEUTIC EXERCISES: CPT | Performed by: PHYSICAL THERAPIST

## 2021-11-17 PROCEDURE — 97112 NEUROMUSCULAR REEDUCATION: CPT | Performed by: PHYSICAL THERAPIST

## 2021-11-17 PROCEDURE — 97140 MANUAL THERAPY 1/> REGIONS: CPT | Performed by: PHYSICAL THERAPIST

## 2021-11-18 ENCOUNTER — ANNUAL EXAM (OUTPATIENT)
Dept: OBGYN CLINIC | Facility: MEDICAL CENTER | Age: 86
End: 2021-11-18
Payer: MEDICARE

## 2021-11-18 VITALS
HEIGHT: 59 IN | DIASTOLIC BLOOD PRESSURE: 80 MMHG | SYSTOLIC BLOOD PRESSURE: 140 MMHG | BODY MASS INDEX: 26.81 KG/M2 | WEIGHT: 133 LBS

## 2021-11-18 DIAGNOSIS — Z01.419 ENCOUNTER FOR GYNECOLOGICAL EXAMINATION WITHOUT ABNORMAL FINDING: Primary | ICD-10-CM

## 2021-11-18 PROCEDURE — G0101 CA SCREEN;PELVIC/BREAST EXAM: HCPCS | Performed by: OBSTETRICS & GYNECOLOGY

## 2021-11-22 ENCOUNTER — OFFICE VISIT (OUTPATIENT)
Dept: PHYSICAL THERAPY | Facility: MEDICAL CENTER | Age: 86
End: 2021-11-22
Payer: MEDICARE

## 2021-11-22 DIAGNOSIS — M70.61 TROCHANTERIC BURSITIS OF RIGHT HIP: Primary | ICD-10-CM

## 2021-11-22 DIAGNOSIS — M54.9 BACK PAIN, UNSPECIFIED BACK LOCATION, UNSPECIFIED BACK PAIN LATERALITY, UNSPECIFIED CHRONICITY: ICD-10-CM

## 2021-11-22 DIAGNOSIS — M76.31 ILIOTIBIAL BAND SYNDROME OF RIGHT SIDE: ICD-10-CM

## 2021-11-22 PROCEDURE — 97140 MANUAL THERAPY 1/> REGIONS: CPT | Performed by: PHYSICAL THERAPIST

## 2021-11-22 PROCEDURE — 97110 THERAPEUTIC EXERCISES: CPT | Performed by: PHYSICAL THERAPIST

## 2021-11-22 PROCEDURE — 97116 GAIT TRAINING THERAPY: CPT | Performed by: PHYSICAL THERAPIST

## 2021-11-24 ENCOUNTER — OFFICE VISIT (OUTPATIENT)
Dept: PHYSICAL THERAPY | Facility: MEDICAL CENTER | Age: 86
End: 2021-11-24
Payer: MEDICARE

## 2021-11-24 DIAGNOSIS — M70.61 TROCHANTERIC BURSITIS OF RIGHT HIP: Primary | ICD-10-CM

## 2021-11-24 DIAGNOSIS — M54.9 BACK PAIN, UNSPECIFIED BACK LOCATION, UNSPECIFIED BACK PAIN LATERALITY, UNSPECIFIED CHRONICITY: ICD-10-CM

## 2021-11-24 DIAGNOSIS — M76.31 ILIOTIBIAL BAND SYNDROME OF RIGHT SIDE: ICD-10-CM

## 2021-11-24 PROCEDURE — 97140 MANUAL THERAPY 1/> REGIONS: CPT | Performed by: PHYSICAL THERAPIST

## 2021-11-24 PROCEDURE — 97112 NEUROMUSCULAR REEDUCATION: CPT | Performed by: PHYSICAL THERAPIST

## 2021-11-24 PROCEDURE — 97110 THERAPEUTIC EXERCISES: CPT | Performed by: PHYSICAL THERAPIST

## 2021-11-29 ENCOUNTER — APPOINTMENT (OUTPATIENT)
Dept: PHYSICAL THERAPY | Facility: MEDICAL CENTER | Age: 86
End: 2021-11-29
Payer: MEDICARE

## 2022-01-17 ENCOUNTER — PROCEDURE VISIT (OUTPATIENT)
Dept: NEUROLOGY | Facility: CLINIC | Age: 87
End: 2022-01-17
Payer: MEDICARE

## 2022-01-17 VITALS — DIASTOLIC BLOOD PRESSURE: 76 MMHG | SYSTOLIC BLOOD PRESSURE: 124 MMHG

## 2022-01-17 DIAGNOSIS — G24.5 BLEPHAROSPASM: ICD-10-CM

## 2022-01-17 DIAGNOSIS — G24.3 CERVICAL DYSTONIA: Primary | ICD-10-CM

## 2022-01-17 PROCEDURE — 64612 DESTROY NERVE FACE MUSCLE: CPT | Performed by: PSYCHIATRY & NEUROLOGY

## 2022-01-17 NOTE — PROGRESS NOTES
Chemodenervation     Date/Time 1/17/2022 1:17 PM     Performed by  Luisito Zaragoza MD     Authorized by Luisito Zaragoza MD        Pre-procedure details      Prepped With: Alcohol     Anesthesia  (see MAR for exact dosages): Anesthesia method:  None   Procedure details     Position:  Upright   Botox     Brand:  Botox    mL's of preservative free sterile saline:  2    Final Concentration per CC:  50 units    Needle gauge: 30G  Procedures     Date of last injection:  9/13/2021   Post-procedure details      Chemodenervation:  Neck, excluding muscles of the larynx and head or face   Comments           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      Overall good response to injections  Injection lasting about 4 months  Exam today with mild blinking  Slight right turn toritcollis and latercollis  No spasmodic tremor  Mild dysphonia   Injections: Dilution was different this visit therefore eye injection may have received slightly more than typical    2 5 unit(s) in 1 injection(s) was injected into the right  muscle  2 5 unit(s) in 1 injection(s) was injected into the left  muscle  10 unit(s) in 4 (2 5 ) injection(s) was injected into the right orbicularis oculi (upper inner, upper and lower outer and lateral canthus)  10 unit(s) in 4 (x2 5) injection(s) was injected into the left orbicularis oculi      25 unit(s) was injected into the left sternocleidomastoid muscle  --    50 (25 side, 25 low) unit(s) was injected into the right splenius capitus muscle  --    25 unit(s) was injected into the right levator scapulae muscle     Total injected 125 units  Discarded 75

## 2022-03-07 ENCOUNTER — OFFICE VISIT (OUTPATIENT)
Dept: FAMILY MEDICINE CLINIC | Facility: MEDICAL CENTER | Age: 87
End: 2022-03-07
Payer: MEDICARE

## 2022-03-07 ENCOUNTER — APPOINTMENT (OUTPATIENT)
Dept: LAB | Facility: MEDICAL CENTER | Age: 87
End: 2022-03-07
Payer: MEDICARE

## 2022-03-07 VITALS
HEART RATE: 80 BPM | TEMPERATURE: 98.4 F | DIASTOLIC BLOOD PRESSURE: 80 MMHG | BODY MASS INDEX: 27.21 KG/M2 | HEIGHT: 59 IN | RESPIRATION RATE: 18 BRPM | SYSTOLIC BLOOD PRESSURE: 142 MMHG | WEIGHT: 135 LBS

## 2022-03-07 DIAGNOSIS — R73.9 HYPERGLYCEMIA: ICD-10-CM

## 2022-03-07 DIAGNOSIS — I10 ESSENTIAL HYPERTENSION: Primary | ICD-10-CM

## 2022-03-07 DIAGNOSIS — I10 ESSENTIAL HYPERTENSION: ICD-10-CM

## 2022-03-07 LAB
ANION GAP SERPL CALCULATED.3IONS-SCNC: 5 MMOL/L (ref 4–13)
BUN SERPL-MCNC: 37 MG/DL (ref 5–25)
CALCIUM SERPL-MCNC: 10.5 MG/DL (ref 8.3–10.1)
CHLORIDE SERPL-SCNC: 105 MMOL/L (ref 100–108)
CO2 SERPL-SCNC: 26 MMOL/L (ref 21–32)
CREAT SERPL-MCNC: 0.97 MG/DL (ref 0.6–1.3)
EST. AVERAGE GLUCOSE BLD GHB EST-MCNC: 134 MG/DL
GFR SERPL CREATININE-BSD FRML MDRD: 51 ML/MIN/1.73SQ M
GLUCOSE SERPL-MCNC: 198 MG/DL (ref 65–140)
HBA1C MFR BLD: 6.3 %
POTASSIUM SERPL-SCNC: 4.9 MMOL/L (ref 3.5–5.3)
SODIUM SERPL-SCNC: 136 MMOL/L (ref 136–145)

## 2022-03-07 PROCEDURE — 83036 HEMOGLOBIN GLYCOSYLATED A1C: CPT

## 2022-03-07 PROCEDURE — 80048 BASIC METABOLIC PNL TOTAL CA: CPT

## 2022-03-07 PROCEDURE — 99213 OFFICE O/P EST LOW 20 MIN: CPT | Performed by: FAMILY MEDICINE

## 2022-03-07 PROCEDURE — 36415 COLL VENOUS BLD VENIPUNCTURE: CPT

## 2022-03-07 RX ORDER — AMLODIPINE BESYLATE AND BENAZEPRIL HYDROCHLORIDE 5; 20 MG/1; MG/1
1 CAPSULE ORAL DAILY
Qty: 90 CAPSULE | Refills: 1 | Status: SHIPPED | OUTPATIENT
Start: 2022-03-07

## 2022-03-07 NOTE — PROGRESS NOTES
Assessment/Plan:       Diagnoses and all orders for this visit:    Essential hypertension  -     Basic metabolic panel; Future  -     amLODIPine-benazepril (LOTREL 5-20) 5-20 MG per capsule; Take 1 capsule by mouth daily  Blood pressure acceptable  Continue amlodipine with benazepril  Check labs to monitor renal function  Hyperglycemia  -     Hemoglobin A1C; Future  Check A1c to monitor glucose levels  Follow-up in six months or sooner if needed  Subjective:      Patient ID: Elgin Juarez is a 80 y o  female  Patient presents for follow-up of hypertension  Currently on amlodipine with benazepril  Tolerating medication well  Also has hyperglycemia  Not on medication at this time        The following portions of the patient's history were reviewed and updated as appropriate: She  has a past medical history of Anxiety (2010), Bladder infection (09/2019), Breast lump in female, HL (hearing loss) (2010), Hypertension (2010), Osteopenia, and Ovarian cyst   She   Patient Active Problem List    Diagnosis Date Noted    Encounter for gynecological examination without abnormal finding 11/18/2021    Trochanteric bursitis of right hip     Primary osteoarthritis of right hip     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    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    Presence of pessary 11/04/2020    Osteoporosis without current pathological fracture 11/04/2020    Pulmonary HTN ([facility] [address] ) 10/23/2020    Vertigo 10/16/2019    Sensorineural hearing loss (SNHL), bilateral 10/16/2019    Asymmetrical hearing loss of right ear 10/16/2019    Hyperglycemia 08/26/2019    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    Family history of malignant neoplasm of breast 08/24/2018    Anxiety 03/25/2016    Essential hypertension 03/25/2016    MCI (mild cognitive impairment) 03/25/2016    Blepharospasm 08/04/2015    Depression, recurrent ([facility] [address] ) 06/16/2014    Functional urinary incontinence 06/16/2014    Hyperlipidemia 06/16/2014    Osteoporosis 06/16/2014    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    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     She  has a past surgical history that includes Breast biopsy; Bladder surgery; Cataract extraction (Bilateral, 01/01/2015); Endometrial biopsy; and Hysterectomy  Her family history includes Heart disease in her father and mother; Hypertension in her family; Osteoporosis in her family  She  reports that she has never smoked  She has never used smokeless tobacco  She reports that she does not drink alcohol and does not use drugs    Current Outpatient Medications   Medication Sig Dispense Refill    alendronate (FOSAMAX) 70 mg tablet Take 1 tablet (70 mg total) by mouth every 7 days 12 tablet 3    amLODIPine-benazepril (LOTREL 5-20) 5-20 MG per capsule Take 1 capsule by mouth daily 90 capsule 1    calcium citrate-vitamin D (CITRACAL+D) 315-200 MG-UNIT per tablet Take 1 tablet by mouth daily      conjugated estrogens (PREMARIN) vaginal cream Insert into the vagina Ose as directed      Multiple Vitamins-Minerals (MULTIVITAL) tablet Take 1 tablet by mouth daily      Omega-3 Fatty Acids (FISH OIL) 645 MG CAPS Take 1 tablet by mouth daily      polyethylene glycol (MIRALAX) 17 g packet Take 1 Package by mouth 2 (two) times a day       Current Facility-Administered Medications   Medication Dose Route Frequency Provider Last Rate Last Admin    Botulinum Toxin Type A SOLR 200 Units  200 Units Injection Q3 Months Mitch Bhatia MD   200 Units at 05/03/21 1619     Current Outpatient Medications on File Prior to Visit   Medication Sig    alendronate (FOSAMAX) 70 mg tablet Take 1 tablet (70 mg total) by mouth every 7 days    calcium citrate-vitamin D (CITRACAL+D) 315-200 MG-UNIT per tablet Take 1 tablet by mouth daily    conjugated estrogens (PREMARIN) vaginal cream Insert into the vagina Ose as directed    Multiple Vitamins-Minerals (MULTIVITAL) tablet Take 1 tablet by mouth daily    Omega-3 Fatty Acids (FISH OIL) 645 MG CAPS Take 1 tablet by mouth daily    polyethylene glycol (MIRALAX) 17 g packet Take 1 Package by mouth 2 (two) times a day    [DISCONTINUED] amLODIPine-benazepril (LOTREL 5-20) 5-20 MG per capsule Take 1 capsule by mouth daily    [DISCONTINUED] MYRBETRIQ 50 MG TB24 Take 1 tablet by mouth daily     Current Facility-Administered Medications on File Prior to Visit   Medication    Botulinum Toxin Type A SOLR 200 Units     She has No Known Allergies       Review of Systems   Constitutional: Negative for fever  Respiratory: Negative for shortness of breath  Cardiovascular: Negative for chest pain  Objective:      /80 (BP Location: Left arm, Patient Position: Sitting, Cuff Size: Adult)   Pulse 80   Temp 98 4 °F (36 9 °C)   Resp 18   Ht 4' 11" (1 499 m)   Wt 61 2 kg (135 lb)   BMI 27 27 kg/m²          Physical Exam  Constitutional:       General: She is not in acute distress  Appearance: She is not ill-appearing  Cardiovascular:      Rate and Rhythm: Normal rate and regular rhythm  Heart sounds: Normal heart sounds  Pulmonary:      Effort: Pulmonary effort is normal       Breath sounds: Normal breath sounds

## 2022-06-06 ENCOUNTER — PROCEDURE VISIT (OUTPATIENT)
Dept: NEUROLOGY | Facility: CLINIC | Age: 87
End: 2022-06-06
Payer: MEDICARE

## 2022-06-06 VITALS — HEART RATE: 77 BPM | TEMPERATURE: 97.6 F | SYSTOLIC BLOOD PRESSURE: 128 MMHG | DIASTOLIC BLOOD PRESSURE: 78 MMHG

## 2022-06-06 DIAGNOSIS — G24.3 CERVICAL DYSTONIA: Primary | ICD-10-CM

## 2022-06-06 DIAGNOSIS — G24.5 BLEPHAROSPASM: ICD-10-CM

## 2022-06-06 PROCEDURE — 64612 DESTROY NERVE FACE MUSCLE: CPT | Performed by: PSYCHIATRY & NEUROLOGY

## 2022-06-06 NOTE — PROGRESS NOTES
Universal Protocol   Consent: Written consent obtained  Consent given by: patient        Chemodenervation     Date/Time 6/6/2022 7:20 AM     Performed by  Aleksandar Manley MD     Authorized by Aleksandar Manley MD        Pre-procedure details      Prepped With: Alcohol     Anesthesia  (see MAR for exact dosages): Anesthesia method:  None   Procedure details     Position:  Upright   Botox     Brand:  Botox    mL's of preservative free sterile saline:  2    Final Concentration per CC:  50 units    Needle gauge: 30G  Procedures     Botox Procedures: blepharospasm and cervical dystonia      Indications: blepharospasm and spasmodic torticollis      Date of last injection:  1/17/2022   Post-procedure details      Chemodenervation:  Head or face and neck, excluding muscles of the larynx    Patient tolerance of procedure: Tolerated well, no immediate complications   Comments       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      Overall good response to injections  Response lasts about 4 months with mild return of blinking and head turn  Exam: Little in the form of blinking  Slight right turn toritcollis and latercollis  No  tremor  Mild dysphonia      Injections:   2 5 unit(s) in 1 injection(s) was injected into the right  muscle  2 5 unit(s) in 1 injection(s) was injected into the left  muscle  10 unit(s) in 4 (2 5 ) injection(s) was injected into the right orbicularis oculi (upper inner, upper and lower outer and lateral canthus)  10 unit(s) in 4 (x2 5) injection(s) was injected into the left orbicularis oculi      25 unit(s) was injected into the left sternocleidomastoid muscle  --    50 (25 side, 25 low) unit(s) was injected into the right splenius capitus muscle  --    25 unit(s) was injected into the right levator scapulae muscle     Total injected 125 units  Discarded 75

## 2022-08-26 ENCOUNTER — RA CDI HCC (OUTPATIENT)
Dept: OTHER | Facility: HOSPITAL | Age: 87
End: 2022-08-26

## 2022-08-26 NOTE — PROGRESS NOTES
[facility] [address]  coding opportunities       Chart reviewed, no opportunity found: CHART REVIEWED, NO OPPORTUNITY FOUND        Patients Insurance     Medicare Insurance: Medicare

## 2022-09-06 ENCOUNTER — TELEPHONE (OUTPATIENT)
Dept: FAMILY MEDICINE CLINIC | Facility: MEDICAL CENTER | Age: 87
End: 2022-09-06

## 2022-09-06 NOTE — TELEPHONE ENCOUNTER
Pt's dtr called to reschedule pt's AWV appt, but also inquired about getting pt a new walker  Pt is using a very old walker right now, and they would like to get her something newer  Dtr doesn't feel pt needs one with a seat, but would like one with wheels on it  Routed to Dr Jackson Ruiz - pt will be establishing with you in November; can this be ordered before she sees you?

## 2022-09-07 NOTE — TELEPHONE ENCOUNTER
Spoke with Marcia Salter to make her aware order has been placed    She will check back at the end of the month if she has not heard anything

## 2022-09-08 LAB
DME PARACHUTE DELIVERY DATE REQUESTED: NORMAL
DME PARACHUTE ITEM DESCRIPTION: NORMAL
DME PARACHUTE ORDER STATUS: NORMAL
DME PARACHUTE SUPPLIER NAME: NORMAL
DME PARACHUTE SUPPLIER PHONE: NORMAL

## 2022-09-12 LAB
DME PARACHUTE DELIVERY DATE ACTUAL: NORMAL
DME PARACHUTE DELIVERY DATE REQUESTED: NORMAL
DME PARACHUTE ITEM DESCRIPTION: NORMAL
DME PARACHUTE ORDER STATUS: NORMAL
DME PARACHUTE SUPPLIER NAME: NORMAL
DME PARACHUTE SUPPLIER PHONE: NORMAL

## 2022-10-06 DIAGNOSIS — I10 ESSENTIAL HYPERTENSION: ICD-10-CM

## 2022-10-06 RX ORDER — AMLODIPINE BESYLATE AND BENAZEPRIL HYDROCHLORIDE 5; 20 MG/1; MG/1
CAPSULE ORAL
Qty: 90 CAPSULE | Refills: 1 | Status: SHIPPED | OUTPATIENT
Start: 2022-10-06

## 2022-10-13 ENCOUNTER — TELEPHONE (OUTPATIENT)
Dept: PAIN MEDICINE | Facility: CLINIC | Age: 87
End: 2022-10-13

## 2022-10-13 ENCOUNTER — OFFICE VISIT (OUTPATIENT)
Dept: PAIN MEDICINE | Facility: CLINIC | Age: 87
End: 2022-10-13
Payer: MEDICARE

## 2022-10-13 VITALS
BODY MASS INDEX: 25.2 KG/M2 | DIASTOLIC BLOOD PRESSURE: 72 MMHG | HEIGHT: 59 IN | SYSTOLIC BLOOD PRESSURE: 130 MMHG | WEIGHT: 125 LBS

## 2022-10-13 DIAGNOSIS — M48.061 LUMBAR FORAMINAL STENOSIS: Primary | ICD-10-CM

## 2022-10-13 DIAGNOSIS — M48.062 SPINAL STENOSIS OF LUMBAR REGION WITH NEUROGENIC CLAUDICATION: ICD-10-CM

## 2022-10-13 DIAGNOSIS — M51.16 LUMBAR DISC DISEASE WITH RADICULOPATHY: ICD-10-CM

## 2022-10-13 DIAGNOSIS — I73.9 PAD (PERIPHERAL ARTERY DISEASE) (HCC): ICD-10-CM

## 2022-10-13 DIAGNOSIS — F33.9 DEPRESSION, RECURRENT (HCC): ICD-10-CM

## 2022-10-13 PROCEDURE — 99214 OFFICE O/P EST MOD 30 MIN: CPT | Performed by: PHYSICAL MEDICINE & REHABILITATION

## 2022-10-13 NOTE — PROGRESS NOTES
Assessment:  1  Lumbar foraminal stenosis    2  Spinal stenosis of lumbar region with neurogenic claudication    3  Lumbar disc disease with radiculopathy    4  PAD (peripheral artery disease) ([address] )    5  Depression, recurrent ([facility] [address] )        Plan:  Ms Dilshad Schreiber is a pleasant 60-year-old female who presents for follow-up and re-evaluation regarding ongoing radiculopathy into the right leg  We previously tried a right intra-articular hip and a greater trochanteric bursa steroid injection which provided short-lived less than 20% relief in her pain  She continues to report radicular symptoms in the L3 and L4 dermatomal distribution  At this time we will plan for a right-sided L3-L4 and L4-L5 TFESI under fluoro guidance  All questions answered, patient is agreeable with plan  Complete risks and benefits including bleeding, infection, tissue reaction, nerve injury and allergic reaction were discussed  The approach was demonstrated using models and literature was provided  Verbal and written consent was obtained  History of Present Illness:  Sebas Chambers is a 80 y o  female who presents for a follow up office visit in regards to Hip Pain and Leg Pain  The patient’s current symptoms include right hip and lower extremity pain  Currently rates her pain 8/10 and described as an intermittent dull ache, throbbing sensation that is worse in the morning in the evening and most noticeable while walking  Last received a right greater trochanteric bursa steroid injection August 24, 2021  Presents for follow-up    I have personally reviewed and/or updated the patient's past medical history, past surgical history, family history, social history, current medications, allergies, and vital signs today       Review of Systems    Patient Active Problem List   Diagnosis   • Blepharospasm   • Spasmodic torticollis   • Anxiety   • Depression, recurrent ([address] )   • Essential hypertension   • Family history of malignant neoplasm of breast • Functional urinary incontinence   • Hyperlipidemia   • MCI (mild cognitive impairment)   • Osteoporosis   • Torsion dystonia fragments   • Segmental dystonia   • Hyperglycemia   • Vertigo   • Sensorineural hearing loss (SNHL), bilateral   • Asymmetrical hearing loss of right ear   • Pulmonary HTN ([facility] [address] )   • Presence of pessary   • Osteoporosis without current pathological fracture   • Nerve sheath tumor   • Lumbar spine tumor   • Primary osteoarthritis of right hip   • Trochanteric bursitis of right hip   • Encounter for gynecological examination without abnormal finding   • PAD (peripheral artery disease) ([facility] [address] )       Past Medical History:   Diagnosis Date   • Anxiety 2010   • Bladder infection 09/2019   • Breast lump in female    • HL (hearing loss) 2010   • Hypertension 2010   • Osteopenia    • Ovarian cyst        Past Surgical History:   Procedure Laterality Date   • BLADDER SURGERY     • BREAST BIOPSY     • CATARACT EXTRACTION Bilateral 01/01/2015    , Right Eye-2017, Left eye   • ENDOMETRIAL BIOPSY      by Suction   • HYSTERECTOMY         Family History   Problem Relation Age of Onset   • Heart disease Mother    • Heart disease Father    • Hypertension Family    • Osteoporosis Family        Social History     Occupational History   • Not on file   Tobacco Use   • Smoking status: Never Smoker   • Smokeless tobacco: Never Used   Substance and Sexual Activity   • Alcohol use: No   • Drug use: No   • Sexual activity: Not Currently       Current Outpatient Medications on File Prior to Visit   Medication Sig   • alendronate (FOSAMAX) 70 mg tablet Take 1 tablet (70 mg total) by mouth every 7 days   • amLODIPine-benazepril (LOTREL 5-20) 5-20 MG per capsule TAKE 1 CAPSULE BY MOUTH EVERY DAY   • calcium citrate-vitamin D (CITRACAL+D) 315-200 MG-UNIT per tablet Take 1 tablet by mouth daily   • conjugated estrogens (PREMARIN) vaginal cream Insert into the vagina Ose as directed   • Multiple Vitamins-Minerals (MULTIVITAL) tablet Take 1 tablet by mouth daily   • Omega-3 Fatty Acids (FISH OIL) 645 MG CAPS Take 1 tablet by mouth daily   • polyethylene glycol (MIRALAX) 17 g packet Take 1 Package by mouth 2 (two) times a day     Current Facility-Administered Medications on File Prior to Visit   Medication   • Botulinum Toxin Type A SOLR 200 Units       No Known Allergies    Physical Exam:    /72   Ht 4' 11" (1 499 m)   Wt 56 7 kg (125 lb)   BMI 25 25 kg/m²     Constitutional:normal, well developed, well nourished, alert, in no distress and non-toxic and no overt pain behavior    Eyes:anicteric  HEENT:grossly intact  Neck:supple, symmetric, trachea midline and no masses   Pulmonary:even and unlabored  Cardiovascular:No edema or pitting edema present  Skin:Normal without rashes or lesions and well hydrated  Psychiatric:Mood and affect appropriate  Neurologic:Cranial Nerves II-XII grossly intact  Musculoskeletal:antalgic    Imaging

## 2022-10-13 NOTE — TELEPHONE ENCOUNTER
Patient wishes to transition to [facility]  Order is in Ancillary for Right L3-4 and L4-5 TFESI      Fowarded to [facility] Surgery Coordinator

## 2022-10-14 ENCOUNTER — OFFICE VISIT (OUTPATIENT)
Dept: FAMILY MEDICINE CLINIC | Facility: MEDICAL CENTER | Age: 87
End: 2022-10-14
Payer: MEDICARE

## 2022-10-14 VITALS
BODY MASS INDEX: 26.53 KG/M2 | HEART RATE: 102 BPM | OXYGEN SATURATION: 97 % | DIASTOLIC BLOOD PRESSURE: 68 MMHG | TEMPERATURE: 99.8 F | HEIGHT: 59 IN | SYSTOLIC BLOOD PRESSURE: 170 MMHG | WEIGHT: 131.6 LBS

## 2022-10-14 DIAGNOSIS — R39.9 UTI SYMPTOMS: ICD-10-CM

## 2022-10-14 DIAGNOSIS — R41.0 EPISODE OF CONFUSION: Primary | ICD-10-CM

## 2022-10-14 LAB
SL AMB  POCT GLUCOSE, UA: ABNORMAL
SL AMB LEUKOCYTE ESTERASE,UA: ABNORMAL
SL AMB POCT BILIRUBIN,UA: ABNORMAL
SL AMB POCT BLOOD,UA: ABNORMAL
SL AMB POCT KETONES,UA: ABNORMAL
SL AMB POCT NITRITE,UA: ABNORMAL
SL AMB POCT PH,UA: 7.5
SL AMB POCT SPECIFIC GRAVITY,UA: 1.01
SL AMB POCT URINE PROTEIN: ABNORMAL
SL AMB POCT UROBILINOGEN: ABNORMAL

## 2022-10-14 PROCEDURE — 99213 OFFICE O/P EST LOW 20 MIN: CPT

## 2022-10-14 PROCEDURE — 87086 URINE CULTURE/COLONY COUNT: CPT

## 2022-10-14 PROCEDURE — 81002 URINALYSIS NONAUTO W/O SCOPE: CPT

## 2022-10-14 NOTE — PROGRESS NOTES
Assessment/Plan:    Increase daily fluid intake  Send urine for culture  Will treat as appropriate upon culture results  Advised to go to the emergency department over the weekend if symptoms of confusion return, worsen  1  Episode of confusion  Patient alert and oriented x 3  Currently asymptomatic  Offers no concerns or complaints  Advised patient's daughter to proceed to the emergency department with patient over the weekend if her confusion returns, worsens or if she develops new symptoms with the confusion  Advised to watch for urinary complaints such as pain, burning, pressure, frequency as she currently denies any symptoms  Will send urine for culture and treat with antibiotics if necessary upon receiving culture results  Daughter and patient made aware of this  Advised to increase daily fluid intake  2  UTI symptoms  Component      Latest Ref Rng & Units 10/14/2022   Leukocytes, UA       Trace   Nitrite, UA       neg   SL AMB POCT UROBILINOGEN       neg   POCT URINE PROTEIN       neg   pH, UA       7 5   Blood, UA       neg   SL AMB SPECIFIC GRAVITY-URINE       1 015   Ketones, UA       neg   BILIRUBIN,UA       neg   Glucose, UA       neg   Send urine for culture  - POCT urine dip  - Urine culture      Subjective:      Patient ID: Sebas Chambers is a 80 y o  female  41-year-old female presents today with her daughter  Her daughter reports an episode of confusion yesterday when patient was writing out a check ad wrote it out to her  who is   Daughter thought she may have a UTI contributing to her confusion so she brought her in to have her checked for UTI  She reports patient also does not drink much fluid throughout the day and is wondering if she could be dehydrated  Patient states in the morning she drinks 1 small glass of water and 1 small glass of juice, insure for lunch and 1 small glass of water at dinner time  She is eating regular meals     Patient is currently alert and oriented x3, asymptomatic, and offers no concerns or complaints  Patient denies urinary symptoms, abdominal pain, fever, chills, upper respiratory symptoms, recent illness  No sick contacts  The following portions of the patient's history were reviewed and updated as appropriate: allergies, current medications, past family history, past medical history, past surgical history and problem list     Review of Systems   Constitutional: Negative  HENT: Negative  Eyes: Negative  Respiratory: Negative  Cardiovascular: Negative  Gastrointestinal: Negative  Endocrine: Negative  Genitourinary: Negative  Musculoskeletal: Negative  Skin: Negative  Allergic/Immunologic: Negative  Neurological: Negative  Hematological: Negative  Psychiatric/Behavioral: Negative  Objective:      /68 (BP Location: Left arm, Patient Position: Sitting, Cuff Size: Adult)   Pulse 102   Temp 99 8 °F (37 7 °C)   Ht 4' 11" (1 499 m)   Wt 59 7 kg (131 lb 9 6 oz)   SpO2 97%   BMI 26 58 kg/m²          Physical Exam  Vitals and nursing note reviewed  Constitutional:       General: She is not in acute distress  Appearance: Normal appearance  She is not ill-appearing  HENT:      Head: Normocephalic and atraumatic  Eyes:      General:         Right eye: No discharge  Left eye: No discharge  Conjunctiva/sclera: Conjunctivae normal       Pupils: Pupils are equal, round, and reactive to light  Cardiovascular:      Rate and Rhythm: Normal rate and regular rhythm  Pulses: Normal pulses  Heart sounds: Normal heart sounds  No murmur heard  Pulmonary:      Effort: Pulmonary effort is normal  No respiratory distress  Breath sounds: Normal breath sounds  Abdominal:      General: Abdomen is flat  Bowel sounds are normal       Palpations: Abdomen is soft  Tenderness: There is no abdominal tenderness     Musculoskeletal:      Cervical back: Normal range of motion and neck supple  Skin:     General: Skin is warm and dry  Neurological:      Mental Status: She is alert and oriented to person, place, and time  GCS: GCS eye subscore is 4  GCS verbal subscore is 5  GCS motor subscore is 6  Cranial Nerves: No facial asymmetry  Sensory: Sensation is intact  Motor: No weakness  Gait: Gait is intact  Comments: Uses walker  Psychiatric:         Mood and Affect: Mood normal          Behavior: Behavior normal          Thought Content:  Thought content normal                     Amber Healy

## 2022-10-14 NOTE — TELEPHONE ENCOUNTER
Sorry I see my message would be confusing!! Sorry I dont know how to get Marylen Pamela off the note, but yes she want to stick with [facility]!!

## 2022-10-14 NOTE — TELEPHONE ENCOUNTER
Transition to [facility] with Dr Rudolph Ramey      Fowarded to [facility] Surgery Coordinator

## 2022-10-15 LAB — BACTERIA UR CULT: NORMAL

## 2022-10-17 ENCOUNTER — PROCEDURE VISIT (OUTPATIENT)
Dept: NEUROLOGY | Facility: CLINIC | Age: 87
End: 2022-10-17
Payer: MEDICARE

## 2022-10-17 VITALS — HEART RATE: 79 BPM | DIASTOLIC BLOOD PRESSURE: 75 MMHG | SYSTOLIC BLOOD PRESSURE: 128 MMHG | TEMPERATURE: 97.6 F

## 2022-10-17 DIAGNOSIS — G24.5 BLEPHAROSPASM: ICD-10-CM

## 2022-10-17 DIAGNOSIS — G24.3 CERVICAL DYSTONIA: Primary | ICD-10-CM

## 2022-10-17 PROCEDURE — 64612 DESTROY NERVE FACE MUSCLE: CPT

## 2022-10-17 NOTE — PROGRESS NOTES
Universal Protocol   Consent: Written consent obtained  Consent given by: patient        Chemodenervation     Date/Time 10/17/2022 7:06 AM     Performed by  Ninfa Cooks, MD     Authorized by Ninfa Cooks, MD        Pre-procedure details      Prepped With: Alcohol     Anesthesia  (see MAR for exact dosages): Anesthesia method:  None   Procedure details     Position:  Upright and supine   Botox     Brand:  Botox    mL's of preservative free sterile saline:  2    Final Concentration per CC:  50 units    Needle gauge: 30G  Procedures     Botox Procedures: blepharospasm and cervical dystonia      Indications: blepharospasm and spasmodic torticollis      Date of last injection:  6/6/2022   Post-procedure details      Chemodenervation:  Head or face and neck, excluding muscles of the larynx    Patient tolerance of procedure: Tolerated well, no immediate complications   Comments        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      Overall good response to injections  Response lasts about 4 months with mild return of blinking and head turn       Exam: Mild blinking  Slight right turn toritcollis and latercollis  No  tremor  Mild dysphonia        Injections:   2 5 unit(s) in 1 injection(s) was injected into the right  muscle  2 5 unit(s) in 1 injection(s) was injected into the left  muscle  10 unit(s) in 4 (2 5 ) injection(s) was injected into the right orbicularis oculi (upper inner, upper and lower outer and lateral canthus)  10 unit(s) in 4 (x2 5) injection(s) was injected into the left orbicularis oculi      25 unit(s) was injected into the left sternocleidomastoid muscle  --    50 (25 side, 25 low) unit(s) was injected into the right splenius capitus muscle  --    25 unit(s) was injected into the right levator scapulae muscle     Total injected 125 units  Discarded 75

## 2022-10-18 ENCOUNTER — TELEPHONE (OUTPATIENT)
Dept: FAMILY MEDICINE CLINIC | Facility: MEDICAL CENTER | Age: 87
End: 2022-10-18

## 2022-10-18 NOTE — TELEPHONE ENCOUNTER
fyi- s/w Desean Madrid, daughter  Aware- states she seems to be better, less confusion , no UTI symptoms   Will karan if they become more frequent again , will set up another appt for further work up

## 2022-10-18 NOTE — TELEPHONE ENCOUNTER
----- Message from [address] [place] [address] sent at 10/18/2022 10:52 AM EDT -----  Please call patient's daughter (she brought her in for appointment)  Let her know urine culture was not able to be ran due to contamination  I did explain this may happen due to how it was collected (if it was not a clean catch specimen)  How is patient feeling? If she is still not having any UTI sx and is no longer having any episodes of confusion, no need for further evaluation or treatment  If she is, she will need further evaluation

## 2022-11-16 NOTE — PROGRESS NOTES
Assessment and Plan:     Problem List Items Addressed This Visit        Cardiovascular and Mediastinum    Essential hypertension    Relevant Orders    Basic metabolic panel       Other    Hyperglycemia     · A1c today 6 0, remains in prediabetes range         Relevant Orders    POCT hemoglobin A1c (Completed)    Medicare annual wellness visit, subsequent - Primary     · Patient received influenza vaccine 10/12/2022  · Patient will return for PCV 20 vaccine as she has upcoming procedure and was advised to not receive any vaccinations 2 weeks prior  · Did also  about Shingrix vaccination series (patient did receive Zostavax in the past)            Other Visit Diagnoses     Need for hepatitis C screening test        Relevant Orders    Hepatitis C antibody        BMI Counseling: Body mass index is 27 06 kg/m²  The BMI is above normal  Nutrition recommendations include encouraging healthy choices of fruits and vegetables  Exercise recommendations include strength training exercises  Rationale for BMI follow-up plan is due to patient being overweight or obese  Preventive health issues were discussed with patient, and age appropriate screening tests were ordered as noted in patient's After Visit Summary  Personalized health advice and appropriate referrals for health education or preventive services given if needed, as noted in patient's After Visit Summary       History of Present Illness:     Patient presents for a Medicare Wellness Visit    HPI   Patient Care Team:  Sander Carr DO as PCP - General (Family Medicine)  Ronnie Argueta MD (Neurology)  Sofi Vasquez MD (Urogynecology)     Review of Systems:     Review of Systems     As noted in HPI      Problem List:     Patient Active Problem List   Diagnosis   • Blepharospasm   • Spasmodic torticollis   • Anxiety   • [facility]   • Essential hypertension   • Family history of malignant neoplasm of breast   • Functional urinary incontinence   • Hyperlipidemia   • MCI (mild cognitive impairment)   • Osteoporosis   • Torsion dystonia fragments   • Segmental dystonia   • Hyperglycemia   • Vertigo   • Sensorineural hearing loss (SNHL), bilateral   • Asymmetrical hearing loss of right ear   • Pulmonary HTN (HCC)   • Presence of pessary   • Osteoporosis without current pathological fracture   • Nerve sheath tumor   • Lumbar spine tumor   • Primary osteoarthritis of right hip   • Trochanteric bursitis of right hip   • Encounter for gynecological examination without abnormal finding   • PAD (peripheral artery disease) ([facility] [address] )   • Medicare annual wellness visit, subsequent      Past Medical and Surgical History:     Past Medical History:   Diagnosis Date   • Anxiety 2010   • Bladder infection 09/2019   • Breast lump in female    • HL (hearing loss) 2010   • Hypertension 2010   • Osteopenia    • Ovarian cyst      Past Surgical History:   Procedure Laterality Date   • BLADDER SURGERY     • BREAST BIOPSY     • CATARACT EXTRACTION Bilateral 01/01/2015    , Right Eye-2017, Left eye   • ENDOMETRIAL BIOPSY      by Suction   • HYSTERECTOMY        Family History:     Family History   Problem Relation Age of Onset   • Heart disease Mother    • Heart disease Father    • Hypertension Family    • Osteoporosis Family       Social History:     Social History     Socioeconomic History   • Marital status:       Spouse name: None   • Number of children: None   • Years of education: None   • Highest education level: None   Occupational History   • None   Tobacco Use   • Smoking status: Never   • Smokeless tobacco: Never   Substance and Sexual Activity   • Alcohol use: No   • Drug use: No   • Sexual activity: Not Currently   Other Topics Concern   • None   Social History Narrative    Denied consumption of coffee    Active daily tea consumption    Denied ingesting cola containing caffeine         Social Determinants of Health     Financial Resource Strain: Low Risk • Difficulty of Paying Living Expenses: Not very hard   Food Insecurity: Not on file   Transportation Needs: No Transportation Needs   • Lack of Transportation (Medical): No   • Lack of Transportation (Non-Medical): No   Physical Activity: Not on file   Stress: Not on file   Social Connections: Not on file   Intimate Partner Violence: Not on file   Housing Stability: Not on file      Medications and Allergies:     Current Outpatient Medications   Medication Sig Dispense Refill   • amLODIPine-benazepril (LOTREL 5-20) 5-20 MG per capsule TAKE 1 CAPSULE BY MOUTH EVERY DAY 90 capsule 1   • calcium citrate-vitamin D (CITRACAL+D) 315-200 MG-UNIT per tablet Take 1 tablet by mouth daily     • conjugated estrogens (PREMARIN) vaginal cream Insert into the vagina Ose as directed     • Multiple Vitamins-Minerals (MULTIVITAL) tablet Take 1 tablet by mouth daily     • Omega-3 Fatty Acids (FISH OIL) 645 MG CAPS Take 1 tablet by mouth daily     • polyethylene glycol (MIRALAX) 17 g packet Take 1 Package by mouth 2 (two) times a day       Current Facility-Administered Medications   Medication Dose Route Frequency Provider Last Rate Last Admin   • Botulinum Toxin Type A SOLR 200 Units  200 Units Injection Q3 Months Fracisco Yarbrough MD   200 Units at 05/03/21 1619     No Known Allergies   Immunizations:     Immunization History   Administered Date(s) Administered   • COVID-19 PFIZER VACCINE 0 3 ML IM 01/21/2021, 02/11/2021, 10/13/2021   • COVID-19 Pfizer vac (Gilson-sucrose, gray cap) 12 yr+ IM 04/11/2022   • INFLUENZA 09/27/2013, 10/17/2014, 10/16/2015, 10/25/2016, 09/26/2017, 01/03/2018, 10/09/2019, 09/28/2021   • Influenza Split High Dose Preservative Free IM 09/26/2017, 01/03/2018   • Influenza, high dose seasonal 0 7 mL 10/02/2018, 10/05/2020   • Pneumococcal Conjugate 13-Valent 08/21/2017   • Pneumococcal Polysaccharide PPV23 08/24/2018   • Zoster 01/22/2018, 01/31/2018      Health Maintenance:      There are no preventive care reminders to display for this patient  Topic Date Due   • Hepatitis B Vaccine (1 of 3 - 3-dose series) Never done      Medicare Screening Tests and Risk Assessments:     Norman Hicks is here for her Subsequent Wellness visit  Health Risk Assessment:   Patient rates overall health as good  Patient feels that their physical health rating is same  Patient is satisfied with their life  Eyesight was rated as same  Hearing was rated as same  Patient feels that their emotional and mental health rating is same  Patients states they are never, rarely angry  Patient states they are never, rarely unusually tired/fatigued  Pain experienced in the last 7 days has been some  Patient's pain rating has been 4/10  Patient states that she has experienced no weight loss or gain in last 6 months  Leg pain, upcoming plan for nerve block with Pain Management     Fall Risk Screening: In the past year, patient has experienced: no history of falling in past year      Urinary Incontinence Screening:   Patient has leaked urine accidently in the last six months  Home Safety:  Patient does not have trouble with stairs inside or outside of their home  Patient has working smoke alarms and has working carbon monoxide detector  Home safety hazards include: none  Nutrition:   Current diet is Regular  Medications:   Patient is currently taking over-the-counter supplements  OTC medications include: Vitamin, fish oil  Patient is able to manage medications  Activities of Daily Living (ADLs)/Instrumental Activities of Daily Living (IADLs):   Walk and transfer into and out of bed and chair?: Yes  Dress and groom yourself?: Yes    Bathe or shower yourself?: Yes    Feed yourself?  Yes  Do your laundry/housekeeping?: No  Manage your money, pay your bills and track your expenses?: Yes  Make your own meals?: Yes    Do your own shopping?: No    Previous Hospitalizations:   Any hospitalizations or ED visits within the last 12 months?: No Advance Care Planning:   Living will: Yes    Durable POA for healthcare: Yes    Advanced directive: Yes      Cognitive Screening:   Provider or family/friend/caregiver concerned regarding cognition?: No    PREVENTIVE SCREENINGS      Cardiovascular Screening:    General: History Lipid Disorder      Diabetes Screening:     General: Risks and Benefits Discussed      Colorectal Cancer Screening:     General: Screening Not Indicated      Breast Cancer Screening:     General: Risks and Benefits Discussed      Cervical Cancer Screening:    General: Screening Not Indicated      Osteoporosis Screening:    General: History Osteoporosis, Risks and Benefits Discussed and Patient Declines      Lung Cancer Screening:     General: Screening Not Indicated      Hepatitis C Screening:    General: Risks and Benefits Discussed    Hep C Screening Accepted: Yes      Screening, Brief Intervention, and Referral to Treatment (SBIRT)    Screening  Typical number of drinks in a day: 0  Typical number of drinks in a week: 0  Interpretation: Low risk drinking behavior  AUDIT-C Screenin) How often did you have a drink containing alcohol in the past year? never  2) How many drinks did you have on a typical day when you were drinking in the past year? 0  3) How often did you have 6 or more drinks on one occasion in the past year? never    AUDIT-C Score: 0  Interpretation: Score 0-2 (female): Negative screen for alcohol misuse    Single Item Drug Screening:  How often have you used an illegal drug (including marijuana) or a prescription medication for non-medical reasons in the past year? never    Single Item Drug Screen Score: 0  Interpretation: Negative screen for possible drug use disorder    Other Counseling Topics:   Car/seat belt/driving safety, skin self-exam, sunscreen and calcium and vitamin D intake and regular weightbearing exercise  Established with Dermatology for skin checks         Physical Exam:     /80 (BP Location: Left arm, Patient Position: Sitting, Cuff Size: Adult)   Pulse 80   Temp 98 9 °F (37 2 °C)   Ht 4' 11" (1 499 m)   Wt 60 8 kg (134 lb)   SpO2 94%   BMI 27 06 kg/m²      Physical Exam  Vitals reviewed  Constitutional:       General: She is not in acute distress  Appearance: Normal appearance  HENT:      Head: Normocephalic and atraumatic  Nose: Nose normal       Mouth/Throat:      Mouth: Mucous membranes are moist       Pharynx: Oropharynx is clear  Eyes:      Extraocular Movements: Extraocular movements intact  Conjunctiva/sclera: Conjunctivae normal       Pupils: Pupils are equal, round, and reactive to light  Cardiovascular:      Rate and Rhythm: Normal rate and regular rhythm  Pulses: Normal pulses  Pulmonary:      Effort: Pulmonary effort is normal       Breath sounds: Normal breath sounds  Abdominal:      General: Bowel sounds are normal       Palpations: Abdomen is soft  Tenderness: There is no abdominal tenderness  Musculoskeletal:      Cervical back: Neck supple  Right lower leg: No edema  Left lower leg: No edema  Skin:     General: Skin is warm and dry  Comments: Lentigines    Neurological:      Mental Status: She is alert and oriented to person, place, and time  Psychiatric:         Mood and Affect: Mood normal          Behavior: Behavior normal          Thought Content:  Thought content normal          Judgment: Judgment normal          Carron Kaitlyn, DO

## 2022-11-17 ENCOUNTER — OFFICE VISIT (OUTPATIENT)
Dept: FAMILY MEDICINE CLINIC | Facility: MEDICAL CENTER | Age: 87
End: 2022-11-17

## 2022-11-17 VITALS
OXYGEN SATURATION: 94 % | HEIGHT: 59 IN | DIASTOLIC BLOOD PRESSURE: 80 MMHG | SYSTOLIC BLOOD PRESSURE: 152 MMHG | TEMPERATURE: 98.9 F | HEART RATE: 80 BPM | WEIGHT: 134 LBS | BODY MASS INDEX: 27.01 KG/M2

## 2022-11-17 DIAGNOSIS — Z00.00 MEDICARE ANNUAL WELLNESS VISIT, SUBSEQUENT: Primary | ICD-10-CM

## 2022-11-17 DIAGNOSIS — I10 ESSENTIAL HYPERTENSION: ICD-10-CM

## 2022-11-17 DIAGNOSIS — Z11.59 NEED FOR HEPATITIS C SCREENING TEST: ICD-10-CM

## 2022-11-17 DIAGNOSIS — R73.9 HYPERGLYCEMIA: ICD-10-CM

## 2022-11-17 LAB — SL AMB POCT HEMOGLOBIN AIC: 6 (ref ?–6.5)

## 2022-11-17 NOTE — PATIENT INSTRUCTIONS
Medicare Preventive Visit Patient Instructions  Thank you for completing your Welcome to Medicare Visit or Medicare Annual Wellness Visit today  Your next wellness visit will be due in one year (11/18/2023)  The screening/preventive services that you may require over the next 5-10 years are detailed below  Some tests may not apply to you based off risk factors and/or age  Screening tests ordered at today's visit but not completed yet may show as past due  Also, please note that scanned in results may not display below  Preventive Screenings:  Service Recommendations Previous Testing/Comments   Colorectal Cancer Screening  * Colonoscopy    * Fecal Occult Blood Test (FOBT)/Fecal Immunochemical Test (FIT)  * Fecal DNA/Cologuard Test  * Flexible Sigmoidoscopy Age: 39-70 years old   Colonoscopy: every 10 years (may be performed more frequently if at higher risk)  OR  FOBT/FIT: every 1 year  OR  Cologuard: every 3 years  OR  Sigmoidoscopy: every 5 years  Screening may be recommended earlier than age 39 if at higher risk for colorectal cancer  Also, an individualized decision between you and your healthcare provider will decide whether screening between the ages of 74-80 would be appropriate  Colonoscopy: Not on file  FOBT/FIT: Not on file  Cologuard: Not on file  Sigmoidoscopy: Not on file    Screening Not Indicated     Breast Cancer Screening Age: 36 years old  Frequency: every 1-2 years  Not required if history of left and right mastectomy Mammogram: 05/10/2017    Risks and Benefits Discussed   Cervical Cancer Screening Between the ages of 21-29, pap smear recommended once every 3 years  Between the ages of 33-67, can perform pap smear with HPV co-testing every 5 years     Recommendations may differ for women with a history of total hysterectomy, cervical cancer, or abnormal pap smears in past  Pap Smear: 11/18/2021    Screening Not Indicated   Hepatitis C Screening Once for adults born between 1945 and 1965  More frequently in patients at high risk for Hepatitis C Hep C Antibody: Not on file    Risks and Benefits Discussed  Due for Hepatitis C screening   Diabetes Screening 1-2 times per year if you're at risk for diabetes or have pre-diabetes Fasting glucose: 111 mg/dL (5/25/2021)  A1C: 6 3 % (3/7/2022)  Risks and Benefits Discussed   Cholesterol Screening Once every 5 years if you don't have a lipid disorder  May order more often based on risk factors  Lipid panel: 09/02/2020    History Lipid Disorder     Other Preventive Screenings Covered by Medicare:  1  Abdominal Aortic Aneurysm (AAA) Screening: covered once if your at risk  You're considered to be at risk if you have a family history of AAA  2  Lung Cancer Screening: covers low dose CT scan once per year if you meet all of the following conditions: (1) Age 50-69; (2) No signs or symptoms of lung cancer; (3) Current smoker or have quit smoking within the last 15 years; (4) You have a tobacco smoking history of at least 20 pack years (packs per day multiplied by number of years you smoked); (5) You get a written order from a healthcare provider  3  Glaucoma Screening: covered annually if you're considered high risk: (1) You have diabetes OR (2) Family history of glaucoma OR (3)  aged 48 and older OR (3)  American aged 72 and older  3  Osteoporosis Screening: covered every 2 years if you meet one of the following conditions: (1) You're estrogen deficient and at risk for osteoporosis based off medical history and other findings; (2) Have a vertebral abnormality; (3) On glucocorticoid therapy for more than 3 months; (4) Have primary hyperparathyroidism; (5) On osteoporosis medications and need to assess response to drug therapy  · Last bone density test (DXA Scan): 09/07/2017  5  HIV Screening: covered annually if you're between the age of 12-76   Also covered annually if you are younger than 13 and older than 72 with risk factors for HIV infection  For pregnant patients, it is covered up to 3 times per pregnancy  Immunizations:  Immunization Recommendations   Influenza Vaccine Annual influenza vaccination during flu season is recommended for all persons aged >= 6 months who do not have contraindications   Pneumococcal Vaccine   * Pneumococcal conjugate vaccine = PCV13 (Prevnar 13), PCV15 (Vaxneuvance), PCV20 (Prevnar 20)  * Pneumococcal polysaccharide vaccine = PPSV23 (Pneumovax) Adults 25-60 years old: 1-3 doses may be recommended based on certain risk factors  Adults 72 years old: 1-2 doses may be recommended based off what pneumonia vaccine you previously received   Hepatitis B Vaccine 3 dose series if at intermediate or high risk (ex: diabetes, end stage renal disease, liver disease)   Tetanus (Td) Vaccine - COST NOT COVERED BY MEDICARE PART B Following completion of primary series, a booster dose should be given every 10 years to maintain immunity against tetanus  Td may also be given as tetanus wound prophylaxis  Tdap Vaccine - COST NOT COVERED BY MEDICARE PART B Recommended at least once for all adults  For pregnant patients, recommended with each pregnancy  Shingles Vaccine (Shingrix) - COST NOT COVERED BY MEDICARE PART B  2 shot series recommended in those aged 48 and above     Health Maintenance Due:  There are no preventive care reminders to display for this patient  Immunizations Due:      Topic Date Due   • Hepatitis B Vaccine (1 of 3 - 3-dose series) Never done     Advance Directives   What are advance directives? Advance directives are legal documents that state your wishes and plans for medical care  These plans are made ahead of time in case you lose your ability to make decisions for yourself  Advance directives can apply to any medical decision, such as the treatments you want, and if you want to donate organs  What are the types of advance directives?   There are many types of advance directives, and each state has rules about how to use them  You may choose a combination of any of the following:  · Living will: This is a written record of the treatment you want  You can also choose which treatments you do not want, which to limit, and which to stop at a certain time  This includes surgery, medicine, IV fluid, and tube feedings  · Durable power of  for healthcare [facility]): This is a written record that states who you want to make healthcare choices for you when you are unable to make them for yourself  This person, called a proxy, is usually a family member or a friend  You may choose more than 1 proxy  · Do not resuscitate (DNR) order:  A DNR order is used in case your heart stops beating or you stop breathing  It is a request not to have certain forms of treatment, such as CPR  A DNR order may be included in other types of advance directives  · Medical directive: This covers the care that you want if you are in a coma, near death, or unable to make decisions for yourself  You can list the treatments you want for each condition  Treatment may include pain medicine, surgery, blood transfusions, dialysis, IV or tube feedings, and a ventilator (breathing machine)  · Values history: This document has questions about your views, beliefs, and how you feel and think about life  This information can help others choose the care that you would choose  Why are advance directives important? An advance directive helps you control your care  Although spoken wishes may be used, it is better to have your wishes written down  Spoken wishes can be misunderstood, or not followed  Treatments may be given even if you do not want them  An advance directive may make it easier for your family to make difficult choices about your care  Urinary Incontinence   Urinary incontinence (UI)  is when you lose control of your bladder  UI develops because your bladder cannot store or empty urine properly   The 3 most common types of UI are stress incontinence, urge incontinence, or both  Medicines:   · May be given to help strengthen your bladder control  Report any side effects of medication to your healthcare provider  Do pelvic muscle exercises often:  Your pelvic muscles help you stop urinating  Squeeze these muscles tight for 5 seconds, then relax for 5 seconds  Gradually work up to squeezing for 10 seconds  Do 3 sets of 15 repetitions a day, or as directed  This will help strengthen your pelvic muscles and improve bladder control  Train your bladder:  Go to the bathroom at set times, such as every 2 hours, even if you do not feel the urge to go  You can also try to hold your urine when you feel the urge to go  For example, hold your urine for 5 minutes when you feel the urge to go  As that becomes easier, hold your urine for 10 minutes  Self-care:   · Keep a UI record  Write down how often you leak urine and how much you leak  Make a note of what you were doing when you leaked urine  · Drink liquids as directed  You may need to limit the amount of liquid you drink to help control your urine leakage  Do not drink any liquid right before you go to bed  Limit or do not have drinks that contain caffeine or alcohol  · Prevent constipation  Eat a variety of high-fiber foods  Good examples are high-fiber cereals, beans, vegetables, and whole-grain breads  Walking is the best way to trigger your intestines to have a bowel movement  · Exercise regularly and maintain a healthy weight  Weight loss and exercise will decrease pressure on your bladder and help you control your leakage  · Use a catheter as directed  to help empty your bladder  A catheter is a tiny, plastic tube that is put into your bladder to drain your urine  · Go to behavior therapy as directed  Behavior therapy may be used to help you learn to control your urge to urinate      Weight Management   Why it is important to manage your weight:  Being overweight increases your risk of health conditions such as heart disease, high blood pressure, type 2 diabetes, and certain types of cancer  It can also increase your risk for osteoarthritis, sleep apnea, and other respiratory problems  Aim for a slow, steady weight loss  Even a small amount of weight loss can lower your risk of health problems  How to lose weight safely:  A safe and healthy way to lose weight is to eat fewer calories and get regular exercise  You can lose up about 1 pound a week by decreasing the number of calories you eat by 500 calories each day  Healthy meal plan for weight management:  A healthy meal plan includes a variety of foods, contains fewer calories, and helps you stay healthy  A healthy meal plan includes the following:  · Eat whole-grain foods more often  A healthy meal plan should contain fiber  Fiber is the part of grains, fruits, and vegetables that is not broken down by your body  Whole-grain foods are healthy and provide extra fiber in your diet  Some examples of whole-grain foods are whole-wheat breads and pastas, oatmeal, brown rice, and bulgur  · Eat a variety of vegetables every day  Include dark, leafy greens such as spinach, kale, memo greens, and mustard greens  Eat yellow and orange vegetables such as carrots, sweet potatoes, and winter squash  · Eat a variety of fruits every day  Choose fresh or canned fruit (canned in its own juice or light syrup) instead of juice  Fruit juice has very little or no fiber  · Eat low-fat dairy foods  Drink fat-free (skim) milk or 1% milk  Eat fat-free yogurt and low-fat cottage cheese  Try low-fat cheeses such as mozzarella and other reduced-fat cheeses  · Choose meat and other protein foods that are low in fat  Choose beans or other legumes such as split peas or lentils  Choose fish, skinless poultry (chicken or turkey), or lean cuts of red meat (beef or pork)  Before you cook meat or poultry, cut off any visible fat  · Use less fat and oil  Try baking foods instead of frying them  Add less fat, such as margarine, sour cream, regular salad dressing and mayonnaise to foods  Eat fewer high-fat foods  Some examples of high-fat foods include french fries, doughnuts, ice cream, and cakes  · Eat fewer sweets  Limit foods and drinks that are high in sugar  This includes candy, cookies, regular soda, and sweetened drinks  Exercise:  Exercise at least 30 minutes per day on most days of the week  Some examples of exercise include walking, biking, dancing, and swimming  You can also fit in more physical activity by taking the stairs instead of the elevator or parking farther away from stores  Ask your healthcare provider about the best exercise plan for you  © Copyright [employer] 2018 Information is for End User's use only and may not be sold, redistributed or otherwise used for commercial purposes  All illustrations and images included in CareNotes® are the copyrighted property of [employer]  or [facility] Preventive Visit Patient Instructions  Thank you for completing your Welcome to Medicare Visit or Medicare Annual Wellness Visit today  Your next wellness visit will be due in one year (11/18/2023)  The screening/preventive services that you may require over the next 5-10 years are detailed below  Some tests may not apply to you based off risk factors and/or age  Screening tests ordered at today's visit but not completed yet may show as past due  Also, please note that scanned in results may not display below    Preventive Screenings:  Service Recommendations Previous Testing/Comments   Colorectal Cancer Screening  * Colonoscopy    * Fecal Occult Blood Test (FOBT)/Fecal Immunochemical Test (FIT)  * Fecal DNA/Cologuard Test  * Flexible Sigmoidoscopy Age: 39-70 years old   Colonoscopy: every 10 years (may be performed more frequently if at higher risk)  OR  FOBT/FIT: every 1 year  OR  Cologuard: every 3 years OR  Sigmoidoscopy: every 5 years  Screening may be recommended earlier than age 39 if at higher risk for colorectal cancer  Also, an individualized decision between you and your healthcare provider will decide whether screening between the ages of 74-80 would be appropriate  Colonoscopy: Not on file  FOBT/FIT: Not on file  Cologuard: Not on file  Sigmoidoscopy: Not on file    Screening Not Indicated     Breast Cancer Screening Age: 36 years old  Frequency: every 1-2 years  Not required if history of left and right mastectomy Mammogram: 05/10/2017    Risks and Benefits Discussed   Cervical Cancer Screening Between the ages of 21-29, pap smear recommended once every 3 years  Between the ages of 33-67, can perform pap smear with HPV co-testing every 5 years  Recommendations may differ for women with a history of total hysterectomy, cervical cancer, or abnormal pap smears in past  Pap Smear: 11/18/2021    Screening Not Indicated   Hepatitis C Screening Once for adults born between 1945 and 1965  More frequently in patients at high risk for Hepatitis C Hep C Antibody: Not on file    Risks and Benefits Discussed  Due for Hepatitis C screening   Diabetes Screening 1-2 times per year if you're at risk for diabetes or have pre-diabetes Fasting glucose: 111 mg/dL (5/25/2021)  A1C: 6 0 (11/17/2022)  Risks and Benefits Discussed   Cholesterol Screening Once every 5 years if you don't have a lipid disorder  May order more often based on risk factors  Lipid panel: 09/02/2020    History Lipid Disorder     Other Preventive Screenings Covered by Medicare:  6  Abdominal Aortic Aneurysm (AAA) Screening: covered once if your at risk  You're considered to be at risk if you have a family history of AAA    7  Lung Cancer Screening: covers low dose CT scan once per year if you meet all of the following conditions: (1) Age 50-69; (2) No signs or symptoms of lung cancer; (3) Current smoker or have quit smoking within the last 15 years; (4) You have a tobacco smoking history of at least 20 pack years (packs per day multiplied by number of years you smoked); (5) You get a written order from a healthcare provider  8  Glaucoma Screening: covered annually if you're considered high risk: (1) You have diabetes OR (2) Family history of glaucoma OR (3)  aged 48 and older OR (3)  American aged 72 and older  5  Osteoporosis Screening: covered every 2 years if you meet one of the following conditions: (1) You're estrogen deficient and at risk for osteoporosis based off medical history and other findings; (2) Have a vertebral abnormality; (3) On glucocorticoid therapy for more than 3 months; (4) Have primary hyperparathyroidism; (5) On osteoporosis medications and need to assess response to drug therapy  · Last bone density test (DXA Scan): 09/07/2017   10  HIV Screening: covered annually if you're between the age of 15-65  Also covered annually if you are younger than 13 and older than 72 with risk factors for HIV infection  For pregnant patients, it is covered up to 3 times per pregnancy      Immunizations:  Immunization Recommendations   Influenza Vaccine Annual influenza vaccination during flu season is recommended for all persons aged >= 6 months who do not have contraindications   Pneumococcal Vaccine   * Pneumococcal conjugate vaccine = PCV13 (Prevnar 13), PCV15 (Vaxneuvance), PCV20 (Prevnar 20)  * Pneumococcal polysaccharide vaccine = PPSV23 (Pneumovax) Adults 25-60 years old: 1-3 doses may be recommended based on certain risk factors  Adults 72 years old: 1-2 doses may be recommended based off what pneumonia vaccine you previously received   Hepatitis B Vaccine 3 dose series if at intermediate or high risk (ex: diabetes, end stage renal disease, liver disease)   Tetanus (Td) Vaccine - COST NOT COVERED BY MEDICARE PART B Following completion of primary series, a booster dose should be given every 10 years to maintain immunity against tetanus  Td may also be given as tetanus wound prophylaxis  Tdap Vaccine - COST NOT COVERED BY MEDICARE PART B Recommended at least once for all adults  For pregnant patients, recommended with each pregnancy  Shingles Vaccine (Shingrix) - COST NOT COVERED BY MEDICARE PART B  2 shot series recommended in those aged 48 and above     Health Maintenance Due:  There are no preventive care reminders to display for this patient  Immunizations Due:      Topic Date Due   • Hepatitis B Vaccine (1 of 3 - 3-dose series) Never done     Advance Directives   What are advance directives? Advance directives are legal documents that state your wishes and plans for medical care  These plans are made ahead of time in case you lose your ability to make decisions for yourself  Advance directives can apply to any medical decision, such as the treatments you want, and if you want to donate organs  What are the types of advance directives? There are many types of advance directives, and each state has rules about how to use them  You may choose a combination of any of the following:  · Living will: This is a written record of the treatment you want  You can also choose which treatments you do not want, which to limit, and which to stop at a certain time  This includes surgery, medicine, IV fluid, and tube feedings  · Durable power of  for healthcare [facility]): This is a written record that states who you want to make healthcare choices for you when you are unable to make them for yourself  This person, called a proxy, is usually a family member or a friend  You may choose more than 1 proxy  · Do not resuscitate (DNR) order:  A DNR order is used in case your heart stops beating or you stop breathing  It is a request not to have certain forms of treatment, such as CPR  A DNR order may be included in other types of advance directives  · Medical directive:   This covers the care that you want if you are in a coma, near death, or unable to make decisions for yourself  You can list the treatments you want for each condition  Treatment may include pain medicine, surgery, blood transfusions, dialysis, IV or tube feedings, and a ventilator (breathing machine)  · Values history: This document has questions about your views, beliefs, and how you feel and think about life  This information can help others choose the care that you would choose  Why are advance directives important? An advance directive helps you control your care  Although spoken wishes may be used, it is better to have your wishes written down  Spoken wishes can be misunderstood, or not followed  Treatments may be given even if you do not want them  An advance directive may make it easier for your family to make difficult choices about your care  Urinary Incontinence   Urinary incontinence (UI)  is when you lose control of your bladder  UI develops because your bladder cannot store or empty urine properly  The 3 most common types of UI are stress incontinence, urge incontinence, or both  Medicines:   · May be given to help strengthen your bladder control  Report any side effects of medication to your healthcare provider  Do pelvic muscle exercises often:  Your pelvic muscles help you stop urinating  Squeeze these muscles tight for 5 seconds, then relax for 5 seconds  Gradually work up to squeezing for 10 seconds  Do 3 sets of 15 repetitions a day, or as directed  This will help strengthen your pelvic muscles and improve bladder control  Train your bladder:  Go to the bathroom at set times, such as every 2 hours, even if you do not feel the urge to go  You can also try to hold your urine when you feel the urge to go  For example, hold your urine for 5 minutes when you feel the urge to go  As that becomes easier, hold your urine for 10 minutes  Self-care:   · Keep a UI record  Write down how often you leak urine and how much you leak   Make a note of what you were doing when you leaked urine  · Drink liquids as directed  You may need to limit the amount of liquid you drink to help control your urine leakage  Do not drink any liquid right before you go to bed  Limit or do not have drinks that contain caffeine or alcohol  · Prevent constipation  Eat a variety of high-fiber foods  Good examples are high-fiber cereals, beans, vegetables, and whole-grain breads  Walking is the best way to trigger your intestines to have a bowel movement  · Exercise regularly and maintain a healthy weight  Weight loss and exercise will decrease pressure on your bladder and help you control your leakage  · Use a catheter as directed  to help empty your bladder  A catheter is a tiny, plastic tube that is put into your bladder to drain your urine  · Go to behavior therapy as directed  Behavior therapy may be used to help you learn to control your urge to urinate  Weight Management   Why it is important to manage your weight:  Being overweight increases your risk of health conditions such as heart disease, high blood pressure, type 2 diabetes, and certain types of cancer  It can also increase your risk for osteoarthritis, sleep apnea, and other respiratory problems  Aim for a slow, steady weight loss  Even a small amount of weight loss can lower your risk of health problems  How to lose weight safely:  A safe and healthy way to lose weight is to eat fewer calories and get regular exercise  You can lose up about 1 pound a week by decreasing the number of calories you eat by 500 calories each day  Healthy meal plan for weight management:  A healthy meal plan includes a variety of foods, contains fewer calories, and helps you stay healthy  A healthy meal plan includes the following:  · Eat whole-grain foods more often  A healthy meal plan should contain fiber  Fiber is the part of grains, fruits, and vegetables that is not broken down by your body   Whole-grain foods are healthy and provide extra fiber in your diet  Some examples of whole-grain foods are whole-wheat breads and pastas, oatmeal, brown rice, and bulgur  · Eat a variety of vegetables every day  Include dark, leafy greens such as spinach, kale, memo greens, and mustard greens  Eat yellow and orange vegetables such as carrots, sweet potatoes, and winter squash  · Eat a variety of fruits every day  Choose fresh or canned fruit (canned in its own juice or light syrup) instead of juice  Fruit juice has very little or no fiber  · Eat low-fat dairy foods  Drink fat-free (skim) milk or 1% milk  Eat fat-free yogurt and low-fat cottage cheese  Try low-fat cheeses such as mozzarella and other reduced-fat cheeses  · Choose meat and other protein foods that are low in fat  Choose beans or other legumes such as split peas or lentils  Choose fish, skinless poultry (chicken or turkey), or lean cuts of red meat (beef or pork)  Before you cook meat or poultry, cut off any visible fat  · Use less fat and oil  Try baking foods instead of frying them  Add less fat, such as margarine, sour cream, regular salad dressing and mayonnaise to foods  Eat fewer high-fat foods  Some examples of high-fat foods include french fries, doughnuts, ice cream, and cakes  · Eat fewer sweets  Limit foods and drinks that are high in sugar  This includes candy, cookies, regular soda, and sweetened drinks  Exercise:  Exercise at least 30 minutes per day on most days of the week  Some examples of exercise include walking, biking, dancing, and swimming  You can also fit in more physical activity by taking the stairs instead of the elevator or parking farther away from stores  Ask your healthcare provider about the best exercise plan for you  © Copyright [employer] 2018 Information is for End User's use only and may not be sold, redistributed or otherwise used for commercial purposes   All illustrations and images included in CareNotes® are the copyrighted property of A D A M , Inc  or [address] [place], [place] [address]brian [facility]

## 2022-11-17 NOTE — ASSESSMENT & PLAN NOTE
· Patient received influenza vaccine 10/12/2022  · Patient will return for PCV 20 vaccine as she has upcoming procedure and was advised to not receive any vaccinations 2 weeks prior  · Did also  about Shingrix vaccination series (patient did receive Zostavax in the past)

## 2022-11-21 ENCOUNTER — HOSPITAL ENCOUNTER (OUTPATIENT)
Dept: RADIOLOGY | Facility: CLINIC | Age: 87
Discharge: HOME | End: 2022-11-21

## 2022-11-21 VITALS
TEMPERATURE: 98.3 F | HEART RATE: 87 BPM | DIASTOLIC BLOOD PRESSURE: 78 MMHG | RESPIRATION RATE: 18 BRPM | SYSTOLIC BLOOD PRESSURE: 164 MMHG | OXYGEN SATURATION: 92 %

## 2022-11-21 DIAGNOSIS — M48.062 SPINAL STENOSIS OF LUMBAR REGION WITH NEUROGENIC CLAUDICATION: ICD-10-CM

## 2022-11-21 DIAGNOSIS — M51.16 LUMBAR DISC DISEASE WITH RADICULOPATHY: ICD-10-CM

## 2022-11-21 DIAGNOSIS — M48.061 LUMBAR FORAMINAL STENOSIS: ICD-10-CM

## 2022-11-21 RX ORDER — 0.9 % SODIUM CHLORIDE 0.9 %
10 VIAL (ML) INJECTION ONCE
Status: COMPLETED | OUTPATIENT
Start: 2022-11-21 | End: 2022-11-21

## 2022-11-21 RX ORDER — PAPAVERINE HCL 150 MG
20 CAPSULE, EXTENDED RELEASE ORAL ONCE
Status: COMPLETED | OUTPATIENT
Start: 2022-11-21 | End: 2022-11-21

## 2022-11-21 RX ORDER — BUPIVACAINE HCL/PF 2.5 MG/ML
10 VIAL (ML) INJECTION ONCE
Status: COMPLETED | OUTPATIENT
Start: 2022-11-21 | End: 2022-11-21

## 2022-11-21 RX ADMIN — IOHEXOL 1 ML: 300 INJECTION, SOLUTION INTRAVENOUS at 13:52

## 2022-11-21 RX ADMIN — DEXAMETHASONE SODIUM PHOSPHATE 10 MG: 10 INJECTION, SOLUTION INTRAMUSCULAR; INTRAVENOUS at 13:53

## 2022-11-21 RX ADMIN — Medication 5 ML: at 13:49

## 2022-11-21 RX ADMIN — BUPIVACAINE HYDROCHLORIDE 10 ML: 2.5 INJECTION, SOLUTION EPIDURAL; INFILTRATION; INTRACAUDAL at 13:53

## 2022-11-21 RX ADMIN — SODIUM CHLORIDE 10 ML: 9 INJECTION, SOLUTION INTRAMUSCULAR; INTRAVENOUS; SUBCUTANEOUS at 13:49

## 2022-11-21 NOTE — DISCHARGE INSTR - LAB
Epidural Steroid Injection   WHAT YOU NEED TO KNOW:   An epidural steroid injection (ERICK) is a procedure to inject steroid medicine into the epidural space  The epidural space is between your spinal cord and vertebrae  Steroids reduce inflammation and fluid buildup in your spine that may be causing pain  You may be given pain medicine along with the steroids  ACTIVITY  Do not drive or operate machinery today  No strenuous activity today - bending, lifting, etc   You may resume normal activites starting tomorrow - start slowly and as tolerated  You may shower today, but no tub baths or hot tubs  You may have numbness for several hours from the local anesthetic  Please use caution and common sense, especially with weight-bearing activities  CARE OF THE INJECTION SITE  If you have soreness or pain, apply ice to the area today (20 minutes on/20 minutes off)  Starting tomorrow, you may use warm, moist heat or ice if needed  You may have an increase or change in your discomfort for 36-48 hours after your treatment  Apply ice and continue with any pain medication you have been prescribed  Notify the [facility] if you have any of the following: redness, drainage, swelling, headache, stiff neck or fever above 100°F     SPECIAL INSTRUCTIONS  Our office will contact you in approximately 7 days for a progress report  MEDICATIONS  Continue to take all routine medications  Our office may have instructed you to hold some medications  As no general anesthesia was used in today's procedure, you should not experience any side effects related to anesthesia  If you are diabetic, the steroids used in today's injection may temporarily increase your blood sugar levels after the first few days after your injection  Please keep a close eye on your sugars and alert the doctor who manages your diabetes if your sugars are significantly high from your baseline or you are symptomatic       If you have a problem specifically related to your procedure, please call our office at [phone]  Problems not related to your procedure should be directed to your primary care physician

## 2022-11-21 NOTE — H&P
History of Present Illness:  The patient is a 80 y o  female who presents with complaints of right-sided low back pain    Past Medical History:   Diagnosis Date   • Anxiety 2010   • Bladder infection 09/2019   • Breast lump in female    • HL (hearing loss) 2010   • Hypertension 2010   • Osteopenia    • Ovarian cyst        Past Surgical History:   Procedure Laterality Date   • BLADDER SURGERY     • BREAST BIOPSY     • CATARACT EXTRACTION Bilateral 01/01/2015    , Right Eye-2017, Left eye   • ENDOMETRIAL BIOPSY      by Suction   • HYSTERECTOMY           Current Outpatient Medications:   •  amLODIPine-benazepril (LOTREL 5-20) 5-20 MG per capsule, TAKE 1 CAPSULE BY MOUTH EVERY DAY, Disp: 90 capsule, Rfl: 1  •  calcium citrate-vitamin D (CITRACAL+D) 315-200 MG-UNIT per tablet, Take 1 tablet by mouth daily, Disp: , Rfl:   •  conjugated estrogens (PREMARIN) vaginal cream, Insert into the vagina Ose as directed, Disp: , Rfl:   •  Multiple Vitamins-Minerals (MULTIVITAL) tablet, Take 1 tablet by mouth daily, Disp: , Rfl:   •  Omega-3 Fatty Acids (FISH OIL) 645 MG CAPS, Take 1 tablet by mouth daily, Disp: , Rfl:   •  polyethylene glycol (MIRALAX) 17 g packet, Take 1 Package by mouth 2 (two) times a day, Disp: , Rfl:     Current Facility-Administered Medications:   •  Botulinum Toxin Type A SOLR 200 Units, 200 Units, Injection, Q3 Months, Debbie Fulton MD, 200 Units at 05/03/21 1619  •  bupivacaine (PF) (MARCAINE) 0 25 % injection 10 mL, 10 mL, Epidural, Once, Adelaida Bays, DO  •  dexamethasone (PF) (DECADRON) injection 20 mg, 20 mg, Epidural, Once, Adelaida Bays, DO  •  iohexol (OMNIPAQUE) 300 mg/mL injection 50 mL, 50 mL, Epidural, Once, Adelaida Bays, DO  •  lidocaine (PF) (XYLOCAINE-MPF) 2 % injection 5 mL, 5 mL, Infiltration, Once, Adelaida Bays, DO  •  sodium chloride (PF) 0 9 % injection 10 mL, 10 mL, Infiltration, Once, Adelaida Vnans, DO    No Known Allergies    Physical Exam: There were no vitals filed for this visit  General: Awake, Alert, Oriented x 3, Mood and affect appropriate  Respiratory: Respirations even and unlabored  Cardiovascular: Peripheral pulses intact; no edema  Musculoskeletal Exam:  Tenderness to palpation right-sided lumbar paraspinals    ASA Score: 2         Assessment:   1  Spinal stenosis of lumbar region with neurogenic claudication    2  Lumbar disc disease with radiculopathy    3   Lumbar foraminal stenosis        Plan: Right sided L3-L4 and L4-L5 TFESI

## 2022-11-28 ENCOUNTER — TELEPHONE (OUTPATIENT)
Dept: PAIN MEDICINE | Facility: CLINIC | Age: 87
End: 2022-11-28

## 2022-12-04 ENCOUNTER — HOSPITAL ENCOUNTER (INPATIENT)
Facility: HOSPITAL | Age: 87
LOS: 3 days | Discharge: NURSING, REHAB OR LONG-TERM CARE | End: 2022-12-07
Attending: EMERGENCY MEDICINE | Admitting: SURGERY

## 2022-12-04 ENCOUNTER — APPOINTMENT (EMERGENCY)
Dept: CT IMAGING | Facility: HOSPITAL | Age: 87
End: 2022-12-04

## 2022-12-04 ENCOUNTER — APPOINTMENT (INPATIENT)
Dept: MRI IMAGING | Facility: HOSPITAL | Age: 87
End: 2022-12-04

## 2022-12-04 ENCOUNTER — APPOINTMENT (EMERGENCY)
Dept: RADIOLOGY | Facility: HOSPITAL | Age: 87
End: 2022-12-04

## 2022-12-04 DIAGNOSIS — S12.300A C4 CERVICAL FRACTURE (HCC): ICD-10-CM

## 2022-12-04 DIAGNOSIS — S12.300A CLOSED DISPLACED FRACTURE OF FOURTH CERVICAL VERTEBRA, UNSPECIFIED FRACTURE MORPHOLOGY, INITIAL ENCOUNTER (HCC): ICD-10-CM

## 2022-12-04 DIAGNOSIS — S06.4XAA EPIDURAL HEMATOMA: ICD-10-CM
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
## 2022-12-04 LAB
2HR DELTA HS TROPONIN: 1 NG/L
ALBUMIN SERPL BCP-MCNC: 4.4 G/DL (ref 3.5–5)
ALP SERPL-CCNC: 48 U/L (ref 34–104)
ALT SERPL W P-5'-P-CCNC: 24 U/L (ref 7–52)
ANION GAP SERPL CALCULATED.3IONS-SCNC: 9 MMOL/L (ref 4–13)
AST SERPL W P-5'-P-CCNC: 38 U/L (ref 13–39)
ATRIAL RATE: 95 BPM
BACTERIA UR QL AUTO: NORMAL /HPF
BASOPHILS # BLD AUTO: 0.02 THOUSANDS/ÂΜL (ref 0–0.1)
BASOPHILS NFR BLD AUTO: 0 % (ref 0–1)
BILIRUB SERPL-MCNC: 0.68 MG/DL (ref 0.2–1)
BILIRUB UR QL STRIP: NEGATIVE
BUN SERPL-MCNC: 26 MG/DL (ref 5–25)
CALCIUM SERPL-MCNC: 9.8 MG/DL (ref 8.4–10.2)
CARDIAC TROPONIN I PNL SERPL HS: 8 NG/L
CARDIAC TROPONIN I PNL SERPL HS: 9 NG/L
CHLORIDE SERPL-SCNC: 104 MMOL/L (ref 96–108)
CK MB SERPL-MCNC: 1.2 % (ref 0–2.5)
CK MB SERPL-MCNC: 7.9 NG/ML (ref 0.6–6.3)
CK SERPL-CCNC: 633 U/L (ref 26–192)
CLARITY UR: ABNORMAL
CO2 SERPL-SCNC: 26 MMOL/L (ref 21–32)
COLOR UR: YELLOW
CREAT SERPL-MCNC: 0.79 MG/DL (ref 0.6–1.3)
EOSINOPHIL # BLD AUTO: 0 THOUSAND/ÂΜL (ref 0–0.61)
EOSINOPHIL NFR BLD AUTO: 0 % (ref 0–6)
ERYTHROCYTE [DISTWIDTH] IN BLOOD BY AUTOMATED COUNT: 14.2 % (ref 11.6–15.1)
GFR SERPL CREATININE-BSD FRML MDRD: 65 ML/MIN/1.73SQ M
GLUCOSE SERPL-MCNC: 186 MG/DL (ref 65–140)
GLUCOSE UR STRIP-MCNC: NEGATIVE MG/DL
HCT VFR BLD AUTO: 39.5 % (ref 34.8–46.1)
HGB BLD-MCNC: 12.9 G/DL (ref 11.5–15.4)
HGB UR QL STRIP.AUTO: NEGATIVE
IMM GRANULOCYTES # BLD AUTO: 0.14 THOUSAND/UL (ref 0–0.2)
IMM GRANULOCYTES NFR BLD AUTO: 1 % (ref 0–2)
INR PPP: 0.9 (ref 0.84–1.19)
KETONES UR STRIP-MCNC: ABNORMAL MG/DL
LEUKOCYTE ESTERASE UR QL STRIP: NEGATIVE
LYMPHOCYTES # BLD AUTO: 0.47 THOUSANDS/ÂΜL (ref 0.6–4.47)
LYMPHOCYTES NFR BLD AUTO: 4 % (ref 14–44)
MAGNESIUM SERPL-MCNC: 2.1 MG/DL (ref 1.9–2.7)
MCH RBC QN AUTO: 31.3 PG (ref 26.8–34.3)
MCHC RBC AUTO-ENTMCNC: 32.7 G/DL (ref 31.4–37.4)
MCV RBC AUTO: 96 FL (ref 82–98)
MONOCYTES # BLD AUTO: 1.4 THOUSAND/ÂΜL (ref 0.17–1.22)
MONOCYTES NFR BLD AUTO: 12 % (ref 4–12)
NEUTROPHILS # BLD AUTO: 9.55 THOUSANDS/ÂΜL (ref 1.85–7.62)
NEUTS SEG NFR BLD AUTO: 83 % (ref 43–75)
NITRITE UR QL STRIP: NEGATIVE
NON-SQ EPI CELLS URNS QL MICRO: NORMAL /HPF
NRBC BLD AUTO-RTO: 0 /100 WBCS
P AXIS: 32 DEGREES
PH UR STRIP.AUTO: 8.5 [PH]
PLATELET # BLD AUTO: 193 THOUSANDS/UL (ref 149–390)
PMV BLD AUTO: 9.7 FL (ref 8.9–12.7)
POTASSIUM SERPL-SCNC: 4.2 MMOL/L (ref 3.5–5.3)
PR INTERVAL: 124 MS
PROT SERPL-MCNC: 7.9 G/DL (ref 6.4–8.4)
PROT UR STRIP-MCNC: ABNORMAL MG/DL
PROTHROMBIN TIME: 12.4 SECONDS (ref 11.6–14.5)
QRS AXIS: 2 DEGREES
QRSD INTERVAL: 86 MS
QT INTERVAL: 344 MS
QTC INTERVAL: 432 MS
RBC # BLD AUTO: 4.12 MILLION/UL (ref 3.81–5.12)
RBC #/AREA URNS AUTO: NORMAL /HPF
SODIUM SERPL-SCNC: 139 MMOL/L (ref 135–147)
SP GR UR STRIP.AUTO: >=1.05 (ref 1–1.03)
T WAVE AXIS: -8 DEGREES
UROBILINOGEN UR STRIP-ACNC: <2 MG/DL
VENTRICULAR RATE: 95 BPM
WBC # BLD AUTO: 11.58 THOUSAND/UL (ref 4.31–10.16)
WBC #/AREA URNS AUTO: NORMAL /HPF

## 2022-12-04 RX ORDER — OXYCODONE HYDROCHLORIDE 5 MG/1
2.5 TABLET ORAL EVERY 4 HOURS PRN
Status: DISCONTINUED | OUTPATIENT
Start: 2022-12-04 | End: 2022-12-07 | Stop reason: HOSPADM

## 2022-12-04 RX ORDER — DOCUSATE SODIUM 100 MG/1
100 CAPSULE, LIQUID FILLED ORAL 2 TIMES DAILY
Status: DISCONTINUED | OUTPATIENT
Start: 2022-12-04 | End: 2022-12-07 | Stop reason: HOSPADM

## 2022-12-04 RX ORDER — LANOLIN ALCOHOL/MO/W.PET/CERES
3 CREAM (GRAM) TOPICAL
Status: DISCONTINUED | OUTPATIENT
Start: 2022-12-04 | End: 2022-12-07 | Stop reason: HOSPADM

## 2022-12-04 RX ORDER — SENNOSIDES 8.6 MG
2 TABLET ORAL DAILY
Status: DISCONTINUED | OUTPATIENT
Start: 2022-12-05 | End: 2022-12-07 | Stop reason: HOSPADM

## 2022-12-04 RX ORDER — AMLODIPINE BESYLATE 2.5 MG/1
2.5 TABLET ORAL ONCE
Status: COMPLETED | OUTPATIENT
Start: 2022-12-04 | End: 2022-12-04

## 2022-12-04 RX ORDER — ACETAMINOPHEN 325 MG/1
975 TABLET ORAL EVERY 8 HOURS SCHEDULED
Status: DISCONTINUED | OUTPATIENT
Start: 2022-12-04 | End: 2022-12-07 | Stop reason: HOSPADM

## 2022-12-04 RX ORDER — GABAPENTIN 100 MG/1
100 CAPSULE ORAL
Status: DISCONTINUED | OUTPATIENT
Start: 2022-12-04 | End: 2022-12-07 | Stop reason: HOSPADM

## 2022-12-04 RX ORDER — HYDROMORPHONE HCL IN WATER/PF 6 MG/30 ML
0.2 PATIENT CONTROLLED ANALGESIA SYRINGE INTRAVENOUS EVERY 4 HOURS PRN
Status: DISCONTINUED | OUTPATIENT
Start: 2022-12-04 | End: 2022-12-07 | Stop reason: HOSPADM

## 2022-12-04 RX ORDER — ONDANSETRON 2 MG/ML
4 INJECTION INTRAMUSCULAR; INTRAVENOUS EVERY 6 HOURS PRN
Status: DISCONTINUED | OUTPATIENT
Start: 2022-12-04 | End: 2022-12-07 | Stop reason: HOSPADM

## 2022-12-04 RX ORDER — OXYCODONE HYDROCHLORIDE 5 MG/1
5 TABLET ORAL EVERY 4 HOURS PRN
Status: DISCONTINUED | OUTPATIENT
Start: 2022-12-04 | End: 2022-12-07 | Stop reason: HOSPADM

## 2022-12-04 RX ADMIN — DOCUSATE SODIUM 100 MG: 100 CAPSULE, LIQUID FILLED ORAL at 22:49

## 2022-12-04 RX ADMIN — AMLODIPINE BESYLATE 2.5 MG: 2.5 TABLET ORAL at 22:43

## 2022-12-04 RX ADMIN — GABAPENTIN 100 MG: 100 CAPSULE ORAL at 22:49

## 2022-12-04 RX ADMIN — Medication 3 MG: at 23:17

## 2022-12-04 RX ADMIN — IOHEXOL 100 ML: 350 INJECTION, SOLUTION INTRAVENOUS at 11:42

## 2022-12-04 NOTE — H&P
[facility]  H&P- Evie Devi 11/27/1931, 80 y o  female MRN: [id number]  Unit/Bed#: ED-39 Encounter: [id number]  Primary Care Provider: Kandis Mccall DO   Date and time admitted to hospital: 12/4/2022  9:41 AM    [address]  - Fall backward down 2 steps landing on her head then body  - Below noted injuries  - Generalized weakness and worsening ambulatory dysfunction prior to the fall  - PT/OT evaluation and treatment as indicated  - Geriatric consultation  - Admit to trauma service SD1     * Closed displaced fracture of fourth cervical vertebra ([facility] [address] )  Assessment & Plan  - C4 bilateral lamina fractures with anterior widening at C4/C5 previously fused osteophyte  - Right sided Upper>lower extremity weakness R: 3/5 , 4/5 tri/biceps, 4-5/5 lower extremity plantar/dorsiflexion  - Sensation intact throughout without paresthesias   - Stat MRI C spine pending  - Neurosurgery consult  - Maintain cervical collar at all times  - C-Spine precautions  - Multimodal pain regimen    Essential hypertension  Assessment & Plan  - Home medications on hold with possible cervical cord injury  - Monitor blood pressures    Trauma Alert: Evaluation; trauma team notified at [phone] via text   Model of Arrival: Ambulance    Trauma Team: Attending Héctor Rizzo  Consultants:     Neurosurgery: routine consult; Epic consult order placed; History of Present Illness     Chief Complaint: neck stiffness  Mechanism:Fall     HPI:    Evie Devi is a 80 y o  female with PMH mild dementia and htn who presents with neck stiffness following a fall 2 days prior to arrival  Her daughters are present bedside and provide history  Patient was slumped down into her chair Friday afternoon and when her daughters picked her up she was very weak and unable to bear her own weight   She had some mild improvement after eating and then tried to walk up the stairs of her house when she lost her balance and fell backward striking her head  After the fall she had persistent generalized R>L sided weakness and when that did not improve over the next two days her daughters brought her in for evaluation  Patient complains of neck stiffness with no other complaints  Review of Systems   Constitutional: Negative for activity change, fatigue and fever  HENT: Negative for congestion, ear pain, facial swelling, nosebleeds, postnasal drip, rhinorrhea, sinus pressure, sinus pain, sneezing and sore throat  Respiratory: Negative  Negative for chest tightness, shortness of breath and wheezing  Cardiovascular: Negative for chest pain and palpitations  Gastrointestinal: Negative for abdominal distention, abdominal pain, constipation, diarrhea, nausea and vomiting  Genitourinary: Negative for dysuria, flank pain, hematuria and urgency  Musculoskeletal: Positive for neck stiffness  Negative for arthralgias, back pain, joint swelling and neck pain  Skin: Negative for color change, rash and wound  Neurological: Positive for weakness  Negative for dizziness, seizures, light-headedness, numbness and headaches  12-point, complete review of systems was reviewed and negative except as stated above       Historical Information     Past Medical History:   Diagnosis Date   • Anxiety 2010   • Bladder infection 09/2019   • Breast lump in female    • HL (hearing loss) 2010   • Hypertension 2010   • Osteopenia    • Ovarian cyst      Past Surgical History:   Procedure Laterality Date   • BLADDER SURGERY     • BREAST BIOPSY     • CATARACT EXTRACTION Bilateral 01/01/2015    , Right Eye-2017, Left eye   • ENDOMETRIAL BIOPSY      by Suction   • HYSTERECTOMY          Social History     Tobacco Use   • Smoking status: Never   • Smokeless tobacco: Never   Substance Use Topics   • Alcohol use: No   • Drug use: No     Immunization History   Administered Date(s) Administered   • COVID-19 PFIZER VACCINE 0 3 ML IM 01/21/2021, 02/11/2021, 10/13/2021   • COVID-19 Pfizer vac (Gilson-sucrose, gray cap) 12 yr+ IM 04/11/2022   • INFLUENZA 09/27/2013, 10/17/2014, 10/16/2015, 10/25/2016, 09/26/2017, 01/03/2018, 10/09/2019, 09/28/2021   • Influenza Split High Dose Preservative Free IM 09/26/2017, 01/03/2018   • Influenza, high dose seasonal 0 7 mL 10/02/2018, 10/05/2020   • Pneumococcal Conjugate 13-Valent 08/21/2017   • Pneumococcal Polysaccharide PPV23 08/24/2018   • Zoster 01/22/2018, 01/31/2018     Last Tetanus: n/a  Family History: Non-contributory    1  Before the illness or injury that brought you to the Emergency, did you need someone to help you on a regular basis? 1=Yes   2  Since the illness or injury that brought you to the Emergency, have you needed more help than usual to take care of yourself? 1=Yes   3  Have you been hospitalized for one or more nights during the past 6 months (excluding a stay in the Emergency Department)? 0=No   4  In general, do you see well? 0=Yes   5  In general, do you have serious problems with your memory? 1=Yes   6  Do you take more than three different medications everyday?  0=No   TOTAL   3     Did you order a geriatric consult if the score was 2 or greater?: yes     Meds/Allergies   all current active meds have been reviewed   No Known Allergies    Objective   Initial Vitals:   Temperature: 97 9 °F (36 6 °C) (12/04/22 0951)  Pulse: 100 (12/04/22 0951)  Respirations: 16 (12/04/22 0951)  Blood Pressure: (!) 193/85 (12/04/22 0951)    Primary Survey:   Airway:        Status: patent;        Pre-hospital Interventions: none        Hospital Interventions: none  Breathing:        Pre-hospital Interventions: none       Effort: normal       Right breath sounds: normal       Left breath sounds: normal  Circulation:        Rhythm: regular       Rate: regular   Right Pulses Left Pulses    R radial: 2+  R femoral: 2+  R pedal: 2+     L radial: 2+  L femoral: 2+  L pedal: 2+       Disability:        GCS: Eye: 4; Verbal: 5 Motor: 6 Total: 15       Right Pupil: round;  reactive         Left Pupil:  round;  reactive      R Motor Strength L Motor Strength    R : 5/5  R dorsiflex: 5/5  R plantarflex: 5/5 L : 5/5  L dorsiflex: 5/5  L plantarflex: 5/5        Sensory:  No sensory deficit  Exposure:       Completed: Yes      Secondary Survey:  Physical Exam  Vitals and nursing note reviewed  Constitutional:       General: She is not in acute distress  Appearance: Normal appearance  She is normal weight  She is not ill-appearing or toxic-appearing  HENT:      Head: Normocephalic and atraumatic  Right Ear: Tympanic membrane normal       Left Ear: Tympanic membrane normal       Nose: No congestion or rhinorrhea  Mouth/Throat:      Mouth: Mucous membranes are moist       Pharynx: Oropharynx is clear  No oropharyngeal exudate or posterior oropharyngeal erythema  Eyes:      Extraocular Movements: Extraocular movements intact  Conjunctiva/sclera: Conjunctivae normal       Pupils: Pupils are equal, round, and reactive to light  Cardiovascular:      Rate and Rhythm: Normal rate and regular rhythm  Heart sounds: No murmur heard  No friction rub  No gallop  Pulmonary:      Effort: Pulmonary effort is normal       Breath sounds: No wheezing, rhonchi or rales  Abdominal:      General: Abdomen is flat  Bowel sounds are normal  There is no distension  Palpations: Abdomen is soft  Tenderness: There is no abdominal tenderness  There is no guarding or rebound  Musculoskeletal:      Cervical back: Tenderness present  Skin:     General: Skin is warm and dry  Neurological:      Mental Status: She is alert        Comments: Right sided Upper > Lower extremity weakness  R: 3/5 , 4/5 Triceps/Bicept, 4-5/5 Plantar flexion/extension  L: 5/5 through out         Invasive Devices     Peripheral Intravenous Line  Duration           Peripheral IV Left;Ventral (anterior) Hand -- days              Lab Results: BMP/CMP:   Lab Results   Component Value Date    SODIUM 139 12/04/2022    K 4 2 12/04/2022     12/04/2022    CO2 26 12/04/2022    BUN 26 (H) 12/04/2022    CREATININE 0 79 12/04/2022    CALCIUM 9 8 12/04/2022    AST 38 12/04/2022    ALT 24 12/04/2022    ALKPHOS 48 12/04/2022    EGFR 65 12/04/2022   , CBC:   Lab Results   Component Value Date    WBC 11 58 (H) 12/04/2022    HGB 12 9 12/04/2022    HCT 39 5 12/04/2022    MCV 96 12/04/2022     12/04/2022    MCH 31 3 12/04/2022    MCHC 32 7 12/04/2022    RDW 14 2 12/04/2022    MPV 9 7 12/04/2022    NRBC 0 12/04/2022    and Coagulation: No results found for: PT, INR, APTT    Imaging Results: I have personally reviewed pertinent reports      Chest Xray(s): negative for acute findings   FAST exam(s): N/A   CT Scan(s): positive for acute findings: acute fracture bilateral lamina and spinous process of C4 with widening of previously fused C4/C5 vertebral bodies   Additional Xray(s): N/A     Other Studies: MRI Cervical spine pending    Code Status: No Order

## 2022-12-04 NOTE — ASSESSMENT & PLAN NOTE
- C4 bilateral lamina fractures with anterior widening at C4/C5 previously fused osteophyte  - Right sided Upper>lower extremity weakness R: 3/5 , 4/5 tri/biceps, 4-5/5 lower extremity plantar/dorsiflexion  - Sensation intact throughout without paresthesias   - Stat MRI C spine pending  - Neurosurgery consult  - Maintain cervical collar at all times  - C-Spine precautions  - Multimodal pain regimen

## 2022-12-04 NOTE — ASSESSMENT & PLAN NOTE
- Fall backward down 2 steps landing on her head then body  - Below noted injuries  - Generalized weakness and worsening ambulatory dysfunction prior to the fall  - PT/OT evaluation and treatment as indicated  - Geriatric consultation  - Admit to trauma service SD1

## 2022-12-04 NOTE — ASSESSMENT & PLAN NOTE
- Fall backward down 2 steps landing on her head then body  - Below noted injuries  - Generalized weakness and worsening ambulatory dysfunction prior to the fall  - PT/OT evaluation and treatment as indicated  - Geriatric consultation

## 2022-12-04 NOTE — ED PROVIDER NOTES
History  Chief Complaint   Patient presents with   • Fall     Pt reports fall last week, denies loc, dizziness  Arrives for neck pain  Collar placed during triage  Negative thinners  Patient is a 51-year-old female with PMH of hypertension that presents to the emergency department from home with difficulty walking  It is reported that 2 days ago the patient was able to stand from her bed and walked to a chair, however she got stuck in the chair and could not get out on her own  It is believed that she was stuck in this chair for several hours  She had slid down and her neck was flexed against the chair back for quite some time  The patient's daughter arrived and helped the patient out of the chair and ever since that time she has had significant difficulty walking  The daughter encouraged her to stay on the ground floor of her house overnight that night, however that evening she attempted to climb the stairs and fell backward down 3 steps and struck the back of her head  She reports neck pain but denies any head pain  Patient denies any blood thinner use and states that she had no bleeding from the site of injury  Throughout the day yesterday the patient had significant difficulty ambulating  She is able to walk a few steps, but then becomes very weak and can no longer walk  She denies any pain other than in the neck and denies any other symptoms  She denies being sick recently and denies chest pain, shortness of breath, abdominal pain  Patient is otherwise reported to be in her normal state of health  Prior to Admission Medications   Prescriptions Last Dose Informant Patient Reported? Taking?    Multiple Vitamins-Minerals (MULTIVITAL) tablet 12/4/2022  Yes Yes   Sig: Take 1 tablet by mouth daily   Omega-3 Fatty Acids (FISH OIL) 645 MG CAPS 12/4/2022  Yes Yes   Sig: Take 1 tablet by mouth daily   amLODIPine-benazepril (LOTREL 5-20) 5-20 MG per capsule 12/4/2022 at 0900  No Yes   Sig: TAKE 1 CAPSULE BY MOUTH EVERY DAY   calcium citrate-vitamin D (CITRACAL+D) 315-200 MG-UNIT per tablet 12/4/2022 at 0900  Yes Yes   Sig: Take 1 tablet by mouth daily   conjugated estrogens (PREMARIN) vaginal cream 12/4/2022  Yes Yes   Sig: Insert into the vagina Ose as directed      Facility-Administered Medications Last Administration Doses Remaining   Botulinum Toxin Type A SOLR 200 Units 5/3/2021  4:19 PM           Past Medical History:   Diagnosis Date   • Anxiety 2010   • Bladder infection 09/2019   • Breast lump in female    • HL (hearing loss) 2010   • Hypertension 2010   • Osteopenia    • Ovarian cyst        Past Surgical History:   Procedure Laterality Date   • BLADDER SURGERY     • BREAST BIOPSY     • CATARACT EXTRACTION Bilateral 01/01/2015    , Right Eye-2017, Left eye   • ENDOMETRIAL BIOPSY      by Suction   • HYSTERECTOMY         Family History   Problem Relation Age of Onset   • Heart disease Mother    • Heart disease Father    • Hypertension Family    • Osteoporosis Family      I have reviewed and agree with the history as documented  E-Cigarette/Vaping     E-Cigarette/Vaping Substances     Social History     Tobacco Use   • Smoking status: Never   • Smokeless tobacco: Never   Substance Use Topics   • Alcohol use: No   • Drug use: No        Review of Systems   Constitutional: Negative for chills and fever  HENT: Negative for congestion, ear pain and sore throat  Eyes: Negative for pain and visual disturbance  Respiratory: Negative for cough and shortness of breath  Cardiovascular: Negative for chest pain and palpitations  Gastrointestinal: Negative for abdominal pain, constipation, diarrhea, nausea and vomiting  Genitourinary: Negative for dysuria and hematuria  Musculoskeletal: Positive for neck pain and neck stiffness  Negative for arthralgias and back pain  Skin: Negative for color change and rash     Neurological: Positive for weakness (Generalized, causing difficulty with ambulation)  Negative for dizziness, seizures, syncope, light-headedness and headaches  All other systems reviewed and are negative  Physical Exam  ED Triage Vitals [12/04/22 0951]   Temperature Pulse Respirations Blood Pressure SpO2   97 9 °F (36 6 °C) 100 16 (!) 193/85 95 %      Temp Source Heart Rate Source Patient Position - Orthostatic VS BP Location FiO2 (%)   Oral Monitor Lying Right arm --      Pain Score       5             Orthostatic Vital Signs  Vitals:    12/04/22 1630 12/04/22 1700 12/04/22 1730 12/04/22 1838   BP: 166/79 156/78 169/75 (!) 186/84   Pulse: 91 89 93 100   Patient Position - Orthostatic VS: Sitting Sitting Sitting Sitting       Physical Exam  Vitals and nursing note reviewed  Constitutional:       General: She is not in acute distress  Appearance: She is well-developed  She is not ill-appearing or toxic-appearing  Interventions: Cervical collar in place  HENT:      Head: Normocephalic and atraumatic  Right Ear: External ear normal       Left Ear: External ear normal       Mouth/Throat:      Pharynx: Oropharynx is clear  No oropharyngeal exudate or posterior oropharyngeal erythema  Eyes:      General:         Right eye: No discharge  Left eye: No discharge  Extraocular Movements: Extraocular movements intact  Conjunctiva/sclera: Conjunctivae normal    Cardiovascular:      Rate and Rhythm: Normal rate and regular rhythm  Pulses: Normal pulses  Heart sounds: Normal heart sounds  No murmur heard  Pulmonary:      Effort: Pulmonary effort is normal  No respiratory distress  Breath sounds: Normal breath sounds  No wheezing, rhonchi or rales  Abdominal:      General: Abdomen is flat  Palpations: Abdomen is soft  Tenderness: There is no abdominal tenderness  There is no guarding or rebound  Musculoskeletal:         General: Tenderness (neck) present  No swelling or deformity  Normal range of motion        Cervical back: Tenderness present  Skin:     General: Skin is warm and dry  Capillary Refill: Capillary refill takes less than 2 seconds  Findings: No bruising  Neurological:      Mental Status: She is alert and oriented to person, place, and time  Cranial Nerves: No cranial nerve deficit  Sensory: No sensory deficit  Motor: Weakness (Decreased  strength in the right hand ) present  Coordination: Coordination normal          ED Medications  Medications   iohexol (OMNIPAQUE) 350 MG/ML injection (SINGLE-DOSE) 85 mL (100 mL Intravenous Given 12/4/22 1142)       Diagnostic Studies  Results Reviewed     Procedure Component Value Units Date/Time    Protime-INR [[id number]] Collected: 12/04/22 1839    Lab Status:  In process Specimen: Blood from Arm, Right Updated: 12/04/22 1850    Urine Microscopic [[id number]]  (Normal) Collected: 12/04/22 1503    Lab Status: Final result Specimen: Urine, Clean Catch Updated: 12/04/22 1547     RBC, UA None Seen /hpf      WBC, UA None Seen /hpf      Epithelial Cells Occasional /hpf      Bacteria, UA None Seen /hpf     UA w Reflex to Microscopic w Reflex to Culture [[id number]]  (Abnormal) Collected: 12/04/22 1503    Lab Status: Final result Specimen: Urine, Clean Catch Updated: 12/04/22 1515     Color, UA Yellow     Clarity, UA Extra Turbid     Specific Gravity, UA >=1 050     pH, UA 8 5     Leukocytes, UA Negative     Nitrite, UA Negative     Protein, UA 30 (1+) mg/dl      Glucose, UA Negative mg/dl      Ketones, UA 10 (1+) mg/dl      Urobilinogen, UA <2 0 mg/dl      Bilirubin, UA Negative     Occult Blood, UA Negative    HS Troponin I 2hr [[id number]]  (Normal) Collected: 12/04/22 1250    Lab Status: Final result Specimen: Blood from Arm, Right Updated: 12/04/22 1324     hs TnI 2hr 9 ng/L      Delta 2hr hsTnI 1 ng/L     CKMB [[id number]]  (Abnormal) Collected: 12/04/22 1036    Lab Status: Final result Specimen: Blood from Arm, Right Updated: 12/04/22 1159     CK-MB Index 1 2 %      CK-MB 7 9 ng/mL     HS Troponin 0hr (reflex protocol) [[id number]]  (Normal) Collected: 12/04/22 1044    Lab Status: Final result Specimen: Blood from Arm, Right Updated: 12/04/22 1142     hs TnI 0hr 8 ng/L     Comprehensive metabolic panel [[id number]]  (Abnormal) Collected: 12/04/22 1036    Lab Status: Final result Specimen: Blood from Arm, Right Updated: 12/04/22 1105     Sodium 139 mmol/L      Potassium 4 2 mmol/L      Chloride 104 mmol/L      CO2 26 mmol/L      ANION GAP 9 mmol/L      BUN 26 mg/dL      Creatinine 0 79 mg/dL      Glucose 186 mg/dL      Calcium 9 8 mg/dL      AST 38 U/L      ALT 24 U/L      Alkaline Phosphatase 48 U/L      Total Protein 7 9 g/dL      Albumin 4 4 g/dL      Total Bilirubin 0 68 mg/dL      eGFR 65 ml/min/1 73sq m     Narrative:      [place] guidelines for Chronic Kidney Disease (CKD):   •  Stage 1 with normal or high GFR (GFR > 90 mL/min/1 73 square meters)  •  Stage 2 Mild CKD (GFR = 60-89 mL/min/1 73 square meters)  •  Stage 3A Moderate CKD (GFR = 45-59 mL/min/1 73 square meters)  •  Stage 3B Moderate CKD (GFR = 30-44 mL/min/1 73 square meters)  •  Stage 4 Severe CKD (GFR = 15-29 mL/min/1 73 square meters)  •  Stage 5 End Stage CKD (GFR <15 mL/min/1 73 square meters)  Note: GFR calculation is accurate only with a steady state creatinine    CK Total with Reflex CKMB [[id number]]  (Abnormal) Collected: 12/04/22 1036    Lab Status: Final result Specimen: Blood from Arm, Right Updated: 12/04/22 1105     Total  U/L     Magnesium [[id number]]  (Normal) Collected: 12/04/22 1036    Lab Status: Final result Specimen: Blood from Arm, Right Updated: 12/04/22 1105     Magnesium 2 1 mg/dL     CBC and differential [[id number]]  (Abnormal) Collected: 12/04/22 1036    Lab Status: Final result Specimen: Blood from Arm, Right Updated: 12/04/22 1047     WBC 11 58 Thousand/uL      RBC 4 12 Million/uL      Hemoglobin 12 9 g/dL      Hematocrit 39 5 % MCV 96 fL      MCH 31 3 pg      MCHC 32 7 g/dL      RDW 14 2 %      MPV 9 7 fL      Platelets 133 Thousands/uL      nRBC 0 /100 WBCs      Neutrophils Relative 83 %      Immat GRANS % 1 %      Lymphocytes Relative 4 %      Monocytes Relative 12 %      Eosinophils Relative 0 %      Basophils Relative 0 %      Neutrophils Absolute 9 55 Thousands/µL      Immature Grans Absolute 0 14 Thousand/uL      Lymphocytes Absolute 0 47 Thousands/µL      Monocytes Absolute 1 40 Thousand/µL      Eosinophils Absolute 0 00 Thousand/µL      Basophils Absolute 0 02 Thousands/µL                  MRI cervical spine wo contrast   Final Result by Eric Horton DO (12/04 1848)      Unfortunately this examination is significantly degraded by patient motion  See separate CT scan for fractures of the posterior elements of C4 on the right  There is widening of the disc space at the C4-5 level anteriorly in the disc space is now distended with fluid  There is abnormal signal within the posterior epidural space beginning at the C3-4 level and extending inferiorly to the C4-5 level resulting in moderate canal stenosis  This is most suggestive of posterior epidural hematoma  Suspected partial disruption of the anterior longitudinal ligament at the level of the C4-5 disc space, incompletely evaluated  No definite abnormal cord signal identified on this motion degraded exam                Workstation performed: [id number]         XR chest 1 view portable   Final Result by Eric Busch MD (12/04 1838)      No acute cardiopulmonary disease  Workstation performed: [id number]         CTA head and neck with and without contrast   Final Result by Abelino Damon MD (12/04 9551)      1  No acute intracranial CT abnormality  2   Acute fracture involving bilateral lamina and spinous process of C4    There is distraction fracture and anterior widening of previously fused C4 and C5 vertebral bodies with sclerotic and nonsclerotic borders  See separate report of the cervical    spine CT  3   Stable chronic right cerebellar infarct  Nonspecific white matter change suggesting microangiopathy  4   Age indeterminate likely chronic threadlike flow within V1 segment of right vertebral artery with occluded V2 segment  Patent small caliber V3 and V4 segments  5   Patent major vasculature of the [place] without high-grade stenosis  No aneurysm  6   No hemodynamically significant stenosis of cervical carotid and left vertebral arteries  7   Anatomic variant retropharyngeal course of proximal to mid right common carotid artery                     I personally discussed this study with Oliver Caceres on 12/4/2022 at 1:47 PM              Workstation performed: [id number]         CT recon only cervical spine (No Charge)   Final Result by Aydee Henriquez MD (12/04 4166)      1  Acute fracture involving bilateral lamina and spinous process of C4  There is distraction fracture and anterior widening of previously fused C4 and C5 vertebral bodies with sclerotic and nonsclerotic borders  Canal assessment for epidural hematoma    is limited due to presence of IV contrast   Recommend cervical spine MRI to further assess the findings and evaluate for ligamentous injury  2   Multilevel degenerative change and canal narrowing most pronounced at C3-4  I personally discussed this study with Oliver Caceres on 12/4/2022 at 1:47 PM                Workstation performed: [id number]               Procedures  ECG 12 Lead Documentation Only    Date/Time: 12/4/2022 10:45 AM  Performed by: Yamilet Mcmahan DO  Authorized by:  Yamilet Mcmahan DO     Indications / Diagnosis:  Generalized weakness  ECG reviewed by me, the ED Provider: yes    Patient location:  ED  Previous ECG:     Previous ECG:  Compared to current    Similarity:  No change  Interpretation:     Interpretation: normal Quality:     Tracing quality:  Limited by artifact  Rate:     ECG rate:  95    ECG rate assessment: normal    Rhythm:     Rhythm: sinus rhythm    Ectopy:     Ectopy: none    QRS:     QRS axis:  Normal    QRS intervals:  Normal  Conduction:     Conduction: normal    ST segments:     ST segments:  Normal  T waves:     T waves: normal            ED Course                             SBIRT 22yo+    Flowsheet Row Most Recent Value   SBIRT (25 yo +)    In order to provide better care to our patients, we are screening all of our patients for alcohol and drug use  Would it be okay to ask you these screening questions? Unable to answer at this time Filed at: 12/04/2022 1644                [facility]  Number of Diagnoses or Management Options  C4 cervical fracture ([facility] [address] )  Fall, initial encounter  Diagnosis management comments: 80F with PMH of hypertension comes to the emergency department with daughter due to difficulty with ambulation  She reports generalized weakness in her legs and states that she fell backward down 3 stairs the night before last   On physical exam the patient has tenderness to palpation of the neck  And reports neck pain  Patient has decreased  strength of the right hand  Laboratory evaluation including CBC, CMP, CK, magnesium, HS troponin, UA reveals elevated total CK of 633, elevated BUN of 26, and 1+ ketones in her urine  She also has and HS troponin of 8 with a delta of 1  EKG is non concerning  CT of the head and neck with and without contrast and CT recon of the cervical spine reveals no acute intracranial abnormalities, however does reveal an acute fracture involving the bilateral lamina and spinous process of C4   CT also revealed a stable chronic right cerebellar infarct  In the presence of acute cervical spinous fracture, patient's case is discussed with trauma who accept the patient under their care for further evaluation and management as needed        Disposition  Final diagnoses: Fall, initial encounter   C4 cervical fracture [facility])     Time reflects when diagnosis was documented in both MDM as applicable and the Disposition within this note     Time User Action Codes Description Comment    12/4/2022  3:19 PM Jocelineofelia Kayla [[id number]  [id number]] Fall, initial encounter     12/4/2022  3:19 PM Saul Dominguez [[id number]] C4 cervical fracture [facility])       ED Disposition     ED Disposition   Admit    Condition   Stable    Date/Time   Sun Dec 4, 2022  3:19 PM    Comment              Follow-up Information    None         Patient's Medications   Discharge Prescriptions    No medications on file     No discharge procedures on file  PDMP Review     None           ED Provider  Attending physically available and evaluated Blane Ron  I managed the patient along with the ED Attending      Electronically Signed by         Piter Finney DO  12/04/22 7380

## 2022-12-05 PROBLEM — G24.8 SEGMENTAL DYSTONIA: Chronic | Status: ACTIVE | Noted: 2019-07-15

## 2022-12-05 PROBLEM — I27.20 PULMONARY HTN (HCC): Chronic | Status: ACTIVE | Noted: 2020-10-23

## 2022-12-05 PROBLEM — I73.9 PAD (PERIPHERAL ARTERY DISEASE) (HCC): Chronic | Status: ACTIVE | Noted: 2022-10-13

## 2022-12-05 PROBLEM — R42 VERTIGO: Status: RESOLVED | Noted: 2019-10-16 | Resolved: 2022-12-05

## 2022-12-05 PROBLEM — R73.9 HYPERGLYCEMIA: Status: RESOLVED | Noted: 2019-08-26 | Resolved: 2022-12-05

## 2022-12-05 PROBLEM — Z00.00 MEDICARE ANNUAL WELLNESS VISIT, SUBSEQUENT: Status: RESOLVED | Noted: 2022-11-17 | Resolved: 2022-12-05

## 2022-12-05 LAB
ANION GAP SERPL CALCULATED.3IONS-SCNC: 7 MMOL/L (ref 4–13)
BUN SERPL-MCNC: 20 MG/DL (ref 5–25)
CALCIUM SERPL-MCNC: 9.2 MG/DL (ref 8.4–10.2)
CHLORIDE SERPL-SCNC: 108 MMOL/L (ref 96–108)
CO2 SERPL-SCNC: 26 MMOL/L (ref 21–32)
CREAT SERPL-MCNC: 0.71 MG/DL (ref 0.6–1.3)
ERYTHROCYTE [DISTWIDTH] IN BLOOD BY AUTOMATED COUNT: 14.6 % (ref 11.6–15.1)
GFR SERPL CREATININE-BSD FRML MDRD: 74 ML/MIN/1.73SQ M
GLUCOSE SERPL-MCNC: 124 MG/DL (ref 65–140)
HCT VFR BLD AUTO: 35.9 % (ref 34.8–46.1)
HGB BLD-MCNC: 11.7 G/DL (ref 11.5–15.4)
MAGNESIUM SERPL-MCNC: 2.2 MG/DL (ref 1.9–2.7)
MCH RBC QN AUTO: 31.9 PG (ref 26.8–34.3)
MCHC RBC AUTO-ENTMCNC: 32.6 G/DL (ref 31.4–37.4)
MCV RBC AUTO: 98 FL (ref 82–98)
PLATELET # BLD AUTO: 170 THOUSANDS/UL (ref 149–390)
PMV BLD AUTO: 9.9 FL (ref 8.9–12.7)
POTASSIUM SERPL-SCNC: 4.1 MMOL/L (ref 3.5–5.3)
RBC # BLD AUTO: 3.67 MILLION/UL (ref 3.81–5.12)
SODIUM SERPL-SCNC: 141 MMOL/L (ref 135–147)
WBC # BLD AUTO: 8.67 THOUSAND/UL (ref 4.31–10.16)

## 2022-12-05 RX ORDER — AMLODIPINE BESYLATE 5 MG/1
5 TABLET ORAL DAILY
Status: DISCONTINUED | OUTPATIENT
Start: 2022-12-05 | End: 2022-12-07 | Stop reason: HOSPADM

## 2022-12-05 RX ADMIN — ACETAMINOPHEN 975 MG: 325 TABLET, FILM COATED ORAL at 22:29

## 2022-12-05 RX ADMIN — ACETAMINOPHEN 975 MG: 325 TABLET, FILM COATED ORAL at 13:44

## 2022-12-05 RX ADMIN — SENNOSIDES 17.2 MG: 8.6 TABLET, FILM COATED ORAL at 09:13

## 2022-12-05 RX ADMIN — DOCUSATE SODIUM 100 MG: 100 CAPSULE, LIQUID FILLED ORAL at 09:13

## 2022-12-05 RX ADMIN — AMLODIPINE BESYLATE 5 MG: 5 TABLET ORAL at 09:13

## 2022-12-05 RX ADMIN — Medication 3 MG: at 22:29

## 2022-12-05 RX ADMIN — DOCUSATE SODIUM 100 MG: 100 CAPSULE, LIQUID FILLED ORAL at 17:35

## 2022-12-05 RX ADMIN — GABAPENTIN 100 MG: 100 CAPSULE ORAL at 22:29

## 2022-12-05 NOTE — CONSULTS
Consultation - Neurosurgery   Tawny Singh 80 y o  female MRN: [id number]  Unit/Bed#: ICU 01 Encounter: [id number]    Images reviewed at morning rounds on 12/05/2022 at 7:00 a m  Patient was seen and examined on 12/05/2020 at 7:20 a m  Inpatient consult to Neurosurgery  Consult performed by: Lisbet Covington PA-C  Consult ordered by: MAGDALENE Person          Assessment/Plan               Assessment:  1  Bilat C4 laminar fracture  2  Anterior and some posterior C3-5 EDH  3  Hx of fall      Plan:   · Exam: Patient appears agitated and confused, bilateral wrist restraint used , communicates well, GCS 14, Jennifer,   54+/5 bilaterally, sensation to LT intact, on cervical brace  Tenderness in posterior cervical spine  · Imagings reviewed personally and with attendings and findings as follows:  · CT of cervical spine without contrast on 12/04/2022 demonstrates acute fracture involving bilateral lamina and spinous process of C4  And also destruction fracture and anterior widening of previous fused C4-C5 vertebral bodies with sclerotic and numbness sclerotic borders  · MRI of cervical spine without contrast on 12/04/2022 reports poor quality but there is some epidural edema at C3-4 and C4-5  · CTA of neck with and without contrast on 12/04/2022 shows no hemodynamically significant stenosis of cervical carotid and left vertebral arteries patent major vasculature of [place] of [place] no aneurysm  Age-indeterminate likely chronic 3 like flow within the V1 segment of right vertebral artery with occluded V2 segment    With fracture involving bilateral lamina and spinous process of C4, widening of anterior C4 and C5 vertebral bodies  · Pain control:  Per Primary team  · DVT ppx: SCDs bilateral legs, C4 pharmacological DVT prophylax  · Activity:  Spine precaution  · PT/OT evaluation & treatment  · Brace:  Recommend [employer] [place] cervical collar all the time and Alexandra collar for shower  · Medical Mx:  Per primary team  · Neurocheck:  Close neuromonitoring  · I saw the patient today with Dr Mitchell Draper  Patient appears slightly agitated  The anterior c4-5 widening seems chronic with new bilateral C4 lamina fracture, some  Epidural edema  Patient can move her extremities  Given her age and code Level III care,I don't think surgery is a good option for this patient  patient may continue with conservative Mx including pain control, bracing, ordering baseline x-rays and also follow-up in 2 weeks for stability of C4-5 and cervical spine as a whole  Will discuss with family  Baseline upright Cx spine xrays in brace, pain control, spine precautions  Call with questions or concerns  History of Present Illness     HPI: Hailey Bosch is a 80 y o  female with PMHx anxiety, hearing issues, hypertension, osteopenia fitted with traumatic injury to her neck and  found to have bilateral C4 laminal fracture including spinous process of C4 also anterior C4-5 disc space widening with sclerotic change the setting of previous fusion  Patient fell down 3 days ago fell down 3 days ago and has neck pain and stiffness afterwards  Per EMR, patient slumped down to her chair difficulty bearing care weight  Patient has generalized weakness  Reports mild neck pain otherwise denies any radicular pain to the upper extremities  Patient is agitated and breasts brace to be removed  History of diabetes mellitus, congestive heart failure, stroke, seizures, bleeding disorder taking anticoagulant medication  Denies any fever, chills, rigors, cough or chest pain  Imaging findings as described in the assessment section above  Review of Systems   Constitutional: Negative for activity change, chills and fever  HENT: Negative for trouble swallowing and voice change  Eyes: Negative for photophobia and visual disturbance  Respiratory: Negative for cough, shortness of breath and wheezing  Cardiovascular: Negative for chest pain and palpitations  Gastrointestinal: Negative for nausea and vomiting  Genitourinary: Negative for difficulty urinating  Musculoskeletal: Positive for gait problem and neck pain  Negative for back pain  Neurological: Positive for weakness  Negative for dizziness, tremors, seizures, syncope, facial asymmetry, speech difficulty, light-headedness, numbness and headaches  Psychiatric/Behavioral: Positive for agitation          Historical Information   Past Medical History:   Diagnosis Date   • Anxiety 2010   • Bladder infection 09/2019   • Breast lump in female    • HL (hearing loss) 2010   • Hypertension 2010   • Osteopenia    • Ovarian cyst      Past Surgical History:   Procedure Laterality Date   • BLADDER SURGERY     • BREAST BIOPSY     • CATARACT EXTRACTION Bilateral 01/01/2015    , Right Eye-2017, Left eye   • ENDOMETRIAL BIOPSY      by Suction   • HYSTERECTOMY       Social History     Substance and Sexual Activity   Alcohol Use No     Social History     Substance and Sexual Activity   Drug Use No     Social History     Tobacco Use   Smoking Status Never   Smokeless Tobacco Never     Family History   Problem Relation Age of Onset   • Heart disease Mother    • Heart disease Father    • Hypertension Family    • Osteoporosis Family        Meds/Allergies   all current active meds have been reviewed, current meds:   Current Facility-Administered Medications   Medication Dose Route Frequency   • acetaminophen (TYLENOL) tablet 975 mg  975 mg Oral Q8H [facility]   • amLODIPine (NORVASC) tablet 5 mg  5 mg Oral Daily   • docusate sodium (COLACE) capsule 100 mg  100 mg Oral BID   • gabapentin (NEURONTIN) capsule 100 mg  100 mg Oral HS   • HYDROmorphone HCl (DILAUDID) injection 0 2 mg  0 2 mg Intravenous Q4H PRN   • melatonin tablet 3 mg  3 mg Oral HS   • ondansetron (ZOFRAN) injection 4 mg  4 mg Intravenous Q6H PRN   • oxyCODONE (ROXICODONE) IR tablet 2 5 mg  2 5 mg Oral Q4H PRN   • oxyCODONE (ROXICODONE) IR tablet 5 mg  5 mg Oral Q4H PRN • senna (SENOKOT) tablet 17 2 mg  2 tablet Oral Daily    and PTA meds:   Prior to Admission Medications   Prescriptions Last Dose Informant Patient Reported? Taking? Multiple Vitamins-Minerals (MULTIVITAL) tablet 12/4/2022  Yes Yes   Sig: Take 1 tablet by mouth daily   Omega-3 Fatty Acids (FISH OIL) 645 MG CAPS 12/4/2022  Yes Yes   Sig: Take 1 tablet by mouth daily   amLODIPine-benazepril (LOTREL 5-20) 5-20 MG per capsule 12/4/2022 at 0900  No Yes   Sig: TAKE 1 CAPSULE BY MOUTH EVERY DAY   calcium citrate-vitamin D (CITRACAL+D) 315-200 MG-UNIT per tablet 12/4/2022 at 0900  Yes Yes   Sig: Take 1 tablet by mouth daily   conjugated estrogens (PREMARIN) vaginal cream 12/4/2022  Yes Yes   Sig: Insert into the vagina Ose as directed      Facility-Administered Medications Last Administration Doses Remaining   Botulinum Toxin Type A SOLR 200 Units 5/3/2021  4:19 PM         No Known Allergies    Objective   I/O       12/03 0701 12/04 0700 12/04 0701 12/05 0700 12/05 0701 12/06 0700    P  O   50     Total Intake(mL/kg)  50 (0 9)     Urine (mL/kg/hr)  310     Total Output  310     Net  -260                  Physical Exam  Constitutional:       Appearance: Normal appearance  HENT:      Head: Normocephalic and atraumatic  Eyes:      Extraocular Movements: Extraocular movements intact  Cardiovascular:      Rate and Rhythm: Normal rate  Pulses: Normal pulses  Pulmonary:      Effort: Pulmonary effort is normal    Musculoskeletal:      Cervical back: Tenderness present  Neurological:      General: No focal deficit present  Mental Status: She is alert and oriented to person, place, and time  Deep Tendon Reflexes:      Reflex Scores:       Tricep reflexes are 2+ on the right side and 2+ on the left side  Bicep reflexes are 2+ on the right side and 2+ on the left side  Brachioradialis reflexes are 2+ on the right side and 2+ on the left side         Patellar reflexes are 2+ on the right side and 2+ on the left side  Achilles reflexes are 2+ on the right side and 2+ on the left side  Neurologic Exam     Mental Status   Oriented to person, place, and time  Level of consciousness: alert    Cranial Nerves     CN XI   CN XI normal      Motor Exam   Muscle bulk: normal  Overall muscle tone: normal  Right arm tone: normal  Left arm tone: normal  Right arm pronator drift: absent  Left arm pronator drift: absent  Right leg tone: normal  Left leg tone: normal    Sensory Exam   Light touch normal      Gait, Coordination, and Reflexes     Reflexes   Right brachioradialis: 2+  Left brachioradialis: 2+  Right biceps: 2+  Left biceps: 2+  Right triceps: 2+  Left triceps: 2+  Right patellar: 2+  Left patellar: 2+  Right achilles: 2+  Left achilles: 2+  Right : 2+  Left : 2+  Right Quigley: absent  Left Quigley: absent  Right ankle clonus: absent  Left pendular knee jerk: absent      Vitals:Blood pressure 153/67, pulse 80, temperature 98 4 °F (36 9 °C), temperature source Oral, resp  rate 16, height 4' 11" (1 499 m), weight 58 2 kg (128 lb 4 9 oz), SpO2 94 %, not currently breastfeeding  ,Body mass index is 25 91 kg/m²       Lab Results:   Results from last 7 days   Lab Units 12/05/22  0547 12/04/22  1036   WBC Thousand/uL 8 67 11 58*   HEMOGLOBIN g/dL 11 7 12 9   HEMATOCRIT % 35 9 39 5   PLATELETS Thousands/uL 170 193   NEUTROS PCT %  --  83*   MONOS PCT %  --  12     Results from last 7 days   Lab Units 12/05/22  0547 12/04/22  1036   POTASSIUM mmol/L 4 1 4 2   CHLORIDE mmol/L 108 104   CO2 mmol/L 26 26   BUN mg/dL 20 26*   CREATININE mg/dL 0 71 0 79   CALCIUM mg/dL 9 2 9 8   ALK PHOS U/L  --  48   ALT U/L  --  24   AST U/L  --  38     Results from last 7 days   Lab Units 12/05/22  0547 12/04/22  1036   MAGNESIUM mg/dL 2 2 2 1         Results from last 7 days   Lab Units 12/04/22  1839   INR  0 90     No results found for: TROPONINT  ABG:No results found for: PHART, ZEP0BAT, PO2ART, IPS6DXE, O5UJNZEE, BEART, SOURCE    Imaging Studies: I have personally reviewed pertinent reports   , I have personally reviewed pertinent films in PACS and I have personally reviewed pertinent films in PACS with a Radiologist     EKG, Pathology, and Other Studies: I have personally reviewed pertinent reports   , I have personally reviewed pertinent films in PACS and I have personally reviewed pertinent films in PACS with a Radiologist     VTE Prophylaxis: Sequential compression device (Venodyne)     Code Status: Level 3 - DNAR and DNI  Advance Directive and Living Will:      Power of :    POLST:      Counseling / Coordination of Care  I spent 20 minutes with the patient

## 2022-12-05 NOTE — PHYSICAL THERAPY NOTE
PHYSICAL THERAPY EVALUATION NOTE    Patient Name: Lobo Laboy  TCMHQ'F Date: 2022    AGE:   80 y o  Mrn:   [id number]  ADMIT DX:  C4 cervical fracture ([address] ) [A81 [id number]]    Past Medical History:   Diagnosis Date    Anxiety     Bladder infection 2019    Breast lump in female     HL (hearing loss)     Hypertension     Osteopenia     Ovarian cyst      Length Of Stay: 1  PHYSICAL THERAPY EVALUATION :   Patient's identity confirmed via 2 patient identifiers (full name and ) at start of session       22 1319   PT Last Visit   PT Visit Date 22   Note Type   Note type Evaluation;Orthotic Management/Training   Type of Brace   Brace Applied [employer] Set   Patient Position When Brace Applied Supine   Bracing Recommendations Recommendation (comment)   Education   Education Provided Yes;Family or social support of family present for education by provider   Pain Assessment   Pain Assessment Tool 0-10   Pain Score 4   Pain Location/Orientation Location: Neck   Restrictions/Precautions   Weight Bearing Precautions Per Order No   Braces or Orthoses C/S Collar  Cloud [place]pas at all times; Alexandra collar for showers)   Other Precautions Cognitive; Chair Alarm; Bed Alarm; Fall Risk;Spinal precautions;Hard of hearing;Visual impairment   Home Living   Type of [address] [place] [address] Two level;Stairs to enter with rails  (2 vs 6 JES)   Bathroom Shower/Tub Tub/shower unit   Bathroom Equipment Shower chair;Commode   Home Equipment Walker;Cane;Stair glide  (pt usually navigates stairs, doesnt use stair glide)   Additional Comments Pt uses RW for functional mobility PTA   Prior Function   Level of [place] Independent with ADLs; Independent with functional mobility   Lives With Daughter  (works full time during the day)   Receives Help From Family   IADLs Family/Friend/Other provides transportation   Falls in the last 6 months 1 to 4  (2; both leading to admission)   Vocational Retired   Comments Pt's daughter reports she goes to work daily and pt is often home alone   General   Family/Caregiver Present Yes  (Daughter)   Cognition   Overall Cognitive Status Impaired   Arousal/Participation Alert   Attention Attends with cues to redirect   Orientation Level Oriented to person;Oriented to place;Oriented to situation   Memory Decreased short term memory   Following Commands Follows multistep commands with increased time or repetition   Comments Pt w/ decreased problem solving, nonsensical speech at times, however overall pleasantly confused   Subjective   Subjective "I'm like horsesh**, i've been everywhere"   RLE Assessment   RLE Assessment WFL   Strength RLE   RLE Overall Strength 4/5   LLE Assessment   LLE Assessment WFL   Strength LLE   LLE Overall Strength 4/5   Vision-Basic Assessment   Current Vision Wears glasses all the time   Coordination   Movements are Fluid and Coordinated 1   Light Touch   RLE Light Touch Grossly intact   LLE Light Touch Grossly intact   Bed Mobility   Supine to Sit 2  Maximal assistance   Additional items Assist x 2; Increased time required;Verbal cues;LE management   Additional Comments Pt is able to sit EOB w/ min A x 1  C/o increased neckpain in unsupported sitting  Also c/o initial dizziness which resolves   Transfers   Sit to Stand 2  Maximal assistance   Additional items Assist x 2; Increased time required;Verbal cues   Stand to Sit 2  Maximal assistance   Additional items Assist x 2; Increased time required;Verbal cues   Ambulation/Elevation   Gait pattern Decreased foot clearance; Wide BOBBY; Forward Flexion; Short stride; Step to;Excessively slow   Gait Assistance 2  Maximal assist   Additional items Assist x 2;Verbal cues   Assistive Device Rolling walker   Distance 2 ft   Ambulation/Elevation Additional Comments Pt is able to ambulate 2 ft from EOB to recliner chair, additional not possible due to weakness, fatigue, and neck pain   Balance   Static Sitting Fair -   Static Standing Zero  (Ax2)   Ambulatory Zero  (Ax2)   Activity Tolerance   Activity Tolerance Patient limited by pain; Patient limited by fatigue   Medical Staff Alma coordination w/ OT GOKUL Hadley   Nurse Made Aware Spoke to RN Lianet   Assessment   Problem List Decreased strength;Decreased endurance; Impaired balance;Decreased mobility; Decreased cognition; Impaired judgement;Decreased safety awareness;Pain  (spinal precautions)   Assessment Johanna Diego is a 80 y o  Female who [facility] on 12/4/2022 from home w/ c/o 2 falls w/ generalized weakness and neck pain and diagnosis of closed nondisplaced fracture of C4  Orders for PT eval and treat received, w/ activity orders of up w/ assist and spinal precautions  Pt presents w/ comorbidities of HTN, anxiety, depression, osteoporosis, MCI  At baseline, pt mobilizes mod I w/ RW, and reports 2 falls in the last 6 months  Upon evaluation, pt presents w/ the following deficits: weakness, impaired balance, decreased endurance and pain limiting functional mobility  Upon eval, pt requires max A x 2 for bed mobility, max A x 2 for transfers, and max A x 2 for gait  Pt's clinical presentation is unstable/unpredictable due to need for assist w/ all phases of mobility when usually mobilizing independently, pain impacting overall mobility status, need for input for task focus and mobility technique, recent drastic decline in mobility compared to baseline and recent history of falls  Patient is at an increased risk of falls due to physical and cognitive deficits  Given the above findings, discharge recommendation is for Post-acute inpatient rehabilitation  During this admission, pt would benefit from continued skilled inpatient PT in the acute care setting in order to address the abovementioned deficits to maximize function and mobility before DC from acute care     Goals   Patient Goals to return to PLOF ("show those stairs who's boss")   STG Expiration Date 12/15/22   Short Term Goal #1 Patient will: Increase bilateral LE strength 1/2 grade to facilitate independent mobility, Perform all bed mobility tasks w/ mod A x1 to decrease fall risk factors, Perform all transfers w/ mod A x1 to improve independence, Ambulate at least 25 ft  with roller walker w/ mod A x1 w/o LOB, Increase all balance 1/2 grade to decrease risk for falls, Complete exercise program independently and Tolerate 3 hr OOB to faciliate upright tolerance   Plan   Treatment/Interventions Functional transfer training;LE strengthening/ROM; Therapeutic exercise; Endurance training;Cognitive reorientation;Patient/family training;Equipment eval/education; Bed mobility;Gait training  (PT to see for stairs when appropriate)   PT Frequency 4-6x/wk   Recommendation   PT Discharge Recommendation Post acute rehabilitation services   Equipment Recommended [address] [facility] Recommended Wheeled walker   Change/add to [employer]equ[employer]? No   AM-PAC Basic Mobility Inpatient   Turning in Bed Without Bedrails 2   Lying on Back to Sitting on Edge of Flat Bed 1   Moving Bed to Chair 1   Standing Up From Chair 1   Walk in Room 1   Climb 3-5 Stairs 1   Basic Mobility Inpatient Raw Score 7   Turning Head Towards Sound 3   Follow Simple Instructions 3   Low Function Basic Mobility Raw Score 13   Low Function Basic Mobility Standardized Score 20 14   Highest Level Of Mobility   -HLM Goal 2: Bed activities/Dependent transfer   -HLM Achieved 4: Move to chair/commode   End of Consult   Patient Position at End of Consult Bedside chair; All needs within reach;Bed/Chair alarm activated         The patient's AM-PAC Basic Mobility Inpatient Short Form Raw Score is 7, Standardized Score is     A standardized score less than 38 32 (raw score of 16) suggests the patient may benefit from discharge to post-acute rehabilitation services which may not coincide with above PT recommendations  However please refer to therapist recommendation for discharge planning given other factors that may influence destination      Pt would benefit from skilled inpatient PT during this admission in order to facilitate progress towards goals to maximize functional independence    Dash Nassar, PT, DPT

## 2022-12-05 NOTE — PLAN OF CARE
Problem: PHYSICAL THERAPY ADULT  Goal: Performs mobility at highest level of function for planned discharge setting  See evaluation for individualized goals  Description: Treatment/Interventions: Functional transfer training, LE strengthening/ROM, Therapeutic exercise, Endurance training, Cognitive reorientation, Patient/family training, Equipment eval/education, Bed mobility, Gait training (PT to see for stairs when appropriate)  Equipment Recommended: [place]       See flowsheet documentation for full assessment, interventions and recommendations  Note:    Problem List: Decreased strength, Decreased endurance, Impaired balance, Decreased mobility, Decreased cognition, Impaired judgement, Decreased safety awareness, Pain (spinal precautions)  Assessment: Devin Dos Santos is a 80 y o  Female who [facility] on 12/4/2022 from home w/ c/o 2 falls w/ generalized weakness and neck pain and diagnosis of closed nondisplaced fracture of C4  Orders for PT eval and treat received, w/ activity orders of up w/ assist and spinal precautions  Pt presents w/ comorbidities of HTN, anxiety, depression, osteoporosis, MCI  At baseline, pt mobilizes mod I w/ RW, and reports 2 falls in the last 6 months  Upon evaluation, pt presents w/ the following deficits: weakness, impaired balance, decreased endurance and pain limiting functional mobility  Upon eval, pt requires max A x 2 for bed mobility, max A x 2 for transfers, and max A x 2 for gait  Pt's clinical presentation is unstable/unpredictable due to need for assist w/ all phases of mobility when usually mobilizing independently, pain impacting overall mobility status, need for input for task focus and mobility technique, recent drastic decline in mobility compared to baseline and recent history of falls  Patient is at an increased risk of falls due to physical and cognitive deficits  Given the above findings, discharge recommendation is for Post-acute inpatient rehabilitation  During this admission, pt would benefit from continued skilled inpatient PT in the acute care setting in order to address the abovementioned deficits to maximize function and mobility before DC from acute care  PT Discharge Recommendation: Post acute rehabilitation services    See flowsheet documentation for full assessment

## 2022-12-05 NOTE — PLAN OF CARE
Problem: MOBILITY - ADULT  Goal: Maintain or return to baseline ADL function  Description: INTERVENTIONS:  -  Assess patient's ability to carry out ADLs; assess patient's baseline for ADL function and identify physical deficits which impact ability to perform ADLs (bathing, care of mouth/teeth, toileting, grooming, dressing, etc )  - Assess/evaluate cause of self-care deficits   - Assess range of motion  - Assess patient's mobility; develop plan if impaired  - Assess patient's need for assistive devices and provide as appropriate  - Encourage maximum independence but intervene and supervise when necessary  - Involve family in performance of ADLs  - Assess for home care needs following discharge   - Consider OT consult to assist with ADL evaluation and planning for discharge  - Provide patient education as appropriate  Outcome: Progressing     Problem: MOBILITY - ADULT  Goal: Maintains/Returns to pre admission functional level  Description: INTERVENTIONS:  - Perform BMAT or MOVE assessment daily    - Set and communicate daily mobility goal to care team and patient/family/caregiver  - Collaborate with rehabilitation services on mobility goals if consulted  - Perform Range of Motion 3 times a day  - Reposition patient every 2 hours    - Dangle patient 1 times a day  - Record patient progress and toleration of activity level   Outcome: Progressing       Problem: Prexisting or High Potential for Compromised Skin Integrity  Goal: Skin integrity is maintained or improved  Description: INTERVENTIONS:  - Identify patients at risk for skin breakdown  - Assess and monitor skin integrity  - Assess and monitor nutrition and hydration status  - Monitor labs   - Assess for incontinence   - Turn and reposition patient  - Assist with mobility/ambulation  - Relieve pressure over bony prominences  - Avoid friction and shearing  - Provide appropriate hygiene as needed including keeping skin clean and dry  - Evaluate need for skin moisturizer/barrier cream  - Collaborate with interdisciplinary team   - Patient/family teaching  - Consider wound care consult   Outcome: Progressing     Problem: DISCHARGE PLANNING  Goal: Discharge to home or other facility with appropriate resources  Description: INTERVENTIONS:  - Identify barriers to discharge w/patient and caregiver  - Arrange for needed discharge resources and transportation as appropriate  - Identify discharge learning needs (meds, wound care, etc )  - Arrange for interpretive services to assist at discharge as needed  - Refer to Case Management Department for coordinating discharge planning if the patient needs post-hospital services based on physician/advanced practitioner order or complex needs related to functional status, cognitive ability, or social support system  Outcome: Progressing

## 2022-12-05 NOTE — ASSESSMENT & PLAN NOTE
· [employer]er[employer]osx consulted - input appreciated  · Continue strict bedrest and neuro spine precautions  · Continue [place]   · Multi modal pain control

## 2022-12-05 NOTE — QUICK NOTE
Received TT from Gema Jordan from [place] regarding the patient who came with fall down injury to her neck and found to have acute fracture involving bilateral lamina and spinous process of C4  There is also destruction fracture and anterior widening of previously fused C4 and C5 vertebral bodies with sclerotic and no sclerotic borders  MRI of cervical spine poor quality  But reported there is abnormal signal within the posterior epidural space beginning at C3-4 down to C4-5, resulting in moderate canal stenosis appears to be posterior epidural hematoma, suspicion for partial disruption of the anterior longitudinal ligament at the level of C4-5 disc space but is incompletely evaluated    Per provider, patient has some cognitive issues in the setting of mild dementia, but  can move her extremities,  5/5 bilaterally biceps and triceps 4+/5 in the right and left shoulder shrug 5/5    Recommendations:    1  Strict bedrest and cervical  spine precaution  2  Wear [employer] cervical collar all the time  3  Pain control  4  Given her age and also unremarkable neurological finding, I dosing patient needs any emergency surgical procedure at this juncture  5  Will Re-evaluate her in the morning  6   Call with question or concern

## 2022-12-05 NOTE — PLAN OF CARE
Problem: MOBILITY - ADULT  Goal: Maintain or return to baseline ADL function  Description: INTERVENTIONS:  -  Assess patient's ability to carry out ADLs; assess patient's baseline for ADL function and identify physical deficits which impact ability to perform ADLs (bathing, care of mouth/teeth, toileting, grooming, dressing, etc )  - Assess/evaluate cause of self-care deficits   - Assess range of motion  - Assess patient's mobility; develop plan if impaired  - Assess patient's need for assistive devices and provide as appropriate  - Encourage maximum independence but intervene and supervise when necessary  - Involve family in performance of ADLs  - Assess for home care needs following discharge   - Consider OT consult to assist with ADL evaluation and planning for discharge  - Provide patient education as appropriate  Outcome: Progressing  Goal: Maintains/Returns to pre admission functional level  Description: INTERVENTIONS:  - Perform BMAT or MOVE assessment daily    - Set and communicate daily mobility goal to care team and patient/family/caregiver  - Collaborate with rehabilitation services on mobility goals if consulted  - Perform Range of Motion  times a day  - Reposition patient every  hours    - Dangle patient  times a day  - Stand patient  times a day  - Ambulate patient  times a day  - Out of bed to chair  times a day   - Out of bed for meals  times a day  - Out of bed for toileting  - Record patient progress and toleration of activity level   Outcome: Progressing     Problem: Potential for Falls  Goal: Patient will remain free of falls  Description: INTERVENTIONS:  - Educate patient/family on patient safety including physical limitations  - Instruct patient to call for assistance with activity   - Consult OT/PT to assist with strengthening/mobility   - Keep Call bell within reach  - Keep bed low and locked with side rails adjusted as appropriate  - Keep care items and personal belongings within reach  - Initiate and maintain comfort rounds  - Make Fall Risk Sign visible to staff  - Offer Toileting every  Hours, in advance of need  - Initiate/Maintain alarm  - Obtain necessary fall risk management equipment:   - Apply yellow socks and bracelet for high fall risk patients  - Consider moving patient to room near nurses station  Outcome: Progressing     Problem: Prexisting or High Potential for Compromised Skin Integrity  Goal: Skin integrity is maintained or improved  Description: INTERVENTIONS:  - Identify patients at risk for skin breakdown  - Assess and monitor skin integrity  - Assess and monitor nutrition and hydration status  - Monitor labs   - Assess for incontinence   - Turn and reposition patient  - Assist with mobility/ambulation  - Relieve pressure over bony prominences  - Avoid friction and shearing  - Provide appropriate hygiene as needed including keeping skin clean and dry  - Evaluate need for skin moisturizer/barrier cream  - Collaborate with interdisciplinary team   - Patient/family teaching  - Consider wound care consult   Outcome: Progressing     Problem: PAIN - ADULT  Goal: Verbalizes/displays adequate comfort level or baseline comfort level  Description: Interventions:  - Encourage patient to monitor pain and request assistance  - Assess pain using appropriate pain scale  - Administer analgesics based on type and severity of pain and evaluate response  - Implement non-pharmacological measures as appropriate and evaluate response  - Consider cultural and social influences on pain and pain management  - Notify physician/advanced practitioner if interventions unsuccessful or patient reports new pain  Outcome: Progressing     Problem: INFECTION - ADULT  Goal: Absence or prevention of progression during hospitalization  Description: INTERVENTIONS:  - Assess and monitor for signs and symptoms of infection  - Monitor lab/diagnostic results  - Monitor all insertion sites, i e  indwelling lines, tubes, and drains  - Monitor endotracheal if appropriate and nasal secretions for changes in amount and color  - [place] appropriate cooling/warming therapies per order  - Administer medications as ordered  - Instruct and encourage patient and family to use good hand hygiene technique  - Identify and instruct in appropriate isolation precautions for identified infection/condition  Outcome: Progressing  Goal: Absence of fever/infection during neutropenic period  Description: INTERVENTIONS:  - Monitor WBC    Outcome: Progressing     Problem: SAFETY ADULT  Goal: Maintain or return to baseline ADL function  Description: INTERVENTIONS:  -  Assess patient's ability to carry out ADLs; assess patient's baseline for ADL function and identify physical deficits which impact ability to perform ADLs (bathing, care of mouth/teeth, toileting, grooming, dressing, etc )  - Assess/evaluate cause of self-care deficits   - Assess range of motion  - Assess patient's mobility; develop plan if impaired  - Assess patient's need for assistive devices and provide as appropriate  - Encourage maximum independence but intervene and supervise when necessary  - Involve family in performance of ADLs  - Assess for home care needs following discharge   - Consider OT consult to assist with ADL evaluation and planning for discharge  - Provide patient education as appropriate  Outcome: Progressing  Goal: Maintains/Returns to pre admission functional level  Description: INTERVENTIONS:  - Perform BMAT or MOVE assessment daily    - Set and communicate daily mobility goal to care team and patient/family/caregiver  - Collaborate with rehabilitation services on mobility goals if consulted  - Perform Range of Motion  times a day  - Reposition patient every  hours    - Dangle patient  times a day  - Stand patient  times a day  - Ambulate patient  times a day  - Out of bed to chair  times a day   - Out of bed for meals  times a day  - Out of bed for toileting  - Record patient progress and toleration of activity level   Outcome: Progressing  Goal: Patient will remain free of falls  Description: INTERVENTIONS:  - Educate patient/family on patient safety including physical limitations  - Instruct patient to call for assistance with activity   - Consult OT/PT to assist with strengthening/mobility   - Keep Call bell within reach  - Keep bed low and locked with side rails adjusted as appropriate  - Keep care items and personal belongings within reach  - Initiate and maintain comfort rounds  - Make Fall Risk Sign visible to staff  - Offer Toileting every  Hours, in advance of need  - Initiate/Maintain alarm  - Obtain necessary fall risk management equipment  - Apply yellow socks and bracelet for high fall risk patients  - Consider moving patient to room near nurses station  Outcome: Progressing     Problem: DISCHARGE PLANNING  Goal: Discharge to home or other facility with appropriate resources  Description: INTERVENTIONS:  - Identify barriers to discharge w/patient and caregiver  - Arrange for needed discharge resources and transportation as appropriate  - Identify discharge learning needs (meds, wound care, etc )  - Arrange for interpretive services to assist at discharge as needed  - Refer to Case Management Department for coordinating discharge planning if the patient needs post-hospital services based on physician/advanced practitioner order or complex needs related to functional status, cognitive ability, or social support system  Outcome: Progressing     Problem: Knowledge Deficit  Goal: Patient/family/caregiver demonstrates understanding of disease process, treatment plan, medications, and discharge instructions  Description: Complete learning assessment and assess knowledge base    Interventions:  - Provide teaching at level of understanding  - Provide teaching via preferred learning methods  Outcome: Progressing

## 2022-12-05 NOTE — OCCUPATIONAL THERAPY NOTE
Occupational Therapy Evaluation     Patient Name: Oskar Ricks  PSWHV'A Date: 12/5/2022  Problem List  Principal Problem:    Closed displaced fracture of fourth cervical vertebra [facility])  Active Problems:    Essential hypertension    Fall    Past Medical History  Past Medical History:   Diagnosis Date   • Anxiety 2010   • Bladder infection 09/2019   • Breast lump in female    • HL (hearing loss) 2010   • Hypertension 2010   • Osteopenia    • Ovarian cyst      Past Surgical History  Past Surgical History:   Procedure Laterality Date   • BLADDER SURGERY     • BREAST BIOPSY     • CATARACT EXTRACTION Bilateral 01/01/2015    , Right Eye-2017, Left eye   • ENDOMETRIAL BIOPSY      by Suction   • HYSTERECTOMY               12/05/22 1345   OT Last Visit   OT Visit Date 12/05/22   Note Type   Note type Evaluation   Pain Assessment   Pain Assessment Tool FLACC   Pain Location/Orientation Location: Neck   Pain Rating: FLACC (Rest) - Face 0   Pain Rating: FLACC (Rest) - Legs 0   Pain Rating: FLACC (Rest) - Activity 0   Pain Rating: FLACC (Rest) - Cry 0   Pain Rating: FLACC (Rest) - Consolability 0   Score: FLACC (Rest) 0   Pain Rating: FLACC (Activity) - Face 0   Pain Rating: FLACC (Activity) - Legs 1   Pain Rating: FLACC (Activity) - Activity 1   Pain Rating: FLACC (Activity) - Cry 0   Pain Rating: FLACC (Activity) - Consolability 1   Score: FLACC (Activity) 3   Restrictions/Precautions   Weight Bearing Precautions Per Order No   Braces or Orthoses C/S Collar  [employer];  Alexandra collar for showers)   Home Living   Type of [address] [place] [address] Two level;Stairs to enter with rails   Bathroom Shower/Tub Tub/shower unit   Bathroom Toilet Standard   Bathroom Equipment Shower chair;Commode   Bathroom Accessibility Accessible   Home Equipment Walker;Cane;Stair glide  (typically completes stairs (I), has not used stair glide)   Additional Comments use of RW at baseline, overall mod I   Prior Function   Level of [place] Independent with ADLs  (sponge bathing, (I) with LB dressing/bathing/toileting)   Lives With Daughter  (works full time during the day, and spends weekends at her [address] Help From Family   IADLs Family/Friend/Other provides transportation   Falls in the last 6 months 1 to 4  (2)   Vocational Retired   Lifestyle   Autonomy PTA pt living with daughter in [place], pt (I) with ADLs and (A) with IADLs, (+)falls, (-)drives, use of RW at baseline   Reciprocal Relationships supportive daughter, however daughter is gone during the day   Service to Others retired, worked in [employer] in [employer] [employer] enjoys reading the newspaper and watching tv   General   Family/Caregiver Present Yes  Marlena Brooks")   Subjective   Subjective "I was all over like horse shit"   ADL   Eating Assistance 5  Supervision/Setup   Grooming Assistance 4  Minimal Assistance   UB Bathing Assistance 3  Moderate Assistance   LB [address] 1  Total Assistance   UB Dressing Assistance 3  Moderate Assistance   LB Dressing Assistance 1  Total Assistance   LB Dressing Deficit Don/doff R sock; Don/doff L sock   Toileting Assistance  2  Maximal Assistance   Bed Mobility   Rolling R 2  Maximal assistance   Additional items Assist x 2; Increased time required;Verbal cues;LE management   Supine to Sit 2  Maximal assistance   Additional items Assist x 2; Increased time required;Verbal cues   Additional Comments Sitting EOB with min A x1   Transfers   Sit to Stand 2  Maximal assistance   Additional items Assist x 2; Increased time required;Verbal cues   Stand to Sit 2  Maximal assistance   Additional items Assist x 2; Increased time required;Verbal cues   Additional Comments use of RW   Functional Mobility   Functional Mobility 2  Maximal assistance   Additional Comments Ax2, limited 2-3 steps to recliner chair   Additional items Rolling walker   Balance   Static Sitting Fair   Dynamic Sitting Fair   Static Standing Zero  (Ax2) Ambulatory Zero   Activity Tolerance   Activity Tolerance Patient limited by fatigue;Patient limited by pain   Medical Staff Made Aware PT LUCIUS Gil Assessment   RUE Assessment WFL   LUE Assessment   LUE Assessment WFL   Hand Function   Gross Motor Coordination Functional   Fine Motor Coordination Functional   Cognition   Overall Cognitive Status Impaired   Arousal/Participation Alert; Cooperative   Attention Attends with cues to redirect   Orientation Level Oriented to person;Disoriented to place; Disoriented to time;Disoriented to situation   Memory Decreased short term memory;Decreased recall of recent events   Following Commands Follows multistep commands with increased time or repetition   Comments Pt with limited problem solving/safety awareness  Nonsensical at times   Assessment   Limitation Decreased ADL status; Decreased UE strength;Decreased Safe judgement during ADL;Decreased endurance;Decreased self-care trans;Decreased high-level ADLs; Decreased cognition   Prognosis Good   Assessment Pt is a 80 y o  female seen for OT evaluation s/p admission to [address] [place] [address] on 12/4/2022 due to fall  Pt diagnosed with Closed displaced fracture of fourth cervical vertebra ([facility] [address] )  Pt has a significant PMH impacting occupational performance including: HTN, fall  Pt with no recent admissions in the last 2 months  Pt with active OT evaluation and treatment orders and activity orders for Up with assistance  PTA pt living with daughter in [facility], pt (I) with ADLs and (A) with IADLs, use of RW at baseline  Pt is motivated to return to home  Personal and environmental factors supporting pt at time of IE include accessible home environment  Personal and environmental factors inhibiting engagement in occupations include advanced age, limited social support, difficulty completing ADLs and difficulty completing IADLs  During evaluation pt performed as is outlined above in flowsheet   Pt required VC for safety and VC for attention to task  Standardized assessments used to assist in identifying performance deficits include AMPAC 6-Clicks and Barthel ADL Index  Performance deficits that affect the pt’s occupational performance during the initial evaluation include impaired balance, functional mobility, endurance, activity tolerance, forward functional reach, functional standing tolerance and overall strength, safety awareness, insight into deficits and problem solving  Based on pt’s functional performance and deficits the following occupations will be addressed in OT treatments in order to maximize pt’s independence and overall occupational performance: grooming, bathing/showering, toileting and toilet hygiene, dressing and functional mobility  Goals are listed below  Upon discharge from acute care setting recommend d/c to [address] [place] [address]  This evaluation required an extensive review of medical and/or therapy records and additional review of physical, cognitive and psychosocial history related to functional performance  Based upon functional performance deficits and assessments, this evaluation has been identified as a high complexity evaluation  Goals   Patient Goals to go home   LTG Time Frame 10-14   Long Term Goal see goals listed below   Plan   Treatment Interventions ADL retraining;Functional transfer training; Endurance training;Patient/family training;Equipment evaluation/education; Compensatory technique education; Activityengagement; Energy conservation   Goal Expiration Date 12/19/22   OT Treatment Day 0   OT Frequency 3-5x/wk   Recommendation   OT Discharge Recommendation Post acute rehabilitation services   AM-PAC Daily Activity Inpatient   Lower Body Dressing 2   Bathing 2   Toileting 1   Upper Body Dressing 2   Grooming 3   Eating 3   Daily Activity Raw Score 13   Daily Activity Standardized Score (Calc for Raw Score >=11) 32 03   AM-PAC Applied Cognition Inpatient   Following a Speech/Presentation 3 Understanding Ordinary Conversation 3   Taking Medications 2   Remembering Where Things Are Placed or Put Away 2   Remembering List of 4-5 Errands 1   Taking Care of Complicated Tasks 1   Applied Cognition Raw Score 12   Applied Cognition Standardized Score 28 82   Barthel Index   Feeding 5   Bathing 0   Grooming Score 5   Dressing Score 5   Bladder Score 10   Bowels Score 10   Toilet Use Score 5   Transfers (Bed/Chair) Score 5   Mobility (Level Surface) Score 0   Stairs Score 0   Barthel Index Score 45   End of Consult   Patient Position at End of Consult Bedside chair;Bed/Chair alarm activated; All needs within reach     GOALS:      -Patient will perform grooming tasks standing at sink with overall Mod I in order to increase overall independence    -Patient will be Min A  with UB ADLs using AE and AD as needed in order to increase (I) with ADLs    -Patient will be Mod A  with LB ADLs with use of AE and AD as needed in order to increase (I) with ADLs    -Patient will complete toileting w/ Mod A  w/ G hygiene/thoroughness in order to reduce caregiver burden    -Patient will demonstrate Mod A x 1 with bed mobility for ability to manage own comfort and initiate OOB tasks      -Patient will perform functional transfers with Mod A x 1 to/from all surfaces using DME as needed in order to increase (I) with functional tasks    -Patient will be Mod A x 1 with functional mobility to/from bathroom for increased independence with toileting tasks    -Patient will tolerate therapeutic activities for greater than 30 min, in order to increase tolerance for functional activities      -Patient will increase OOB/sitting tolerance to 2-4 hours per day to increase participation in self-care and leisure tasks with no s/s of exertion      -Patient will engage in ongoing cognitive assessment in order to assist with safe discharge planning/recommendations      The patient's raw score on the -PAC Daily Activity inpatient short form is 13, standardized score is 32 03, less than 39 4  Patients at this level are likely to benefit from discharge to post-acute rehabilitation services  Please refer to the recommendation of the Occupational Therapist for safe discharge planning  This session, pt required and most appropriately benefited from skilled OT/PT co-eval due to extensive physical assistance of SKILLED therapists, significant regression from functional baseline, and decreased activity tolerance  OT and PT goals were addressed separately as seen in documentation       Miranda Horton MS, OTR/L

## 2022-12-05 NOTE — CONSULTS
Consultation - Geriatric Medicine   Brianda Bianchi 80 y o  female MRN: [id number]  Unit/Bed#: ICU 01 Encounter: [id number]        Inpatient consult to Gerontology  Consult performed by: Reynaldo Manning MD  Consult ordered by: Rimma Alvarado PA-C            Assessment/Plan      1  Closed displaced fracture of 4th cervical vertebra s/p fall  - C4 bilateral lamina fractures with anterior widening at C4/C5 previously fused osteophyte  Right sided Upper>lower extremity weakness R: 3/5 , 4/5 tri/biceps, 4-5/5 lower extremity plantar/dorsiflexion  Per neurosx, pt is to remain in [place] vista cervical collar  Due to advanced age, they report that conservative management is the best treatment at this time  2  Hypertension - Managed with Amlodipine-Benazapril 5-20mg     3 - Osteoporosis  - Multivitamin & Calcium citrate at home    4 -Hearing loss  -Bilateral hearing loss     5  Peripheral artery disease - Follows closely with podiatry and gets regular nail trims by them     6 -Depression - Has been on Sertaline in the past but was weaned off by PCP     7 -Arthritis  -Receives injections in her right knee, via pain management         History of Present Illness   Physician Requesting Consult: Natasha Menendez,*  Reason for Consult / Principal Problem: Fall >64 y/o  Hx and PE limited by: None  Additional history obtained from: Daughter January Durham      HPI: Brianda Bianchi is a 80y o  year old female who presents with neck stiffness s/p falling down the steps 2 days PTA  The patient was attempting to walk up the steps on her own  Daughter was not home at the time of the fall and when she returned she found the patient at the bottom of the steps  Patient reports that she only made it up a few steps before she fell  Per family, she was noted at home to be very weak and became unable to walk on her own  She then tried to stand up and fell backward striking her head   She has weakness R>L and when it did not improve her daughter brought her into the ED  Patient's only complaint is neck stiffness  She is not on any anticoagulants  Only outstanding medical hx is hypertension, managed with Amlodipine  Labs showed no evidence of leukocytosis WBC 8 67, kidney or liver impairment GFR 74,, anemia with HGB 11 7, troponins negative  UA negative for infection  CT head, neck and C spine showed No acute intracranial CT abnormality  Acute fracture involving bilateral lamina and spinous process of C4  There is distraction fracture and anterior widening of previously fused C4 and C5 vertebral bodies with sclerotic and nonsclerotic borders  See separate report of the cervical spine CT  Stable chronic right cerebellar infarct  Nonspecific white matter change suggesting microangiopathy  Age indeterminate likely chronic threadlike flow within V1 segment of right vertebral artery with occluded V2 segment  Patent small caliber V3 and V4 segments  Patent major vasculature of the [place] without high-grade stenosis  No aneurysm  No hemodynamically significant stenosis of cervical carotid and left vertebral arteries  Anatomic variant retropharyngeal course of proximal to mid right common carotid artery  CXR No acute cardiopulmonary disease  Mri cervical spine eventually showed There is widening of the disc space at the C4-5 level anteriorly in the disc space is now distended with fluid  There is abnormal signal within the posterior epidural space beginning at the C3-4 level and extending inferiorly to the C4-5 level resulting in moderate canal stenosis  This is most suggestive of posterior epidural hematoma  Suspected partial disruption of the anterior longitudinal ligament at the level of the C4-5 disc space, incompletely evaluated  No definite abnormal cord signal identified on this motion degraded exam  EKG showed NSR 95 bpm with no evidence of infarct  Patient does live in a 2 story home however they have a chair lift   Planning for conservative management and outpatient rehab facility         Review of Systems   Constitutional: Negative for chills and fatigue  HENT: Positive for hearing loss  Negative for sinus pain and sore throat  Eyes: Negative for pain  Respiratory: Negative for cough and shortness of breath  Cardiovascular: Negative for chest pain  Gastrointestinal: Negative for abdominal pain, diarrhea, nausea and vomiting  Genitourinary: Negative for dysuria and flank pain  Musculoskeletal: Positive for neck pain  Skin: Negative for rash  Neurological: Positive for weakness  Memory/Cognitive screening: memory is mostly intact, occasionally she has some confusion  She is able to follow commands  Mobility:  Poor mobility secondary to arthritis and bursitis in right leg  Falls:  No falls prior to this incident  Assistive Devices:  walker  Fraility: none  Nutrition/weight loss/grocery shopping/meal preparation: done entirely by daughter  Vision impairment: wears reading glasses, bilateral cataract sx, follows with ophthalmology  Hearing impairment: B/L hearing aids   Incontinence: Wears depends   Delirium: None  Polypharmacy:   No current facility-administered medications on file prior to encounter       Current Outpatient Medications on File Prior to Encounter   Medication Sig Dispense Refill   • amLODIPine-benazepril (LOTREL 5-20) 5-20 MG per capsule TAKE 1 CAPSULE BY MOUTH EVERY DAY 90 capsule 1   • calcium citrate-vitamin D (CITRACAL+D) 315-200 MG-UNIT per tablet Take 1 tablet by mouth daily     • conjugated estrogens (PREMARIN) vaginal cream Insert into the vagina Ose as directed     • Multiple Vitamins-Minerals (MULTIVITAL) tablet Take 1 tablet by mouth daily     • Omega-3 Fatty Acids (FISH OIL) 645 MG CAPS Take 1 tablet by mouth daily         Patients primary residence: [address] home with 14 steps to get up Lives with:daughter   iADL's:  Can write her own checks but struggles to balance her check book, daughter does many of her iADLs  She just quit driving about 1 year ago   ADL's:  Enjoys her basic activities of daily living    Historical Information   Past medical history:   Past Medical History:   Diagnosis Date   • Anxiety 2010   • Bladder infection 09/2019   • Breast lump in female    • HL (hearing loss) 2010   • Hypertension 2010   • Osteopenia    • Ovarian cyst        Past surgical history:   Past Surgical History:   Procedure Laterality Date   • BLADDER SURGERY     • BREAST BIOPSY     • CATARACT EXTRACTION Bilateral 01/01/2015    , Right Eye-2017, Left eye   • ENDOMETRIAL BIOPSY      by Suction   • HYSTERECTOMY         Social history:  Social History     Socioeconomic History   • Marital status:      Spouse name: Not on file   • Number of children: Not on file   • Years of education: Not on file   • Highest education level: Not on file   Occupational History   • Not on file   Tobacco Use   • Smoking status: Never   • Smokeless tobacco: Never   Substance and Sexual Activity   • Alcohol use: No   • Drug use: No   • Sexual activity: Not Currently   Other Topics Concern   • Not on file   Social History Narrative    Denied consumption of coffee    Active daily tea consumption    Denied ingesting cola containing caffeine         Social Determinants of Health     Financial Resource Strain: Low Risk    • Difficulty of Paying Living Expenses: Not very hard   Food Insecurity: Not on file   Transportation Needs: No Transportation Needs   • Lack of Transportation (Medical): No   • Lack of Transportation (Non-Medical):  No   Physical Activity: Not on file   Stress: Not on file   Social Connections: Not on file   Intimate Partner Violence: Not on file   Housing Stability: Not on file       Family history:   Family History   Problem Relation Age of Onset   • Heart disease Mother    • Heart disease Father    • Hypertension Family    • Osteoporosis Family          Meds/Allergies   All current active meds have been reviewed  No Known Allergies    Objective   Vitals:    12/05/22 0801   BP: 153/67   Pulse: 80   Resp: 16   Temp: 98 4 °F (36 9 °C)   SpO2: 94%       Intake/Output Summary (Last 24 hours) at 12/5/2022 0818  Last data filed at 12/5/2022 0400  Gross per 24 hour   Intake 50 ml   Output 310 ml   Net -260 ml     Invasive Devices     Peripheral Intravenous Line  Duration           Peripheral IV 12/05/22 Dorsal (posterior); Left Forearm <1 day                Physical Exam  Constitutional:       Appearance: She is normal weight  HENT:      Head: Normocephalic and atraumatic  Right Ear: Tympanic membrane normal       Left Ear: Tympanic membrane normal       Nose: Nose normal       Mouth/Throat:      Mouth: Mucous membranes are moist       Pharynx: Oropharynx is clear  Eyes:      Conjunctiva/sclera: Conjunctivae normal       Pupils: Pupils are equal, round, and reactive to light  Neck:      Comments: Cervical collar in place   Cardiovascular:      Rate and Rhythm: Normal rate and regular rhythm  Pulses: Normal pulses  Heart sounds: Normal heart sounds  Pulmonary:      Effort: Pulmonary effort is normal       Breath sounds: Normal breath sounds  Abdominal:      General: Abdomen is flat  Bowel sounds are normal    Musculoskeletal:         General: Normal range of motion  Skin:     General: Skin is warm  Neurological:      Mental Status: She is alert  Motor: Weakness present  Lab Results:   I have personally reviewed pertinent lab and imaging results       VTE Prophylaxis: Enoxaparin (Lovenox)    Code Status: Level 3 - DNAR and DNI  Advance Directive and Living Will:    Yes  Power of :  daughter  POLST:      Family and Social Support: Supported by daughter who provides full care   No data recorded     Transcribed by Aileen OLIVER  Evaluated with Dr Porsha Rizzo

## 2022-12-05 NOTE — QUICK NOTE
Called her daughter Crescencio Tanner on her home phone and discussed Mx plan about her mother's cervical fracture  Patient with potentially unstable C4 fracture;  supposed to be managed surgically  Discussed Mx options with Crescencio Tanner, surgical vs Conservative with bracing , pain control and follow up with x-rays  Her daughter agrees to go for conservative Mx, given patient's  advanced age and cognitive issues  Baseline upright Cervical spine x-rays in [facility] collar order placed  Continue with pain control, PT/OT  Will review the images once it is done  Consider F/U at OP setting  No AC/AP or pharm DVT ( EDH at C3-4 & C4-5)  Call with question or concern

## 2022-12-05 NOTE — PROGRESS NOTES
[facility] Trauma Survey/Consult - Oskar Ricks 11/27/1931, 80 y o  female MRN: [id number]  Unit/Bed#: ICU 01 Encounter: [id number]  Primary Care Provider: Calvin Sacks, DO   Date and time admitted to hospital: 12/4/2022  9:41 AM    Fall  Assessment & Plan  · Geriatrics consult  · Will need PT/OT    Essential hypertension  Assessment & Plan  · Continue PTA Norvasc    * Closed displaced fracture of fourth cervical vertebra ([address] )  Assessment & Plan  · Neruosx consulted - input appreciated  · Continue strict bedrest and neuro spine precautions  · Continue [place] vista   · Multi modal pain control         -------------------------------------------------------------------------------------------------------------  Chief Complaint: Fall    History of Present Illness   Oskar Ricks is a 80 y o  female with a PMH significant for mild dementia who presented after a mechanical fall 2 days prior to presentation, now complaining of neck stiffness  Trauma workup revealed C4 fracture with possible epidural hematoma  Admit to SD1  History obtained from chart review and the patient   -------------------------------------------------------------------------------------------------------------  Dispo: Admit to Stepdown Level 1    Code Status: Level 3 - DNAR and DNI  --------------------------------------------------------------------------------------------------------------  Review of Systems   Constitutional: Negative  HENT: Negative  Respiratory: Negative  Cardiovascular: Negative  Gastrointestinal: Negative  Genitourinary: Negative  Musculoskeletal: Positive for neck stiffness  Skin: Negative  Neurological: Positive for weakness  Psychiatric/Behavioral: Negative  A 12-point, complete review of systems was reviewed and negative except as stated above     Physical Exam  Vitals and nursing note reviewed     Constitutional:       General: She is not in acute distress  Appearance: She is not ill-appearing  HENT:      Head: Normocephalic and atraumatic  Mouth/Throat:      Mouth: Mucous membranes are moist       Pharynx: Oropharynx is clear  Eyes:      Conjunctiva/sclera: Conjunctivae normal       Pupils: Pupils are equal, round, and reactive to light  Neck:      Comments: C collar in place  Cardiovascular:      Rate and Rhythm: Normal rate and regular rhythm  Pulmonary:      Effort: Pulmonary effort is normal       Breath sounds: Normal breath sounds  Abdominal:      General: There is no distension  Palpations: Abdomen is soft  Tenderness: There is no abdominal tenderness  Skin:     General: Skin is warm and dry  Neurological:      Mental Status: She is alert  GCS: GCS eye subscore is 4  GCS verbal subscore is 5  GCS motor subscore is 6  Psychiatric:         Behavior: Behavior is cooperative        --------------------------------------------------------------------------------------------------------------  Vitals:   Vitals:    12/05/22 0145 12/05/22 0200 12/05/22 0215 12/05/22 0229   BP: 126/57 129/60 155/71 149/71   BP Location:    Right arm   Pulse: 87 85 87 83   Resp: (!) 38 18 (!) 51 (!) 28   Temp:    98 9 °F (37 2 °C)   TempSrc:    Axillary   SpO2: 94% 94% 94% 93%   Weight:    58 2 kg (128 lb 4 9 oz)   Height:         Temp  Min: 97 9 °F (36 6 °C)  Max: 98 9 °F (37 2 °C)     Height: 4' 11" (149 9 cm)  Body mass index is 25 91 kg/m²      Laboratory and Diagnostics:  Results from last 7 days   Lab Units 12/04/22  1036   WBC Thousand/uL 11 58*   HEMOGLOBIN g/dL 12 9   HEMATOCRIT % 39 5   PLATELETS Thousands/uL 193   NEUTROS PCT % 83*   MONOS PCT % 12     Results from last 7 days   Lab Units 12/04/22  1036   SODIUM mmol/L 139   POTASSIUM mmol/L 4 2   CHLORIDE mmol/L 104   CO2 mmol/L 26   ANION GAP mmol/L 9   BUN mg/dL 26*   CREATININE mg/dL 0 79   CALCIUM mg/dL 9 8   GLUCOSE RANDOM mg/dL 186*   ALT U/L 24   AST U/L 38   ALK PHOS U/L 48   ALBUMIN g/dL 4 4   TOTAL BILIRUBIN mg/dL 0 68     Results from last 7 days   Lab Units 12/04/22  1036   MAGNESIUM mg/dL 2 1      Results from last 7 days   Lab Units 12/04/22  1839   INR  0 90      EKG: Telemetry reviewed  Imaging: I have personally reviewed pertinent reports     and I have personally reviewed pertinent films in PACS    Historical Information   Past Medical History:   Diagnosis Date   • Anxiety 2010   • Bladder infection 09/2019   • Breast lump in female    • HL (hearing loss) 2010   • Hypertension 2010   • Osteopenia    • Ovarian cyst      Past Surgical History:   Procedure Laterality Date   • BLADDER SURGERY     • BREAST BIOPSY     • CATARACT EXTRACTION Bilateral 01/01/2015    , Right Eye-2017, Left eye   • ENDOMETRIAL BIOPSY      by Suction   • HYSTERECTOMY       Social History   Social History     Substance and Sexual Activity   Alcohol Use No     Social History     Substance and Sexual Activity   Drug Use No     Social History     Tobacco Use   Smoking Status Never   Smokeless Tobacco Never     Exercise History: Independent with ADL's  Family History:   Family History   Problem Relation Age of Onset   • Heart disease Mother    • Heart disease Father    • Hypertension Family    • Osteoporosis Family      I have reviewed this patient's family history and commented on sigificant items within the HPI    Medications:  Current Facility-Administered Medications   Medication Dose Route Frequency   • acetaminophen (TYLENOL) tablet 975 mg  975 mg Oral Q8H [facility]   • amLODIPine (NORVASC) tablet 5 mg  5 mg Oral Daily   • docusate sodium (COLACE) capsule 100 mg  100 mg Oral BID   • gabapentin (NEURONTIN) capsule 100 mg  100 mg Oral HS   • HYDROmorphone HCl (DILAUDID) injection 0 2 mg  0 2 mg Intravenous Q4H PRN   • melatonin tablet 3 mg  3 mg Oral HS   • ondansetron (ZOFRAN) injection 4 mg  4 mg Intravenous Q6H PRN   • oxyCODONE (ROXICODONE) IR tablet 2 5 mg  2 5 mg Oral Q4H PRN   • oxyCODONE (ROXICODONE) IR tablet 5 mg  5 mg Oral Q4H PRN   • senna (SENOKOT) tablet 17 2 mg  2 tablet Oral Daily     Home medications:  Prior to Admission Medications   Prescriptions Last Dose Informant Patient Reported? Taking? Multiple Vitamins-Minerals (MULTIVITAL) tablet 12/4/2022  Yes Yes   Sig: Take 1 tablet by mouth daily   Omega-3 Fatty Acids (FISH OIL) 645 MG CAPS 12/4/2022  Yes Yes   Sig: Take 1 tablet by mouth daily   amLODIPine-benazepril (LOTREL 5-20) 5-20 MG per capsule 12/4/2022 at 0900  No Yes   Sig: TAKE 1 CAPSULE BY MOUTH EVERY DAY   calcium citrate-vitamin D (CITRACAL+D) 315-200 MG-UNIT per tablet 12/4/2022 at 0900  Yes Yes   Sig: Take 1 tablet by mouth daily   conjugated estrogens (PREMARIN) vaginal cream 12/4/2022  Yes Yes   Sig: Insert into the vagina Ose as directed      Facility-Administered Medications Last Administration Doses Remaining   Botulinum Toxin Type A SOLR 200 Units 5/3/2021  4:19 PM         Allergies:  No Known Allergies  ------------------------------------------------------------------------------------------------------------  Care Time Delivered:   No Critical Care time spent     Leatha Hercules PA-C    Portions of the record may have been created with voice recognition software  Occasional wrong word or "sound a like" substitutions may have occurred due to the inherent limitations of voice recognition software    Read the chart carefully and recognize, using context, where substitutions have occurred

## 2022-12-05 NOTE — PLAN OF CARE
Problem: OCCUPATIONAL THERAPY ADULT  Goal: Performs self-care activities at highest level of function for planned discharge setting  See evaluation for individualized goals  Description: Treatment Interventions: ADL retraining, Functional transfer training, Endurance training, Patient/family training, Equipment evaluation/education, Compensatory technique education, Activityengagement, Energy conservation          See flowsheet documentation for full assessment, interventions and recommendations  Note: Limitation: Decreased ADL status, Decreased UE strength, Decreased Safe judgement during ADL, Decreased endurance, Decreased self-care trans, Decreased high-level ADLs, Decreased cognition  Prognosis: Good  Assessment: Pt is a 80 y o  female seen for OT evaluation s/p admission to [address] [place] [address] on 12/4/2022 due to fall  Pt diagnosed with Closed displaced fracture of fourth cervical vertebra ([facility] [address] )  Pt has a significant PMH impacting occupational performance including: HTN, fall  Pt with no recent admissions in the last 2 months  Pt with active OT evaluation and treatment orders and activity orders for Up with assistance  PTA pt living with daughter in [facility], pt (I) with ADLs and (A) with IADLs, use of RW at baseline  Pt is motivated to return to home  Personal and environmental factors supporting pt at time of IE include accessible home environment  Personal and environmental factors inhibiting engagement in occupations include advanced age, limited social support, difficulty completing ADLs and difficulty completing IADLs  During evaluation pt performed as is outlined above in flowsheet  Pt required VC for safety and VC for attention to task  Standardized assessments used to assist in identifying performance deficits include AMPAC 6-Clicks and Barthel ADL Index   Performance deficits that affect the pt’s occupational performance during the initial evaluation include impaired balance, functional mobility, endurance, activity tolerance, forward functional reach, functional standing tolerance and overall strength, safety awareness, insight into deficits and problem solving  Based on pt’s functional performance and deficits the following occupations will be addressed in OT treatments in order to maximize pt’s independence and overall occupational performance: grooming, bathing/showering, toileting and toilet hygiene, dressing and functional mobility  Goals are listed below  Upon discharge from acute care setting recommend d/c to [address] [place] [address]  This evaluation required an extensive review of medical and/or therapy records and additional review of physical, cognitive and psychosocial history related to functional performance  Based upon functional performance deficits and assessments, this evaluation has been identified as a high complexity evaluation       OT Discharge Recommendation: Post acute rehabilitation services

## 2022-12-05 NOTE — SPEECH THERAPY NOTE
Speech-Language Pathology Bedside Swallow Evaluation        Patient Name: Susan Franks    GJQBY'K Date: 12/5/2022     Problem List  Principal Problem:    Closed displaced fracture of fourth cervical vertebra [facility])  Active Problems:    Essential hypertension    Fall         Past Medical History  Past Medical History:   Diagnosis Date   • Anxiety 2010   • Bladder infection 09/2019   • Breast lump in female    • HL (hearing loss) 2010   • Hypertension 2010   • Osteopenia    • Ovarian cyst        Past Surgical History  Past Surgical History:   Procedure Laterality Date   • BLADDER SURGERY     • BREAST BIOPSY     • CATARACT EXTRACTION Bilateral 01/01/2015    , Right Eye-2017, Left eye   • ENDOMETRIAL BIOPSY      by Suction   • HYSTERECTOMY         Summary    Pt presents with functional appearing oral and pharyngeal stages of swallowing with aspen collar in place  No overt s/s aspiration noted  Recommendations:   Diet: soft/level 3 diet and thin liquids   Meds: whole with liquid   Frequent Oral care: 2x/day  Aspiration precautions   Other Recommendations/ considerations: no follow up tx needed       Current Medical Status  Pt is a 80 y o  female who presented to [address] [place] [address]  with closed fx of C4  Pt presents with neck stiffness following a fall 2 days prior to arrival  Her daughters are present bedside and provide history  Patient was slumped down into her chair Friday afternoon and when her daughters picked her up she was very weak and unable to bear her own weight  She had some mild improvement after eating and then tried to walk up the stairs of her house when she lost her balance and fell backward striking her head  After the fall she had persistent generalized R>L sided weakness and when that did not improve over the next two days her daughters brought her in for evaluation  Patient complains of neck stiffness with no other complaints       Past medical history:   Please see H&P for details    Special Studies:  MRI c spine: 12/4/22  There is abnormal signal within the posterior epidural space beginning at the C3-4 level and extending inferiorly to the C4-5 level resulting in moderate canal stenosis  This is most suggestive of posterior epidural hematoma  Social/Education/Vocational Hx:  Pt lives alone    Swallow Information   Current Risks for Dysphagia & Aspiration: s/p fall, aspen collar   Current Symptoms/Concerns: aspen collar  Current Diet: soft/level 3 diet and thin liquids   Baseline Diet: soft/level 3 diet, regular diet and thin liquids  Takes pills- whole w/ water     Baseline Assessment   Behavior/Cognition: alert  Speech/Language Status: able to participate in conversation and able to follow commands  Patient Positioning: upright in bed     Swallow Mechanism Exam   Facial: symmetrical  Labial: WFL  Lingual: WFL  Velum: unable to visualize  Mandible: adequate ROM  Dentition: adequate  Vocal quality:clear/adequate   Volitional Cough: strong/productive   Respiratory: RA    Consistencies Assessed and Performance   Consistencies Administered: pt seen at breakfast w/ toast, oatmeal, apple juice by straw  Oral Stage: pt was able to bite toast, drink from straw due to HFNC  Mastication, manipulation and transfer appeared WNL  Good oral control w/ liquids  Pharyngeal Stage: swallow initiation was timely and complete  No coughing, throat clearing or wet voice noted  Pt denied any food dysphagia symptoms         Esophageal Concerns: none reported      Results Reviewed with: patient, RN and family   Dysphagia Goals: none at this time      April Zenon Santos [address] [place] [address]  Speech Pathologist  [place] license # [id number] [facility] [id number]  [place] license # [id number]  Available via [employer]

## 2022-12-05 NOTE — CONSULTS
Consultation - Neurosurgery   Arnoldo Kaminski 80 y o  female MRN: [id number]  Unit/Bed#: ICU 01 Encounter: [id number]    Images reviewed at morning rounds on 12/05/2022 at 7:00 a m  Patient was seen and examined on 12/05/2022 at 7:05AM a m  Consults    Assessment/Plan               Assessment:  1  Traumatic Bilat C4 Lamina & SP #  2  Anterior C4-5 widening in the setting of previous fusion ( appears chronic)  3  Hx of fall      Plan:   · Exam:  Patient appears Confused and somewhat disoriented, agitated and restrained, Jennifer,  4/5, Moves both LEs  Sensation to LT intact bilaterally  Tenderness on posterior cervical spine palpation  · Imagings reviewed personally and by attendings, findings as follows:  · CT of cervical spine without contrast on 12/04/2022 demonstrate acute fracture involving bilateral lamina and spinous process of C4  There is also destruction fracture and anterior widening of previously fused C4 and C5 vertebral bodies with sclerotic and a sclerotic borders  Multilevel degenerative change and canal narrowing most pronounced at C3-4  · MRI of cervical spine without contrast on 12/04/2022 signature can not be degraded by motion so poor quality  But it appears there is some anterior posterior epidural hematoma at C3-4 and C4-5  · Pain control:  Per primary team  · DVT ppx: SCDs bilateral legs legs  · Activity:  As tolerated  · PT/OT evaluation & treatment  · Brace:  Wear [place] cervical collar all the time and Alexandra collar for shower  · Medical Mx:  Per primary team  · Neurocheck:  Routine  · NSx evaluated the patient  Given her age and other medical issues, Patient might be a high surgical risk candidate, additionally she is code level 3 care  I will discuss with her family, possible conservative Mx, bracing, pain control and follow up  Call with questions or concerns        History of Present Illness     HPI: Arnoldo Kaminski is a 80 y o  female with PMHx of anxiety, hearing issues, hypertension, osteopenia, presented with      Review of Systems    Historical Information   Past Medical History:   Diagnosis Date   • Anxiety 2010   • Bladder infection 09/2019   • Breast lump in female    • HL (hearing loss) 2010   • Hypertension 2010   • Osteopenia    • Ovarian cyst      Past Surgical History:   Procedure Laterality Date   • BLADDER SURGERY     • BREAST BIOPSY     • CATARACT EXTRACTION Bilateral 01/01/2015    , Right Eye-2017, Left eye   • ENDOMETRIAL BIOPSY      by Suction   • HYSTERECTOMY       Social History     Substance and Sexual Activity   Alcohol Use No     Social History     Substance and Sexual Activity   Drug Use No     Social History     Tobacco Use   Smoking Status Never   Smokeless Tobacco Never     Family History   Problem Relation Age of Onset   • Heart disease Mother    • Heart disease Father    • Hypertension Family    • Osteoporosis Family        Meds/Allergies   {SL IP DBIS:183470730}  No Known Allergies    Objective   I/O       12/03 0701 12/04 0700 12/04 0701 12/05 0700 12/05 0701 12/06 0700    P  O   50     Total Intake(mL/kg)  50 (0 9)     Urine (mL/kg/hr)  310     Total Output  310     Net  -260                  Physical Exam  Neurologic Exam    Vitals:Blood pressure (!) 181/83, pulse 98, temperature 98 9 °F (37 2 °C), temperature source Axillary, resp  rate (!) 74, height 4' 11" (1 499 m), weight 58 2 kg (128 lb 4 9 oz), SpO2 94 %, not currently breastfeeding  ,Body mass index is 25 91 kg/m²       Lab Results:   Results from last 7 days   Lab Units 12/05/22  0547 12/04/22  1036   WBC Thousand/uL 8 67 11 58*   HEMOGLOBIN g/dL 11 7 12 9   HEMATOCRIT % 35 9 39 5   PLATELETS Thousands/uL 170 193   NEUTROS PCT %  --  83*   MONOS PCT %  --  12     Results from last 7 days   Lab Units 12/05/22  0547 12/04/22  1036   POTASSIUM mmol/L 4 1 4 2   CHLORIDE mmol/L 108 104   CO2 mmol/L 26 26   BUN mg/dL 20 26*   CREATININE mg/dL 0 71 0 79   CALCIUM mg/dL 9 2 9 8   ALK PHOS U/L  --  48 ALT U/L  --  24   AST U/L  --  38     Results from last 7 days   Lab Units 12/05/22  0547 12/04/22  1036   MAGNESIUM mg/dL 2 2 2 1         Results from last 7 days   Lab Units 12/04/22  1839   INR  0 90     No results found for: TROPONINT  ABG:No results found for: PHART, WZL6DEU, PO2ART, BEJ0LHC, X9XJVFGC, BEART, SOURCE    Imaging Studies: {Results Review Statement:07047}    EKG, Pathology, and Other Studies: {Results Review Statement:30596}    VTE Prophylaxis: {DVT/VTE Prophylaxis:643942725}    Code Status: Level 3 - DNAR and DNI  Advance Directive and Living Will:      Power of :    POLST:      Counseling / Coordination of Care  I spent {Time; 15 min - 1 hour:19605} with the patient

## 2022-12-06 ENCOUNTER — APPOINTMENT (INPATIENT)
Dept: RADIOLOGY | Facility: HOSPITAL | Age: 87
End: 2022-12-06

## 2022-12-06 LAB
ANION GAP SERPL CALCULATED.3IONS-SCNC: 8 MMOL/L (ref 4–13)
BASOPHILS # BLD AUTO: 0.01 THOUSANDS/ÂΜL (ref 0–0.1)
BASOPHILS NFR BLD AUTO: 0 % (ref 0–1)
BUN SERPL-MCNC: 22 MG/DL (ref 5–25)
CALCIUM SERPL-MCNC: 8.9 MG/DL (ref 8.4–10.2)
CHLORIDE SERPL-SCNC: 106 MMOL/L (ref 96–108)
CO2 SERPL-SCNC: 25 MMOL/L (ref 21–32)
CREAT SERPL-MCNC: 0.64 MG/DL (ref 0.6–1.3)
EOSINOPHIL # BLD AUTO: 0.04 THOUSAND/ÂΜL (ref 0–0.61)
EOSINOPHIL NFR BLD AUTO: 1 % (ref 0–6)
ERYTHROCYTE [DISTWIDTH] IN BLOOD BY AUTOMATED COUNT: 14.2 % (ref 11.6–15.1)
FLUAV RNA RESP QL NAA+PROBE: NEGATIVE
FLUBV RNA RESP QL NAA+PROBE: NEGATIVE
GFR SERPL CREATININE-BSD FRML MDRD: 78 ML/MIN/1.73SQ M
GLUCOSE SERPL-MCNC: 129 MG/DL (ref 65–140)
HCT VFR BLD AUTO: 37 % (ref 34.8–46.1)
HGB BLD-MCNC: 11.8 G/DL (ref 11.5–15.4)
IMM GRANULOCYTES # BLD AUTO: 0.08 THOUSAND/UL (ref 0–0.2)
IMM GRANULOCYTES NFR BLD AUTO: 1 % (ref 0–2)
LYMPHOCYTES # BLD AUTO: 1.01 THOUSANDS/ÂΜL (ref 0.6–4.47)
LYMPHOCYTES NFR BLD AUTO: 13 % (ref 14–44)
MAGNESIUM SERPL-MCNC: 2.1 MG/DL (ref 1.9–2.7)
MCH RBC QN AUTO: 30.5 PG (ref 26.8–34.3)
MCHC RBC AUTO-ENTMCNC: 31.9 G/DL (ref 31.4–37.4)
MCV RBC AUTO: 96 FL (ref 82–98)
MONOCYTES # BLD AUTO: 1.15 THOUSAND/ÂΜL (ref 0.17–1.22)
MONOCYTES NFR BLD AUTO: 15 % (ref 4–12)
NEUTROPHILS # BLD AUTO: 5.55 THOUSANDS/ÂΜL (ref 1.85–7.62)
NEUTS SEG NFR BLD AUTO: 70 % (ref 43–75)
NRBC BLD AUTO-RTO: 0 /100 WBCS
PHOSPHATE SERPL-MCNC: 2.6 MG/DL (ref 2.3–4.1)
PLATELET # BLD AUTO: 174 THOUSANDS/UL (ref 149–390)
PMV BLD AUTO: 9.8 FL (ref 8.9–12.7)
POTASSIUM SERPL-SCNC: 3.9 MMOL/L (ref 3.5–5.3)
RBC # BLD AUTO: 3.87 MILLION/UL (ref 3.81–5.12)
RSV RNA RESP QL NAA+PROBE: NEGATIVE
SARS-COV-2 RNA RESP QL NAA+PROBE: NEGATIVE
SODIUM SERPL-SCNC: 139 MMOL/L (ref 135–147)
WBC # BLD AUTO: 7.84 THOUSAND/UL (ref 4.31–10.16)

## 2022-12-06 RX ORDER — HEPARIN SODIUM 5000 [USP'U]/ML
5000 INJECTION, SOLUTION INTRAVENOUS; SUBCUTANEOUS EVERY 8 HOURS SCHEDULED
Status: DISCONTINUED | OUTPATIENT
Start: 2022-12-06 | End: 2022-12-07 | Stop reason: HOSPADM

## 2022-12-06 RX ORDER — HYDRALAZINE HYDROCHLORIDE 20 MG/ML
5 INJECTION INTRAMUSCULAR; INTRAVENOUS EVERY 6 HOURS PRN
Status: DISCONTINUED | OUTPATIENT
Start: 2022-12-06 | End: 2022-12-07 | Stop reason: HOSPADM

## 2022-12-06 RX ADMIN — HEPARIN SODIUM 5000 UNITS: 5000 INJECTION INTRAVENOUS; SUBCUTANEOUS at 21:13

## 2022-12-06 RX ADMIN — ACETAMINOPHEN 975 MG: 325 TABLET, FILM COATED ORAL at 14:04

## 2022-12-06 RX ADMIN — ACETAMINOPHEN 975 MG: 325 TABLET, FILM COATED ORAL at 21:12

## 2022-12-06 RX ADMIN — DOCUSATE SODIUM 100 MG: 100 CAPSULE, LIQUID FILLED ORAL at 17:23

## 2022-12-06 RX ADMIN — OXYCODONE HYDROCHLORIDE 5 MG: 5 TABLET ORAL at 09:21

## 2022-12-06 RX ADMIN — HYDRALAZINE HYDROCHLORIDE 5 MG: 20 INJECTION, SOLUTION INTRAMUSCULAR; INTRAVENOUS at 03:14

## 2022-12-06 RX ADMIN — OXYCODONE HYDROCHLORIDE 5 MG: 5 TABLET ORAL at 21:12

## 2022-12-06 RX ADMIN — Medication 3 MG: at 21:13

## 2022-12-06 RX ADMIN — SENNOSIDES 17.2 MG: 8.6 TABLET, FILM COATED ORAL at 08:21

## 2022-12-06 RX ADMIN — ACETAMINOPHEN 975 MG: 325 TABLET, FILM COATED ORAL at 05:33

## 2022-12-06 RX ADMIN — DOCUSATE SODIUM 100 MG: 100 CAPSULE, LIQUID FILLED ORAL at 08:21

## 2022-12-06 RX ADMIN — GABAPENTIN 100 MG: 100 CAPSULE ORAL at 21:12

## 2022-12-06 RX ADMIN — AMLODIPINE BESYLATE 5 MG: 5 TABLET ORAL at 08:21

## 2022-12-06 NOTE — CASE MANAGEMENT
Case Management Progress Note    Patient name Em Hanley  Location ICU 01/ICU 01 MRN [id number]  : 1931 Date 2022       LOS (days): 2  Geometric Mean LOS (GMLOS) (days): 4 40  Days to GMLOS:2 4        OBJECTIVE:        Current admission status: Inpatient  Preferred Pharmacy:   [facility] [address] FIDELINA HUGGINS [address], [address]  Phone: [phone] Fax: [phone]    [address]  [address] [facility]  Phone: [phone] Fax: [address], [address]  [address]  [facility] [address]  Phone: [phone] Fax: [phone]    [id number] [place] [address] [facility]  [address]  [facility] [address]  Phone: [phone] Fax: [phone]    Primary Care Provider: Faith Fernandez DO    Primary Insurance: MEDICARE  Secondary Insurance: AAR    PROGRESS NOTE:     informed by pt daughterDesean, that she spoke with [facility] and feels that they may offer a bed  Desean Madrid later contacted  and left  - Desean Madrid reports she received a VM from [facility] stating pt is now denied and requested CM to call back   left  for pt daughterDesean  Aware [facility] denied  Options at this time are [facility], [facility], and [facility]  Awaiting Hilda's return call

## 2022-12-06 NOTE — QUICK NOTE
Patient seen and evaluated by Critical Care today and deemed to be appropriate for transfer to [facility]   Spoke to [facility] from Trauma  to accept patient transfer  Major changes to treatment plan from the day of transfer include F/U final read on uprights  Dw NeuroSx, pepito Bueno to start pharm DVT ppx  Will start SQ heparin  Critical care can be contacted via [employer] with any questions or concerns

## 2022-12-06 NOTE — NURSING NOTE
Patients chin sliding down while in [employer] collar  [place] collar placed back on with [address]  Trauma surgery aware

## 2022-12-06 NOTE — ASSESSMENT & PLAN NOTE
· Neruosx consulted - family has decided to pursue non-op management after extensive discussion w/ neurosx/trauma team  · Continue strict bedrest and neuro spine precautions  · Continue Aspen collar in place of Vista as vista did not fit appropriately     · Multimodal analgesia ordered  · Contiue

## 2022-12-06 NOTE — PROGRESS NOTES
Progress Note - Geriatric Medicine   Blane Velasquez 80 y o  female MRN: [id number]  Unit/Bed#: ICU 01 Encounter: [id number]      Assessment/Plan:  1  Closed displaced fracture of 4th cervical vertebra s/p fall  - C4 bilateral lamina fractures with anterior widening at C4/C5 previously fused osteophyte  Right sided Upper>lower extremity weakness R: 3/5 , 4/5 tri/biceps, 4-5/5 lower extremity plantar/dorsiflexion  Per neurosx, pt is to remain in aspen vista cervical collar  Due to advanced age, they report that conservative management is the best treatment at this time       2  Hypertension - Managed with Amlodipine-Benazapril 5-20mg  Slightly elevated here at 154/100     3 - Osteoporosis  - Multivitamin & Calcium citrate at home     4 -Hearing loss  -Bilateral hearing loss      5  Peripheral artery disease - Follows closely with podiatry and gets regular nail trims by them      6 -Depression - Has been on Sertaline in the past but was weaned off by PCP      7 -Arthritis  -Receives injections in her right knee, via pain management     Recommendations:   1  Continue with pain management following the geriatric pain protocol     2  Following the recommendations from neurosx & maintain patient in [email or web address] collar which fits the most appropriately  Close f/u as an outpatient     Subjective:   Blood counts remain within normal range - no evidence of leukocytosis, no anemia  kdiney and liver function in tact  GFR improved from 74 to 78  Review of Systems   Constitutional: Negative for chills and fatigue  HENT: Positive for hearing loss  Negative for sinus pain and sore throat  Eyes: Negative for pain  Respiratory: Negative for cough and shortness of breath  Cardiovascular: Negative for chest pain  Gastrointestinal: Negative for abdominal pain, diarrhea, nausea and vomiting  Genitourinary: Negative for dysuria and flank pain  Musculoskeletal: Positive for neck pain  Skin: Negative for rash     Neurological: Positive for weakness  Objective:     Vitals: Blood pressure 154/100, pulse 79, temperature 97 8 °F (36 6 °C), temperature source Oral, resp  rate 20, height 4' 11" (1 499 m), weight 55 9 kg (123 lb 3 8 oz), SpO2 95 %, not currently breastfeeding  ,Body mass index is 24 89 kg/m²  Intake/Output Summary (Last 24 hours) at 12/6/2022 0839  Last data filed at 12/6/2022 8605  Gross per 24 hour   Intake 240 ml   Output 500 ml   Net -260 ml       Current Medications: Reviewed    Physical Exam:   Physical Exam  Constitutional:       Appearance: She is normal weight  HENT:      Head: Normocephalic and atraumatic  Right Ear: Tympanic membrane normal       Left Ear: Tympanic membrane normal       Nose: Nose normal       Mouth/Throat:      Mouth: Mucous membranes are moist       Pharynx: Oropharynx is clear  Eyes:      Conjunctiva/sclera: Conjunctivae normal       Pupils: Pupils are equal, round, and reactive to light  Neck:      Comments: Cervical collar in place   Cardiovascular:      Rate and Rhythm: Normal rate and regular rhythm  Pulses: Normal pulses  Heart sounds: Normal heart sounds  Pulmonary:      Effort: Pulmonary effort is normal       Breath sounds: Normal breath sounds  Abdominal:      General: Abdomen is flat  Bowel sounds are normal    Musculoskeletal:         General: Normal range of motion  Skin:     General: Skin is warm  Neurological:      Mental Status: She is alert  Motor: Weakness present  Psychiatric:         Mood and Affect: Mood normal          Thought Content: Thought content normal           Invasive Devices     Peripheral Intravenous Line  Duration           Peripheral IV 12/05/22 Dorsal (posterior); Left Forearm 1 day          Drain  Duration           External Urinary Catheter <1 day            Transcribed by Aileen OLIVER  Evaluated with Dr Maria Isabel Tony and other studies: I have personally reviewed pertinent reports

## 2022-12-06 NOTE — CASE MANAGEMENT
Case Management Progress Note    Patient name Johanna Diego  Location ICU 01/ICU 01 MRN [id number]  : 1931 Date 2022       LOS (days): 2  Geometric Mean LOS (GMLOS) (days): 4 40  Days to GMLOS:2 6        OBJECTIVE:        Current admission status: Inpatient  Preferred Pharmacy:   [address], [address] [place]   Phone: [phone] Fax: [phone]    [place], [address] [facility] [address]  [address] [address]  Phone: [phone] Fax: [phone], [address]  [address]  [facility] [address]  Phone: [phone] Fax: [phone]    [id number] [address]  [address]  [facility] [address]  Phone: [phone] Fax: [phone]    Primary Care Provider: Martir Swanson DO    Primary Insurance: MEDICARE  Secondary Insurance: AARP    PROGRESS NOTE:    As per Trauma AP anticipate d/c tomorrow  CM f/u with referrals  As per [facility] no bed available at this time  CM did request update if one becomes available  [facility] - would like to speak with pt daughter  CM provided them with [employer]'s cell phone number  [facility] - able to offer bed  [facility] - requesting LTC jina prior to making decision  [facility] and [facility] able to offer a bed  CM met with pt daughter, June Jerez, and pt at bedside  Both aware of anticipated d/c tomorrow  CM reviewed above  June Jerez aware [facility] will be contacting her regarding pt  June Jerez confirmed this is their second choice

## 2022-12-06 NOTE — OCCUPATIONAL THERAPY NOTE
Occupational Therapy Orthotic Note     Patient Name: Ja RODRIGUEZ Date: 12/6/2022  Problem List  Principal Problem:    Closed displaced fracture of fourth cervical vertebra [facility])  Active Problems:    Essential hypertension    Hyperlipidemia    MCI (mild cognitive impairment)    Fall            12/06/22 1502   OT Last Visit   OT Visit Date 12/06/22   Note Type   Note Type Orthotic Management/Training   Type of Brace   Brace [facility] Collar Replacement Pads   Assessment   Assessment Pt seen per RN request as pt noted to have incorrect C-collar in place and pt noted to be in brace incorrectly  Pt, pt's family and RN educated on re-fitting of brace and how to clean soiled pads  Pt re-fit into aspen vista collar and adjusted appropriately for improved fit       Michelle Cordero MS, OTR/L

## 2022-12-06 NOTE — PLAN OF CARE
Problem: MOBILITY - ADULT  Goal: Maintain or return to baseline ADL function  Description: INTERVENTIONS:  -  Assess patient's ability to carry out ADLs; assess patient's baseline for ADL function and identify physical deficits which impact ability to perform ADLs (bathing, care of mouth/teeth, toileting, grooming, dressing, etc )  - Assess/evaluate cause of self-care deficits   - Assess range of motion  - Assess patient's mobility; develop plan if impaired  - Assess patient's need for assistive devices and provide as appropriate  - Encourage maximum independence but intervene and supervise when necessary  - Involve family in performance of ADLs  - Assess for home care needs following discharge   - Consider OT consult to assist with ADL evaluation and planning for discharge  - Provide patient education as appropriate  Outcome: Progressing  Goal: Maintains/Returns to pre admission functional level  Description: INTERVENTIONS:  - Perform BMAT or MOVE assessment daily    - Set and communicate daily mobility goal to care team and patient/family/caregiver     - Collaborate with rehabilitation services on mobility goals if consulted  - Out of bed for toileting  - Record patient progress and toleration of activity level   Outcome: Progressing     Problem: Potential for Falls  Goal: Patient will remain free of falls  Description: INTERVENTIONS:  - Educate patient/family on patient safety including physical limitations  - Instruct patient to call for assistance with activity   - Consult OT/PT to assist with strengthening/mobility   - Keep Call bell within reach  - Keep bed low and locked with side rails adjusted as appropriate  - Keep care items and personal belongings within reach  - Initiate and maintain comfort rounds  - Make Fall Risk Sign visible to staff  - Apply yellow socks and bracelet for high fall risk patients  - Consider moving patient to room near nurses station  Outcome: Progressing     Problem: Prexisting or High Potential for Compromised Skin Integrity  Goal: Skin integrity is maintained or improved  Description: INTERVENTIONS:  - Identify patients at risk for skin breakdown  - Assess and monitor skin integrity  - Assess and monitor nutrition and hydration status  - Monitor labs   - Assess for incontinence   - Turn and reposition patient  - Assist with mobility/ambulation  - Relieve pressure over bony prominences  - Avoid friction and shearing  - Provide appropriate hygiene as needed including keeping skin clean and dry  - Evaluate need for skin moisturizer/barrier cream  - Collaborate with interdisciplinary team   - Patient/family teaching  - Consider wound care consult   Outcome: Progressing     Problem: PAIN - ADULT  Goal: Verbalizes/displays adequate comfort level or baseline comfort level  Description: Interventions:  - Encourage patient to monitor pain and request assistance  - Assess pain using appropriate pain scale  - Administer analgesics based on type and severity of pain and evaluate response  - Implement non-pharmacological measures as appropriate and evaluate response  - Consider cultural and social influences on pain and pain management  - Notify physician/advanced practitioner if interventions unsuccessful or patient reports new pain  Outcome: Progressing     Problem: INFECTION - ADULT  Goal: Absence or prevention of progression during hospitalization  Description: INTERVENTIONS:  - Assess and monitor for signs and symptoms of infection  - Monitor lab/diagnostic results  - Monitor all insertion sites, i e  indwelling lines, tubes, and drains  - Monitor endotracheal if appropriate and nasal secretions for changes in amount and color  - [place] appropriate cooling/warming therapies per order  - Administer medications as ordered  - Instruct and encourage patient and family to use good hand hygiene technique  - Identify and instruct in appropriate isolation precautions for identified infection/condition  Outcome: Progressing  Goal: Absence of fever/infection during neutropenic period  Description: INTERVENTIONS:  - Monitor WBC    Outcome: Progressing     Problem: SAFETY ADULT  Goal: Maintain or return to baseline ADL function  Description: INTERVENTIONS:  -  Assess patient's ability to carry out ADLs; assess patient's baseline for ADL function and identify physical deficits which impact ability to perform ADLs (bathing, care of mouth/teeth, toileting, grooming, dressing, etc )  - Assess/evaluate cause of self-care deficits   - Assess range of motion  - Assess patient's mobility; develop plan if impaired  - Assess patient's need for assistive devices and provide as appropriate  - Encourage maximum independence but intervene and supervise when necessary  - Involve family in performance of ADLs  - Assess for home care needs following discharge   - Consider OT consult to assist with ADL evaluation and planning for discharge  - Provide patient education as appropriate  Outcome: Progressing  Goal: Maintains/Returns to pre admission functional level  Description: INTERVENTIONS:  - Perform BMAT or MOVE assessment daily    - Set and communicate daily mobility goal to care team and patient/family/caregiver     - Collaborate with rehabilitation services on mobility goals if consulted  - Out of bed for toileting  - Record patient progress and toleration of activity level   Outcome: Progressing  Goal: Patient will remain free of falls  Description: INTERVENTIONS:  - Educate patient/family on patient safety including physical limitations  - Instruct patient to call for assistance with activity   - Consult OT/PT to assist with strengthening/mobility   - Keep Call bell within reach  - Keep bed low and locked with side rails adjusted as appropriate  - Keep care items and personal belongings within reach  - Initiate and maintain comfort rounds  - Make Fall Risk Sign visible to staff  - Apply yellow socks and bracelet for high fall risk patients  - Consider moving patient to room near nurses station  Outcome: Progressing     Problem: DISCHARGE PLANNING  Goal: Discharge to home or other facility with appropriate resources  Description: INTERVENTIONS:  - Identify barriers to discharge w/patient and caregiver  - Arrange for needed discharge resources and transportation as appropriate  - Identify discharge learning needs (meds, wound care, etc )  - Arrange for interpretive services to assist at discharge as needed  - Refer to Case Management Department for coordinating discharge planning if the patient needs post-hospital services based on physician/advanced practitioner order or complex needs related to functional status, cognitive ability, or social support system  Outcome: Progressing     Problem: Knowledge Deficit  Goal: Patient/family/caregiver demonstrates understanding of disease process, treatment plan, medications, and discharge instructions  Description: Complete learning assessment and assess knowledge base    Interventions:  - Provide teaching at level of understanding  - Provide teaching via preferred learning methods  Outcome: Progressing

## 2022-12-06 NOTE — PROGRESS NOTES
[facility]  Progress Note - Otf Chavira 11/27/1931, 80 y o  female MRN: [id number]  Unit/Bed#: ICU 01 Encounter: [id number]  Primary Care Provider: Drexel Frankel, DO   Date and time admitted to hospital: 12/4/2022  9:41 AM    * Closed displaced fracture of fourth cervical vertebra [facility])  Assessment & Plan  · Neruosx consulted - family has decided to pursue non-op management after extensive discussion w/ neurosx/trauma team  · Continue strict bedrest and neuro spine precautions  · Continue Aspen collar in place of Vista as vista did not fit appropriately  · Multimodal analgesia ordered  · Contiue    Fall  Assessment & Plan  · Geriatrics consult  · PT/OT recommend post acute rehab    Hyperlipidemia  Assessment & Plan  · No statin PTA, resume fish oil if indicated    Essential hypertension  Assessment & Plan  · Continue PTA Norvasc  · Hold PTA ACE      ----------------------------------------------------------------------------------------  HPI/24hr events: No acute events o/n  Neuro checks stable  Tunica collar not fitting appropriately so transitioned back to aspen collar  Patient appropriate for transfer out of the ICU today?: Patient does not meet criteria for referral to the ICU Follow-Up Clinic; referral has not been made  Disposition: Transfer to Med-Surg   Code Status: Level 3 - DNAR and DNI  ---------------------------------------------------------------------------------------  SUBJECTIVE  No complaints aside from mild neck soreness with repositioning  Review of Systems   Constitutional: Negative for chills  HENT: Negative for trouble swallowing  Respiratory: Negative for chest tightness and shortness of breath  Musculoskeletal: Positive for neck pain and neck stiffness  Negative for back pain  Neurological: Positive for weakness  Negative for light-headedness, numbness and headaches  Psychiatric/Behavioral: Negative for confusion       Review of systems was reviewed and negative unless stated above in HPI/24-hour events   ---------------------------------------------------------------------------------------  OBJECTIVE    Vitals   Vitals:    22 1900 22 2234 22 2313 22 2314   BP: (!) 171/79 (!) 173/82 166/68    BP Location: Right arm Right arm     Pulse: 92 97     Resp: 20 20     Temp: 98 1 °F (36 7 °C)   98 °F (36 7 °C)   TempSrc: Oral   Oral   SpO2: 95% 96%     Weight:       Height:         Temp (24hrs), Av 4 °F (36 9 °C), Min:98 °F (36 7 °C), Max:98 9 °F (37 2 °C)  Current: Temperature: 98 °F (36 7 °C)          Respiratory:  Comfortable on RA       Invasive/non-invasive ventilation settings   Respiratory    Lab Data (Last 4 hours)    None         O2/Vent Data (Last 4 hours)    None                Physical Exam  Constitutional:       General: She is not in acute distress  Appearance: She is not ill-appearing  HENT:      Head: Normocephalic and atraumatic  Neck:      Comments: Aspen collar in place  Cardiovascular:      Rate and Rhythm: Normal rate and regular rhythm  Heart sounds: Normal heart sounds  Pulmonary:      Effort: Pulmonary effort is normal       Breath sounds: Normal breath sounds  Abdominal:      General: Abdomen is flat  There is no distension  Palpations: Abdomen is soft  Tenderness: There is no abdominal tenderness  Skin:     General: Skin is warm and dry  Capillary Refill: Capillary refill takes less than 2 seconds  Neurological:      Mental Status: She is alert and oriented to person, place, and time  GCS: GCS eye subscore is 4  GCS verbal subscore is 5  GCS motor subscore is 6  Motor: Weakness present  Comments: 4+/5 in RUE/RLE  5/5 in LUE/LLE  Psychiatric:         Behavior: Behavior is cooperative               Laboratory and Diagnostics:  Results from last 7 days   Lab Units 22  0547 22  1036   WBC Thousand/uL 8 67 11 58*   HEMOGLOBIN g/dL 11 7 12 9 HEMATOCRIT % 35 9 39 5   PLATELETS Thousands/uL 170 193   NEUTROS PCT %  --  83*   MONOS PCT %  --  12     Results from last 7 days   Lab Units 12/05/22  0547 12/04/22  1036   SODIUM mmol/L 141 139   POTASSIUM mmol/L 4 1 4 2   CHLORIDE mmol/L 108 104   CO2 mmol/L 26 26   ANION GAP mmol/L 7 9   BUN mg/dL 20 26*   CREATININE mg/dL 0 71 0 79   CALCIUM mg/dL 9 2 9 8   GLUCOSE RANDOM mg/dL 124 186*   ALT U/L  --  24   AST U/L  --  38   ALK PHOS U/L  --  48   ALBUMIN g/dL  --  4 4   TOTAL BILIRUBIN mg/dL  --  0 68     Results from last 7 days   Lab Units 12/05/22  0547 12/04/22  1036   MAGNESIUM mg/dL 2 2 2 1      Results from last 7 days   Lab Units 12/04/22  1839   INR  0 90              ABG:    VBG:          Micro        EKG: NSR   Imaging: I have personally reviewed pertinent reports  Intake and Output  I/O       12/04 0701 12/05 0700 12/05 0701 12/06 0700    P  O  50 240    Total Intake(mL/kg) 50 (0 9) 240 (4 1)    Urine (mL/kg/hr) 310 350 (0 3)    Total Output 310 350    Net -260 -110                Height and Weights   Height: 4' 11" (149 9 cm)     Body mass index is 25 91 kg/m²  Weight (last 2 days)     Date/Time Weight    12/05/22 0229 58 2 (128 31)    12/04/22 1400 62 4 (137 57)    12/04/22 0951 62 4 (137 57)            Nutrition       Diet Orders   (From admission, onward)             Start     Ordered    12/05/22 0059  Diet Dysphagia/Modified Consistency; Dysphagia 3-Dental Soft; Thin Liquid  Diet effective now        References:    Nutrtion Support Algorithm Enteral vs  Parenteral   Question Answer Comment   Diet Type Dysphagia/Modified Consistency    Dysphagia/Modified Consistency Dysphagia 3-Dental Soft    Liquid Modifier Thin Liquid    RD to adjust diet per protocol?  Yes        12/05/22 0059                  Active Medications  Scheduled Meds:  Current Facility-Administered Medications   Medication Dose Route Frequency Provider Last Rate   • acetaminophen  975 mg Oral Q8H [facility] Amanda Oviedo, TAYLER     • amLODIPine  5 mg Oral Daily Odette Perez PA-C     • docusate sodium  100 mg Oral BID Amanda Oviedo PA-C     • gabapentin  100 mg Oral HS Amanda Oviedo PA-C     • HYDROmorphone  0 2 mg Intravenous Q4H PRN Amanda Oviedo PA-C     • melatonin  3 mg Oral HS Amanda Oviedo PA-C     • ondansetron  4 mg Intravenous Q6H PRN Amanda Oviedo PA-C     • oxyCODONE  2 5 mg Oral Q4H PRN Amanda Oviedo PA-C     • oxyCODONE  5 mg Oral Q4H PRN Amanda Oviedo PA-C     • senna  2 tablet Oral Daily Amanda Oviedo PA-C       Continuous Infusions:     PRN Meds:   HYDROmorphone, 0 2 mg, Q4H PRN  ondansetron, 4 mg, Q6H PRN  oxyCODONE, 2 5 mg, Q4H PRN  oxyCODONE, 5 mg, Q4H PRN        Invasive Devices Review  Invasive Devices     Peripheral Intravenous Line  Duration           Peripheral IV 12/05/22 Dorsal (posterior); Left Forearm <1 day          Drain  Duration           External Urinary Catheter <1 day                Rationale for remaining devices:   ---------------------------------------------------------------------------------------  Advance Directive and Living Will:      Power of :    POLST:    ---------------------------------------------------------------------------------------  Care Time Delivered:   No Critical Care time spent       [employer] [facility] TAYLER      Portions of the record may have been created with voice recognition software  Occasional wrong word or "sound a like" substitutions may have occurred due to the inherent limitations of voice recognition software    Read the chart carefully and recognize, using context, where substitutions have occurred

## 2022-12-07 ENCOUNTER — TELEPHONE (OUTPATIENT)
Dept: NEUROSURGERY | Facility: CLINIC | Age: 87
End: 2022-12-07

## 2022-12-07 VITALS
WEIGHT: 123.24 LBS | HEIGHT: 59 IN | RESPIRATION RATE: 16 BRPM | TEMPERATURE: 97.9 F | SYSTOLIC BLOOD PRESSURE: 176 MMHG | HEART RATE: 90 BPM | BODY MASS INDEX: 24.84 KG/M2 | OXYGEN SATURATION: 97 % | DIASTOLIC BLOOD PRESSURE: 118 MMHG

## 2022-12-07 LAB
ANION GAP SERPL CALCULATED.3IONS-SCNC: 6 MMOL/L (ref 4–13)
BUN SERPL-MCNC: 16 MG/DL (ref 5–25)
CALCIUM SERPL-MCNC: 9.3 MG/DL (ref 8.4–10.2)
CHLORIDE SERPL-SCNC: 106 MMOL/L (ref 96–108)
CO2 SERPL-SCNC: 27 MMOL/L (ref 21–32)
CREAT SERPL-MCNC: 0.61 MG/DL (ref 0.6–1.3)
ERYTHROCYTE [DISTWIDTH] IN BLOOD BY AUTOMATED COUNT: 14 % (ref 11.6–15.1)
GFR SERPL CREATININE-BSD FRML MDRD: 79 ML/MIN/1.73SQ M
GLUCOSE SERPL-MCNC: 128 MG/DL (ref 65–140)
HCT VFR BLD AUTO: 39.4 % (ref 34.8–46.1)
HGB BLD-MCNC: 12.9 G/DL (ref 11.5–15.4)
MCH RBC QN AUTO: 31.5 PG (ref 26.8–34.3)
MCHC RBC AUTO-ENTMCNC: 32.7 G/DL (ref 31.4–37.4)
MCV RBC AUTO: 96 FL (ref 82–98)
PLATELET # BLD AUTO: 205 THOUSANDS/UL (ref 149–390)
PMV BLD AUTO: 9.9 FL (ref 8.9–12.7)
POTASSIUM SERPL-SCNC: 3.8 MMOL/L (ref 3.5–5.3)
RBC # BLD AUTO: 4.1 MILLION/UL (ref 3.81–5.12)
SODIUM SERPL-SCNC: 139 MMOL/L (ref 135–147)
WBC # BLD AUTO: 7.44 THOUSAND/UL (ref 4.31–10.16)

## 2022-12-07 RX ORDER — ACETAMINOPHEN 325 MG/1
975 TABLET ORAL EVERY 8 HOURS SCHEDULED
Refills: 0
Start: 2022-12-07

## 2022-12-07 RX ORDER — GABAPENTIN 100 MG/1
100 CAPSULE ORAL
Refills: 0
Start: 2022-12-07

## 2022-12-07 RX ADMIN — DOCUSATE SODIUM 100 MG: 100 CAPSULE, LIQUID FILLED ORAL at 09:29

## 2022-12-07 RX ADMIN — ACETAMINOPHEN 975 MG: 325 TABLET, FILM COATED ORAL at 06:12

## 2022-12-07 RX ADMIN — HEPARIN SODIUM 5000 UNITS: 5000 INJECTION INTRAVENOUS; SUBCUTANEOUS at 14:15

## 2022-12-07 RX ADMIN — AMLODIPINE BESYLATE 5 MG: 5 TABLET ORAL at 09:29

## 2022-12-07 RX ADMIN — SENNOSIDES 17.2 MG: 8.6 TABLET, FILM COATED ORAL at 09:29

## 2022-12-07 RX ADMIN — ACETAMINOPHEN 975 MG: 325 TABLET, FILM COATED ORAL at 14:15

## 2022-12-07 NOTE — PLAN OF CARE
Problem: MOBILITY - ADULT  Goal: Maintain or return to baseline ADL function  Description: INTERVENTIONS:  -  Assess patient's ability to carry out ADLs; assess patient's baseline for ADL function and identify physical deficits which impact ability to perform ADLs (bathing, care of mouth/teeth, toileting, grooming, dressing, etc )  - Assess/evaluate cause of self-care deficits   - Assess range of motion  - Assess patient's mobility; develop plan if impaired  - Assess patient's need for assistive devices and provide as appropriate  - Encourage maximum independence but intervene and supervise when necessary  - Involve family in performance of ADLs  - Assess for home care needs following discharge   - Consider OT consult to assist with ADL evaluation and planning for discharge  - Provide patient education as appropriate  Outcome: Progressing  Goal: Maintains/Returns to pre admission functional level  Description: INTERVENTIONS:  - Perform BMAT or MOVE assessment daily    - Set and communicate daily mobility goal to care team and patient/family/caregiver  - Collaborate with rehabilitation services on mobility goals if consulted  - Perform Range of Motion  times a day  - Reposition patient every  hours    - Dangle patient  times a day  - Stand patient times a day  - Ambulate patient  times a day  - Out of bed to chair  times a day   - Out of bed for meals  times a day  - Out of bed for toileting  - Record patient progress and toleration of activity level   Outcome: Progressing     Problem: Potential for Falls  Goal: Patient will remain free of falls  Description: INTERVENTIONS:  -  Assess patient's ability to carry out ADLs; assess patient's baseline for ADL function and identify physical deficits which impact ability to perform ADLs (bathing, care of mouth/teeth, toileting, grooming, dressing, etc )  - Assess/evaluate cause of self-care deficits   - Assess range of motion  - Assess patient's mobility; develop plan if impaired  - Assess patient's need for assistive devices and provide as appropriate  - Encourage maximum independence but intervene and supervise when necessary  - Involve family in performance of ADLs  - Assess for home care needs following discharge   - Consider OT consult to assist with ADL evaluation and planning for discharge  - Provide patient education as appropriate  Outcome: Progressing     Problem: Prexisting or High Potential for Compromised Skin Integrity  Goal: Skin integrity is maintained or improved  Description: INTERVENTIONS:  - Identify patients at risk for skin breakdown  - Assess and monitor skin integrity  - Assess and monitor nutrition and hydration status  - Monitor labs   - Assess for incontinence   - Turn and reposition patient  - Assist with mobility/ambulation  - Relieve pressure over bony prominences  - Avoid friction and shearing  - Provide appropriate hygiene as needed including keeping skin clean and dry  - Evaluate need for skin moisturizer/barrier cream  - Collaborate with interdisciplinary team   - Patient/family teaching  - Consider wound care consult   Outcome: Progressing     Problem: PAIN - ADULT  Goal: Verbalizes/displays adequate comfort level or baseline comfort level  Description: Interventions:  - Encourage patient to monitor pain and request assistance  - Assess pain using appropriate pain scale  - Administer analgesics based on type and severity of pain and evaluate response  - Implement non-pharmacological measures as appropriate and evaluate response  - Consider cultural and social influences on pain and pain management  - Notify physician/advanced practitioner if interventions unsuccessful or patient reports new pain  Outcome: Progressing     Problem: INFECTION - ADULT  Goal: Absence or prevention of progression during hospitalization  Description: INTERVENTIONS:  - Assess and monitor for signs and symptoms of infection  - Monitor lab/diagnostic results  - Monitor all insertion sites, i e  indwelling lines, tubes, and drains  - Monitor endotracheal if appropriate and nasal secretions for changes in amount and color  - [place] appropriate cooling/warming therapies per order  - Administer medications as ordered  - Instruct and encourage patient and family to use good hand hygiene technique  - Identify and instruct in appropriate isolation precautions for identified infection/condition  Outcome: Progressing  Goal: Absence of fever/infection during neutropenic period  Description: INTERVENTIONS:  - Monitor WBC    Outcome: Progressing     Problem: SAFETY ADULT  Goal: Maintain or return to baseline ADL function  Description: INTERVENTIONS:  -  Assess patient's ability to carry out ADLs; assess patient's baseline for ADL function and identify physical deficits which impact ability to perform ADLs (bathing, care of mouth/teeth, toileting, grooming, dressing, etc )  - Assess/evaluate cause of self-care deficits   - Assess range of motion  - Assess patient's mobility; develop plan if impaired  - Assess patient's need for assistive devices and provide as appropriate  - Encourage maximum independence but intervene and supervise when necessary  - Involve family in performance of ADLs  - Assess for home care needs following discharge   - Consider OT consult to assist with ADL evaluation and planning for discharge  - Provide patient education as appropriate  Outcome: Progressing  Goal: Maintains/Returns to pre admission functional level  Description: INTERVENTIONS:  - Perform BMAT or MOVE assessment daily    - Set and communicate daily mobility goal to care team and patient/family/caregiver  - Collaborate with rehabilitation services on mobility goals if consulted  - Perform Range of Motion  times a day  - Reposition patient every  hours    - Dangle patient  times a day  - Stand patient  times a day  - Ambulate patient  times a day  - Out of bed to chair  times a day   - Out of bed for meals times a day  - Out of bed for toileting  - Record patient progress and toleration of activity level   Outcome: Progressing  Goal: Patient will remain free of falls  Description: INTERVENTIONS:  -  Assess patient's ability to carry out ADLs; assess patient's baseline for ADL function and identify physical deficits which impact ability to perform ADLs (bathing, care of mouth/teeth, toileting, grooming, dressing, etc )  - Assess/evaluate cause of self-care deficits   - Assess range of motion  - Assess patient's mobility; develop plan if impaired  - Assess patient's need for assistive devices and provide as appropriate  - Encourage maximum independence but intervene and supervise when necessary  - Involve family in performance of ADLs  - Assess for home care needs following discharge   - Consider OT consult to assist with ADL evaluation and planning for discharge  - Provide patient education as appropriate  Outcome: Progressing     Problem: DISCHARGE PLANNING  Goal: Discharge to home or other facility with appropriate resources  Description: INTERVENTIONS:  - Identify barriers to discharge w/patient and caregiver  - Arrange for needed discharge resources and transportation as appropriate  - Identify discharge learning needs (meds, wound care, etc )  - Arrange for interpretive services to assist at discharge as needed  - Refer to Case Management Department for coordinating discharge planning if the patient needs post-hospital services based on physician/advanced practitioner order or complex needs related to functional status, cognitive ability, or social support system  Outcome: Progressing     Problem: Knowledge Deficit  Goal: Patient/family/caregiver demonstrates understanding of disease process, treatment plan, medications, and discharge instructions  Description: Complete learning assessment and assess knowledge base    Interventions:  - Provide teaching at level of understanding  - Provide teaching via preferred learning methods  Outcome: Progressing

## 2022-12-07 NOTE — PROGRESS NOTES
[facility]  Progress Note - Karis Peng 11/27/1931, 80 y o  female MRN: [id number]  Unit/Bed#: W -01 Encounter: [id number]  Primary Care Provider: Carlos Lucas DO   Date and time admitted to hospital: 12/4/2022  9:41 AM    [address]  - Fall backward down 2 steps landing on her head then body  - Below noted injuries  - Generalized weakness and worsening ambulatory dysfunction prior to the fall  - PT/OT evaluation and treatment as indicated  - Geriatric consultation  - Admit to trauma service SD1     Essential hypertension  Assessment & Plan  - Home medications on hold with possible cervical cord injury  - Monitor blood pressures    * Closed displaced fracture of fourth cervical vertebra (HCC)  Assessment & Plan  - C4 bilateral lamina fractures with anterior widening at C4/C5 previously fused osteophyte  - Right sided Upper>lower extremity weakness R: 3/5 , 4/5 tri/biceps, 4-5/5 lower extremity plantar/dorsiflexion  - Sensation intact throughout without paresthesias   - Stat MRI C spine pending  - Neurosurgery consult  - Maintain cervical collar at all times  - C-Spine precautions  - Multimodal pain regimen    DVT Prophylaxis: SCDs and Lovenox  PT and OT: eval and treat    Disposition: D/C today  Discussed with patient daughter over the phone  Discussed discharge plan  Subjective   Chief Complaint: No complaints    Subjective: Patient offered no new complaints today  Reports that she is ready to go  Objective   Vitals:   Temp:  [97 9 °F (36 6 °C)-98 4 °F (36 9 °C)] 97 9 °F (36 6 °C)  HR:  [] 90  Resp:  [16-20] 16  BP: (148-176)/() 176/118    I/O       12/05 0701 12/06 0700 12/06 0701 12/07 0700 12/07 0701  12/08 0700    P  O  240      Total Intake(mL/kg) 240 (4 3)      Urine (mL/kg/hr) 450 (0 3) 500 (0 4)     Total Output 450 500     Net -210 -500            Unmeasured Urine Occurrence  1 x            Physical Exam:   GENERAL APPEARANCE: NAD  NEURO: GCS 15  HEENT: Normocephalic  CV: RRR  LUNGS: CTA b/l  GI: non-tender, non-distended  : no allen  MSK: moving all extremities  SKIN: warm, dry, intact    Invasive Devices     Peripheral Intravenous Line  Duration           Peripheral IV 12/05/22 Dorsal (posterior); Left Forearm 2 days          Drain  Duration           External Urinary Catheter 1 day                      Lab Results:   Results: I have personally reviewed all pertinent laboratory/tests results, BMP/CMP:   Lab Results   Component Value Date    SODIUM 139 12/07/2022    K 3 8 12/07/2022     12/07/2022    CO2 27 12/07/2022    BUN 16 12/07/2022    CREATININE 0 61 12/07/2022    CALCIUM 9 3 12/07/2022    EGFR 79 12/07/2022    and CBC:   Lab Results   Component Value Date    WBC 7 44 12/07/2022    HGB 12 9 12/07/2022    HCT 39 4 12/07/2022    MCV 96 12/07/2022     12/07/2022    MCH 31 5 12/07/2022    MCHC 32 7 12/07/2022    RDW 14 0 12/07/2022    MPV 9 9 12/07/2022     Imaging: I have personally reviewed pertinent reports       Other Studies: no other studies

## 2022-12-07 NOTE — PLAN OF CARE
Problem: MOBILITY - ADULT  Goal: Maintain or return to baseline ADL function  Description: INTERVENTIONS:  -  Assess patient's ability to carry out ADLs; assess patient's baseline for ADL function and identify physical deficits which impact ability to perform ADLs (bathing, care of mouth/teeth, toileting, grooming, dressing, etc )  - Assess/evaluate cause of self-care deficits   - Assess range of motion  - Assess patient's mobility; develop plan if impaired  - Assess patient's need for assistive devices and provide as appropriate  - Encourage maximum independence but intervene and supervise when necessary  - Involve family in performance of ADLs  - Assess for home care needs following discharge   - Consider OT consult to assist with ADL evaluation and planning for discharge  - Provide patient education as appropriate  Outcome: Progressing  Goal: Maintains/Returns to pre admission functional level  Description: INTERVENTIONS:  - Perform BMAT or MOVE assessment daily    - Set and communicate daily mobility goal to care team and patient/family/caregiver     - Collaborate with rehabilitation services on mobility goals if consulted  - Out of bed for toileting  - Record patient progress and toleration of activity level   Outcome: Progressing     Problem: Potential for Falls  Goal: Patient will remain free of falls  Description: INTERVENTIONS:  - Educate patient/family on patient safety including physical limitations  - Instruct patient to call for assistance with activity   - Consult OT/PT to assist with strengthening/mobility   - Keep Call bell within reach  - Keep bed low and locked with side rails adjusted as appropriate  - Keep care items and personal belongings within reach  - Initiate and maintain comfort rounds  - Make Fall Risk Sign visible to staff  - Apply yellow socks and bracelet for high fall risk patients  - Consider moving patient to room near nurses station  Outcome: Progressing     Problem: Prexisting or High Potential for Compromised Skin Integrity  Goal: Skin integrity is maintained or improved  Description: INTERVENTIONS:  - Identify patients at risk for skin breakdown  - Assess and monitor skin integrity  - Assess and monitor nutrition and hydration status  - Monitor labs   - Assess for incontinence   - Turn and reposition patient  - Assist with mobility/ambulation  - Relieve pressure over bony prominences  - Avoid friction and shearing  - Provide appropriate hygiene as needed including keeping skin clean and dry  - Evaluate need for skin moisturizer/barrier cream  - Collaborate with interdisciplinary team   - Patient/family teaching  - Consider wound care consult   Outcome: Progressing     Problem: PAIN - ADULT  Goal: Verbalizes/displays adequate comfort level or baseline comfort level  Description: Interventions:  - Encourage patient to monitor pain and request assistance  - Assess pain using appropriate pain scale  - Administer analgesics based on type and severity of pain and evaluate response  - Implement non-pharmacological measures as appropriate and evaluate response  - Consider cultural and social influences on pain and pain management  - Notify physician/advanced practitioner if interventions unsuccessful or patient reports new pain  Outcome: Progressing     Problem: INFECTION - ADULT  Goal: Absence or prevention of progression during hospitalization  Description: INTERVENTIONS:  - Assess and monitor for signs and symptoms of infection  - Monitor lab/diagnostic results  - Monitor all insertion sites, i e  indwelling lines, tubes, and drains  - Monitor endotracheal if appropriate and nasal secretions for changes in amount and color  - [place] appropriate cooling/warming therapies per order  - Administer medications as ordered  - Instruct and encourage patient and family to use good hand hygiene technique  - Identify and instruct in appropriate isolation precautions for identified infection/condition  Outcome: Progressing  Goal: Absence of fever/infection during neutropenic period  Description: INTERVENTIONS:  - Monitor WBC    Outcome: Progressing     Problem: SAFETY ADULT  Goal: Maintain or return to baseline ADL function  Description: INTERVENTIONS:  -  Assess patient's ability to carry out ADLs; assess patient's baseline for ADL function and identify physical deficits which impact ability to perform ADLs (bathing, care of mouth/teeth, toileting, grooming, dressing, etc )  - Assess/evaluate cause of self-care deficits   - Assess range of motion  - Assess patient's mobility; develop plan if impaired  - Assess patient's need for assistive devices and provide as appropriate  - Encourage maximum independence but intervene and supervise when necessary  - Involve family in performance of ADLs  - Assess for home care needs following discharge   - Consider OT consult to assist with ADL evaluation and planning for discharge  - Provide patient education as appropriate  Outcome: Progressing  Goal: Maintains/Returns to pre admission functional level  Description: INTERVENTIONS:  - Perform BMAT or MOVE assessment daily    - Set and communicate daily mobility goal to care team and patient/family/caregiver     - Collaborate with rehabilitation services on mobility goals if consulted  - Out of bed for toileting  - Record patient progress and toleration of activity level   Outcome: Progressing  Goal: Patient will remain free of falls  Description: INTERVENTIONS:  - Educate patient/family on patient safety including physical limitations  - Instruct patient to call for assistance with activity   - Consult OT/PT to assist with strengthening/mobility   - Keep Call bell within reach  - Keep bed low and locked with side rails adjusted as appropriate  - Keep care items and personal belongings within reach  - Initiate and maintain comfort rounds  - Make Fall Risk Sign visible to staff  - Apply yellow socks and bracelet for high fall risk patients  - Consider moving patient to room near nurses station  Outcome: Progressing     Problem: DISCHARGE PLANNING  Goal: Discharge to home or other facility with appropriate resources  Description: INTERVENTIONS:  - Identify barriers to discharge w/patient and caregiver  - Arrange for needed discharge resources and transportation as appropriate  - Identify discharge learning needs (meds, wound care, etc )  - Arrange for interpretive services to assist at discharge as needed  - Refer to Case Management Department for coordinating discharge planning if the patient needs post-hospital services based on physician/advanced practitioner order or complex needs related to functional status, cognitive ability, or social support system  Outcome: Progressing     Problem: Knowledge Deficit  Goal: Patient/family/caregiver demonstrates understanding of disease process, treatment plan, medications, and discharge instructions  Description: Complete learning assessment and assess knowledge base    Interventions:  - Provide teaching at level of understanding  - Provide teaching via preferred learning methods  Outcome: Progressing

## 2022-12-07 NOTE — DISCHARGE SUMMARY
Discharge Summary - Roseanne Fried 80 y o  female MRN: [id number]    Unit/Bed#: W -00 Encounter: [id number]    Admission Date:   Admission Orders (From admission, onward)     Ordered        12/04/22 1710  Inpatient Admission  Once                        Admitting Diagnosis: C4 cervical fracture ([facility] [address] ) [S12 300A]    HPI: Per Javi Damon, "Roseanne Fried is a 80 y o  female with PMH mild dementia and htn who presents with neck stiffness following a fall 2 days prior to arrival  Her daughters are present bedside and provide history  Patient was slumped down into her chair Friday afternoon and when her daughters picked her up she was very weak and unable to bear her own weight  She had some mild improvement after eating and then tried to walk up the stairs of her house when she lost her balance and fell backward striking her head  After the fall she had persistent generalized R>L sided weakness and when that did not improve over the next two days her daughters brought her in for evaluation  Patient complains of neck stiffness with no other complaints "    Procedures Performed:   Orders Placed This Encounter   Procedures   • ED ECG Documentation Only       Summary of Hospital Course: Patient is a 80year old female present for evaluation status post fall  Noted history of mild dementia and hypertension  1 blood pressure medication mated to the ICU secondary to concern over central cord syndrome as well as concern over CT of the cervical spine which revealed a significant cervical spine fracture  Neurosurgery did not recommend any acute intervention  Patient was not an operative candidate based on her fracture pattern as well as age/comorbidities per neurosurgery  She was treated conservatively with a brace  Repeat/upright imaging revealed slightly worsening of the cervical spine fracture    Neurosurgery again stated that the patient did not require any acute intervention and can be discharged with outpatient follow-up in 2 weeks  Patient family was notified of the findings  CTA did not reveal any vascular injury and the patient did not require any anticoagulant/antiplatelet medication  She would be discharged to rehab on 10/7/2022  Follow-up outpatient she would follow-up outpatient with neurosurgery in 2 weeks  She would also follow-up outpatient with PCP  Discharge paperwork emailed to patient family at request       Significant Findings, Care, Treatment and Services Provided:   CTA head and neck with and without contrast    Result Date: 12/4/2022  Impression: 1  No acute intracranial CT abnormality  2   Acute fracture involving bilateral lamina and spinous process of C4  There is distraction fracture and anterior widening of previously fused C4 and C5 vertebral bodies with sclerotic and nonsclerotic borders  See separate report of the cervical spine CT  3   Stable chronic right cerebellar infarct  Nonspecific white matter change suggesting microangiopathy  4   Age indeterminate likely chronic threadlike flow within V1 segment of right vertebral artery with occluded V2 segment  Patent small caliber V3 and V4 segments  5   Patent major vasculature of the [place] without high-grade stenosis  No aneurysm  6   No hemodynamically significant stenosis of cervical carotid and left vertebral arteries  7   Anatomic variant retropharyngeal course of proximal to mid right common carotid artery I personally discussed this study with Oliver Caceres on 12/4/2022 at 1:47 PM   Workstation performed: [id number]     XR chest 1 view portable    Result Date: 12/4/2022  Impression: No acute cardiopulmonary disease  Workstation performed: [id number]     XR spine cervical 2 or 3 vw injury    Result Date: 12/7/2022  Impression: Interval development of 9 mm anterior subluxation at C3-4 since 12/4/2022    I personally discussed this study with Dr Blanca Perdomo on 12/7/2022 at 7:55 AM   Workstation performed: [id number] MRI cervical spine wo contrast    Result Date: 12/4/2022  Impression: Unfortunately this examination is significantly degraded by patient motion  See separate CT scan for fractures of the posterior elements of C4 on the right  There is widening of the disc space at the C4-5 level anteriorly in the disc space is now distended with fluid  There is abnormal signal within the posterior epidural space beginning at the C3-4 level and extending inferiorly to the C4-5 level resulting in moderate canal stenosis  This is most suggestive of posterior epidural hematoma  Suspected partial disruption of the anterior longitudinal ligament at the level of the C4-5 disc space, incompletely evaluated  No definite abnormal cord signal identified on this motion degraded exam  Workstation performed: [id number]     CT recon only cervical spine (No Charge)    Result Date: 12/4/2022  Impression: 1  Acute fracture involving bilateral lamina and spinous process of C4  There is distraction fracture and anterior widening of previously fused C4 and C5 vertebral bodies with sclerotic and nonsclerotic borders  Canal assessment for epidural hematoma is limited due to presence of IV contrast   Recommend cervical spine MRI to further assess the findings and evaluate for ligamentous injury  2   Multilevel degenerative change and canal narrowing most pronounced at C3-4    I personally discussed this study with Puneet Pate on 12/4/2022 at 1:47 PM  Workstation performed: [id number]       Complications: no complications    Discharge Diagnosis:   Patient Active Problem List   Diagnosis   • Blepharospasm   • Spasmodic torticollis   • Anxiety   • Depression, recurrent ([address] )   • Essential hypertension   • Family history of malignant neoplasm of breast   • Functional urinary incontinence   • Hyperlipidemia   • MCI (mild cognitive impairment)   • Osteoporosis   • Torsion dystonia fragments   • Segmental dystonia   • Sensorineural hearing loss (SNHL), bilateral   • Asymmetrical hearing loss of right ear   • Pulmonary HTN (HCC)   • Presence of pessary   • Osteoporosis without current pathological fracture   • Nerve sheath tumor   • Lumbar spine tumor   • Primary osteoarthritis of right hip   • Encounter for gynecological examination without abnormal finding   • PAD (peripheral artery disease) ([facility] [address] )   • Spinal stenosis of lumbar region with neurogenic claudication   • Lumbar disc disease with radiculopathy   • Fall   • Closed displaced fracture of fourth cervical vertebra ([facility] [address] )   • Epidural hematoma         Medical Problems     Resolved Problems  Date Reviewed: 12/6/2022   None         Condition at Discharge: good         Discharge instructions/Information to patient and family:   See after visit summary for information provided to patient and family  Provisions for Follow-Up Care:  See after visit summary for information related to follow-up care and any pertinent home health orders  PCP: Consuelo Tan DO    Disposition: David Jarquin    Planned Readmission: No      Discharge Statement   I spent 23 minutes discharging the patient  This time was spent on the day of discharge  I had direct contact with the patient on the day of discharge  Additional documentation is required if more than 30 minutes were spent on discharge  Discharge Medications:  See after visit summary for reconciled discharge medications provided to patient and family

## 2022-12-07 NOTE — TELEPHONE ENCOUNTER
12/8/22:  DISCHARGED TO [facility] #[phone]  SPOKE TO AC WARE OV DATE / TIME / LOCATION / IMAGING     12/7/22: INPATIENT    2 WK HFU W/ AP  12/22/22 / 10:00 / Truman Iglesias

## 2022-12-07 NOTE — CASE MANAGEMENT
Case Management Assessment & Discharge Planning Note    Patient name Sophie Gallardo  Location W /W -01 MRN [id number]  : 1931 Date 2022       Current Admission Date: 2022  Current Admission Diagnosis:Closed displaced fracture of fourth cervical vertebra [facility])   Patient Active Problem List    Diagnosis Date Noted   • Fall 2022   • Closed displaced fracture of fourth cervical vertebra ([address] ) 2022   • Epidural hematoma 2022   • Spinal stenosis of lumbar region with neurogenic claudication    • Lumbar disc disease with radiculopathy    • PAD (peripheral artery disease) ([address] ) 10/13/2022   • Encounter for gynecological examination without abnormal finding 2021   • Primary osteoarthritis of right hip    • Nerve sheath tumor 2021   • Lumbar spine tumor 2021   • Presence of pessary 2020   • Osteoporosis without current pathological fracture 2020   • Pulmonary HTN ([facility] [address] ) 10/23/2020   • Sensorineural hearing loss (SNHL), bilateral 10/16/2019   • Asymmetrical hearing loss of right ear 10/16/2019   • 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   • Family history of malignant neoplasm of breast 2018   • Anxiety 2016   • Essential hypertension 2016   • MCI (mild cognitive impairment) 2016   • Blepharospasm 2015   • Depression, recurrent ([address] ) 2014   • Functional urinary incontinence 2014   • Hyperlipidemia 2014   • Osteoporosis 2014   • 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   • 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      LOS (days): 3  Geometric Mean LOS (GMLOS) (days): 4 40  Days to GMLOS:1 6     OBJECTIVE:    Risk of Unplanned Readmission Score: 11 11         Current admission status: Inpatient       Preferred Pharmacy:   [facility] [address] FIDELINA HUGGINS [address], [place] - [address] [address]  [address] [address]  Phone: [phone] Fax: [address]  [address] [facility] [address]  Phone: [phone] Fax: [address], [address]  [address]  [facility] [address]  Phone: [phone] Fax: [phone]    [id number] [place] [place] - [address] [place] [place] [address]  Phone: [phone] Fax: [phone]    Primary Care Provider: Pablo Sahni DO    Primary Insurance: MEDICARE  Secondary Insurance: AARP    ASSESSMENT:  [facility] Proxies    There are no active Health Care Proxies on file  DISCHARGE DETAILS:    Discharge planning discussed with[de-identified] pt and daughter - Arnel Parrisher of Choice: Yes  Comments - Freedom of Choice: CM reviewed the available facilities via the [address] printout  Pt and teodoro wish for the pt to go to [facility] for the pt's short-term rehabilitation  CM contacted family/caregiver?: Yes  Were Treatment Team discharge recommendations reviewed with patient/caregiver?: Yes  Did patient/caregiver verbalize understanding of patient care needs?: N/A- going to facility  Were patient/caregiver advised of the risks associated with not following Treatment Team discharge recommendations?: Yes    Contacts  Patient Contacts: Daughter Saad Clifton  Relationship to Patient[de-identified] Family  Contact Method: Phone  Phone Number: see chart  Reason/Outcome: Discharge Planning, Continuity of Care              Other Referral/Resources/Interventions Provided:  Interventions: Short Term Rehab  Referral Comments: CM reserved the availability of [facility] for the pt  Pt has had a COVID swab and is negative, CM informed [facility]  CM discussed transportation times with daughter  Daughter explained she is at the office today, but wishes to be at the facility to meet her mom  CM will arrange for transportation for after 1530           Treatment Team Recommendation: Short Term Rehab  Discharge Destination Plan[de-identified] Short Term Rehab  Transport at Discharge : [facility]  Dispatcher Contacted: Yes  Number/Name of Dispatcher: ANUPAMA     ETA of Transport (Date): 12/07/22  ETA of Transport (Time): 8117              IMM Given (Date):: 12/07/22  IMM Given to[de-identified] Family  Family notified[de-identified] Daughter Shelli Perez agrees with discharge plan and has no questions or concerns at this time  [facility]  Receiving Facility/Agency Phone Number: [phone]  Facility/Agency Fax Number: [phone]       CM notified MD, RN, family, and facility of pt's transportation time

## 2022-12-07 NOTE — DISCHARGE INSTRUCTIONS
Neurosurgery discharge instructions following neck fracture:     VISTA collar at all times except for showering change to urbano (peach) collar  No bending, twisting or heavy lifting  No pushing or pulling over 10lbs  No strenuous activities  NO DRIVING  **Please notify MD immediately if you have increased neck or arm pain  New numbness and/or weakness in your arm  Difficulty swallowing or breathing especially while lying down  Numbness or weakness in arms or legs   Increased difficulty walking **

## 2022-12-07 NOTE — TREATMENT PLAN
Baseline upright cervical spine x-rays demonstrate severe degenerative disease with collapse at disc spaces at each level and the fused C4-5 and C5-6 vertebral bodies  Radiologist report there is progression of 9 mm C3 on C4 subluxation, but there was no previous x-rays to compare with to determine progression  Given her age and cognitive issues, surgery is risky for this patient and would like her to continue with conservative treatment including wearing [place] collar all the time, and serial collar for shower  Follow-up in 2 weeks with upright cervical spine x-rays in brace, continue PT/OT and pain control  Fall precaution, avoid lifting heavy objects, excessive extension or flexion of cervical spine  NSx will sign off    Call with questions or concerns

## 2022-12-07 NOTE — PLAN OF CARE
Problem: MOBILITY - ADULT  Goal: Maintain or return to baseline ADL function  Description: INTERVENTIONS:  -  Assess patient's ability to carry out ADLs; assess patient's baseline for ADL function and identify physical deficits which impact ability to perform ADLs (bathing, care of mouth/teeth, toileting, grooming, dressing, etc )  - Assess/evaluate cause of self-care deficits   - Assess range of motion  - Assess patient's mobility; develop plan if impaired  - Assess patient's need for assistive devices and provide as appropriate  - Encourage maximum independence but intervene and supervise when necessary  - Involve family in performance of ADLs  - Assess for home care needs following discharge   - Consider OT consult to assist with ADL evaluation and planning for discharge  - Provide patient education as appropriate  12/7/2022 1336 by Rachell Kraus RN  Outcome: Progressing  12/7/2022 1249 by Rachell Kraus RN  Outcome: Progressing  Goal: Maintains/Returns to pre admission functional level  Description: INTERVENTIONS:  - Perform BMAT or MOVE assessment daily    - Set and communicate daily mobility goal to care team and patient/family/caregiver  - Collaborate with rehabilitation services on mobility goals if consulted  - Perform Range of Motion  times a day  - Reposition patient every  hours    - Dangle patient  times a day  - Stand patient  times a day  - Ambulate patient  times a day  - Out of bed to chair times a day   - Out of bed for meals  times a day  - Out of bed for toileting  - Record patient progress and toleration of activity level   12/7/2022 1336 by Rachell Kraus RN  Outcome: Progressing  12/7/2022 1249 by Rachell Kraus RN  Outcome: Progressing     Problem: Potential for Falls  Goal: Patient will remain free of falls  Description: INTERVENTIONS:  - Educate patient/family on patient safety including physical limitations  - Instruct patient to call for assistance with activity   - Consult OT/PT to assist with strengthening/mobility   - Keep Call bell within reach  - Keep bed low and locked with side rails adjusted as appropriate  - Keep care items and personal belongings within reach  - Initiate and maintain comfort rounds  - Make Fall Risk Sign visible to staff  - Offer Toileting every  Hours, in advance of need  - Initiate/Maintain alarm  - Obtain necessary fall risk management equipment:   - Apply yellow socks and bracelet for high fall risk patients  - Consider moving patient to room near nurses station  12/7/2022 1336 by Emir Joseph RN  Outcome: Progressing  12/7/2022 1249 by Emir Joseph RN  Outcome: Progressing     Problem: Prexisting or High Potential for Compromised Skin Integrity  Goal: Skin integrity is maintained or improved  Description: INTERVENTIONS:  - Identify patients at risk for skin breakdown  - Assess and monitor skin integrity  - Assess and monitor nutrition and hydration status  - Monitor labs   - Assess for incontinence   - Turn and reposition patient  - Assist with mobility/ambulation  - Relieve pressure over bony prominences  - Avoid friction and shearing  - Provide appropriate hygiene as needed including keeping skin clean and dry  - Evaluate need for skin moisturizer/barrier cream  - Collaborate with interdisciplinary team   - Patient/family teaching  - Consider wound care consult   12/7/2022 1336 by Emir Joseph RN  Outcome: Progressing  12/7/2022 1249 by Emir Joseph RN  Outcome: Progressing     Problem: PAIN - ADULT  Goal: Verbalizes/displays adequate comfort level or baseline comfort level  Description: Interventions:  - Encourage patient to monitor pain and request assistance  - Assess pain using appropriate pain scale  - Administer analgesics based on type and severity of pain and evaluate response  - Implement non-pharmacological measures as appropriate and evaluate response  - Consider cultural and social influences on pain and pain management  - Notify physician/advanced practitioner if interventions unsuccessful or patient reports new pain  12/7/2022 1336 by Raul Hanna RN  Outcome: Progressing  12/7/2022 1249 by Raul Hanna RN  Outcome: Progressing     Problem: INFECTION - ADULT  Goal: Absence or prevention of progression during hospitalization  Description: INTERVENTIONS:  - Assess and monitor for signs and symptoms of infection  - Monitor lab/diagnostic results  - Monitor all insertion sites, i e  indwelling lines, tubes, and drains  - Monitor endotracheal if appropriate and nasal secretions for changes in amount and color  - [place] appropriate cooling/warming therapies per order  - Administer medications as ordered  - Instruct and encourage patient and family to use good hand hygiene technique  - Identify and instruct in appropriate isolation precautions for identified infection/condition  12/7/2022 1336 by Raul Hanna RN  Outcome: Progressing  12/7/2022 1249 by Raul Hanna RN  Outcome: Progressing  Goal: Absence of fever/infection during neutropenic period  Description: INTERVENTIONS:  - Monitor WBC    12/7/2022 1336 by Raul Hanna RN  Outcome: Progressing  12/7/2022 1249 by Raul Hanna RN  Outcome: Progressing     Problem: SAFETY ADULT  Goal: Maintain or return to baseline ADL function  Description: INTERVENTIONS:  -  Assess patient's ability to carry out ADLs; assess patient's baseline for ADL function and identify physical deficits which impact ability to perform ADLs (bathing, care of mouth/teeth, toileting, grooming, dressing, etc )  - Assess/evaluate cause of self-care deficits   - Assess range of motion  - Assess patient's mobility; develop plan if impaired  - Assess patient's need for assistive devices and provide as appropriate  - Encourage maximum independence but intervene and supervise when necessary  - Involve family in performance of ADLs  - Assess for home care needs following discharge   - Consider OT consult to assist with ADL evaluation and planning for discharge  - Provide patient education as appropriate  12/7/2022 1336 by Ne Mariscal RN  Outcome: Progressing  12/7/2022 1249 by Ne Mariscal RN  Outcome: Progressing  Goal: Maintains/Returns to pre admission functional level  Description: INTERVENTIONS:  - Perform BMAT or MOVE assessment daily    - Set and communicate daily mobility goal to care team and patient/family/caregiver  - Collaborate with rehabilitation services on mobility goals if consulted  - Perform Range of Motion  times a day  - Reposition patient every  hours    - Dangle patient  times a day  - Stand patient  times a day  - Ambulate patient  times a day  - Out of bed to chair times a day   - Out of bed for meals  times a day  - Out of bed for toileting  - Record patient progress and toleration of activity level   12/7/2022 1336 by Ne Mariscal RN  Outcome: Progressing  12/7/2022 1249 by Ne Mariscal RN  Outcome: Progressing  Goal: Patient will remain free of falls  Description: INTERVENTIONS:  - Educate patient/family on patient safety including physical limitations  - Instruct patient to call for assistance with activity   - Consult OT/PT to assist with strengthening/mobility   - Keep Call bell within reach  - Keep bed low and locked with side rails adjusted as appropriate  - Keep care items and personal belongings within reach  - Initiate and maintain comfort rounds  - Make Fall Risk Sign visible to staff  - Offer Toileting every  Hours, in advance of need  - Initiate/Maintain alarm  - Obtain necessary fall risk management equipment:   - Apply yellow socks and bracelet for high fall risk patients  - Consider moving patient to room near nurses station  12/7/2022 1336 by Ne Mariscal RN  Outcome: Progressing  12/7/2022 1249 by Ne Mariscal RN  Outcome: Progressing     Problem: DISCHARGE PLANNING  Goal: Discharge to home or other facility with appropriate resources  Description: INTERVENTIONS:  - Identify barriers to discharge w/patient and caregiver  - Arrange for needed discharge resources and transportation as appropriate  - Identify discharge learning needs (meds, wound care, etc )  - Arrange for interpretive services to assist at discharge as needed  - Refer to Case Management Department for coordinating discharge planning if the patient needs post-hospital services based on physician/advanced practitioner order or complex needs related to functional status, cognitive ability, or social support system  12/7/2022 1336 by Marianela Sparrow RN  Outcome: Progressing  12/7/2022 1249 by Marianela Sparrow RN  Outcome: Progressing     Problem: Knowledge Deficit  Goal: Patient/family/caregiver demonstrates understanding of disease process, treatment plan, medications, and discharge instructions  Description: Complete learning assessment and assess knowledge base    Interventions:  - Provide teaching at level of understanding  - Provide teaching via preferred learning methods  12/7/2022 1336 by Marianela Sparrow RN  Outcome: Progressing  12/7/2022 1249 by Marianela Sparrow RN  Outcome: Progressing

## 2022-12-08 ENCOUNTER — TRANSITIONAL CARE MANAGEMENT (OUTPATIENT)
Dept: FAMILY MEDICINE CLINIC | Facility: MEDICAL CENTER | Age: 87
End: 2022-12-08

## 2022-12-12 ENCOUNTER — TRANSCRIBE ORDERS (OUTPATIENT)
Dept: NEUROSURGERY | Facility: CLINIC | Age: 87
End: 2022-12-12

## 2022-12-12 DIAGNOSIS — S12.300A CLOSED DISPLACED FRACTURE OF FOURTH CERVICAL VERTEBRA (HCC): Primary | ICD-10-CM

## 2022-12-21 ENCOUNTER — TELEPHONE (OUTPATIENT)
Dept: NEUROSURGERY | Facility: CLINIC | Age: 87
End: 2022-12-21

## 2022-12-22 ENCOUNTER — OFFICE VISIT (OUTPATIENT)
Dept: NEUROSURGERY | Facility: CLINIC | Age: 87
End: 2022-12-22

## 2022-12-22 VITALS
BODY MASS INDEX: 24.89 KG/M2 | HEART RATE: 89 BPM | HEIGHT: 59 IN | DIASTOLIC BLOOD PRESSURE: 88 MMHG | TEMPERATURE: 97.8 F | SYSTOLIC BLOOD PRESSURE: 104 MMHG | RESPIRATION RATE: 16 BRPM

## 2022-12-22 DIAGNOSIS — S12.300A CLOSED DISPLACED FRACTURE OF FOURTH CERVICAL VERTEBRA (HCC): Primary | ICD-10-CM

## 2022-12-22 RX ORDER — BISACODYL 10 MG
10 SUPPOSITORY, RECTAL RECTAL DAILY
COMMUNITY

## 2022-12-22 RX ORDER — HYDRALAZINE HYDROCHLORIDE 25 MG/1
25 TABLET, FILM COATED ORAL EVERY 6 HOURS PRN
COMMUNITY
Start: 2022-12-08

## 2022-12-22 NOTE — PROGRESS NOTES
Neurosurgery Office Note  Mickey Chan 80 y o  female MRN: [id number]      Assessment/Plan     Closed displaced fracture of fourth cervical vertebra ([address] )  C4 bilateral laminar and SP fracture with widening of C4-5 VB s/p fall 12/2/22  · MRI obtained and c/w epidural hematoma  · Given patient's age and co-morbidities, conservative treatment was recommended for this likely unstable fracture and family was in agreement  · Patient developed worsening of RUE weakness after hospital discharge (unclear if before or after fall at rehab)  · On exam, 1/5 right delt/bi/tri  4+/5 right WF/WE//IO  Decreased sensation entire RUE  No pain with passive ROM  VISTA collar in place  Imaging:  · XR cervical spine 12/8/22 and 12/21/22: Done at outside facility  Unable to appreciate fracture, alignment appears stable but very difficult to visualize given poor imaging quality      Plan:  · Continue to closely monitor neuro exam and call with any changes  · I had a lengthy discussion with the patient and her daughter today regarding further imaging for worsening right upper extremity weakness  I would not recommend a repeat MRI at this point as it would not   We could consider a CT cervical spine to evaluate the status of the fracture and alignment given that this was an unstable fracture with worsening neurodeficits  However, they are still adamantly against any surgical intervention  As this will not , they prefer to hold off on any significant imaging  They are in agreement with a 1 month follow-up x-ray  · Continue Vista collar at all times  They understand that she will likely be in her collar for life    · Okay to work with gentle PT/OT for strengthening and ambulation assistance  · Pain control per facility  · Follow-up in 1 month with repeat x-ray       Diagnoses and all orders for this visit:    Closed displaced fracture of fourth cervical vertebra (HCC)  -     XR spine cervical 2 or 3 vw injury; Future    Other orders  -     hydrALAZINE (APRESOLINE) 25 mg tablet; Take 25 mg by mouth every 6 (six) hours as needed  -     bisacodyl (DULCOLAX) 10 mg suppository; Insert 10 mg into the rectum daily  -     magnesium hydroxide (MILK OF MAGNESIA) 400 mg/5 mL oral suspension; Take by mouth daily as needed for constipation          I spent 25 minutes with the patient today in which >50% of the time was spent counseling/coordination of care regarding diagnosis, imaging review, symptoms and treatment plan  CHIEF COMPLAINT    Chief Complaint   Patient presents with   • Follow-up     [address] [place] [address]    This is a 80-year-old female with a past medical history significant for hearing loss, dystonia, hypertension, osteopenia, mild dementia who presents today for 2-week follow-up of C4 fracture status post fall 3 days prior to ED presentation  Currently the patient is at rehab at [facility]  Her daughter is present with her today and provides much of the history  She did have some documented right upper extremity weakness upon hospital discharge but was able to use her right arm  She did fall within the first few days at rehab  She now has no use of her right upper extremity apart from her hand  Is unclear whether or not her significant weakness was present upon arrival to rehab or if this happened after her fall  Currently she has a 1 out of 5 in most muscle groups in her right upper extremity  She has decreased sensation in her right upper extremity  She has no pain in her arm and has no pain in her shoulder with passive range of motion  She is complaining of continued neck pain  She has no other deficits  She is eating puréed foods  She is slowly working with therapy and ambulating in the halls with a walker and assistance  We had a lengthy discussion regarding obtaining further imaging as above    At this time, they declined further imaging apart from routine follow-up x-rays given they are not amenable to surgical intervention at this time  See Discussion    REVIEW OF SYSTEMS    Review of Systems   Constitutional: Negative  HENT: Negative  Eyes: Negative  Respiratory: Negative  Cardiovascular: Negative  Gastrointestinal: Negative  Endocrine: Negative  Genitourinary: Negative  Musculoskeletal: Positive for gait problem (uses cane) and neck pain (5/10-previously having Right arm pain- difficultly with lifting up right arm --doing PT at facility )  Back pain: 2 months right buttock pain and pain radiates down right leg when walking  2 WK HFU W/ XRAY CSPINE    s/p fall w neck stiffness    Skin: Negative  Allergic/Immunologic: Negative  Neurological: Positive for weakness (difficulty with lifting Right arm )  Negative for numbness  Hematological: Does not bruise/bleed easily (fish oil)  Psychiatric/Behavioral: Positive for confusion (mild dementia )  All other systems reviewed and are negative      ROS was personally reviewed and changes made as needed     Meds/Allergies     Current Outpatient Medications   Medication Sig Dispense Refill   • acetaminophen (TYLENOL) 325 mg tablet Take 3 tablets (975 mg total) by mouth every 8 (eight) hours  0   • amLODIPine-benazepril (LOTREL 5-20) 5-20 MG per capsule TAKE 1 CAPSULE BY MOUTH EVERY DAY (Patient taking differently: daily 10-20 mg) 90 capsule 1   • bisacodyl (DULCOLAX) 10 mg suppository Insert 10 mg into the rectum daily     • calcium citrate-vitamin D (CITRACAL+D) 315-200 MG-UNIT per tablet Take 1 tablet by mouth daily     • magnesium hydroxide (MILK OF MAGNESIA) 400 mg/5 mL oral suspension Take by mouth daily as needed for constipation     • Multiple Vitamins-Minerals (MULTIVITAL) tablet Take 1 tablet by mouth daily     • Omega-3 Fatty Acids (FISH OIL) 645 MG CAPS Take 1 tablet by mouth daily     • conjugated estrogens (PREMARIN) vaginal cream Insert into the vagina Ose as directed (Patient not taking: Reported on 12/22/2022)     • gabapentin (NEURONTIN) 100 mg capsule Take 1 capsule (100 mg total) by mouth daily at bedtime  0   • hydrALAZINE (APRESOLINE) 25 mg tablet Take 25 mg by mouth every 6 (six) hours as needed       Current Facility-Administered Medications   Medication Dose Route Frequency Provider Last Rate Last Admin   • Botulinum Toxin Type A SOLR 200 Units  200 Units Injection Q3 Months Iglesia Wylie MD   200 Units at 05/03/21 1619       No Known Allergies    PAST HISTORY    Past Medical History:   Diagnosis Date   • Anxiety 2010   • Bladder infection 09/2019   • Breast lump in female    • HL (hearing loss) 2010   • Hypertension 2010   • Osteopenia    • Ovarian cyst        Past Surgical History:   Procedure Laterality Date   • BLADDER SURGERY     • BREAST BIOPSY     • CATARACT EXTRACTION Bilateral 01/01/2015    , Right Eye-2017, Left eye   • ENDOMETRIAL BIOPSY      by Suction   • HYSTERECTOMY         Social History     Tobacco Use   • Smoking status: Never   • Smokeless tobacco: Never   Substance Use Topics   • Alcohol use: No   • Drug use: No       Family History   Problem Relation Age of Onset   • Heart disease Mother    • Heart disease Father    • Hypertension Family    • Osteoporosis Family          Above history personally reviewed  EXAM    Vitals:Blood pressure 104/88, pulse 89, temperature 97 8 °F (36 6 °C), temperature source Temporal, resp  rate 16, height 4' 11" (1 499 m), not currently breastfeeding  ,Body mass index is 24 89 kg/m²  Physical Exam  Vitals and nursing note reviewed  Constitutional:       Appearance: Normal appearance  She is well-developed and normal weight  HENT:      Head: Normocephalic and atraumatic  Eyes:      Extraocular Movements: Extraocular movements intact  Pupils: Pupils are equal, round, and reactive to light  Neck:      Comments: VISTA collar in place  Cardiovascular:      Rate and Rhythm: Normal rate  Pulmonary:      Effort: Pulmonary effort is normal  No respiratory distress  Abdominal:      Palpations: Abdomen is soft  Musculoskeletal:         General: Normal range of motion  Skin:     General: Skin is warm and dry  Neurological:      Mental Status: She is alert  Cranial Nerves: Cranial nerves 2-12 are intact  Psychiatric:         Speech: Speech normal          Behavior: Behavior normal          Thought Content: Thought content normal          Judgment: Judgment normal          Neurologic Exam     Mental Status   Follows 2 step commands  Attention: normal  Concentration: normal    Speech: speech is normal   Level of consciousness: alert  Knowledge: good  Able to repeat  Normal comprehension  Cranial Nerves   Cranial nerves II through XII intact  CN III, IV, VI   Pupils are equal, round, and reactive to light  Motor Exam   Muscle bulk: normal  Overall muscle tone: normal  1/5 right D/B/T  4+/5 right WF/WE/G/IO     Sensory Exam   Decreased sensation entire RUE including hand     Gait, Coordination, and Reflexes     Tremor   Resting tremor: absent    Reflexes   Right Quigley: absent  Left Quigley: absent        MEDICAL DECISION MAKING    Imaging Studies:     CTA head and neck with and without contrast    Result Date: 12/4/2022  Narrative: CTA NECK AND BRAIN WITH AND WITHOUT CONTRAST INDICATION: R sided weakness, fall COMPARISON:   Head CT 10/5/2019 TECHNIQUE:  Routine CT imaging of the Brain without contrast   Post contrast imaging was performed after administration of iodinated contrast through the neck and brain  Post contrast axial 0 625 mm images timed to opacify the arterial system  3D rendering was performed on an independent workstation  MIP reconstructions performed  Coronal reconstructions were performed of the noncontrast portion of the brain  Radiation dose length product (DLP) for this visit:  2175 mGy-cm     This examination, like all CT scans performed in the [facility]  [address], was performed utilizing techniques to minimize radiation dose exposure, including the use of iterative reconstruction and automated exposure control  IV Contrast:  100 mL of iohexol (OMNIPAQUE)  IMAGE QUALITY:   Diagnostic FINDINGS: NONCONTRAST BRAIN PARENCHYMA: Stable chronic right cerebellar infarct  No specific patchy and confluent decreased attenuation involving periventricular and subcortical white matter, most compatible with moderate to advanced microangiopathic change  No intracranial mass, mass effect or midline shift  No CT signs of acute infarction  No acute parenchymal hemorrhage  VENTRICLES AND EXTRA-AXIAL SPACES:  Normal for the patient's age  VISUALIZED ORBITS AND PARANASAL SINUSES:  Normal visualized orbits  Normal visualized paranasal sinuses  CERVICAL VASCULATURE AORTIC ARCH AND GREAT VESSELS: Atherosclerotic calcification of aortic arch  Great vessel origins are patent without significant stenosis  RIGHT VERTEBRAL ARTERY CERVICAL SEGMENT: Likely chronic threadlike flow within V1 segment and occluded V2 segment  There is small caliber flow reconstitution within the V3 segment  LEFT VERTEBRAL ARTERY CERVICAL SEGMENT: Moderate atherosclerotic narrowing at the origin  The vessel is patent throughout the neck without high-grade stenosis  RIGHT EXTRACRANIAL CAROTID SEGMENT: Somewhat limited assessment due to distorted image quality from swallow motion artifact  Partially calcified atherosclerotic plaque at the bifurcation and proximal internal carotid artery  No hemodynamically significant stenosis of cervical ICA by NASCET criteria ( less than 50%)  Anatomic variant retropharyngeal course of proximal to mid common carotid artery  LEFT EXTRACRANIAL CAROTID SEGMENT: Somewhat limited assessment due to distorted image quality from swallow motion artifact  Mild atherosclerotic disease at the bifurcation and proximal internal carotid artery   No hemodynamically significant stenosis of cervical ICA by NASCET criteria  INTRACRANIAL VASCULATURE INTERNAL CAROTID ARTERIES: Mild atherosclerotic disease of cavernous and supraclinoid segments of bilateral internal carotid arteries without critical stenosis  Normal ophthalmic artery origins  ANTERIOR CIRCULATION:  Normal A1 segments  Bilateral anterior cerebral arteries are unremarkable  Normal anterior comminuting artery  MIDDLE CEREBRAL ARTERY CIRCULATION:  Bilateral M1 segments and major M2 branches are patent without high-grade stenosis  DISTAL VERTEBRAL ARTERIES:  Multifocal atherosclerotic narrowing of bilateral intracranial vertebral arteries without critical stenosis  PICA origins are patent  BASILAR ARTERY:  Basilar artery is unremarkable  Normal superior cerebellar arteries  POSTERIOR CEREBRAL ARTERIES: Persistent fetal origin of right posterior cerebral arteries  Bilateral posterior cerebral arteries are patent without high-grade stenosis  Normal posterior communicating arteries  DURAL VENOUS SINUSES:  Unremarkable  NON VASCULAR ANATOMY BONY STRUCTURES: Acute fracture involving bilateral lamina and spinous process of C4  There is distraction fracture and anterior widening of previously fused C4 and C5 vertebral bodies  SOFT TISSUES OF THE NECK:  Unremarkable  THORACIC INLET:  Unremarkable  Impression: 1  No acute intracranial CT abnormality  2   Acute fracture involving bilateral lamina and spinous process of C4  There is distraction fracture and anterior widening of previously fused C4 and C5 vertebral bodies with sclerotic and nonsclerotic borders  See separate report of the cervical spine CT  3   Stable chronic right cerebellar infarct  Nonspecific white matter change suggesting microangiopathy  4   Age indeterminate likely chronic threadlike flow within V1 segment of right vertebral artery with occluded V2 segment  Patent small caliber V3 and V4 segments   5   Patent major vasculature of the [place] without high-grade stenosis  No aneurysm  6   No hemodynamically significant stenosis of cervical carotid and left vertebral arteries  7   Anatomic variant retropharyngeal course of proximal to mid right common carotid artery I personally discussed this study with Sue Escalante on 12/4/2022 at 1:47 PM   Workstation performed: [id number]     XR chest 1 view portable    Result Date: 12/4/2022  Narrative: CHEST INDICATION:   SOB  Extensive atherosclerosis of aortic knob  COMPARISON:  Chest radiograph September 1, 2020  EXAM PERFORMED/VIEWS:  XR CHEST PORTABLE FINDINGS: Cardiomediastinal silhouette appears unremarkable  The lungs are clear  No pneumothorax or pleural effusion  Osseous structures appear within normal limits for patient age  Impression: No acute cardiopulmonary disease  Workstation performed: [id number]     XR spine cervical 2 or 3 vw injury    Result Date: 12/7/2022  Narrative: CERVICAL SPINE INDICATION:   C4 lamina fracture  COMPARISON:  CT 12/4/2022  VIEWS:  XR SPINE CERVICAL 2 OR 3 VW INJURY FINDINGS: C6 and C7 are obscured in the lateral view by the patient's shoulders  New moderate anterior subluxation measuring approximately 9 mm at C3-4  Severe multilevel degenerative changes again seen  Normal prevertebral soft tissues  Clear lung apices  Impression: Interval development of 9 mm anterior subluxation at C3-4 since 12/4/2022  I personally discussed this study with Dr Andrew Cruz on 12/7/2022 at 7:55 AM   Workstation performed: [id number]     MRI cervical spine wo contrast    Result Date: 12/4/2022  Narrative: MRI CERVICAL SPINE WITHOUT CONTRAST INDICATION: Cervical spine fracture with weakness  COMPARISON:  CT scan performed earlier today  MRI dated 7/10/2013  TECHNIQUE:  Multiplanar, multisequence imaging of the cervical spine was performed     IMAGE QUALITY:  Unfortunately this examination is markedly degraded by extensive patient motion throughout   FINDINGS: ALIGNMENT:  Straightening of the normal cervical lordosis  There is slight anterior subluxation of C3 upon C4  At C4-5 the disc space demonstrates slight widening anteriorly and there is now fluid signal seen within the disc space  These findings are new compared to the prior MRI from 2013  Fluid signal extends into the posterior aspect of the disc space as well as inferiorly, anterior to the CT 5 vertebral body  Stable anterior subluxation of C7 upon T1  MARROW SIGNAL:  Limited evaluation of marrow signal   Multilevel degenerative marrow change is present  No definite acute marrow edema  CERVICAL AND VISUALIZED THORACIC CORD:  Cord is limited but appears diffusely normal in signal with no definitive evidence of acute cord injury  There is abnormal signal within the posterior epidural space at the level of C4 vertebral body corresponding to faintly increased density seen on recent CT scan, suspicious for focal posterior epidural hematoma  These results in effacement of the ventral and dorsal CSF space with moderate canal stenosis  See series 8 image 97 and series 4 image 8  PREVERTEBRAL AND PARASPINAL SOFT TISSUES:  Minor edema present within the prevertebral space anterior to the C2-C5 vertebral bodies  There is a small amount of edema present posterior to the ring of C1 below the occiput and between the ring of C1 and posterior elements of C2 on inversion recovery imaging  Suspected disruption of the anterior longitudinal ligament at the level of the C4-5 disc space  VISUALIZED POSTERIOR FOSSA:  Hyperintense signal on T2 and inversion recovery imaging within the angel luis consistent with chronic microangiopathic change  There is encephalomalacia within the inferior cerebellum on the right consistent with old infarction  CERVICAL DISC SPACES: C2-C3:  Right greater than left facet hypertrophic degenerative change  Normal disc height  There is no canal stenosis  Mild to moderate right foraminal narrowing   C3-C4:  There is significant loss of disc height  Anterior subluxation of C3 upon C4  Minor endplate hypertrophic degenerative change  Moderate right facet hypertrophic change with moderate right foraminal narrowing  As described above there is moderate canal stenosis at the level of the C3-4 disc space extending inferiorly to the level of C4-5 likely related to posterior epidural hematoma  C4-C5:  As described above the disc space is splayed anteriorly and there is fracture of the posterior elements on the right including the pedicle and facets  Moderate canal stenosis as a result of the posterior epidural hematoma described above  C5-C6:  C5-6 endplates are fused  No canal stenosis  No clear foraminal narrowing  C6-C7:  Disc desiccation and loss of disc height  Annular bulging with mild endplate degenerative change  No significant canal stenosis or foraminal narrowing  C7-T1:  Anterior subluxation of C7 upon T1  Mild annular bulging  Mild posterior element hypertrophic change  There is mild canal stenosis without foraminal narrowing  UPPER THORACIC DISC SPACES:  Multilevel mild upper thoracic degenerative change with mild canal stenosis and foraminal narrowing  OTHER FINDINGS:  None  Impression: Unfortunately this examination is significantly degraded by patient motion  See separate CT scan for fractures of the posterior elements of C4 on the right  There is widening of the disc space at the C4-5 level anteriorly in the disc space is now distended with fluid  There is abnormal signal within the posterior epidural space beginning at the C3-4 level and extending inferiorly to the C4-5 level resulting in moderate canal stenosis  This is most suggestive of posterior epidural hematoma  Suspected partial disruption of the anterior longitudinal ligament at the level of the C4-5 disc space, incompletely evaluated   No definite abnormal cord signal identified on this motion degraded exam  Workstation performed: [id number]     CT recon only cervical spine (No Charge)    Result Date: 12/4/2022  Narrative: CT CERVICAL SPINE INDICATION:   neck pain, fall  COMPARISON:  MR cervical spine 7/10/2013 TECHNIQUE: Axial CT examination of the cervical spine was obtained utilizing reconstructed images from CT of the neck performed the same day  Images were reformatted in the sagittal and coronal planes  This examination, like all CT scans performed in the [facility], was performed utilizing techniques to minimize radiation dose exposure, including the use of iterative reconstruction and automated exposure control  Radiation dose for this study is included in the CTA report  FINDINGS: ALIGNMENT AND VERTEBRAL BODIES: There is acute fracture involving bilateral lamina and spinous process of C4  There is distraction fracture and widening of previously fused C4 and C5 vertebral bodies with sclerotic and nonsclerotic borders  Spontaneous interbody ankylosis of C5 and C6  Stable grade 1 anterolisthesis of C3 on C4  Stable grade 1 anterolisthesis of C7 on T1  DEGENERATIVE CHANGES: Significant multilevel disc space narrowing with endplate degenerative change  Multilevel disc bulges and canal stenosis most pronounced at C3-4  Assessment of the canal for epidural hematoma is limited due to presence of IV contrast  PREVERTEBRAL AND PARASPINAL SOFT TISSUES: Limited assessment due to motion artifact  Suspected prevertebral edema at level C4-5  Impression: 1  Acute fracture involving bilateral lamina and spinous process of C4  There is distraction fracture and anterior widening of previously fused C4 and C5 vertebral bodies with sclerotic and nonsclerotic borders  Canal assessment for epidural hematoma is limited due to presence of IV contrast   Recommend cervical spine MRI to further assess the findings and evaluate for ligamentous injury  2   Multilevel degenerative change and canal narrowing most pronounced at C3-4    I personally discussed this study with Marinolizz Soliman on 12/4/2022 at 1:47 PM  Workstation performed: [id number]     XR outside images    Result Date: 12/22/2022  Narrative: 1 2 826 0 1 [id number]  0 [id number]    XR outside images    Result Date: 12/22/2022  Narrative: 1 2 826 0 1 [id number]  7 [id number]      I have personally reviewed pertinent reports     and I have personally reviewed pertinent films in PACS

## 2022-12-22 NOTE — ASSESSMENT & PLAN NOTE
C4 bilateral laminar and SP fracture with widening of C4-5 VB s/p fall 12/2/22  · MRI obtained and c/w epidural hematoma  · Given patient's age and co-morbidities, conservative treatment was recommended for this likely unstable fracture and family was in agreement  · Patient developed worsening of RUE weakness after hospital discharge (unclear if before or after fall at rehab)  · On exam, 1/5 right delt/bi/tri  4+/5 right WF/WE//IO  Decreased sensation entire RUE  No pain with passive ROM  VISTA collar in place  Imaging:  · XR cervical spine 12/8/22 and 12/21/22: Done at outside facility  Unable to appreciate fracture, alignment appears stable but very difficult to visualize given poor imaging quality      Plan:  · Continue to closely monitor neuro exam and call with any changes  · I had a lengthy discussion with the patient and her daughter today regarding further imaging for worsening right upper extremity weakness  I would not recommend a repeat MRI at this point as it would not   We could consider a CT cervical spine to evaluate the status of the fracture and alignment given that this was an unstable fracture with worsening neurodeficits  However, they are still adamantly against any surgical intervention  As this will not , they prefer to hold off on any significant imaging  They are in agreement with a 1 month follow-up x-ray  · Continue Vista collar at all times  They understand that she will likely be in her collar for life    · Okay to work with gentle PT/OT for strengthening and ambulation assistance  · Pain control per facility  · Follow-up in 1 month with repeat x-ray

## 2022-12-22 NOTE — PATIENT INSTRUCTIONS
Continue to wear collar at all times  Ok to work with therapy but please be gentle as this is not a stable fracture - focus on ambulation and RUE movement    Follow up in 1 month after repeat XR

## 2023-01-18 ENCOUNTER — APPOINTMENT (OUTPATIENT)
Dept: RADIOLOGY | Facility: MEDICAL CENTER | Age: 88
End: 2023-01-18

## 2023-01-18 DIAGNOSIS — S12.300A CLOSED DISPLACED FRACTURE OF FOURTH CERVICAL VERTEBRA (HCC): ICD-10-CM

## 2023-01-23 ENCOUNTER — TELEPHONE (OUTPATIENT)
Dept: FAMILY MEDICINE CLINIC | Facility: MEDICAL CENTER | Age: 88
End: 2023-01-23

## 2023-01-23 ENCOUNTER — OFFICE VISIT (OUTPATIENT)
Dept: NEUROSURGERY | Facility: CLINIC | Age: 88
End: 2023-01-23

## 2023-01-23 VITALS
SYSTOLIC BLOOD PRESSURE: 152 MMHG | DIASTOLIC BLOOD PRESSURE: 74 MMHG | BODY MASS INDEX: 24.89 KG/M2 | HEIGHT: 59 IN | RESPIRATION RATE: 18 BRPM | HEART RATE: 84 BPM | TEMPERATURE: 98 F | OXYGEN SATURATION: 97 %

## 2023-01-23 DIAGNOSIS — S12.300A C4 CERVICAL FRACTURE (HCC): Primary | ICD-10-CM

## 2023-01-23 RX ORDER — AMLODIPINE BESYLATE AND BENAZEPRIL HYDROCHLORIDE 10; 20 MG/1; MG/1
1 CAPSULE ORAL DAILY
COMMUNITY
Start: 2023-01-06 | End: 2023-01-26 | Stop reason: SDUPTHER

## 2023-01-23 NOTE — PROGRESS NOTES
Neurosurgery Office Note  Kenyon Galicia 80 y o  female MRN: [id number]      Assessment/Plan      Patient is a 80 yrs old pleasant woman here today with her daughter for 7 weeks follow up of Bilateral Laminar fractures of C4 and distraction fracture with widening of C4-C5 disk space  Patient wearing [place] collar, had follow up Upright Cx spine x-rays which demonstrates stable C4 #, angulation and kyphosis  Patient reports no pain, takes Tylenol BID, improved right UE weakness and paresthesia  She is able to hold spoon/forks, cups and denies dropping objects or fine motor dysfunction  She is doing PT, has baseline gait issues , but able to walk with a walker  Exam-A&OX3  Jennifer  Strength 5/5 bilaterally, except slight right shoulder abduction weakness  Sensation to LT intact throughout  DTR 2+ without clonus bilaterally  Non tender on palpation of cervical spine  Hx, PEx,and images reviewed with the patient  Mx plan discussed  Given severity of cervical injury with neurological symptoms, I would advise continue wearing [place] collar for another 4 weeks  F/U upright Cx spine x-rays ordered  Advised to continue wearing [place] collar as instructed and [place] collar for shower  Extra collar Pad was given  Fall precautions , avoid lifting heavy objects, excessive flex/extension of cervical spine  All questions and concerns were answered to patient's satisfaction  Patient expressed her understandings and agreed with the plan  Plan:    1  F/U upright Cx spine x-rays in [employer] collar  2  Continue Tylenol for pain  3  Continue PT  4  Fall precaution, avoid lifting heavy objects, excessive bending or twisting  5   Follow up in 4 weeks  6  Call with questions or concerns  I spent 30 minutes with the patient today in which >50% of the time was spent counseling/coordination of care regarding diagnosis, imaging review, symptoms and treatment plan         Chief Complaint   Patient presents with   • Follow-up     C4-5 FX-- S/P FALL 12/2/22       C/C: " 7 weeks follow up for traumatic C4 fracture"    History of Present Illness     Patient is a 80 yrs old pleasant woman with PMHx of PAD, HTN, OP, Anxiety, Depression, OA, SNHL, pulmonary HTN  Cognitive deficit, spasmodic torticollis, here today with her daughter for traumatic bilateral C4 Laminar fractures,and distractive fracture of C4-5 space causing widening C4-5 disk space  Patient had follow up upright Cx spine x-rays which shows stable C4 , severe OP and DJD, with kyphosis and angulation  Patient wearing [place] collar  She reports improvement with her right UE weakness , able to hold objects, denies dropping, fine motor dysfunction, no radicular pain, numbness or paresthesia  She has baseline gait instability , uses walker for ambulation  She is doing PT  Patient denies fever, chills, rigors, cough or chest pain  REVIEW OF SYSTEMS  ROS personally reviewed and updated   Review of Systems   Constitutional: Negative  HENT: Negative  Eyes: Negative  Respiratory: Negative  Cardiovascular: Negative  Gastrointestinal: Negative  Endocrine: Negative  Genitourinary: Negative  Musculoskeletal: Negative  Skin: Negative  Allergic/Immunologic: Negative  Neurological: Negative  Hematological: Negative  Psychiatric/Behavioral: Negative  All other systems reviewed and are negative          Meds/Allergies     Current Outpatient Medications   Medication Sig Dispense Refill   • amLODIPine-benazepril (LOTREL) 10-20 MG per capsule Take 1 capsule by mouth daily     • bisacodyl (DULCOLAX) 10 mg suppository Insert 10 mg into the rectum daily     • calcium citrate-vitamin D (CITRACAL+D) 315-200 MG-UNIT per tablet Take 1 tablet by mouth daily     • conjugated estrogens (PREMARIN) vaginal cream Insert into the vagina Ose as directed     • magnesium hydroxide (MILK OF MAGNESIA) 400 mg/5 mL oral suspension Take by mouth daily as needed for constipation     • Multiple Vitamins-Minerals (MULTIVITAL) tablet Take 1 tablet by mouth daily     • Omega-3 Fatty Acids (FISH OIL) 645 MG CAPS Take 1 tablet by mouth daily     • acetaminophen (TYLENOL) 325 mg tablet Take 3 tablets (975 mg total) by mouth every 8 (eight) hours (Patient taking differently: Take 500 mg by mouth 2 (two) times a day)  0   • amLODIPine-benazepril (LOTREL 5-20) 5-20 MG per capsule TAKE 1 CAPSULE BY MOUTH EVERY DAY (Patient not taking: Reported on 1/23/2023) 90 capsule 1     Current Facility-Administered Medications   Medication Dose Route Frequency Provider Last Rate Last Admin   • Botulinum Toxin Type A SOLR 200 Units  200 Units Injection Q3 Months Pradip Hardin MD   200 Units at 05/03/21 1619       No Known Allergies    PAST HISTORY    Past Medical History:   Diagnosis Date   • Anxiety 2010   • Bladder infection 09/2019   • Breast lump in female    • HL (hearing loss) 2010   • Hypertension 2010   • Osteopenia    • Ovarian cyst        Past Surgical History:   Procedure Laterality Date   • BLADDER SURGERY     • BREAST BIOPSY     • CATARACT EXTRACTION Bilateral 01/01/2015    , Right Eye-2017, Left eye   • ENDOMETRIAL BIOPSY      by Suction   • HYSTERECTOMY         Social History     Tobacco Use   • Smoking status: Never   • Smokeless tobacco: Never   Substance Use Topics   • Alcohol use: No   • Drug use: No       Family History   Problem Relation Age of Onset   • Heart disease Mother    • Heart disease Father    • Hypertension Family    • Osteoporosis Family          Above history personally reviewed  EXAM    Vitals:Blood pressure 152/74, pulse 84, temperature 98 °F (36 7 °C), temperature source Temporal, resp  rate 18, height 4' 11" (1 499 m), SpO2 97 %, not currently breastfeeding  ,Body mass index is 24 89 kg/m²  Physical Exam  Constitutional:       Appearance: Normal appearance  HENT:      Head: Normocephalic and atraumatic     Eyes:      Extraocular Movements: Extraocular movements intact  Pupils: Pupils are equal, round, and reactive to light  Neck:      Comments: Non tender on palpation of posterior cervical spine  Cardiovascular:      Rate and Rhythm: Normal rate  Pulmonary:      Effort: Pulmonary effort is normal    Skin:     General: Skin is warm  Neurological:      General: No focal deficit present  Mental Status: She is alert  She is disoriented  GCS: GCS eye subscore is 4  GCS verbal subscore is 4  GCS motor subscore is 6  Cranial Nerves: Cranial nerves 2-12 are intact  Sensory: Sensation is intact  Motor: Weakness present  Deep Tendon Reflexes: Reflexes are normal and symmetric  Reflex Scores:       Tricep reflexes are 2+ on the right side and 2+ on the left side  Bicep reflexes are 2+ on the right side and 2+ on the left side  Brachioradialis reflexes are 2+ on the right side and 2+ on the left side  Patellar reflexes are 2+ on the right side and 2+ on the left side  Achilles reflexes are 2+ on the right side and 2+ on the left side  Psychiatric:         Speech: Speech normal          Neurologic Exam     Mental Status   Speech: speech is normal   Level of consciousness: alert    Cranial Nerves   Cranial nerves II through XII intact  CN III, IV, VI   Pupils are equal, round, and reactive to light    Nystagmus: none     CN XI   CN XI normal      Motor Exam   Muscle bulk: normal  Overall muscle tone: normal  Right arm tone: normal  Left arm tone: normal  Right arm pronator drift: absent  Left arm pronator drift: absent  Right leg tone: normal  Left leg tone: normal    Sensory Exam   Light touch normal      Gait, Coordination, and Reflexes     Reflexes   Right brachioradialis: 2+  Left brachioradialis: 2+  Right biceps: 2+  Left biceps: 2+  Right triceps: 2+  Left triceps: 2+  Right patellar: 2+  Left patellar: 2+  Right achilles: 2+  Left achilles: 2+  Right : 2+  Left : 2+  Right Quigley: absent  Left Quigley: absent  Right ankle clonus: absent  Left pendular knee jerk: absent        MEDICAL DECISION MAKING    Imaging Studies:     XR spine cervical 2 or 3 vw injury    Result Date: 1/19/2023  Narrative: CERVICAL SPINE INDICATION:   S12 300A: Unspecified displaced fracture of fourth cervical vertebra, initial encounter for closed fracture  COMPARISON:  12/6/2022  CT 12/4/2022 5/13/2013  VIEWS:  XR SPINE CERVICAL 2 OR 3 VW INJURY FINDINGS: Relative to 12/6/2022, previously fused C4-C5 unchanged marked  compression fractures, 5 mm anterior C4 subluxation and anterior angulation of the cervical spine at this level  Posterior C4 CT reported fractures not appreciated radiographically  Cervical collar remains  C2-C3, C3-C4, C5-C6 marked degenerative disc changes again noted  Normal prevertebral soft tissues  Clear lung apices       Impression: Unchanged complex C4, C5 fracture, subluxation and angulation Workstation performed: [id number]       I have personally reviewed pertinent reports   , I have personally reviewed pertinent films in PACS and I have personally reviewed pertinent films in PACS with a Radiologist

## 2023-01-23 NOTE — TELEPHONE ENCOUNTER
Patti Farrar from [facility] AT [facility] said pt developed blister on right heel  She is using skin prep and keeping it elevated  Complete pressure relief  Pt has appt on Thurs  An FYI so you will examine it then

## 2023-01-26 ENCOUNTER — OFFICE VISIT (OUTPATIENT)
Dept: FAMILY MEDICINE CLINIC | Facility: MEDICAL CENTER | Age: 88
End: 2023-01-26

## 2023-01-26 VITALS — HEART RATE: 101 BPM | SYSTOLIC BLOOD PRESSURE: 140 MMHG | OXYGEN SATURATION: 97 % | DIASTOLIC BLOOD PRESSURE: 78 MMHG

## 2023-01-26 DIAGNOSIS — I10 ESSENTIAL HYPERTENSION: ICD-10-CM

## 2023-01-26 DIAGNOSIS — Z09 HOSPITAL DISCHARGE FOLLOW-UP: Primary | ICD-10-CM

## 2023-01-26 RX ORDER — AMLODIPINE BESYLATE AND BENAZEPRIL HYDROCHLORIDE 10; 20 MG/1; MG/1
1 CAPSULE ORAL DAILY
Qty: 90 CAPSULE | Refills: 1 | Status: SHIPPED | OUTPATIENT
Start: 2023-01-26

## 2023-01-26 NOTE — PROGRESS NOTES
[address] [facility] - Clinic Note  Keny Panda, [place], 23     Mary Torres MRN: [id number] : 1931 Age: 80 y o  Assessment/Plan     1  Hospital discharge follow-up    -Patient presents today for follow-up after discharge from short-term rehab  -Patient continues to do well today, she is participating with physical therapy at home twice a week, visiting nurses also once a week tending to heel  -Next follow-up with neurosurgery on 2023, repeat imaging prior to that appointment  -Orpha  collar in place  -Daughter request refill of antihypertensive, dose was adjusted during STR stay  - amLODIPine-benazepril (LOTREL) 10-20 MG per capsule; Take 1 capsule by mouth daily  Dispense: 90 capsule; Refill: [address] acknowledged understanding of treatment plan, all questions answered  Subjective      Mary Torres is a 80 y o  female who presents for follow-up after nursing home discharge  She was hospitalized at [address] [place] [address] from 2022 to 8107 with complications after a fall  She was discharged to short-term rehab at [facility] after  Was discharged from nursing home on   Daughter reports that initially patient was there for 2 weeks but, extended 2 weeks more as she was gaining strength and make more progress  Currently has physical therapy at home twice a week and visiting nurse once a week  Patient did have follow-up with Neurosurgery on 2023  Daughter offers that she did see ENT recently for bilateral cerumen disimpaction  She seems to still be struggling with hearing, she plans on following up to check hearing aids  Patient with good appetite  Hospital course per discharge summary:  Patient is a 80year old female present for evaluation status post fall  Noted history of mild dementia and hypertension    1 blood pressure medication mated to the ICU secondary to concern over central cord syndrome as well as concern over CT of the cervical spine which revealed a significant cervical spine fracture  Neurosurgery did not recommend any acute intervention  Patient was not an operative candidate based on her fracture pattern as well as age/comorbidities per neurosurgery  She was treated conservatively with a brace  Repeat/upright imaging revealed slightly worsening of the cervical spine fracture  Neurosurgery again stated that the patient did not require any acute intervention and can be discharged with outpatient follow-up in 2 weeks  Patient family was notified of the findings  CTA did not reveal any vascular injury and the patient did not require any anticoagulant/antiplatelet medication  She would be discharged to rehab on 10/7/2022  Follow-up outpatient she would follow-up outpatient with neurosurgery in 2 weeks  She would also follow-up outpatient with PCP  Discharge paperwork emailed to patient family at request       The following portions of the patient's history were reviewed and updated as appropriate: allergies, current medications, past family history, past medical history, past social history, past surgical history and problem list      Past Medical History:   Diagnosis Date   • Anxiety 2010   • Bladder infection 09/2019   • Breast lump in female    • HL (hearing loss) 2010   • Hypertension 2010   • Osteopenia    • Ovarian cyst        No Known Allergies    Past Surgical History:   Procedure Laterality Date   • BLADDER SURGERY     • BREAST BIOPSY     • CATARACT EXTRACTION Bilateral 01/01/2015    , Right Eye-2017, Left eye   • ENDOMETRIAL BIOPSY      by Suction   • HYSTERECTOMY         Family History   Problem Relation Age of Onset   • Heart disease Mother    • Heart disease Father    • Hypertension Family    • Osteoporosis Family        Social History     Socioeconomic History   • Marital status:       Spouse name: None   • Number of children: None   • Years of education: None   • Highest education level: None   Occupational History   • None   Tobacco Use   • Smoking status: Never   • Smokeless tobacco: Never   Substance and Sexual Activity   • Alcohol use: No   • Drug use: No   • Sexual activity: Not Currently   Other Topics Concern   • None   Social History Narrative    Denied consumption of coffee    Active daily tea consumption    Denied ingesting cola containing caffeine         Social Determinants of Health     Financial Resource Strain: Low Risk    • Difficulty of Paying Living Expenses: Not very hard   Food Insecurity: Not on file   Transportation Needs: No Transportation Needs   • Lack of Transportation (Medical): No   • Lack of Transportation (Non-Medical):  No   Physical Activity: Not on file   Stress: Not on file   Social Connections: Not on file   Intimate Partner Violence: Not on file   Housing Stability: Not on file       Current Outpatient Medications   Medication Sig Dispense Refill   • acetaminophen (TYLENOL) 325 mg tablet Take 3 tablets (975 mg total) by mouth every 8 (eight) hours  0   • amLODIPine-benazepril (LOTREL) 10-20 MG per capsule Take 1 capsule by mouth daily     • bisacodyl (DULCOLAX) 10 mg suppository Insert 10 mg into the rectum daily     • calcium citrate-vitamin D (CITRACAL+D) 315-200 MG-UNIT per tablet Take 1 tablet by mouth daily     • magnesium hydroxide (MILK OF MAGNESIA) 400 mg/5 mL oral suspension Take by mouth daily as needed for constipation     • Multiple Vitamins-Minerals (MULTIVITAL) tablet Take 1 tablet by mouth daily     • Omega-3 Fatty Acids (FISH OIL) 645 MG CAPS Take 1 tablet by mouth daily     • amLODIPine-benazepril (LOTREL 5-20) 5-20 MG per capsule TAKE 1 CAPSULE BY MOUTH EVERY DAY 90 capsule 1   • conjugated estrogens (PREMARIN) vaginal cream Insert into the vagina Ose as directed (Patient not taking: Reported on 1/26/2023)       Current Facility-Administered Medications   Medication Dose Route Frequency Provider Last Rate Last Admin   • Botulinum Toxin Type A SOLR 200 Units  200 Units Injection Q3 Months Mare Solitario MD   200 Units at 05/03/21 1619       Review of Systems     As noted in HPI    Objective      /78 (BP Location: Left arm, Patient Position: Sitting, Cuff Size: Adult)   Pulse 101   SpO2 97%     Physical Exam  Vitals reviewed  Constitutional:       General: She is not in acute distress  Appearance: She is not toxic-appearing  Interventions: Cervical collar in place  HENT:      Head: Normocephalic and atraumatic  Right Ear: Tympanic membrane, ear canal and external ear normal  There is no impacted cerumen  Left Ear: Tympanic membrane, ear canal and external ear normal  There is no impacted cerumen  Mouth/Throat:      Mouth: Mucous membranes are moist       Pharynx: Oropharynx is clear  Eyes:      Conjunctiva/sclera: Conjunctivae normal    Cardiovascular:      Rate and Rhythm: Normal rate and regular rhythm  Pulses: Normal pulses  Heart sounds: Normal heart sounds  Pulmonary:      Effort: Pulmonary effort is normal       Breath sounds: Normal breath sounds  Abdominal:      General: Abdomen is flat  Bowel sounds are normal       Palpations: Abdomen is soft  Tenderness: There is no abdominal tenderness  Musculoskeletal:      Comments: Right heel wound with localized erythema, no drainage    Skin:     General: Skin is warm and dry  Neurological:      Mental Status: She is alert  Psychiatric:         Behavior: Behavior normal              Some portions of this record may have been generated with voice recognition software  There may be translation, syntax, or grammatical errors  Occasional wrong word or "sound-a-like" substitutions may have occurred due to the inherent limitations of the voice recognition software  Read the chart carefully and recognize, using context, where substations may have occurred   If you have any questions, please contact the dictating provider for clarification or correction, as needed

## 2023-02-06 ENCOUNTER — PROCEDURE VISIT (OUTPATIENT)
Dept: NEUROLOGY | Facility: CLINIC | Age: 88
End: 2023-02-06

## 2023-02-06 VITALS — TEMPERATURE: 98.4 F | SYSTOLIC BLOOD PRESSURE: 134 MMHG | HEART RATE: 95 BPM | DIASTOLIC BLOOD PRESSURE: 74 MMHG

## 2023-02-06 DIAGNOSIS — G24.8 SEGMENTAL DYSTONIA: Chronic | ICD-10-CM

## 2023-02-06 DIAGNOSIS — G24.3 CERVICAL DYSTONIA: ICD-10-CM

## 2023-02-06 DIAGNOSIS — G24.5 BLEPHAROSPASM: Primary | ICD-10-CM

## 2023-02-06 NOTE — PROGRESS NOTES
Universal Protocol   Consent: Written consent obtained  Consent given by: patient        Chemodenervation     Date/Time 2/6/2023 2:08 PM     Performed by  Fadia Whitfield MD     Authorized by Thuy Hernandez MD        Pre-procedure details      Prepped With: Alcohol     Anesthesia  (see MAR for exact dosages): Anesthesia method:  None   Procedure details     Position:  Supine   Botox     Brand:  Botox    mL's of preservative free sterile saline:  2    Final Concentration per CC:  50 units    Needle gauge: 30G 1/2 inch  Procedures     Botox Procedures: blepharospasm      Indications: blepharospasm      Last date: 10/17/22  Post-procedure details      Chemodenervation:  Chronic migraine    Facial Nerve Location[de-identified]  Bilateral facial nerve    Patient tolerance of procedure: Tolerated well, no immediate complications   Comments               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      Overall good response to injections  Response lasts about 4 months with mild return of blinking and head turn       Exam: Mild blinking  Patient fell and fractured C4 in neck brace  Will be following up with neurosurgery in 2 weeks       Injections:   2 5 unit(s) in 1 injection(s) was injected into the right  muscle  2 5 unit(s) in 1 injection(s) was injected into the left  muscle     10 unit(s) in 4 (2 5 ) injection(s) was injected into the right orbicularis oculi (upper inner, upper and lower outer and lateral canthus)  10 unit(s) in 4 (x2 5) injection(s) was injected into the left orbicularis oculi     Injected: 25 units  Discarded: 75 units

## 2023-02-15 ENCOUNTER — APPOINTMENT (OUTPATIENT)
Dept: RADIOLOGY | Facility: MEDICAL CENTER | Age: 88
End: 2023-02-15

## 2023-02-15 DIAGNOSIS — S12.300A C4 CERVICAL FRACTURE (HCC): ICD-10-CM

## 2023-02-20 ENCOUNTER — OFFICE VISIT (OUTPATIENT)
Dept: NEUROSURGERY | Facility: CLINIC | Age: 88
End: 2023-02-20

## 2023-02-20 VITALS
BODY MASS INDEX: 24.89 KG/M2 | HEART RATE: 93 BPM | HEIGHT: 59 IN | TEMPERATURE: 98 F | RESPIRATION RATE: 18 BRPM | DIASTOLIC BLOOD PRESSURE: 84 MMHG | SYSTOLIC BLOOD PRESSURE: 122 MMHG

## 2023-02-20 DIAGNOSIS — S12.300A C4 CERVICAL FRACTURE (HCC): Primary | ICD-10-CM

## 2023-02-20 NOTE — PROGRESS NOTES
Neurosurgery Office Note  Jeanne Felix 80 y o  female MRN: [id number]      Assessment/Plan      Patient is a 80years old pleasant lady with history of traumatic bilateral L4 laminar fracture and splaying of C4-5 disc space  Here today with her daughter  for 11 weeks follow-up with upright cervical spine x-rays in brace  Hx of of fall steps about 11 weeks ago with  neck injury  Patient reports no neck pain  Patient has stiffness in the neck with limited range of motion in the setting of cervical dystonia  Denies taking any pain medication  Noticed improvement of right upper extremity weakness  no radicular pain, numbness or paresthesia  Gait instability uses walker for ambulation  No B/B dysfunction  11 th weeks F/U upright Cervical spine x-rays in brace shows stable L4 fracture, but the C4-5 disk space widening appears not clearly visible  Overall alignment is maintained, severe Multilevel DJD of cervical spine noted  Exam-Patient sitting in wheel chair; wearing [place] collar  Jennifer  Strength 5/5 and sensation to LT intact bilaterally  DTR 2+ without clonus bilaterally  Non tender on palpation of posterior C4 region, but there appears limited ROM of cervical spine  Hx, PEx, & images reviewed with the patient  Mx plan discussed  Patient on brace for 11 weeks, no neck pain, just rigidity with limited ROM in the setting of trauma as well ans underlying cervical dystonia  Non tender, follow up xrays stable C4 and anatomical cervical spine excepts severe DJD of Cx spine  I would recommend flex/Ext Cx spine xrays in 2 weeks, consider weaning off the brace  Fall precautions, avoid lifting heavy objects, excessive bending or twisting  All questions and concerns were answered to patient's satisfaction        Plan:  1  Flex/Ext Cx spine x-rays in 2 weeks  2  Consider weaning of brace  3  Fall precaution, avoid lifting heavy objects, excessive bending or twisting  4  F/U in 2 weeks    5  Call with questions or cocnerns          C/C: " 11 weeks follow u[p for traumatic  Bilateral Laminar fracture"    Chief Complaint   Patient presents with   • Follow-up       HISTORY    History of Present Illness     Patient is a 80years old pleasant lady with PMHx of mild hypertension, essential hypertension, spasmodic torticollis, sensory neural hearing loss, osteoporosis, osteoarthritis, anxiety, depression, peripheral arterial disease, nerve sheath tumor, and  traumatic bilateral L4 laminar fractures and splaying of C4-5 disc space  Here today with her daughter  for 11 weeks follow-up with upright cervical spine x-rays in brace  Hx  of fall of  steps about 11 weeks ago with  neck injury and right upper extremity weakness at that time  Patient reports feeling better with her right UE weakness and  neck pain  She is wearing [employer] cervical collar and doing physical therapy patient has stiffness in the neck with limited range of motion in the setting of cervical dystonia  Denies taking any pain medication  Noticed improvement of right upper extremity weakness  no radicular pain, numbness or paresthesia  Gait instability uses walker for ambulation  No B/B dysfunction  She denies history of fever, chills, rigors, cough or chest pain  Follow-up image findings as described in the assessment section above      REVIEW OF SYSTEMS  Review of system personally reviewed and updated  Review of Systems   Constitutional: Negative  HENT: Negative  Eyes: Negative  Respiratory: Negative  Cardiovascular: Negative  Gastrointestinal: Negative  Endocrine: Negative  Genitourinary: Negative  Musculoskeletal: Negative  4 WK F/U W/ XRAY CSPINE ( 2/15/23        s/p fall w neck stiffness   Skin: Negative  Allergic/Immunologic: Negative  Neurological: Negative  Hematological: Negative  Psychiatric/Behavioral: Negative           Mild dementia    All other systems reviewed and are negative  ROS obtained by MA  Reviewed  See HPI  Meds/Allergies     Current Outpatient Medications   Medication Sig Dispense Refill   • acetaminophen (TYLENOL) 325 mg tablet Take 3 tablets (975 mg total) by mouth every 8 (eight) hours  0   • amLODIPine-benazepril (LOTREL) 10-20 MG per capsule Take 1 capsule by mouth daily 90 capsule 1   • bisacodyl (DULCOLAX) 10 mg suppository Insert 10 mg into the rectum daily     • calcium citrate-vitamin D (CITRACAL+D) 315-200 MG-UNIT per tablet Take 1 tablet by mouth daily     • magnesium hydroxide (MILK OF MAGNESIA) 400 mg/5 mL oral suspension Take by mouth daily as needed for constipation     • Multiple Vitamins-Minerals (MULTIVITAL) tablet Take 1 tablet by mouth daily     • Omega-3 Fatty Acids (FISH OIL) 645 MG CAPS Take 1 tablet by mouth daily       Current Facility-Administered Medications   Medication Dose Route Frequency Provider Last Rate Last Admin   • Botulinum Toxin Type A SOLR 200 Units  200 Units Injection Q3 Months Ryan Hansen MD   200 Units at 05/03/21 1619       No Known Allergies    PAST HISTORY    Past Medical History:   Diagnosis Date   • Anxiety 2010   • Bladder infection 09/2019   • Breast lump in female    • HL (hearing loss) 2010   • Hypertension 2010   • Osteopenia    • Ovarian cyst        Past Surgical History:   Procedure Laterality Date   • BLADDER SURGERY     • BREAST BIOPSY     • CATARACT EXTRACTION Bilateral 01/01/2015    , Right Eye-2017, Left eye   • ENDOMETRIAL BIOPSY      by Suction   • HYSTERECTOMY         Social History     Tobacco Use   • Smoking status: Never   • Smokeless tobacco: Never   Substance Use Topics   • Alcohol use: No   • Drug use: No       Family History   Problem Relation Age of Onset   • Heart disease Mother    • Heart disease Father    • Hypertension Family    • Osteoporosis Family          Above history personally reviewed  EXAM    Vitals:not currently breastfeeding  ,There is no height or weight on file to calculate BMI  Physical Exam  HENT:      Head: Normocephalic and atraumatic  Eyes:      Extraocular Movements: Extraocular movements intact  Cardiovascular:      Rate and Rhythm: Normal rate  Pulmonary:      Effort: Pulmonary effort is normal    Musculoskeletal:         General: No tenderness  Cervical back: Rigidity present  No tenderness  Neurological:      General: No focal deficit present  Mental Status: She is alert and oriented to person, place, and time  GCS: GCS eye subscore is 4  GCS verbal subscore is 5  GCS motor subscore is 6  Cranial Nerves: Cranial nerves 2-12 are intact  Sensory: Sensation is intact  Motor: Motor function is intact  Coordination: Finger-Nose-Finger Test normal       Deep Tendon Reflexes: Reflexes are normal and symmetric  Reflex Scores:       Tricep reflexes are 2+ on the right side and 2+ on the left side  Bicep reflexes are 2+ on the right side and 2+ on the left side  Brachioradialis reflexes are 2+ on the right side and 2+ on the left side  Patellar reflexes are 2+ on the right side and 2+ on the left side  Achilles reflexes are 2+ on the right side and 2+ on the left side  Psychiatric:         Speech: Speech normal          Neurologic Exam     Mental Status   Oriented to person, place, and time  Speech: speech is normal   Level of consciousness: alert    Cranial Nerves   Cranial nerves II through XII intact       CN III, IV, VI   Nystagmus: none     CN XI   CN XI normal      Motor Exam   Muscle bulk: normal  Overall muscle tone: normal  Right arm tone: normal  Left arm tone: normal  Right arm pronator drift: absent  Left arm pronator drift: absent  Right leg tone: normal  Left leg tone: normal    Sensory Exam   Light touch normal      Gait, Coordination, and Reflexes     Coordination   Finger to nose coordination: normal    Reflexes   Right brachioradialis: 2+  Left brachioradialis: 2+  Right biceps: 2+  Left biceps: 2+  Right triceps: 2+  Left triceps: 2+  Right patellar: 2+  Left patellar: 2+  Right achilles: 2+  Left achilles: 2+  Right : 2+  Left : 2+  Right Quigley: absent  Left Quigley: absent  Right ankle clonus: absent  Left pendular knee jerk: absent        MEDICAL DECISION MAKING    Imaging Studies:     XR spine cervical 2 or 3 vw injury    Result Date: 2/19/2023  Narrative: CERVICAL SPINE INDICATION:   S12 300A: Unspecified displaced fracture of fourth cervical vertebra, initial encounter for closed fracture  COMPARISON:  Cervical spine x-ray dated January 18, 2023  CTA head and neck dated December 4, 2022  VIEWS:  XR SPINE CERVICAL 2 OR 3 VW INJURY FINDINGS: Previous described fused C4-C5 unchanged posterior element fractures better visualized on the prior CT  There is stable grade 1 anterolisthesis of C3 on C4  Stable grade 1 anterolisthesis of C7 on T1   C2-C3, C3-C4, C5-C6 marked degenerative disc changes again noted  Normal prevertebral soft tissues  Clear lung apices  Impression: No significant interval change in C4 fracture, subluxation and angulation  Advanced multilevel spondylotic degenerative changes of the cervical spine   Workstation performed: [id number]       I have personally reviewed pertinent reports   , I have personally reviewed pertinent films in PACS and I have personally reviewed pertinent films in PACS with a Radiologist

## 2023-03-07 ENCOUNTER — APPOINTMENT (OUTPATIENT)
Dept: RADIOLOGY | Facility: MEDICAL CENTER | Age: 88
End: 2023-03-07

## 2023-03-09 ENCOUNTER — OFFICE VISIT (OUTPATIENT)
Dept: NEUROSURGERY | Facility: CLINIC | Age: 88
End: 2023-03-09

## 2023-03-09 VITALS
BODY MASS INDEX: 24.89 KG/M2 | DIASTOLIC BLOOD PRESSURE: 79 MMHG | HEIGHT: 59 IN | SYSTOLIC BLOOD PRESSURE: 134 MMHG | RESPIRATION RATE: 14 BRPM | TEMPERATURE: 98.2 F | HEART RATE: 87 BPM

## 2023-03-09 DIAGNOSIS — S12.300A C4 CERVICAL FRACTURE (HCC): Primary | ICD-10-CM

## 2023-03-09 RX ORDER — ACETAMINOPHEN 500 MG
1000 TABLET ORAL 2 TIMES DAILY PRN
COMMUNITY

## 2023-03-09 NOTE — PROGRESS NOTES
Neurosurgery Office Note  Jonathan Pruitt 80 y o  female MRN: [id number]      Assessment/Plan      Patient is a 80years old presented woman, here with her daughter for 13 weeks follow-up status post traumatic fracture of bilateral L4 laminar with widening C4-5 disc space  History of fall  She had flexion-extension cervical spine x-rays  Measurements with severe osteoporosis with degenerative changes in the stable L4-5 fracture without malalignment  Patient reports this no neck pain or radicular symptoms  She has baseline gait instability uses walker for ambulation  She did physical therapy  Started weaning the brace  Takes Tylenol as needed  Exam-patient is alert and communicates well brought in wheelchair, wearing [place] with a cervical collar  Strength is 5/5 bilaterally and sensation to right ankle throughout  Nontender on palpation of posterior cervical spine  Some range of motion on both flexion extension and lateral rotation without pain  Slight stiffness  Hx, PEx, and images reviewed with the patient and her daughter  Management plan discussed  Patient was treated with bracing and pain control, including PT/OT for the past 13 weeks,her flex/ext cervical spine x-rays stable L4 VB and without malalignment or listhesis  Exam non tender on palpation of posterior cervical spine  I would recommend wean the brace over the next two weeks, no further follow up imaging required  Advised daughter that falls, lifting heavy objects is a risk for refracture or injury to the neck  Referral to physical therapy for balance training (patient's with baseline gait issues), and range of motion exercises (for her neck stiffness) order placed  All patient's questions and concerns were answered to patient's satisfaction  Patient and daughter verbalized their understanding's and agreed with the plan  Plan:  1  Amb referral to PT  2  Advised to wean off the brace  3   Call with questions or concerns  4  Otherwise follow up on PRN basis    I have spent a total time of 45 minutes on 03/09/23 in caring for this patient including Diagnostic results, Prognosis, Risks and benefits of tx options, Instructions for management, Patient and family education, Importance of tx compliance, Risk factor reductions, Impressions, Counseling / Coordination of care, Documenting in the medical record, Reviewing / ordering tests, medicine, procedures   and Obtaining or reviewing history    C/C: " Here for 13 weeks follow-up of bilateral C4 laminar fracture"    Chief Complaint   Patient presents with   • Follow-up     2 WK FU W/ XRAY C/S done 3/7       HISTORY    All patient's medical histories were reviewed and updated as appropriate: Allergies, current medication list, past medical history, past surgical history, family history, social history, and current medical lists  Patient is here today for 13 weeks follow-up status post fall injury to the neck and found to have traumatic bilateral C4 laminar fracture with splaying of C4-5 disc space  She was wearing [place] cervical collar and doing physical therapy  Had flexion-extension cervical spine x-rays in the emergency demonstrate stable cervical spine without malalignment thrombosis flexion and extension views  Patient reports this improvement and denies any neck pain  She takes Tylenol for arthritis and also other back pain  Denies any radicular symptoms in the extremities, bowel or bladder issues  Has baseline gait problem, uses walker for ambulation  Denies any fever, chills, rigors, cough or chest pain  REVIEW OF SYSTEMS    Review of Systems   Constitutional: Negative  HENT: Negative  Eyes: Negative  Respiratory: Negative  Cardiovascular: Negative  Gastrointestinal: Negative  Endocrine: Negative      Genitourinary:        Leakage   Musculoskeletal: Positive for gait problem (uses walker for assistance, wheelchair only as a precaution)  Negative for back pain (right upper leg)  Still wearing collar    4 WK F/U W/ XRAY CSPINE (2/15/23)    s/p fall w neck stiffness   Skin: Negative  Allergic/Immunologic: Negative  Neurological: Negative for dizziness, seizures, syncope, weakness, numbness and headaches  Hematological: Negative  Psychiatric/Behavioral: Negative  Mild dementia        ROS obtained by MA  Reviewed  See HPI       Meds/Allergies     Current Outpatient Medications   Medication Sig Dispense Refill   • acetaminophen (TYLENOL) 500 mg tablet Take 1,000 mg by mouth 2 (two) times a day as needed for mild pain     • amLODIPine-benazepril (LOTREL) 10-20 MG per capsule Take 1 capsule by mouth daily 90 capsule 1   • calcium citrate-vitamin D (CITRACAL+D) 315-200 MG-UNIT per tablet Take 1 tablet by mouth daily     • magnesium hydroxide (MILK OF MAGNESIA) 400 mg/5 mL oral suspension Take by mouth daily as needed for constipation     • Multiple Vitamins-Minerals (MULTIVITAL) tablet Take 1 tablet by mouth daily     • Omega-3 Fatty Acids (FISH OIL) 645 MG CAPS Take 1 tablet by mouth daily     • acetaminophen (TYLENOL) 325 mg tablet Take 3 tablets (975 mg total) by mouth every 8 (eight) hours (Patient not taking: Reported on 3/9/2023)  0   • bisacodyl (DULCOLAX) 10 mg suppository Insert 10 mg into the rectum daily       Current Facility-Administered Medications   Medication Dose Route Frequency Provider Last Rate Last Admin   • Botulinum Toxin Type A SOLR 200 Units  200 Units Injection Q3 Months Cruz Calle MD   200 Units at 05/03/21 1619       No Known Allergies    PAST HISTORY    Past Medical History:   Diagnosis Date   • Anxiety 2010   • Bladder infection 09/2019   • Breast lump in female    • HL (hearing loss) 2010   • Hypertension 2010   • Osteopenia    • Ovarian cyst        Past Surgical History:   Procedure Laterality Date   • BLADDER SURGERY     • BREAST BIOPSY     • CATARACT EXTRACTION Bilateral 01/01/2015    , Right Eye-2017, Left eye   • ENDOMETRIAL BIOPSY      by Suction   • HYSTERECTOMY         Social History     Tobacco Use   • Smoking status: Never   • Smokeless tobacco: Never   Vaping Use   • Vaping Use: Never used   Substance Use Topics   • Alcohol use: No   • Drug use: No       Family History   Problem Relation Age of Onset   • Heart disease Mother    • Heart disease Father    • Hypertension Family    • Osteoporosis Family          Above history personally reviewed  EXAM    Vitals:Blood pressure 134/79, pulse 87, temperature 98 2 °F (36 8 °C), resp  rate 14, height 4' 11" (1 499 m), not currently breastfeeding  ,Body mass index is 24 89 kg/m²  Physical Exam  Constitutional:       Appearance: Normal appearance  Neck:      Comments: Slight stiffness noted  Cardiovascular:      Pulses: Normal pulses  Pulmonary:      Effort: Pulmonary effort is normal    Musculoskeletal:         General: Tenderness present  Cervical back: Normal range of motion  Neurological:      General: No focal deficit present  Mental Status: She is alert and oriented to person, place, and time  GCS: GCS eye subscore is 4  GCS verbal subscore is 5  GCS motor subscore is 6  Cranial Nerves: Cranial nerves 2-12 are intact  Sensory: Sensation is intact  Motor: Motor function is intact  Deep Tendon Reflexes: Reflexes are normal and symmetric  Reflex Scores:       Tricep reflexes are 2+ on the right side and 2+ on the left side  Bicep reflexes are 2+ on the right side and 2+ on the left side  Brachioradialis reflexes are 2+ on the right side and 2+ on the left side  Patellar reflexes are 2+ on the right side and 2+ on the left side  Achilles reflexes are 2+ on the right side and 2+ on the left side  Psychiatric:         Speech: Speech normal          Neurologic Exam     Mental Status   Oriented to person, place, and time     Speech: speech is normal   Level of consciousness: alert    Cranial Nerves   Cranial nerves II through XII intact  CN III, IV, VI   Nystagmus: none     CN XI   CN XI normal      Motor Exam   Muscle bulk: normal  Overall muscle tone: normal  Right arm tone: normal  Left arm tone: normal  Right arm pronator drift: absent  Left arm pronator drift: absent  Right leg tone: normal  Left leg tone: normal    Sensory Exam   Light touch normal      Gait, Coordination, and Reflexes     Reflexes   Right brachioradialis: 2+  Left brachioradialis: 2+  Right biceps: 2+  Left biceps: 2+  Right triceps: 2+  Left triceps: 2+  Right patellar: 2+  Left patellar: 2+  Right achilles: 2+  Left achilles: 2+  Right : 2+  Left : 2+  Right Quigley: absent  Left Quigley: absent  Right ankle clonus: absent  Left pendular knee jerk: absent        MEDICAL DECISION MAKING    Imaging Studies:     XR spine cervical 2 or 3 vw injury    Result Date: 2/19/2023  Narrative: CERVICAL SPINE INDICATION:   S12 300A: Unspecified displaced fracture of fourth cervical vertebra, initial encounter for closed fracture  COMPARISON:  Cervical spine x-ray dated January 18, 2023  CTA head and neck dated December 4, 2022  VIEWS:  XR SPINE CERVICAL 2 OR 3 VW INJURY FINDINGS: Previous described fused C4-C5 unchanged posterior element fractures better visualized on the prior CT  There is stable grade 1 anterolisthesis of C3 on C4  Stable grade 1 anterolisthesis of C7 on T1   C2-C3, C3-C4, C5-C6 marked degenerative disc changes again noted  Normal prevertebral soft tissues  Clear lung apices  Impression: No significant interval change in C4 fracture, subluxation and angulation  Advanced multilevel spondylotic degenerative changes of the cervical spine   Workstation performed: [id number]     XR spine cervical complete 6+ vw flex/ext/obl    Result Date: 3/8/2023  Narrative: CERVICAL SPINE INDICATION:   S12 300A: Unspecified displaced fracture of fourth cervical vertebra, initial encounter for closed fracture  COMPARISON:  2/15/2023  CT 12/4/2022 VIEWS:  XR SPINE CERVICAL COMPLETE 6+ VW FLEX /EXT /OBL FINDINGS: CT reported C4 fractures and C4-C5 fracture not well appreciated but interim healing suggested  C2-C3 slight, C4-C5 mild spondylolisthesis  Multilevel degenerative disc changes with C5-C6 fusion Bilateral upper and mid neuroforamina narrowing  No evidence of dynamic instability seen with flexion or extension  The prevertebral soft tissues are within normal limits  The lung apices are clear  Impression: Stable C4 and C4-C5 with interim healing suggested   Workstation performed: [id number]       I have personally reviewed pertinent reports   , I have personally reviewed pertinent films in PACS and I have personally reviewed pertinent films in PACS with a Radiologist

## 2023-05-11 ENCOUNTER — RA CDI HCC (OUTPATIENT)
Dept: OTHER | Facility: HOSPITAL | Age: 88
End: 2023-05-11

## 2023-05-15 ENCOUNTER — PROCEDURE VISIT (OUTPATIENT)
Dept: NEUROLOGY | Facility: CLINIC | Age: 88
End: 2023-05-15

## 2023-05-15 VITALS — DIASTOLIC BLOOD PRESSURE: 68 MMHG | TEMPERATURE: 97.7 F | HEART RATE: 84 BPM | SYSTOLIC BLOOD PRESSURE: 131 MMHG

## 2023-05-15 DIAGNOSIS — G24.3 CERVICAL DYSTONIA: Primary | ICD-10-CM

## 2023-05-15 DIAGNOSIS — G24.5 BLEPHAROSPASM: ICD-10-CM

## 2023-05-15 NOTE — PROGRESS NOTES
Universal Protocol   Consent: Written consent obtained  Consent given by: patient        Chemodenervation     Date/Time 5/15/2023 9:30 AM     Performed by  Patrice Paniagua MD     Authorized by Patrice Paniagua MD        Pre-procedure details      Prepped With: Alcohol     Anesthesia  (see MAR for exact dosages): Anesthesia method:  None   Procedure details     Position:  Upright   Botox     Brand:  Botox    Final Concentration per CC:  50 units (1cc per every 100 units)    Needle gauge: 30G 1/2 inch    Procedures     Botox Procedures: blepharospasm and cervical dystonia      Indications: blepharospasm and spasmodic torticollis      Date of last injection:  2/6/2023   Post-procedure details      Chemodenervation:  Head or face    Facial Nerve Location[de-identified]  Bilateral facial nerve    Patient tolerance of procedure: Tolerated well, no immediate complications   Comments      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      Overall good response to injections  Response lasts about 4 months with mild return of blinking  Last seen with neck brace following fall with fractured C4 so neck injects skipped  Brace off and wishes to resume injections  Exam: Mild blinking  Left SCM hypertrophy         Injections:   2 5 unit(s) in 1 injection(s) was injected into the right  muscle  2 5 unit(s) in 1 injection(s) was injected into the left  muscle  10 unit(s) in 4 (2 5 ) injection(s) was injected into the right orbicularis oculi (upper inner, upper and lower outer and lateral canthus)  10 unit(s) in 4 (x2 5) injection(s) was injected into the left orbicularis oculi      Injected: 25 units     Cervical dystonia injections:   25 unit(s) was injected into the left sternocleidomastoid muscle  --    50 (25 x2 side) unit(s) was injected into the right splenius capitus muscle  --    25 unit(s) was injected into the right levator scapulae muscle     Total injected 125 units  Discarded 75

## 2023-05-18 ENCOUNTER — OFFICE VISIT (OUTPATIENT)
Dept: FAMILY MEDICINE CLINIC | Facility: MEDICAL CENTER | Age: 88
End: 2023-05-18

## 2023-05-18 VITALS
SYSTOLIC BLOOD PRESSURE: 146 MMHG | DIASTOLIC BLOOD PRESSURE: 82 MMHG | BODY MASS INDEX: 25.25 KG/M2 | WEIGHT: 125 LBS | HEART RATE: 90 BPM | RESPIRATION RATE: 18 BRPM | TEMPERATURE: 97.3 F

## 2023-05-18 DIAGNOSIS — E78.2 MIXED HYPERLIPIDEMIA: Chronic | ICD-10-CM

## 2023-05-18 DIAGNOSIS — R52 PAIN AGGRAVATED BY WALKING: ICD-10-CM

## 2023-05-18 DIAGNOSIS — M79.604 RIGHT LEG PAIN: ICD-10-CM

## 2023-05-18 DIAGNOSIS — M16.10 HIP ARTHRITIS: ICD-10-CM

## 2023-05-18 DIAGNOSIS — I10 ESSENTIAL HYPERTENSION: Chronic | ICD-10-CM

## 2023-05-18 DIAGNOSIS — Z09 FOLLOW-UP EXAM, 3-6 MONTHS SINCE PREVIOUS EXAM: Primary | ICD-10-CM

## 2023-05-18 DIAGNOSIS — Z23 IMMUNIZATION DUE: ICD-10-CM

## 2023-05-18 NOTE — PROGRESS NOTES
[address]  Vik Card[place], [place], 23     Phu Hicks MRN: [id number] : 1931 Age: 80 y o  Assessment/Plan     1  Follow-up exam, 3-6 months since previous exam    -Patient presents for follow-up today, she will return in 6 months and sooner as needed    2  Immunization due    -Counseled about PCV 20, patient and daughter agreeable to receiving today  - Pneumococcal Conjugate Vaccine 20-valent (Pcv20)    3  Essential hypertension    -Stable with current management, continue amlodipine-benazepril 10-20 mg/caps  1 capsule daily  - Basic metabolic panel; Future    4  Mixed hyperlipidemia    -Continue fish oil supplement daily    5  Hip arthritis    - XR hip/pelv 2-3 vws right if performed; Future    6  Pain aggravated by walking    - XR hip/pelv 2-3 vws right if performed; Future    7  Right leg pain    - Ambulatory Referral to Physical Therapy; Future  - Diclofenac Sodium (VOLTAREN) 1 %; Apply 2 g topically 4 (four) times a day  Dispense: 350 g; Refill: 0    Caroleen M Yehuda Vincent acknowledged understanding of treatment plan, all questions answered  Subjective      Phu Hicks is a 80 y o  female who presents for 6-month follow-up  Patient presents with her daughter  Patient's only complaint today is pain at the lateral aspect of her right thigh that is particularly notable when walking  Daughter mentions she does not complain of pain while sitting  Patient ambulates with a walker at home      The following portions of the patient's history were reviewed and updated as appropriate: allergies, current medications, past family history, past medical history, past social history, past surgical history and problem list      Past Medical History:   Diagnosis Date   • Anxiety    • Bladder infection 2019   • Breast lump in female    • HL (hearing loss)    • Hypertension    • Osteopenia    • Ovarian cyst        No Known Allergies    Past Surgical History:   Procedure Laterality Date   • BLADDER SURGERY     • BREAST BIOPSY     • CATARACT EXTRACTION Bilateral 01/01/2015    , Right Eye-2017, Left eye   • ENDOMETRIAL BIOPSY      by Suction   • HYSTERECTOMY         Family History   Problem Relation Age of Onset   • Heart disease Mother    • Heart disease Father    • Hypertension Family    • Osteoporosis Family        Social History     Socioeconomic History   • Marital status:      Spouse name: None   • Number of children: None   • Years of education: None   • Highest education level: None   Occupational History   • None   Tobacco Use   • Smoking status: Never   • Smokeless tobacco: Never   Vaping Use   • Vaping Use: Never used   Substance and Sexual Activity   • Alcohol use: No   • Drug use: No   • Sexual activity: Not Currently   Other Topics Concern   • None   Social History Narrative    Denied consumption of coffee    Active daily tea consumption    Denied ingesting cola containing caffeine         Social Determinants of Health     Financial Resource Strain: Low Risk    • Difficulty of Paying Living Expenses: Not very hard   Food Insecurity: Not on file   Transportation Needs: No Transportation Needs   • Lack of Transportation (Medical): No   • Lack of Transportation (Non-Medical):  No   Physical Activity: Not on file   Stress: Not on file   Social Connections: Not on file   Intimate Partner Violence: Not on file   Housing Stability: Not on file       Current Outpatient Medications   Medication Sig Dispense Refill   • acetaminophen (TYLENOL) 500 mg tablet Take 1,000 mg by mouth 2 (two) times a day as needed for mild pain     • amLODIPine-benazepril (LOTREL) 10-20 MG per capsule Take 1 capsule by mouth daily 90 capsule 1   • calcium citrate-vitamin D (CITRACAL+D) 315-200 MG-UNIT per tablet Take 1 tablet by mouth daily     • Diclofenac Sodium (VOLTAREN) 1 % Apply 2 g topically 4 (four) times a day 350 g 0   • magnesium hydroxide (MILK OF MAGNESIA) 400 mg/5 mL oral suspension "Take by mouth daily as needed for constipation     • Multiple Vitamins-Minerals (MULTIVITAL) tablet Take 1 tablet by mouth daily     • Omega-3 Fatty Acids (FISH OIL) 645 MG CAPS Take 1 tablet by mouth daily       Current Facility-Administered Medications   Medication Dose Route Frequency Provider Last Rate Last Admin   • Botulinum Toxin Type A SOLR 200 Units  200 Units Injection Q3 Months Sia Heard MD   200 Units at 05/03/21 1619       Review of Systems     As noted in HPI    Objective      /82 (BP Location: Left arm, Patient Position: Sitting, Cuff Size: Adult)   Pulse 90   Temp (!) 97 3 °F (36 3 °C)   Resp 18   Wt 56 7 kg (125 lb)   BMI 25 25 kg/m²     Physical Exam  Vitals reviewed  Constitutional:       General: She is not in acute distress  Appearance: Normal appearance  HENT:      Head: Normocephalic and atraumatic  Eyes:      Conjunctiva/sclera: Conjunctivae normal    Cardiovascular:      Rate and Rhythm: Normal rate and regular rhythm  Pulses: Normal pulses  Heart sounds: Normal heart sounds  Pulmonary:      Effort: Pulmonary effort is normal       Breath sounds: Normal breath sounds  Musculoskeletal:      Right hip: No bony tenderness  Right lower leg: No edema  Left lower leg: No edema  Comments: Patient was nervous about ambulating today without walker which she did not have with her at that time as she came in on wheelchair so, gait could not be observed      Tenderness at lateral aspect of right lower extremity inferior to hip and superior to knee   Skin:     General: Skin is warm and dry  Neurological:      Mental Status: She is alert and oriented to person, place, and time  Psychiatric:         Mood and Affect: Mood normal              Some portions of this record may have been generated with voice recognition software  There may be translation, syntax, or grammatical errors   Occasional wrong word or \"sound-a-like\" substitutions may " have occurred due to the inherent limitations of the voice recognition software  Read the chart carefully and recognize, using context, where substations may have occurred  If you have any questions, please contact the dictating provider for clarification or correction, as needed

## 2023-06-13 ENCOUNTER — APPOINTMENT (OUTPATIENT)
Dept: RADIOLOGY | Facility: MEDICAL CENTER | Age: 88
End: 2023-06-13
Payer: MEDICARE

## 2023-06-13 DIAGNOSIS — M16.10 HIP ARTHRITIS: ICD-10-CM

## 2023-06-13 DIAGNOSIS — R52 PAIN AGGRAVATED BY WALKING: ICD-10-CM

## 2023-06-13 PROCEDURE — 73502 X-RAY EXAM HIP UNI 2-3 VIEWS: CPT

## 2023-06-19 ENCOUNTER — TELEPHONE (OUTPATIENT)
Dept: FAMILY MEDICINE CLINIC | Facility: MEDICAL CENTER | Age: 88
End: 2023-06-19

## 2023-06-19 NOTE — TELEPHONE ENCOUNTER
----- Message from Any Cesar, DO sent at 6/19/2023  9:47 AM EDT -----  Please inform patient and daughter that x-ray of right hip revealed severe arthritic changes  No fracture  Patient could consider follow-up with orthopedics if pain more bothersome to assess if she is a candidate for any intervention such as cortic  osteroid injection

## 2023-07-27 DIAGNOSIS — I10 ESSENTIAL HYPERTENSION: ICD-10-CM

## 2023-07-27 RX ORDER — AMLODIPINE BESYLATE AND BENAZEPRIL HYDROCHLORIDE 10; 20 MG/1; MG/1
1 CAPSULE ORAL DAILY
Qty: 90 CAPSULE | Refills: 1 | Status: SHIPPED | OUTPATIENT
Start: 2023-07-27

## 2023-09-11 ENCOUNTER — PROCEDURE VISIT (OUTPATIENT)
Dept: NEUROLOGY | Facility: CLINIC | Age: 88
End: 2023-09-11

## 2023-09-11 VITALS — TEMPERATURE: 98.1 F | HEART RATE: 80 BPM | SYSTOLIC BLOOD PRESSURE: 130 MMHG | DIASTOLIC BLOOD PRESSURE: 64 MMHG

## 2023-09-11 DIAGNOSIS — G24.3 CERVICAL DYSTONIA: Primary | ICD-10-CM

## 2023-09-11 NOTE — PROGRESS NOTES
Universal Protocol   Consent: Written consent obtained. Consent given by: patient        Chemodenervation     Date/Time 9/11/2023 1:30 PM     Performed by  Corrina Lara MD   Authorized by Georgia Hernandez MD       Pre-procedure details      Prepped With: Alcohol     Anesthesia  (see MAR for exact dosages): Anesthesia method:  None   Procedure details     Position:  Upright and supine   Botox     Brand:  Botox    Botulinum toxin total units: 2cc per 100  units. Needle gauge: 30G 1/2 inch    Procedures     Botox Procedures: blepharospasm and cervical dystonia      Indications: blepharospasm and spasmodic torticollis      Last date: 5/15/23. 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 Cervical fx in Spring 2023. Recent decline in gait.       Overall good response to injections. Response lasts about 4 months with mild return of blinking. Exam: Mild blinking. Left SCM hypertrophy.         Injections:   2.5 unit(s) in 1 injection(s) was injected into the right  muscle. 2.5 unit(s) in 1 injection(s) was injected into the left  muscle. 10 unit(s) in 4 (2.5 ) injection(s) was injected into the right orbicularis oculi (upper inner, upper and lower outer and lateral canthus)  10 unit(s) in 4 (x2.5) injection(s) was injected into the left orbicularis oculi      Injected: 25 units     Cervical dystonia injections:   25 unit(s) was injected into the left sternocleidomastoid muscle. --    50 (25 x2 side) unit(s) was injected into the right splenius capitus muscle. --    25 unit(s) was injected into the right levator scapulae muscle     Total injected 125 units  Discarded 75

## 2023-09-18 ENCOUNTER — HOSPITAL ENCOUNTER (INPATIENT)
Facility: HOSPITAL | Age: 88
LOS: 3 days | Discharge: HOME HEALTH | DRG: 301 | End: 2023-09-22
Attending: EMERGENCY MEDICINE | Admitting: INTERNAL MEDICINE
Payer: MEDICARE

## 2023-09-18 ENCOUNTER — APPOINTMENT (EMERGENCY)
Dept: CT IMAGING | Facility: HOSPITAL | Age: 88
DRG: 301 | End: 2023-09-18
Payer: MEDICARE

## 2023-09-18 DIAGNOSIS — G24.3 CERVICAL DYSTONIA: ICD-10-CM
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
## 2023-09-18 DIAGNOSIS — I82.411: ICD-10-CM

## 2023-09-18 DIAGNOSIS — R55 NEAR SYNCOPE: ICD-10-CM

## 2023-09-18 DIAGNOSIS — R11.10 VOMITING: ICD-10-CM

## 2023-09-18 DIAGNOSIS — I82.411 ACUTE DEEP VEIN THROMBOSIS (DVT) OF FEMORAL VEIN OF RIGHT LOWER EXTREMITY (HCC): Primary | ICD-10-CM

## 2023-09-18 LAB
2HR DELTA HS TROPONIN: 0 NG/L
ALBUMIN SERPL BCP-MCNC: 4.4 G/DL (ref 3.5–5)
ALP SERPL-CCNC: 49 U/L (ref 34–104)
ALT SERPL W P-5'-P-CCNC: 12 U/L (ref 7–52)
ANION GAP SERPL CALCULATED.3IONS-SCNC: 9 MMOL/L
APTT PPP: 27 SECONDS (ref 23–37)
AST SERPL W P-5'-P-CCNC: 17 U/L (ref 13–39)
BASOPHILS # BLD AUTO: 0.03 THOUSANDS/ÂΜL (ref 0–0.1)
BASOPHILS NFR BLD AUTO: 0 % (ref 0–1)
BILIRUB SERPL-MCNC: 0.38 MG/DL (ref 0.2–1)
BUN SERPL-MCNC: 32 MG/DL (ref 5–25)
CALCIUM SERPL-MCNC: 10.5 MG/DL (ref 8.4–10.2)
CARDIAC TROPONIN I PNL SERPL HS: 4 NG/L
CARDIAC TROPONIN I PNL SERPL HS: 4 NG/L
CHLORIDE SERPL-SCNC: 103 MMOL/L (ref 96–108)
CO2 SERPL-SCNC: 26 MMOL/L (ref 21–32)
CREAT SERPL-MCNC: 0.88 MG/DL (ref 0.6–1.3)
EOSINOPHIL # BLD AUTO: 0.04 THOUSAND/ÂΜL (ref 0–0.61)
EOSINOPHIL NFR BLD AUTO: 1 % (ref 0–6)
ERYTHROCYTE [DISTWIDTH] IN BLOOD BY AUTOMATED COUNT: 14.3 % (ref 11.6–15.1)
GFR SERPL CREATININE-BSD FRML MDRD: 57 ML/MIN/1.73SQ M
GLUCOSE SERPL-MCNC: 145 MG/DL (ref 65–140)
HCT VFR BLD AUTO: 38.4 % (ref 34.8–46.1)
HGB BLD-MCNC: 12.4 G/DL (ref 11.5–15.4)
IMM GRANULOCYTES # BLD AUTO: 0.05 THOUSAND/UL (ref 0–0.2)
IMM GRANULOCYTES NFR BLD AUTO: 1 % (ref 0–2)
INR PPP: 0.93 (ref 0.84–1.19)
LACTATE SERPL-SCNC: 2 MMOL/L (ref 0.5–2)
LIPASE SERPL-CCNC: 74 U/L (ref 11–82)
LYMPHOCYTES # BLD AUTO: 0.74 THOUSANDS/ÂΜL (ref 0.6–4.47)
LYMPHOCYTES NFR BLD AUTO: 9 % (ref 14–44)
MCH RBC QN AUTO: 31.2 PG (ref 26.8–34.3)
MCHC RBC AUTO-ENTMCNC: 32.3 G/DL (ref 31.4–37.4)
MCV RBC AUTO: 97 FL (ref 82–98)
MONOCYTES # BLD AUTO: 1.56 THOUSAND/ÂΜL (ref 0.17–1.22)
MONOCYTES NFR BLD AUTO: 18 % (ref 4–12)
NEUTROPHILS # BLD AUTO: 6.19 THOUSANDS/ÂΜL (ref 1.85–7.62)
NEUTS SEG NFR BLD AUTO: 71 % (ref 43–75)
NRBC BLD AUTO-RTO: 0 /100 WBCS
PLATELET # BLD AUTO: 201 THOUSANDS/UL (ref 149–390)
PMV BLD AUTO: 9 FL (ref 8.9–12.7)
POTASSIUM SERPL-SCNC: 4.9 MMOL/L (ref 3.5–5.3)
PROT SERPL-MCNC: 7.7 G/DL (ref 6.4–8.4)
PROTHROMBIN TIME: 13 SECONDS (ref 11.6–14.5)
RBC # BLD AUTO: 3.98 MILLION/UL (ref 3.81–5.12)
SODIUM SERPL-SCNC: 138 MMOL/L (ref 135–147)
WBC # BLD AUTO: 8.61 THOUSAND/UL (ref 4.31–10.16)

## 2023-09-18 PROCEDURE — 36415 COLL VENOUS BLD VENIPUNCTURE: CPT | Performed by: EMERGENCY MEDICINE

## 2023-09-18 PROCEDURE — G1004 CDSM NDSC: HCPCS

## 2023-09-18 PROCEDURE — 85025 COMPLETE CBC W/AUTO DIFF WBC: CPT | Performed by: EMERGENCY MEDICINE

## 2023-09-18 PROCEDURE — 85730 THROMBOPLASTIN TIME PARTIAL: CPT | Performed by: EMERGENCY MEDICINE

## 2023-09-18 PROCEDURE — 99285 EMERGENCY DEPT VISIT HI MDM: CPT

## 2023-09-18 PROCEDURE — 80053 COMPREHEN METABOLIC PANEL: CPT | Performed by: EMERGENCY MEDICINE

## 2023-09-18 PROCEDURE — 93005 ELECTROCARDIOGRAM TRACING: CPT

## 2023-09-18 PROCEDURE — 84484 ASSAY OF TROPONIN QUANT: CPT | Performed by: EMERGENCY MEDICINE

## 2023-09-18 PROCEDURE — 83605 ASSAY OF LACTIC ACID: CPT | Performed by: EMERGENCY MEDICINE

## 2023-09-18 PROCEDURE — 99285 EMERGENCY DEPT VISIT HI MDM: CPT | Performed by: EMERGENCY MEDICINE

## 2023-09-18 PROCEDURE — 96360 HYDRATION IV INFUSION INIT: CPT

## 2023-09-18 PROCEDURE — 83690 ASSAY OF LIPASE: CPT | Performed by: EMERGENCY MEDICINE

## 2023-09-18 PROCEDURE — 96361 HYDRATE IV INFUSION ADD-ON: CPT

## 2023-09-18 PROCEDURE — 74177 CT ABD & PELVIS W/CONTRAST: CPT

## 2023-09-18 PROCEDURE — 85610 PROTHROMBIN TIME: CPT | Performed by: EMERGENCY MEDICINE

## 2023-09-18 RX ORDER — HEPARIN SODIUM 10000 [USP'U]/100ML
3-30 INJECTION, SOLUTION INTRAVENOUS
Status: DISCONTINUED | OUTPATIENT
Start: 2023-09-19 | End: 2023-09-21

## 2023-09-18 RX ORDER — HEPARIN SODIUM 1000 [USP'U]/ML
4400 INJECTION, SOLUTION INTRAVENOUS; SUBCUTANEOUS ONCE
Status: COMPLETED | OUTPATIENT
Start: 2023-09-19 | End: 2023-09-19

## 2023-09-18 RX ORDER — HEPARIN SODIUM 1000 [USP'U]/ML
2200 INJECTION, SOLUTION INTRAVENOUS; SUBCUTANEOUS EVERY 6 HOURS PRN
Status: DISCONTINUED | OUTPATIENT
Start: 2023-09-18 | End: 2023-09-21

## 2023-09-18 RX ORDER — SODIUM CHLORIDE 9 MG/ML
75 INJECTION, SOLUTION INTRAVENOUS CONTINUOUS
Status: DISPENSED | OUTPATIENT
Start: 2023-09-18 | End: 2023-09-20

## 2023-09-18 RX ORDER — HEPARIN SODIUM 1000 [USP'U]/ML
4400 INJECTION, SOLUTION INTRAVENOUS; SUBCUTANEOUS EVERY 6 HOURS PRN
Status: DISCONTINUED | OUTPATIENT
Start: 2023-09-18 | End: 2023-09-21

## 2023-09-18 RX ADMIN — SODIUM CHLORIDE 75 ML/HR: 0.9 INJECTION, SOLUTION INTRAVENOUS at 20:49

## 2023-09-18 RX ADMIN — IOHEXOL 100 ML: 350 INJECTION, SOLUTION INTRAVENOUS at 21:26

## 2023-09-19 ENCOUNTER — APPOINTMENT (INPATIENT)
Dept: VASCULAR ULTRASOUND | Facility: HOSPITAL | Age: 88
DRG: 301 | End: 2023-09-19
Payer: MEDICARE

## 2023-09-19 ENCOUNTER — APPOINTMENT (INPATIENT)
Dept: CT IMAGING | Facility: HOSPITAL | Age: 88
DRG: 301 | End: 2023-09-19
Payer: MEDICARE

## 2023-09-19 ENCOUNTER — APPOINTMENT (OUTPATIENT)
Dept: NUCLEAR MEDICINE | Facility: HOSPITAL | Age: 88
DRG: 301 | End: 2023-09-19
Payer: MEDICARE

## 2023-09-19 ENCOUNTER — APPOINTMENT (INPATIENT)
Dept: NON INVASIVE DIAGNOSTICS | Facility: HOSPITAL | Age: 88
DRG: 301 | End: 2023-09-19
Payer: MEDICARE

## 2023-09-19 PROBLEM — M79.89 MASS OF SOFT TISSUE: Status: ACTIVE | Noted: 2023-09-19

## 2023-09-19 PROBLEM — Q45.3 PANCREATIC ABNORMALITY: Status: ACTIVE | Noted: 2023-09-19

## 2023-09-19 LAB
4HR DELTA HS TROPONIN: 1 NG/L
ANION GAP SERPL CALCULATED.3IONS-SCNC: 10 MMOL/L
AORTIC ROOT: 2.5 CM
APICAL FOUR CHAMBER EJECTION FRACTION: 57 %
APTT PPP: 57 SECONDS (ref 23–37)
APTT PPP: >210 SECONDS (ref 23–37)
APTT PPP: >210 SECONDS (ref 23–37)
ASCENDING AORTA: 3.2 CM
B2 GLYCOPROT1 IGA SERPL IA-ACNC: 1.7
B2 GLYCOPROT1 IGG SERPL IA-ACNC: 1.2
B2 GLYCOPROT1 IGM SERPL IA-ACNC: 38
BUN SERPL-MCNC: 26 MG/DL (ref 5–25)
CALCIUM SERPL-MCNC: 9.2 MG/DL (ref 8.4–10.2)
CARDIAC TROPONIN I PNL SERPL HS: 5 NG/L
CARDIOLIPIN IGA SER IA-ACNC: 3.9
CARDIOLIPIN IGG SER IA-ACNC: 1.4
CARDIOLIPIN IGM SER IA-ACNC: 9.8
CHLORIDE SERPL-SCNC: 107 MMOL/L (ref 96–108)
CO2 SERPL-SCNC: 23 MMOL/L (ref 21–32)
CREAT SERPL-MCNC: 0.66 MG/DL (ref 0.6–1.3)
DEPRECATED AT III PPP: 111 % OF NORMAL (ref 92–136)
E WAVE DECELERATION TIME: 217 MS
ERYTHROCYTE [DISTWIDTH] IN BLOOD BY AUTOMATED COUNT: 14.4 % (ref 11.6–15.1)
FRACTIONAL SHORTENING: 33 (ref 28–44)
GFR SERPL CREATININE-BSD FRML MDRD: 77 ML/MIN/1.73SQ M
GLUCOSE SERPL-MCNC: 140 MG/DL (ref 65–140)
HCT VFR BLD AUTO: 37.4 % (ref 34.8–46.1)
HGB BLD-MCNC: 12.4 G/DL (ref 11.5–15.4)
INTERVENTRICULAR SEPTUM IN DIASTOLE (PARASTERNAL SHORT AXIS VIEW): 1.2 CM
INTERVENTRICULAR SEPTUM: 1.2 CM (ref 0.6–1.1)
LAAS-AP2: 14.7 CM2
LAAS-AP4: 11.6 CM2
LEFT ATRIUM SIZE: 3.6 CM
LEFT ATRIUM VOLUME (MOD BIPLANE): 32 ML
LEFT INTERNAL DIMENSION IN SYSTOLE: 2.4 CM (ref 2.1–4)
LEFT VENTRICULAR INTERNAL DIMENSION IN DIASTOLE: 3.6 CM (ref 3.5–6)
LEFT VENTRICULAR POSTERIOR WALL IN END DIASTOLE: 0.9 CM
LEFT VENTRICULAR STROKE VOLUME: 37 ML
LVSV (TEICH): 37 ML
MAGNESIUM SERPL-MCNC: 2 MG/DL (ref 1.9–2.7)
MCH RBC QN AUTO: 32 PG (ref 26.8–34.3)
MCHC RBC AUTO-ENTMCNC: 33.2 G/DL (ref 31.4–37.4)
MCV RBC AUTO: 96 FL (ref 82–98)
MV E'TISSUE VEL-LAT: 10 CM/S
MV E'TISSUE VEL-SEP: 6 CM/S
MV PEAK A VEL: 1.4 M/S
MV PEAK E VEL: 48 CM/S
MV STENOSIS PRESSURE HALF TIME: 63 MS
MV VALVE AREA P 1/2 METHOD: 3.49
PLATELET # BLD AUTO: 200 THOUSANDS/UL (ref 149–390)
PMV BLD AUTO: 9.8 FL (ref 8.9–12.7)
POTASSIUM SERPL-SCNC: 4.6 MMOL/L (ref 3.5–5.3)
RA PRESSURE ESTIMATED: 3 MMHG
RBC # BLD AUTO: 3.88 MILLION/UL (ref 3.81–5.12)
RIGHT ATRIUM AREA SYSTOLE A4C: 10.3 CM2
RIGHT VENTRICLE ID DIMENSION: 3 CM
RV PSP: 39 MMHG
SL CV LEFT ATRIUM LENGTH A2C: 4.8 CM
SL CV LV EF: 60
SL CV PED ECHO LEFT VENTRICLE DIASTOLIC VOLUME (MOD BIPLANE) 2D: 56 ML
SL CV PED ECHO LEFT VENTRICLE SYSTOLIC VOLUME (MOD BIPLANE) 2D: 20 ML
SODIUM SERPL-SCNC: 140 MMOL/L (ref 135–147)
TR MAX PG: 36 MMHG
TR PEAK VELOCITY: 3 M/S
TRICUSPID ANNULAR PLANE SYSTOLIC EXCURSION: 1.7 CM
TRICUSPID VALVE PEAK REGURGITATION VELOCITY: 2.99 M/S
WBC # BLD AUTO: 14.06 THOUSAND/UL (ref 4.31–10.16)

## 2023-09-19 PROCEDURE — 85730 THROMBOPLASTIN TIME PARTIAL: CPT | Performed by: INTERNAL MEDICINE

## 2023-09-19 PROCEDURE — G1004 CDSM NDSC: HCPCS

## 2023-09-19 PROCEDURE — 93970 EXTREMITY STUDY: CPT

## 2023-09-19 PROCEDURE — 85305 CLOT INHIBIT PROT S TOTAL: CPT | Performed by: EMERGENCY MEDICINE

## 2023-09-19 PROCEDURE — 93306 TTE W/DOPPLER COMPLETE: CPT | Performed by: INTERNAL MEDICINE

## 2023-09-19 PROCEDURE — 85732 THROMBOPLASTIN TIME PARTIAL: CPT | Performed by: EMERGENCY MEDICINE

## 2023-09-19 PROCEDURE — 80048 BASIC METABOLIC PNL TOTAL CA: CPT

## 2023-09-19 PROCEDURE — 93306 TTE W/DOPPLER COMPLETE: CPT

## 2023-09-19 PROCEDURE — 84484 ASSAY OF TROPONIN QUANT: CPT | Performed by: EMERGENCY MEDICINE

## 2023-09-19 PROCEDURE — 86147 CARDIOLIPIN ANTIBODY EA IG: CPT | Performed by: EMERGENCY MEDICINE

## 2023-09-19 PROCEDURE — 99223 1ST HOSP IP/OBS HIGH 75: CPT | Performed by: INTERNAL MEDICINE

## 2023-09-19 PROCEDURE — 85670 THROMBIN TIME PLASMA: CPT | Performed by: EMERGENCY MEDICINE

## 2023-09-19 PROCEDURE — 86146 BETA-2 GLYCOPROTEIN ANTIBODY: CPT | Performed by: EMERGENCY MEDICINE

## 2023-09-19 PROCEDURE — 85300 ANTITHROMBIN III ACTIVITY: CPT | Performed by: EMERGENCY MEDICINE

## 2023-09-19 PROCEDURE — 85613 RUSSELL VIPER VENOM DILUTED: CPT | Performed by: EMERGENCY MEDICINE

## 2023-09-19 PROCEDURE — 83735 ASSAY OF MAGNESIUM: CPT

## 2023-09-19 PROCEDURE — 85306 CLOT INHIBIT PROT S FREE: CPT | Performed by: EMERGENCY MEDICINE

## 2023-09-19 PROCEDURE — 71275 CT ANGIOGRAPHY CHEST: CPT

## 2023-09-19 PROCEDURE — 85705 THROMBOPLASTIN INHIBITION: CPT | Performed by: EMERGENCY MEDICINE

## 2023-09-19 PROCEDURE — 85027 COMPLETE CBC AUTOMATED: CPT | Performed by: INTERNAL MEDICINE

## 2023-09-19 PROCEDURE — 85303 CLOT INHIBIT PROT C ACTIVITY: CPT | Performed by: EMERGENCY MEDICINE

## 2023-09-19 PROCEDURE — 36415 COLL VENOUS BLD VENIPUNCTURE: CPT | Performed by: EMERGENCY MEDICINE

## 2023-09-19 PROCEDURE — 93970 EXTREMITY STUDY: CPT | Performed by: SURGERY

## 2023-09-19 RX ORDER — LISINOPRIL 20 MG/1
20 TABLET ORAL DAILY
Status: DISCONTINUED | OUTPATIENT
Start: 2023-09-19 | End: 2023-09-22 | Stop reason: HOSPADM

## 2023-09-19 RX ORDER — ACETAMINOPHEN 325 MG/1
650 TABLET ORAL EVERY 6 HOURS PRN
Status: DISCONTINUED | OUTPATIENT
Start: 2023-09-19 | End: 2023-09-22 | Stop reason: HOSPADM

## 2023-09-19 RX ORDER — LABETALOL HYDROCHLORIDE 5 MG/ML
10 INJECTION, SOLUTION INTRAVENOUS EVERY 6 HOURS PRN
Status: DISCONTINUED | OUTPATIENT
Start: 2023-09-19 | End: 2023-09-20

## 2023-09-19 RX ORDER — CHLORAL HYDRATE 500 MG
1000 CAPSULE ORAL DAILY
Status: DISCONTINUED | OUTPATIENT
Start: 2023-09-19 | End: 2023-09-22 | Stop reason: HOSPADM

## 2023-09-19 RX ORDER — AMLODIPINE BESYLATE 10 MG/1
10 TABLET ORAL DAILY
Status: DISCONTINUED | OUTPATIENT
Start: 2023-09-19 | End: 2023-09-22 | Stop reason: HOSPADM

## 2023-09-19 RX ADMIN — HEPARIN SODIUM 4400 UNITS: 1000 INJECTION INTRAVENOUS; SUBCUTANEOUS at 00:13

## 2023-09-19 RX ADMIN — HEPARIN SODIUM 2200 UNITS: 1000 INJECTION INTRAVENOUS; SUBCUTANEOUS at 21:55

## 2023-09-19 RX ADMIN — DICLOFENAC SODIUM 2 G: 10 GEL TOPICAL at 07:43

## 2023-09-19 RX ADMIN — HEPARIN SODIUM 18 UNITS/KG/HR: 10000 INJECTION, SOLUTION INTRAVENOUS at 00:13

## 2023-09-19 RX ADMIN — AMLODIPINE BESYLATE 10 MG: 10 TABLET ORAL at 07:40

## 2023-09-19 RX ADMIN — LISINOPRIL 20 MG: 20 TABLET ORAL at 07:40

## 2023-09-19 RX ADMIN — IOHEXOL 85 ML: 350 INJECTION, SOLUTION INTRAVENOUS at 21:15

## 2023-09-19 RX ADMIN — DICLOFENAC SODIUM 2 G: 10 GEL TOPICAL at 12:32

## 2023-09-19 NOTE — ASSESSMENT & PLAN NOTE
Noted incidentally on CTAP. 8 mm nodule in the necrotic head. For simple cyst(s) less than 1.5 cm, recommend followup every 2 year for 2 times. After 4 years, no more followups. Recommend next followup in 2 years.  Preferred imaging modality: abdomen MRI and MRCP with and without IV contrast, or triple phase abdomen CT with IV contrast, or abdomen MRI and MRCP without IV contrast.

## 2023-09-19 NOTE — ASSESSMENT & PLAN NOTE
Deep Vein Thrombosis of R femoral vein:  Indication for hospitalization is significant comorbid disease, unable to assess for PE due to recent contrast administration.     Plan:  Admit to hospital.  Continue monitoring on tele  IV heparin for [facility]; pt will need to be discharged on oral AC therapy  Bilateral LE venous duplex scan  Pt will need V/Q scan  Monitor respiratory status  PT/OT

## 2023-09-19 NOTE — PLAN OF CARE
Problem: PAIN - ADULT  Goal: Verbalizes/displays adequate comfort level or baseline comfort level  Description: Interventions:  - Encourage patient to monitor pain and request assistance  - Assess pain using appropriate pain scale  - Administer analgesics based on type and severity of pain and evaluate response  - Implement non-pharmacological measures as appropriate and evaluate response  - Consider cultural and social influences on pain and pain management  - Notify physician/advanced practitioner if interventions unsuccessful or patient reports new pain  9/19/2023 0946 by Nayeli Perez RN  Outcome: Progressing  9/19/2023 0946 by Nayeli Perez RN  Outcome: Progressing     Problem: MOBILITY - ADULT  Goal: Maintain or return to baseline ADL function  Description: INTERVENTIONS:  -  Assess patient's ability to carry out ADLs; assess patient's baseline for ADL function and identify physical deficits which impact ability to perform ADLs (bathing, care of mouth/teeth, toileting, grooming, dressing, etc.)  - Assess/evaluate cause of self-care deficits   - Assess range of motion  - Assess patient's mobility; develop plan if impaired  - Assess patient's need for assistive devices and provide as appropriate  - Encourage maximum independence but intervene and supervise when necessary  - Involve family in performance of ADLs  - Assess for home care needs following discharge   - Consider OT consult to assist with ADL evaluation and planning for discharge  - Provide patient education as appropriate  9/19/2023 0946 by Nayeli Perez RN  Outcome: Progressing  9/19/2023 0946 by Nayeli Perez RN  Outcome: Progressing

## 2023-09-19 NOTE — H&P
[address]  H&P  Name: Michelle Brown 80 y.o. female I MRN: [id number]  Unit/Bed#: ED-05 I Date of Admission: 9/18/2023   Date of Service: 9/19/2023 I Hospital Day: 0      Assessment/Plan   * Deep vein thrombosis (DVT) of femoral vein of right lower extremity [facility])  Assessment & Plan  Deep Vein Thrombosis of R femoral vein:  Indication for hospitalization is significant comorbid disease, unable to assess for PE due to recent contrast administration. Plan:  Admit to hospital.  IV heparin. Bilateral LE venous duplex scan  V/Q scan  Monitor respiratory status  PT/OT      Near syncope  Assessment & Plan  V/Q scan to assess for PE  PT/OT    Spinal stenosis of lumbar region with neurogenic claudication  Assessment & Plan  Pain management  PT/OT    Essential hypertension  Assessment & Plan  Continue home antihypertensives or formulary equivalent         VTE Prophylaxis: Heparin Drip  / sequential compression device   Code Status: Full Code  POLST: POLST form is not discussed and not completed at this time. Discussion with family: Attempted to contact daughterElgin via phone. No response. Anticipated Length of Stay:  Patient will be admitted on an Inpatient basis with an anticipated length of stay of  > 2 midnights. Justification for Hospital Stay: Acute VTE    Total Time for Visit, including Counseling / Coordination of Care: {Note Time:61989}. Greater than 50% of this total time spent on direct patient counseling and coordination of care. Chief Complaint:   AP    History of Present Illness:    Michelle Brown is a 80 y.o. female w/PMHx dementia, HTN, PAD, vertigo, osteoporosis, who presents with vomiting and abdominal pain. 80-year-old female who came in after having a near syncopal episode, followed by vomiting and RLQ abdominal pain.  Per pt's daughter they had just finished eating some chinese takeout at a restaurant when the pt appeared to have decreased responsiveness, became pale, and vomited at the dinner table. Pt then reported sharp RLQ pain. She was brought to the ED for evaluation where she her CTAP w/contrast showed an acute thrombus in her R femoral vein. CT PE study was unable to be done because pt had just had contrast for her CTAP. VS, EKG and bloodwork were otherwise relatively unremarkable. She was started on a heparin gtt and admitted for treatment of acute DVT and evaluation of N/V and presyncope. Review of Systems:    Review of Systems   Constitutional: Negative for chills and fever. HENT: Negative for ear pain and sore throat. Eyes: Negative for pain and visual disturbance. Respiratory: Negative for cough and shortness of breath. Cardiovascular: Negative for chest pain and palpitations. Gastrointestinal: Positive for abdominal pain, nausea and vomiting. Genitourinary: Negative for dysuria and hematuria. Musculoskeletal: Positive for myalgias. Negative for arthralgias and back pain. Skin: Negative for color change and rash. Neurological: Negative for seizures and syncope. All other systems reviewed and are negative. Past Medical and Surgical History:     Past Medical History:   Diagnosis Date   • Anxiety 2010   • Bladder infection 09/2019   • Breast lump in female    • HL (hearing loss) 2010   • Hypertension 2010   • Osteopenia    • Ovarian cyst        Past Surgical History:   Procedure Laterality Date   • BLADDER SURGERY     • BREAST BIOPSY     • CATARACT EXTRACTION Bilateral 01/01/2015    , Right [id number], Left eye   • ENDOMETRIAL BIOPSY      by Suction   • HYSTERECTOMY         Meds/Allergies:    Prior to Admission medications    Medication Sig Start Date End Date Taking?  Authorizing Provider   acetaminophen (TYLENOL) 500 mg tablet Take 1,000 mg by mouth 2 (two) times a day as needed for mild pain    Historical Provider, MD   amLODIPine-benazepril (LOTREL) 10-20 MG per capsule TAKE 1 CAPSULE BY MOUTH EVERY DAY 7/27/23 Prudencio Lewis,    calcium citrate-vitamin D (CITRACAL+D) 315-200 MG-UNIT per tablet Take 1 tablet by mouth daily    Historical Provider, MD   Diclofenac Sodium (VOLTAREN) 1 % Apply 2 g topically 4 (four) times a day 5/18/23   Prudencio Lewis DO   magnesium hydroxide (MILK OF MAGNESIA) 400 mg/5 mL oral suspension Take by mouth daily as needed for constipation    Historical Provider, MD   Multiple Vitamins-Minerals (MULTIVITAL) tablet Take 1 tablet by mouth daily    Historical Provider, MD   Omega-3 Fatty Acids (FISH OIL) 645 MG CAPS Take 1 tablet by mouth daily    Historical Provider, MD     I have reviewed home medications using allscripts. Allergies: No Known Allergies    Social History:     Marital Status:    Occupation: Retired  Patient Pre-hospital Living Situation: Home with daughter  Patient Pre-hospital Level of Mobility: Unable to determine  Patient Pre-hospital Diet Restrictions: Unknown  Substance Use History:   Social History     Substance and Sexual Activity   Alcohol Use No     Social History     Tobacco Use   Smoking Status Never   Smokeless Tobacco Never     Social History     Substance and Sexual Activity   Drug Use No       Family History:    non-contributory    Physical Exam:     Vitals:   Blood Pressure: (!) 189/80 (09/19/23 0100)  Pulse: 95 (09/19/23 0100)  Temperature: 98 °F (36.7 °C) (09/18/23 1911)  Temp Source: Oral (09/18/23 1911)  Respirations: 18 (09/19/23 0100)  Weight - Scale: 62.3 kg (137 lb 5.6 oz) (09/18/23 2330)  SpO2: 94 % (09/19/23 0100)    Physical Exam  Vitals and nursing note reviewed. Constitutional:       General: She is not in acute distress. Appearance: She is well-developed. HENT:      Head: Normocephalic and atraumatic. Eyes:      Conjunctiva/sclera: Conjunctivae normal.   Cardiovascular:      Rate and Rhythm: Normal rate and regular rhythm. Heart sounds: No murmur heard. Pulmonary:      Effort: Pulmonary effort is normal. No respiratory distress. Breath sounds: Examination of the right-lower field reveals rhonchi. Rhonchi present. Abdominal:      Palpations: Abdomen is soft. Tenderness: There is no abdominal tenderness. Musculoskeletal:         General: No swelling. Cervical back: Neck supple. Comments: Unable to straighten R leg 2/2 pain. Preferred leg with knee flexed with hip slightly externally rotated. No R calf pain. No pain in LLE. No edema in either lower extremity noted. Skin:     General: Skin is warm and dry. Capillary Refill: Capillary refill takes less than 2 seconds. Neurological:      Mental Status: She is alert. She is disoriented. Comments: Unable to tell me her PMHx or medications she takes. Thought the year was 2021. When asked with whom she lives, she answered "With my parents."   Psychiatric:         Mood and Affect: Mood normal.             Additional Data:     Lab Results: I have personally reviewed pertinent reports. Results from last 7 days   Lab Units 09/18/23 2046   WBC Thousand/uL 8.61   HEMOGLOBIN g/dL 12.4   HEMATOCRIT % 38.4   PLATELETS Thousands/uL 201   NEUTROS PCT % 71   LYMPHS PCT % 9*   MONOS PCT % 18*   EOS PCT % 1     Results from last 7 days   Lab Units 09/18/23  2046   SODIUM mmol/L 138   POTASSIUM mmol/L 4.9   CHLORIDE mmol/L 103   CO2 mmol/L 26   BUN mg/dL 32*   CREATININE mg/dL 0.88   ANION GAP mmol/L 9   CALCIUM mg/dL 10.5*   ALBUMIN g/dL 4.4   TOTAL BILIRUBIN mg/dL 0.38   ALK PHOS U/L 49   ALT U/L 12   AST U/L 17   GLUCOSE RANDOM mg/dL 145*     Results from last 7 days   Lab Units 09/18/23  2046   INR  0.93             Results from last 7 days   Lab Units 09/18/23  2046   LACTIC ACID mmol/L 2.0       Imaging: I have personally reviewed pertinent reports. CT abdomen pelvis with contrast   Final Result by Radha Huber MD (09/18 2509)      There is a DVT in the right femoral vein. 8 mm nodule in the necrotic head.  For simple cyst(s) less than 1.5 cm, recommend followup every 2 year for 2 times. After 4 years, no more followups. Recommend next followup in 2 years. Preferred imaging modality: abdomen MRI and MRCP with and without IV    contrast, or triple phase abdomen CT with IV contrast, or abdomen MRI and MRCP without IV contrast.         I personally discussed this study with HAYLEY CHATTERJEE on 9/18/2023 11:28 PM.            Workstation performed: [id number]         VAS lower limb venous duplex study, complete bilateral    (Results Pending)       EKG, Pathology, and Other Studies Reviewed on Admission:   · EKG: NSR    Allscripts / Epic Records Reviewed: Yes     ** Please Note: This note has been constructed using a voice recognition system.  **

## 2023-09-19 NOTE — QUICK NOTE
Patient seen at bedside. Daughter is present during conversation/examination. He was explained to the patient that a blood clot in the right femoral vein was noted on CT scan of the abdomen and pelvis in the emergency room. Patient was admitted for nausea vomiting and near syncope event while eating dinner yesterday. Due to blood clot noted on CT abdomen and pelvis, patient was ordered bilateral lower extremity ultrasounds which confirmed blood clot. Patient reports no nausea or vomiting this morning and overall she feels well. She denies any shortness of breath, chest pain, dizziness, lightheadedness. Due to the near-syncope and nausea and vomiting, we feel as though a PE could be possible/contributing to presenting symptoms. Originally a VQ scan was ordered due to patient receiving IV contrast for CT abdomen and pelvis in the emergency room. Benefits of VQ scan or CT scan to rule out PE was discussed with the patient and family at bedside. Due to the increased benefit of ordering a CTA PE study over VQ scan we ultimately decided to cancel the VQ scan and scheduled a CTA PE study for 2100 tonight on 9/19/2023. We will continue maintenance fluids throughout the day and during the procedure to try and prevent possible KENYA due to receiving another round of IV contrast.  Risk and benefits of the imaging study discussed with the patient at bedside. Patient and family are amenable to this plan. We will continue with IV heparin at this time. With possible transition to oral anticoagulation tomorrow barring patient remained stable.        Olga Fernandez DO

## 2023-09-19 NOTE — H&P
[address] [facility]  H&P  Name: Robe Alvarado 80 y.o. female I MRN: [id number]  Unit/Bed#: ED-05 I Date of Admission: 9/18/2023   Date of Service: 9/19/2023 I Hospital Day: 0      Assessment/Plan   * Deep vein thrombosis (DVT) of femoral vein of right lower extremity [facility])  Assessment & Plan  Deep Vein Thrombosis of R femoral vein:  Indication for hospitalization is significant comorbid disease, unable to assess for PE due to recent contrast administration. Plan:  Admit to hospital.  Continue monitoring on tele  IV heparin for [facility]; pt will need to be discharged on oral AC therapy  Bilateral LE venous duplex scan  Pt will need V/Q scan  Monitor respiratory status  PT/OT      Near syncope  Assessment & Plan  V/Q scan to assess for PE  Check orthostatic BP  PT/OT    Mass of soft tissue  Assessment & Plan  Incidental finding on CTAP. Expansile soft tissue mass in the left L2-3 vertebral neural foramen concerning for a schwannoma or neurofibroma similar in appearance to the prior MRI dated 5/29/2021     Pancreatic abnormality  Assessment & Plan  Noted incidentally on CTAP. 8 mm nodule in the necrotic head. For simple cyst(s) less than 1.5 cm, recommend followup every 2 year for 2 times. After 4 years, no more followups. Recommend next followup in 2 years.  Preferred imaging modality: abdomen MRI and MRCP with and without IV contrast, or triple phase abdomen CT with IV contrast, or abdomen MRI and MRCP without IV contrast.     Spinal stenosis of lumbar region with neurogenic claudication  Assessment & Plan  Pain management prn  PT/OT    Hyperlipidemia  Assessment & Plan  Continue fish oil    Functional urinary incontinence  Assessment & Plan  Continue Purewick external catheter    Essential hypertension  Assessment & Plan  Blood Pressure: (!) 189/80    Continue home antihypertensives or formulary equivalent  Prn labetalol IV for SBP >170 mmHg         VTE Prophylaxis: Heparin Drip    Code Status: Full Code  POLST: POLST form is not discussed and not completed at this time. Discussion with family: Attempted to contact daughter, Lotus Dobson via phone. No response. Anticipated Length of Stay:  Patient will be admitted on an Inpatient basis with an anticipated length of stay of  > 2 midnights. Justification for Hospital Stay: Acute VTE    Total Time for Visit, including Counseling / Coordination of Care: 60 minutes. Greater than 50% of this total time spent on direct patient counseling and coordination of care. Chief Complaint:   AP    History of Present Illness:    Jessica Andre is a 80 y.o. female w/PMHx dementia, HTN, PAD, vertigo, osteoporosis, who presents with vomiting and abdominal pain. 70-year-old female who came in after having a near syncopal episode, followed by vomiting and RLQ abdominal pain. Per pt's daughter they had just finished eating some chinese takeout at a restaurant when the pt appeared to have decreased responsiveness, became pale, and vomited at the dinner table. Pt then reported sharp RLQ pain. She was brought to the ED for evaluation where her CTAP w/contrast showed an acute thrombus in her R femoral vein. CT PE study was unable to be done because pt had just received IV contrast for her CTAP. VS, EKG and bloodwork were otherwise relatively unremarkable except for persistent HTN. She was started on a heparin gtt and admitted for treatment of acute DVT and evaluation of N/V and presyncope.          Review of Systems:    Review of Systems   Unable to perform ROS: Dementia       Past Medical and Surgical History:     Past Medical History:   Diagnosis Date   • Anxiety 2010   • Bladder infection 09/2019   • Breast lump in female    • HL (hearing loss) 2010   • Hypertension 2010   • Osteopenia    • Ovarian cyst        Past Surgical History:   Procedure Laterality Date   • BLADDER SURGERY     • BREAST BIOPSY     • CATARACT EXTRACTION Bilateral 01/01/2015    , Right Eye-2017, Left eye   • ENDOMETRIAL BIOPSY      by Suction   • HYSTERECTOMY         Meds/Allergies:    Prior to Admission medications    Medication Sig Start Date End Date Taking? Authorizing Provider   acetaminophen (TYLENOL) 500 mg tablet Take 1,000 mg by mouth 2 (two) times a day as needed for mild pain    Historical Provider, MD   amLODIPine-benazepril (LOTREL) 10-20 MG per capsule TAKE 1 CAPSULE BY MOUTH EVERY DAY 7/27/23   [facility] Covert, DO   calcium citrate-vitamin D (CITRACAL+D) 315-200 MG-UNIT per tablet Take 1 tablet by mouth daily    Historical Provider, MD   Diclofenac Sodium (VOLTAREN) 1 % Apply 2 g topically 4 (four) times a day 5/18/23   [facility] Covert, DO   magnesium hydroxide (MILK OF MAGNESIA) 400 mg/5 mL oral suspension Take by mouth daily as needed for constipation    Historical Provider, MD   Multiple Vitamins-Minerals (MULTIVITAL) tablet Take 1 tablet by mouth daily    Historical Provider, MD   Omega-3 Fatty Acids (FISH OIL) 645 MG CAPS Take 1 tablet by mouth daily    Historical Provider, MD     I have reviewed home medications using allscripts. Allergies: No Known Allergies    Social History:     Marital Status:     Occupation: Retired  Patient Pre-hospital Living Situation: Home with daughter  Patient Pre-hospital Level of Mobility: Unable to determine  Patient Pre-hospital Diet Restrictions: Unknown  Substance Use History:   Social History     Substance and Sexual Activity   Alcohol Use No     Social History     Tobacco Use   Smoking Status Never   Smokeless Tobacco Never     Social History     Substance and Sexual Activity   Drug Use No       Family History:    non-contributory    Physical Exam:     Vitals:   Blood Pressure: (!) 189/80 (09/19/23 0100)  Pulse: 95 (09/19/23 0100)  Temperature: 98 °F (36.7 °C) (09/18/23 1911)  Temp Source: Oral (09/18/23 1911)  Respirations: 18 (09/19/23 0100)  Weight - Scale: 62.3 kg (137 lb 5.6 oz) (09/18/23 2330)  SpO2: 94 % (09/19/23 0100)    Physical Exam  Vitals and nursing note reviewed. Constitutional:       General: She is not in acute distress. Appearance: She is well-developed. HENT:      Head: Normocephalic and atraumatic. Eyes:      Conjunctiva/sclera: Conjunctivae normal.   Cardiovascular:      Rate and Rhythm: Normal rate and regular rhythm. Heart sounds: No murmur heard. Pulmonary:      Effort: Pulmonary effort is normal. No respiratory distress. Breath sounds: Examination of the right-lower field reveals rhonchi. Rhonchi present. Abdominal:      Palpations: Abdomen is soft. Tenderness: There is no abdominal tenderness. Musculoskeletal:         General: No swelling. Cervical back: Neck supple. Comments: Unable to straighten R leg 2/2 pain. Preferred leg with knee flexed with hip slightly externally rotated. No R calf pain. No pain in LLE. No edema in either lower extremity noted. Skin:     General: Skin is warm and dry. Capillary Refill: Capillary refill takes less than 2 seconds. Neurological:      Mental Status: She is alert. She is disoriented. Comments: Unable to tell me her PMHx or medications she takes. Thought the year was 2021. When asked with whom she lives, she answered "With my parents."   Psychiatric:         Mood and Affect: Mood normal.      Comments: Pleasantly confused             Additional Data:     Lab Results: I have personally reviewed pertinent reports.       Results from last 7 days   Lab Units 09/18/23 2046   WBC Thousand/uL 8.61   HEMOGLOBIN g/dL 12.4   HEMATOCRIT % 38.4   PLATELETS Thousands/uL 201   NEUTROS PCT % 71   LYMPHS PCT % 9*   MONOS PCT % 18*   EOS PCT % 1     Results from last 7 days   Lab Units 09/18/23 2046   SODIUM mmol/L 138   POTASSIUM mmol/L 4.9   CHLORIDE mmol/L 103   CO2 mmol/L 26   BUN mg/dL 32*   CREATININE mg/dL 0.88   ANION GAP mmol/L 9   CALCIUM mg/dL 10.5*   ALBUMIN g/dL 4.4   TOTAL BILIRUBIN mg/dL 0.38   ALK PHOS U/L 49   ALT U/L 12   AST U/L 17   GLUCOSE RANDOM mg/dL 145*     Results from last 7 days   Lab Units 09/18/23 2046   INR  0.93             Results from last 7 days   Lab Units 09/18/23 2046   LACTIC ACID mmol/L 2.0       Imaging: I have personally reviewed pertinent reports. CT abdomen pelvis with contrast   Final Result by Nemesio Winters MD (09/18 2329)      There is a DVT in the right femoral vein. 8 mm nodule in the necrotic head. For simple cyst(s) less than 1.5 cm, recommend followup every 2 year for 2 times. After 4 years, no more followups. Recommend next followup in 2 years. Preferred imaging modality: abdomen MRI and MRCP with and without IV    contrast, or triple phase abdomen CT with IV contrast, or abdomen MRI and MRCP without IV contrast.         I personally discussed this study with HAYLEY CHATTERJEE on 9/18/2023 11:28 PM.            Workstation performed: [id number]         VAS lower limb venous duplex study, complete bilateral    (Results Pending)       EKG, Pathology, and Other Studies Reviewed on Admission:   · EKG: NSR    Allscripts / Epic Records Reviewed: Yes     ** Please Note: This note has been constructed using a voice recognition system.  **

## 2023-09-19 NOTE — ASSESSMENT & PLAN NOTE
Blood Pressure: (!) 189/80    Continue home antihypertensives or formulary equivalent  Prn labetalol IV for SBP >170 mmHg

## 2023-09-19 NOTE — PLAN OF CARE
Problem: Potential for Falls  Goal: Patient will remain free of falls  Description: INTERVENTIONS:  - Educate patient/family on patient safety including physical limitations  - Instruct patient to call for assistance with activity   - Consult OT/PT to assist with strengthening/mobility   - Keep Call bell within reach  - Keep bed low and locked with side rails adjusted as appropriate  - Keep care items and personal belongings within reach  - Initiate and maintain comfort rounds  - Make Fall Risk Sign visible to staff  - Offer Toileting every 2 Hours, in advance of need  - Initiate/Maintain bed alarm  - Obtain necessary fall risk management equipment: Alamrs  - Apply yellow socks and bracelet for high fall risk patients  - Consider moving patient to room near nurses station  Outcome: Progressing     Problem: MOBILITY - ADULT  Goal: Maintain or return to baseline ADL function  Description: INTERVENTIONS:  -  Assess patient's ability to carry out ADLs; assess patient's baseline for ADL function and identify physical deficits which impact ability to perform ADLs (bathing, care of mouth/teeth, toileting, grooming, dressing, etc.)  - Assess/evaluate cause of self-care deficits   - Assess range of motion  - Assess patient's mobility; develop plan if impaired  - Assess patient's need for assistive devices and provide as appropriate  - Encourage maximum independence but intervene and supervise when necessary  - Involve family in performance of ADLs  - Assess for home care needs following discharge   - Consider OT consult to assist with ADL evaluation and planning for discharge  - Provide patient education as appropriate  Outcome: Progressing     Problem: PAIN - ADULT  Goal: Verbalizes/displays adequate comfort level or baseline comfort level  Description: Interventions:  - Encourage patient to monitor pain and request assistance  - Assess pain using appropriate pain scale  - Administer analgesics based on type and severity of pain and evaluate response  - Implement non-pharmacological measures as appropriate and evaluate response  - Consider cultural and social influences on pain and pain management  - Notify physician/advanced practitioner if interventions unsuccessful or patient reports new pain  Outcome: Progressing     Problem: INFECTION - ADULT  Goal: Absence or prevention of progression during hospitalization  Description: INTERVENTIONS:  - Assess and monitor for signs and symptoms of infection  - Monitor lab/diagnostic results  - Monitor all insertion sites, i.e. indwelling lines, tubes, and drains  - Monitor endotracheal if appropriate and nasal secretions for changes in amount and color  - [place] appropriate cooling/warming therapies per order  - Administer medications as ordered  - Instruct and encourage patient and family to use good hand hygiene technique  - Identify and instruct in appropriate isolation precautions for identified infection/condition  Outcome: Progressing     Problem: DISCHARGE PLANNING  Goal: Discharge to home or other facility with appropriate resources  Description: INTERVENTIONS:  - Identify barriers to discharge w/patient and caregiver  - Arrange for needed discharge resources and transportation as appropriate  - Identify discharge learning needs (meds, wound care, etc.)  - Arrange for interpretive services to assist at discharge as needed  - Refer to Case Management Department for coordinating discharge planning if the patient needs post-hospital services based on physician/advanced practitioner order or complex needs related to functional status, cognitive ability, or social support system  Outcome: Progressing     Problem: Knowledge Deficit  Goal: Patient/family/caregiver demonstrates understanding of disease process, treatment plan, medications, and discharge instructions  Description: Complete learning assessment and assess knowledge base.   Interventions:  - Provide teaching at level of understanding  - Provide teaching via preferred learning methods  Outcome: Progressing

## 2023-09-19 NOTE — ASSESSMENT & PLAN NOTE
Incidental finding on CTAP.     Expansile soft tissue mass in the left L2-3 vertebral neural foramen concerning for a schwannoma or neurofibroma similar in appearance to the prior MRI dated 5/29/2021

## 2023-09-19 NOTE — ED PROVIDER NOTES
History  Chief Complaint   Patient presents with   • Abdominal Pain     Pt arrives via EMS with c/o abdominal discomfort after eating dinner. +N +V. Pt states she feels better after vomiting. No other complaints at this time     44-year-old female presents to the emergency department accompanied by her daughter for evaluation after having an episode of vomiting and abdominal pain. Patient's daughter states that they had just finished dinner, the patient had eaten some chicken fried rice. While sitting at the table the patient's daughter was helping with the patient sign a check when she seemed to be poorly responsive. The patient became pale in appearance then came to and vomited. Patient had large volume of emesis which was nonbilious and nonbloody. Patient then began to feel better. She does note having abdominal pain localized to the right lower quadrant that started right around the same time. Patient's daughter states that the patient did not lose consciousness and did not have seizure activity. She was able to follow commands when the daughter asked her to raise her hands while she was calling 911. Patient denies recent sick contacts or travel. She has been having normal bowel movements normally once every 3 days. She denies chest pain or shortness of breath. No associated headache or palpitations.       History provided by:  Patient and relative   used: No    Abdominal Pain  Pain location:  RLQ  Pain quality: aching    Pain radiates to:  Does not radiate  Pain severity:  Moderate  Onset quality:  Gradual  Timing:  Constant  Progression:  Unchanged  Context: not diet changes, not eating, not previous surgeries, not sick contacts and not suspicious food intake    Relieved by:  Nothing  Worsened by:  Nothing  Ineffective treatments:  None tried  Associated symptoms: nausea and vomiting    Associated symptoms: no anorexia, no chest pain, no chills, no constipation, no cough, no diarrhea, no dysuria, no fatigue, no fever and no sore throat    Risk factors: no recent hospitalization        Prior to Admission Medications   Prescriptions Last Dose Informant Patient Reported? Taking? Diclofenac Sodium (VOLTAREN) 1 %   No No   Sig: Apply 2 g topically 4 (four) times a day   Multiple Vitamins-Minerals (MULTIVITAL) tablet  Self Yes No   Sig: Take 1 tablet by mouth daily   Omega-3 Fatty Acids (FISH OIL) 645 MG CAPS  Self Yes No   Sig: Take 1 tablet by mouth daily   acetaminophen (TYLENOL) 500 mg tablet  Self Yes No   Sig: Take 1,000 mg by mouth 2 (two) times a day as needed for mild pain   amLODIPine-benazepril (LOTREL) 10-20 MG per capsule 9/18/2023  No Yes   Sig: TAKE 1 CAPSULE BY MOUTH EVERY DAY   Patient taking differently: Take 1 capsule by mouth every morning   calcium citrate-vitamin D (CITRACAL+D) 315-200 MG-UNIT per tablet  Self Yes No   Sig: Take 1 tablet by mouth daily   magnesium hydroxide (MILK OF MAGNESIA) 400 mg/5 mL oral suspension  Self Yes No   Sig: Take by mouth daily as needed for constipation      Facility-Administered Medications Last Administration Doses Remaining   Botulinum Toxin Type A SOLR 200 Units 5/3/2021  4:19 PM    onabotulinumtoxin A (BOTOX) injection 200 Units 9/11/2023  2:00 PM           Past Medical History:   Diagnosis Date   • Anxiety 2010   • Bladder infection 09/2019   • Breast lump in female    • HL (hearing loss) 2010   • Hypertension 2010   • Osteopenia    • Ovarian cyst        Past Surgical History:   Procedure Laterality Date   • BLADDER SURGERY     • BREAST BIOPSY     • CATARACT EXTRACTION Bilateral 01/01/2015    , Right Eye-2017, Left eye   • ENDOMETRIAL BIOPSY      by Suction   • HYSTERECTOMY         Family History   Problem Relation Age of Onset   • Heart disease Mother    • Heart disease Father    • Hypertension Family    • Osteoporosis Family      I have reviewed and agree with the history as documented.     E-Cigarette/Vaping   • E-Cigarette Use Never User      E-Cigarette/Vaping Substances   • Nicotine No    • THC No    • CBD No    • Flavoring No    • Other No    • Unknown No      Social History     Tobacco Use   • Smoking status: Never   • Smokeless tobacco: Never   Vaping Use   • Vaping Use: Never used   Substance Use Topics   • Alcohol use: No   • Drug use: No       Review of Systems   Constitutional: Negative for chills, fatigue and fever. HENT: Negative for sore throat. Respiratory: Negative for cough. Cardiovascular: Negative for chest pain, palpitations and leg swelling. Gastrointestinal: Positive for abdominal pain, nausea and vomiting. Negative for anorexia, constipation and diarrhea. Genitourinary: Negative for dysuria. Neurological: Positive for syncope (near syncope). Negative for weakness. All other systems reviewed and are negative. Physical Exam  Physical Exam  Vitals reviewed. Constitutional:       General: She is not in acute distress. Appearance: She is well-developed. She is not ill-appearing. HENT:      Head: Normocephalic. Nose: Nose normal.      Mouth/Throat:      Mouth: Mucous membranes are moist.      Pharynx: No oropharyngeal exudate. Eyes:      General: No scleral icterus. Conjunctiva/sclera: Conjunctivae normal.      Pupils: Pupils are equal, round, and reactive to light. Cardiovascular:      Rate and Rhythm: Normal rate and regular rhythm. Heart sounds: Normal heart sounds. No murmur heard. Pulmonary:      Effort: Pulmonary effort is normal.      Breath sounds: Normal breath sounds. No wheezing. Chest:      Chest wall: No tenderness. Abdominal:      General: Bowel sounds are normal. There is no distension. Palpations: Abdomen is soft. Tenderness: There is abdominal tenderness in the right lower quadrant. There is no guarding or rebound. Hernia: No hernia is present. Musculoskeletal:         General: No tenderness or deformity. Normal range of motion. Cervical back: Normal range of motion and neck supple. Lymphadenopathy:      Cervical: No cervical adenopathy. Skin:     General: Skin is warm and dry. Findings: No rash. Neurological:      Mental Status: She is alert and oriented to person, place, and time. Cranial Nerves: No cranial nerve deficit. Sensory: No sensory deficit. Motor: No abnormal muscle tone. Coordination: Coordination normal.      Gait: Gait normal.      Deep Tendon Reflexes: Reflexes are normal and symmetric. Psychiatric:         Behavior: Behavior normal.         Thought Content:  Thought content normal.         Judgment: Judgment normal.         Vital Signs  ED Triage Vitals   Temperature Pulse Respirations Blood Pressure SpO2   09/18/23 1911 09/18/23 1911 09/18/23 1911 09/18/23 1911 09/18/23 1911   98 °F (36.7 °C) 95 18 168/86 96 %      Temp Source Heart Rate Source Patient Position - Orthostatic VS BP Location FiO2 (%)   09/18/23 1911 09/18/23 1911 09/18/23 2114 09/18/23 1911 --   Oral Monitor Lying Right arm       Pain Score       --                  Vitals:    09/18/23 2300 09/18/23 2330 09/19/23 0007 09/19/23 0100   BP: 139/75 150/70 169/77 (!) 189/80   Pulse: 85 85 97 95   Patient Position - Orthostatic VS: Lying   Lying         Visual Acuity      ED Medications  Medications   sodium chloride 0.9 % infusion (75 mL/hr Intravenous New Bag 9/18/23 2049)   heparin (porcine) 25,000 units in 0.45% NaCl 250 mL infusion (premix) (18 Units/kg/hr × 55 kg (Order-Specific) Intravenous New Bag 9/19/23 0013)   heparin (porcine) injection 4,400 Units (has no administration in time range)   heparin (porcine) injection 2,200 Units (has no administration in time range)   labetalol (NORMODYNE) injection 10 mg (has no administration in time range)   magnesium hydroxide (MILK OF MAGNESIA) oral suspension 15 mL (has no administration in time range)   Diclofenac Sodium (VOLTAREN) 1 % topical gel 2 g (has no administration in time range)   fish oil capsule 1,000 mg (has no administration in time range)   multivitamin-minerals (CENTRUM) tablet 1 tablet (has no administration in time range)   acetaminophen (TYLENOL) tablet 650 mg (has no administration in time range)   amLODIPine (NORVASC) tablet 10 mg (has no administration in time range)     And   lisinopril (ZESTRIL) tablet 20 mg (has no administration in time range)   iohexol (OMNIPAQUE) 350 MG/ML injection (MULTI-DOSE) 100 mL (100 mL Intravenous Given 9/18/23 2126)   heparin (porcine) injection 4,400 Units (4,400 Units Intravenous Given 9/19/23 0013)       Diagnostic Studies  Results Reviewed     Procedure Component Value Units Date/Time    HS Troponin I 4hr [[id number]]  (Normal) Collected: 09/19/23 0050    Lab Status: Final result Specimen: Blood from Arm, Right Updated: 09/19/23 0140     hs TnI 4hr 5 ng/L      Delta 4hr hsTnI 1 ng/L     Protein C activity [[id number]] Collected: 09/19/23 0000    Lab Status: In process Specimen: Blood from Arm, Left Updated: 09/19/23 0006    Cardiolipin antibody [[id number]] Collected: 09/19/23 0000    Lab Status: In process Specimen: Blood from Arm, Left Updated: 09/19/23 0006    Beta-2 glycoprotein antibodies [[id number]] Collected: 09/19/23 0000    Lab Status: In process Specimen: Blood from Arm, Left Updated: 09/19/23 0006    Protein S Antigen, Total & Free [[id number]] Collected: 09/19/23 0000    Lab Status: In process Specimen: Blood from Arm, Left Updated: 09/19/23 0006    Lupus anticoagulant [[id number]] Collected: 09/19/23 0000    Lab Status: In process Specimen: Blood from Arm, Left Updated: 09/19/23 0006    Antithrombin III Activity [[id number]] Collected: 09/19/23 0000    Lab Status: In process Specimen: Blood from Arm, Left Updated: 09/19/23 0006    Protein S activity [[id number]] Collected: 09/19/23 0000    Lab Status:  In process Specimen: Blood from Arm, Left Updated: 09/19/23 0006    HS Troponin I 2hr [[id number]]  (Normal) Collected: 09/18/23 2249    Lab Status: Final result Specimen: Blood from Arm, Right Updated: 09/18/23 2316     hs TnI 2hr 4 ng/L      Delta 2hr hsTnI 0 ng/L     Protime-INR [[id number]]  (Normal) Collected: 09/18/23 2046    Lab Status: Final result Specimen: Blood from Arm, Right Updated: 09/18/23 2123     Protime 13.0 seconds      INR 0.93    APTT [[id number]]  (Normal) Collected: 09/18/23 2046    Lab Status: Final result Specimen: Blood from Arm, Right Updated: 09/18/23 2123     PTT 27 seconds     HS Troponin 0hr (reflex protocol) [[id number]]  (Normal) Collected: 09/18/23 2046    Lab Status: Final result Specimen: Blood from Arm, Right Updated: 09/18/23 2119     hs TnI 0hr 4 ng/L     Lactic acid, plasma (w/reflex if result > 2.0) [[id number]]  (Normal) Collected: 09/18/23 2046    Lab Status: Final result Specimen: Blood from Arm, Right Updated: 09/18/23 2113     LACTIC ACID 2.0 mmol/L     Narrative:      Result may be elevated if tourniquet was used during collection.     Comprehensive metabolic panel [[id number]]  (Abnormal) Collected: 09/18/23 2046    Lab Status: Final result Specimen: Blood from Arm, Right Updated: 09/18/23 2112     Sodium 138 mmol/L      Potassium 4.9 mmol/L      Chloride 103 mmol/L      CO2 26 mmol/L      ANION GAP 9 mmol/L      BUN 32 mg/dL      Creatinine 0.88 mg/dL      Glucose 145 mg/dL      Calcium 10.5 mg/dL      AST 17 U/L      ALT 12 U/L      Alkaline Phosphatase 49 U/L      Total Protein 7.7 g/dL      Albumin 4.4 g/dL      Total Bilirubin 0.38 mg/dL      eGFR 57 ml/min/1.73sq m     Narrative:      [place] guidelines for Chronic Kidney Disease (CKD):   •  Stage 1 with normal or high GFR (GFR > 90 mL/min/1.73 square meters)  •  Stage 2 Mild CKD (GFR = 60-89 mL/min/1.73 square meters)  •  Stage 3A Moderate CKD (GFR = 45-59 mL/min/1.73 square meters)  •  Stage 3B Moderate CKD (GFR = 30-44 mL/min/1.73 square meters)  •  Stage 4 Severe CKD (GFR = 15-29 mL/min/1.73 square meters)  • Stage 5 End Stage CKD (GFR <15 mL/min/1.73 square meters)  Note: GFR calculation is accurate only with a steady state creatinine    Lipase [[id number]]  (Normal) Collected: 09/18/23 2046    Lab Status: Final result Specimen: Blood from Arm, Right Updated: 09/18/23 2112     Lipase 74 u/L     CBC and differential [[id number]]  (Abnormal) Collected: 09/18/23 2046    Lab Status: Final result Specimen: Blood from Arm, Right Updated: 09/18/23 2057     WBC 8.61 Thousand/uL      RBC 3.98 Million/uL      Hemoglobin 12.4 g/dL      Hematocrit 38.4 %      MCV 97 fL      MCH 31.2 pg      MCHC 32.3 g/dL      RDW 14.3 %      MPV 9.0 fL      Platelets 301 Thousands/uL      nRBC 0 /100 WBCs      Neutrophils Relative 71 %      Immat GRANS % 1 %      Lymphocytes Relative 9 %      Monocytes Relative 18 %      Eosinophils Relative 1 %      Basophils Relative 0 %      Neutrophils Absolute 6.19 Thousands/µL      Immature Grans Absolute 0.05 Thousand/uL      Lymphocytes Absolute 0.74 Thousands/µL      Monocytes Absolute 1.56 Thousand/µL      Eosinophils Absolute 0.04 Thousand/µL      Basophils Absolute 0.03 Thousands/µL                  CT abdomen pelvis with contrast   Final Result by Rafi Loja MD (09/18 2329)      There is a DVT in the right femoral vein. 8 mm nodule in the necrotic head. For simple cyst(s) less than 1.5 cm, recommend followup every 2 year for 2 times. After 4 years, no more followups. Recommend next followup in 2 years.  Preferred imaging modality: abdomen MRI and MRCP with and without IV    contrast, or triple phase abdomen CT with IV contrast, or abdomen MRI and MRCP without IV contrast.         I personally discussed this study with HAYLEY CHATTERJEE on 9/18/2023 11:28 PM.            Workstation performed: [id number]         VAS lower limb venous duplex study, complete bilateral    (Results Pending)              Procedures  ECG 12 Lead Documentation Only    Date/Time: 9/18/2023 9:35 PM    Performed by: Nate Lorenzo   Authorized by: Sage Zuluaga DO    Indications / Diagnosis:  Vomiting, abdominal pain questionable near syncope  ECG reviewed by me, the ED Provider: yes    Patient location:  ED  Previous ECG:     Previous ECG:  Compared to current    Comparison ECG info:  December 4, 2022    Similarity:  No change  Interpretation:     Interpretation: non-specific    Quality:     Tracing quality:  Limited by artifact  Rate:     ECG rate:  84    ECG rate assessment: normal    Rhythm:     Rhythm: sinus rhythm    Ectopy:     Ectopy: none    QRS:     QRS axis:  Normal  Conduction:     Conduction: normal    ST segments:     ST segments:  Normal  T waves:     T waves: normal               ED Course                               SBIRT 22yo+    Flowsheet Row Most Recent Value   Initial Alcohol Screen: US AUDIT-C     1. How often do you have a drink containing alcohol? 0 Filed at: 09/18/2023 1913   2. How many drinks containing alcohol do you have on a typical day you are drinking? 0 Filed at: 09/18/2023 1913   3a. Male UNDER 65: How often do you have five or more drinks on one occasion? 0 Filed at: 09/18/2023 1913   3b. FEMALE Any Age, or MALE 65+: How often do you have 4 or more drinks on one occassion? 0 Filed at: 09/18/2023 1913   Audit-C Score 0 Filed at: 09/18/2023 1913   AVE: How many times in the past year have you. .. Used an illegal drug or used a prescription medication for non-medical reasons? Never Filed at: 09/18/2023 [address] Making  70-year-old female presents with near syncopal episode followed by vomiting and abdominal pain. Differential diagnosis includes but is not limited to acute foodborne illness, gastritis, peptic ulcer disease, pancreatitis, small bowel obstruction, appendicitis, cardiac dysrhythmia.     Acute deep vein thrombosis (DVT) of femoral vein of right lower extremity ([address]): acute illness or injury  Near syncope: acute illness or injury  Vomiting: acute illness or injury  Amount and/or Complexity of Data Reviewed  Independent Historian:      Details: Daughter at bedside to help provide history  Labs: ordered. Details: Labs ordered and independently interpreted by me patient with mild hypercalcemia otherwise blood work unremarkable  Radiology: ordered. Details: CT scan ordered by me and interpreted by radiology  ECG/medicine tests: ordered and independent interpretation performed. Discussion of management or test interpretation with external provider(s): Patient was found to have an acute DVT of the right femoral vein. It is unclear if this may be related to the symptoms that she developed at dinnertime with near syncope followed by vomiting and pain. I am concerned that patient may have had a pulmonary embolism triggering her near syncopal event however she has no shortness of breath or chest pain at this time. Unable to obtain CT PE study at this time as patient just received IV contrast for CT abdomen pelvis. We will start patient on heparin admit to the internal medicine service for further work-up, thrombosis panel results pending. Risk  Prescription drug management. Decision regarding hospitalization.           Disposition  Final diagnoses:   Acute deep vein thrombosis (DVT) of femoral vein of right lower extremity (HCC)   Near syncope   Vomiting     Time reflects when diagnosis was documented in both MDM as applicable and the Disposition within this note     Time User Action Codes Description Comment    9/18/2023 11:59 PM Eufemia Lockwood Add [I82.411] Acute deep vein thrombosis (DVT) of femoral vein of right lower extremity ([address])     9/18/2023 11:59 PM Eufemia Lockwood Add [R55] Near syncope     9/18/2023 11:59 PM Eufemia Lockwood Add [R11.10] Vomiting     9/19/2023  1:30 AM Malou Yip Add [G24.3] Cervical dystonia       ED Disposition     ED Disposition   Admit    Condition   Stable    Date/Time   Mon Sep 18, 2023 11:58 PM    Comment   Case was discussed with Dr. Craig Mir and the patient's admission status was agreed to be Admission Status: inpatient status to the service of Dr. Criag Mir . Follow-up Information    None         Patient's Medications   Discharge Prescriptions    No medications on file       No discharge procedures on file.     PDMP Review     None          ED Provider  Electronically Signed by           Abimbola Xiao DO  09/19/23 0157

## 2023-09-20 LAB
ANION GAP SERPL CALCULATED.3IONS-SCNC: 8 MMOL/L
APTT PPP: 45 SECONDS (ref 23–37)
APTT PPP: 72 SECONDS (ref 23–37)
APTT PPP: 73 SECONDS (ref 23–37)
APTT SCREEN TO CONFIRM RATIO: 1.01 RATIO (ref 0–1.34)
ATRIAL RATE: 107 BPM
ATRIAL RATE: 84 BPM
ATRIAL RATE: 89 BPM
BACTERIA UR QL AUTO: ABNORMAL /HPF
BASOPHILS # BLD MANUAL: 0 THOUSAND/UL (ref 0–0.1)
BASOPHILS NFR MAR MANUAL: 0 % (ref 0–1)
BILIRUB UR QL STRIP: NEGATIVE
BUN SERPL-MCNC: 23 MG/DL (ref 5–25)
CALCIUM SERPL-MCNC: 9 MG/DL (ref 8.4–10.2)
CHLORIDE SERPL-SCNC: 103 MMOL/L (ref 96–108)
CLARITY UR: ABNORMAL
CO2 SERPL-SCNC: 25 MMOL/L (ref 21–32)
COLOR UR: YELLOW
CONFIRM APTT/NORMAL: 35.6 SEC (ref 0–47.6)
CREAT SERPL-MCNC: 0.7 MG/DL (ref 0.6–1.3)
EOSINOPHIL # BLD MANUAL: 0 THOUSAND/UL (ref 0–0.4)
EOSINOPHIL NFR BLD MANUAL: 0 % (ref 0–6)
ERYTHROCYTE [DISTWIDTH] IN BLOOD BY AUTOMATED COUNT: 14.7 % (ref 11.6–15.1)
GFR SERPL CREATININE-BSD FRML MDRD: 75 ML/MIN/1.73SQ M
GLUCOSE SERPL-MCNC: 135 MG/DL (ref 65–140)
GLUCOSE UR STRIP-MCNC: NEGATIVE MG/DL
HCT VFR BLD AUTO: 33.4 % (ref 34.8–46.1)
HGB BLD-MCNC: 11.1 G/DL (ref 11.5–15.4)
HGB UR QL STRIP.AUTO: NEGATIVE
KETONES UR STRIP-MCNC: NEGATIVE MG/DL
LA PPP-IMP: NORMAL
LEUKOCYTE ESTERASE UR QL STRIP: NEGATIVE
LYMPHOCYTES # BLD AUTO: 13 % (ref 14–44)
LYMPHOCYTES # BLD AUTO: 2.47 THOUSAND/UL (ref 0.6–4.47)
MCH RBC QN AUTO: 31.7 PG (ref 26.8–34.3)
MCHC RBC AUTO-ENTMCNC: 33.2 G/DL (ref 31.4–37.4)
MCV RBC AUTO: 95 FL (ref 82–98)
MONOCYTES # BLD AUTO: 1.33 THOUSAND/UL (ref 0–1.22)
MONOCYTES NFR BLD: 7 % (ref 4–12)
NEUTROPHILS # BLD MANUAL: 15.18 THOUSAND/UL (ref 1.85–7.62)
NEUTS SEG NFR BLD AUTO: 80 % (ref 43–75)
NITRITE UR QL STRIP: NEGATIVE
NON-SQ EPI CELLS URNS QL MICRO: ABNORMAL /HPF
P AXIS: 52 DEGREES
P AXIS: 64 DEGREES
PH UR STRIP.AUTO: 6.5 [PH]
PLATELET # BLD AUTO: 178 THOUSANDS/UL (ref 149–390)
PLATELET BLD QL SMEAR: ADEQUATE
PMV BLD AUTO: 9.8 FL (ref 8.9–12.7)
POTASSIUM SERPL-SCNC: 3.6 MMOL/L (ref 3.5–5.3)
PR INTERVAL: 118 MS
PR INTERVAL: 142 MS
PR INTERVAL: 144 MS
PROCALCITONIN SERPL-MCNC: 1.1 NG/ML
PROT UR STRIP-MCNC: ABNORMAL MG/DL
QRS AXIS: -21 DEGREES
QRS AXIS: -32 DEGREES
QRS AXIS: -35 DEGREES
QRSD INTERVAL: 80 MS
QRSD INTERVAL: 84 MS
QRSD INTERVAL: 86 MS
QT INTERVAL: 342 MS
QT INTERVAL: 344 MS
QT INTERVAL: 360 MS
QTC INTERVAL: 404 MS
QTC INTERVAL: 438 MS
QTC INTERVAL: 459 MS
RBC # BLD AUTO: 3.5 MILLION/UL (ref 3.81–5.12)
RBC #/AREA URNS AUTO: ABNORMAL /HPF
RBC MORPH BLD: NORMAL
SCREEN APTT: 30.3 SEC (ref 0–43.5)
SCREEN DRVVT: 39.1 SEC (ref 0–47)
SODIUM SERPL-SCNC: 136 MMOL/L (ref 135–147)
SP GR UR STRIP.AUTO: >=1.05 (ref 1–1.03)
T WAVE AXIS: -2 DEGREES
T WAVE AXIS: 15 DEGREES
T WAVE AXIS: 22 DEGREES
THROMBIN TIME: 18.4 SEC (ref 0–23)
UROBILINOGEN UR STRIP-ACNC: <2 MG/DL
VENTRICULAR RATE: 107 BPM
VENTRICULAR RATE: 84 BPM
VENTRICULAR RATE: 89 BPM
WBC # BLD AUTO: 18.98 THOUSAND/UL (ref 4.31–10.16)
WBC #/AREA URNS AUTO: ABNORMAL /HPF

## 2023-09-20 PROCEDURE — 85730 THROMBOPLASTIN TIME PARTIAL: CPT | Performed by: INTERNAL MEDICINE

## 2023-09-20 PROCEDURE — 97167 OT EVAL HIGH COMPLEX 60 MIN: CPT

## 2023-09-20 PROCEDURE — 97163 PT EVAL HIGH COMPLEX 45 MIN: CPT

## 2023-09-20 PROCEDURE — 81001 URINALYSIS AUTO W/SCOPE: CPT

## 2023-09-20 PROCEDURE — 93010 ELECTROCARDIOGRAM REPORT: CPT | Performed by: INTERNAL MEDICINE

## 2023-09-20 PROCEDURE — 99232 SBSQ HOSP IP/OBS MODERATE 35: CPT | Performed by: INTERNAL MEDICINE

## 2023-09-20 PROCEDURE — 85007 BL SMEAR W/DIFF WBC COUNT: CPT

## 2023-09-20 PROCEDURE — 84145 PROCALCITONIN (PCT): CPT

## 2023-09-20 PROCEDURE — 80048 BASIC METABOLIC PNL TOTAL CA: CPT

## 2023-09-20 PROCEDURE — 93005 ELECTROCARDIOGRAM TRACING: CPT

## 2023-09-20 PROCEDURE — 87040 BLOOD CULTURE FOR BACTERIA: CPT

## 2023-09-20 PROCEDURE — 85027 COMPLETE CBC AUTOMATED: CPT

## 2023-09-20 RX ORDER — LABETALOL HYDROCHLORIDE 5 MG/ML
10 INJECTION, SOLUTION INTRAVENOUS EVERY 6 HOURS PRN
Status: DISCONTINUED | OUTPATIENT
Start: 2023-09-20 | End: 2023-09-22 | Stop reason: HOSPADM

## 2023-09-20 RX ADMIN — HEPARIN SODIUM 14 UNITS/KG/HR: 10000 INJECTION, SOLUTION INTRAVENOUS at 03:56

## 2023-09-20 RX ADMIN — AMLODIPINE BESYLATE 10 MG: 10 TABLET ORAL at 08:54

## 2023-09-20 RX ADMIN — LISINOPRIL 20 MG: 20 TABLET ORAL at 08:54

## 2023-09-20 RX ADMIN — HEPARIN SODIUM 2200 UNITS: 1000 INJECTION INTRAVENOUS; SUBCUTANEOUS at 14:06

## 2023-09-20 NOTE — ASSESSMENT & PLAN NOTE
· Finding on CTA obtained in ED showing 8 mm nodule in the necrotic head  · Follow up imaging per Radiology/PCP

## 2023-09-20 NOTE — PLAN OF CARE
Problem: PHYSICAL THERAPY ADULT  Goal: Performs mobility at highest level of function for planned discharge setting. See evaluation for individualized goals. Description: Treatment/Interventions: Functional transfer training, LE strengthening/ROM, Therapeutic exercise, Cognitive reorientation, Patient/family training, Equipment eval/education, Bed mobility, Gait training, Spoke to nursing, Spoke to case management, OT  Equipment Recommended: Carla Ferreira       See flowsheet documentation for full assessment, interventions and recommendations. Note: Prognosis: Poor  Problem List: Decreased range of motion, Decreased strength, Decreased endurance, Impaired balance, Decreased mobility, Decreased cognition, Impaired judgement, Decreased safety awareness, Obesity, Decreased skin integrity  Assessment: Pt is a 80 y.o. female seen for PT evaluation s/p admit to [facility] on [facility]/18/[facility]23. Pt was admitted with a primary dx of: DVT of femoral vein of right lower extremity. PT now consulted for assessment of mobility and d/c needs. Pt with Up and OOB as tolerated orders. Pts current comorbidities and personal factors effecting treatment include: anxiety, HTN, BMI, osteopenia, depression. . Pts current clinical presentation is Unstable/Unpredictable (high complexity) due to Ongoing medical management for primary dx, Decreased activity tolerance compared to baseline, Fall risk, Increased assistance needed from caregiver at current time, Ongoing telemetry monitoring, Cog status. Prior to admission, pt was required family assistance. Upon evaluation, pt currently is requiring  ModA for transfers and [address] for ambulation 10 ft w/ RW.  Pt presents at PT eval functioning below baseline and currently w/ overall mobility deficits 2* to: BLE weakness, impaired balance, gait deviations, pain, decreased activity tolerance compared to baseline, decreased functional mobility tolerance compared to baseline, decreased safety awareness, impaired judgement, fall risk, decreased cognition. Pt currently at a fall risk 2* to impairments listed above. Pt will continue to benefit from skilled acute PT interventions to address stated impairments; to maximize functional mobility; for ongoing pt/ family training; and DME needs. At conclusion of PT session pt returned back in chair, chair alarm engaged, all needs in reach and RN notified of session findings/recommendations with phone and call bell within reach. Pt denies any further questions at this time. Recommend IP rehab upon hospital D/C. PT Discharge Recommendation: Post acute rehabilitation services    See flowsheet documentation for full assessment.

## 2023-09-20 NOTE — PLAN OF CARE
Problem: OCCUPATIONAL THERAPY ADULT  Goal: Performs self-care activities at highest level of function for planned discharge setting. See evaluation for individualized goals. Description: Treatment Interventions: ADL retraining, Functional transfer training, Endurance training, Cognitive reorientation, Patient/family training, Equipment evaluation/education, Compensatory technique education, Activityengagement, Energy conservation          See flowsheet documentation for full assessment, interventions and recommendations. Note: Limitation: Decreased ADL status, Decreased UE strength, Decreased Safe judgement during ADL, Decreased cognition, Decreased endurance, Decreased self-care trans, Decreased high-level ADLs (impaired balance, fxnl mobility, act moni, trunk control, standing moni, strength, fxnl sitting moni, attention to task, safety awareness, insight, orientation, problem solving, learning new tasks)  Prognosis: Good  Assessment: Pt is a 80 y.o. female seen for OT evaluation s/p [facility] on [facility]18/2023 due to abdominal pain. Diagnosed with Deep vein thrombosis (DVT) of femoral vein of right lower extremity ([address] Personal and env factors supporting pt at time of IE include supportive daughter + HHAs and accessible home environment. Personal and env factors inhibiting engagement in occupations include advanced age, limited social support, difficulty completing ADLs and difficulty completing IADLs. Performance deficits that affect the pt’s occupational performance can be seen above. Due to pt's current functional limitations and medical complications pt is functioning below baseline. Pt would benefit from continued skilled OT treatment in order to maximize safety, independence and overall performance with ADLs, functional mobility, functional transfers and cognition in order to achieve highest level of function.      OT Discharge Recommendation: Post acute rehabilitation services

## 2023-09-20 NOTE — PROGRESS NOTES
[address] [facility]  Progress Note  Name: Rosita Henderson  MRN: [id number]  Unit/Bed#: S -01 I Date of Admission: 9/18/2023   Date of Service: 9/20/2023 I Hospital Day: 1    Assessment/Plan   * Deep vein thrombosis (DVT) of femoral vein of right lower extremity (HCC)  Assessment & Plan  · VS lower limb venous duplex showed evidence of acute nonocclusive DVT in the common femoral vein and proximal distal femoral vein no evidence of superficial for Lua's noted.   · Follow CTA PE study was done did not show any PE  · Currently on heparin but will start Eliquis or Xarelto once centimeters dose price check    Plan:  · Continue IV Heparin  · Transition to oral anticoagulant after price-check by CM  · Continue monitoring respiratory status/SpO2  · Monitor Vitals SIgns    Near syncope  Assessment & Plan  · No overnight events, as mentioned before vasovagal is the most likely etiology for the patient's symptom  · Orthostatic hypotension seems to be unlikely at this time to since negative during evaluation  · PE also has been ruled out with negative CTA PE study  · Will continue her on IV Heparin and anticipate transition to oral anticoagulant once cleared by price check from CM  · Continue fall precautions  · Out of bed with assistance    Functional urinary incontinence  Assessment & Plan  · Continue PureWick    Essential hypertension  Assessment & Plan  BP: (126-140)/(66-72) 128/70  · BP Regimen: Amlodipine 10 mg PO QD and Lisinopril 20 mg PO QD  Labetalol 10 mg IV for SBP greater than 180 mmHg  · Current, BP is adequately controlled without having to use PRN med    Plan:   · Continue current regimen   · Monitor VS    Hyperlipidemia  Assessment & Plan  · Continue Fish Oil 1000 grams per day   · Repeat Lipid panel as outpatient once discharged     Mass of soft tissue  Assessment & Plan  · On CTA PE during ED he was expected soft tissue mass in the left L2-L3 vertebral neural foramen 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 was seen however on this has also been seen on MRI dated back in 2021  · Follow up with PCP once discharged    MCI (mild cognitive impairment)  Assessment & Plan  · Will need to follow up with her PCP once discharged  · May need capacity assessment     Pancreatic abnormality  Assessment & Plan  · Finding on CTA obtained in ED showing 8 mm nodule in the necrotic head  · Follow up imaging per Radiology/PCP    Pulmonary HTN ([address])  Assessment & Plan  · Patient found to have pulmonary hypertension after echo showed right ventricle systolic pressure mildly elevated with a systolic pressure of 73.47 mmHg  · Follow up with PCP at discharge   · Consider Pulmonology referral at discharge     Spinal stenosis of lumbar region with neurogenic claudication  Assessment & Plan  - Pain management prn  - PT/OT         VTE Pharmacologic Prophylaxis:     Moderate Risk (Score 3-4) - Pharmacological DVT Prophylaxis Ordered: Heparin. Mechanical VTE Prophylaxis in Place: Yes    Patient Centered Rounds: I have performed bedside rounds with nursing staff today. Discussions with Specialists or Other Care Team Provider: None     Education and Discussions with Family / Patient: Updated  (daughter) at bedside. Current Length of Stay: 1 day(s)    Current Patient Status: Inpatient     Discharge Plan / Estimated Discharge Date: Anticipate discharge tomorrow to home. Code Status: Level 3 - DNAR and DNI      Subjective:   Patient denies any problems this morning. Reports no difficulties with urination nor is she experiencing any pain. Objective:     Vitals:   Temp (24hrs), Av.3 °F (37.9 °C), Min:100.3 °F (37.9 °C), Max:100.3 °F (37.9 °C)    Temp:  [100.3 °F (37.9 °C)] 100.3 °F (37.9 °C)  HR:  [82-89] 89  Resp:  [18] 18  BP: (126-128)/(70-72) 128/70  SpO2:  [95 %-96 %] 95 %  Body mass index is 27.67 kg/m². Input and Output Summary (last 24 hours):        Intake/Output Summary (Last 24 hours) at 9/20/2023 1513  Last data filed at 9/20/2023 1000  Gross per 24 hour   Intake 480 ml   Output 600 ml   Net -120 ml       Physical Exam:     Physical Exam  Vitals and nursing note reviewed. Constitutional:       General: She is not in acute distress. Appearance: She is well-developed. HENT:      Head: Normocephalic and atraumatic. Eyes:      Conjunctiva/sclera: Conjunctivae normal.   Cardiovascular:      Rate and Rhythm: Normal rate and regular rhythm. Heart sounds: No murmur heard. Pulmonary:      Effort: Pulmonary effort is normal. No respiratory distress. Abdominal:      Palpations: Abdomen is soft. Tenderness: There is no abdominal tenderness. Musculoskeletal:         General: No swelling. Cervical back: Neck supple. Skin:     General: Skin is warm and dry. Capillary Refill: Capillary refill takes less than 2 seconds. Neurological:      Mental Status: She is alert. She is disoriented. Psychiatric:         Mood and Affect: Mood normal.         Additional Data:     Labs:  Results from last 7 days   Lab Units 09/20/23  0507 09/19/23 0605 09/18/23 2046   WBC Thousand/uL 18.98*   < > 8.61   HEMOGLOBIN g/dL 11.1*   < > 12.4   HEMATOCRIT % 33.4*   < > 38.4   PLATELETS Thousands/uL 178   < > 201   NEUTROS PCT %  --   --  71   LYMPHS PCT %  --   --  9*   LYMPHO PCT % 13*  --   --    MONOS PCT %  --   --  18*   MONO PCT % 7  --   --    EOS PCT % 0  --  1    < > = values in this interval not displayed.      Results from last 7 days   Lab Units 09/20/23  0507 09/19/23  0605 09/18/23 2046   SODIUM mmol/L 136   < > 138   POTASSIUM mmol/L 3.6   < > 4.9   CHLORIDE mmol/L 103   < > 103   CO2 mmol/L 25   < > 26   BUN mg/dL 23   < > 32*   CREATININE mg/dL 0.70   < > 0.88   ANION GAP mmol/L 8   < > 9   CALCIUM mg/dL 9.0   < > 10.5*   ALBUMIN g/dL  --   --  4.4   TOTAL BILIRUBIN mg/dL  --   --  0.38   ALK PHOS U/L  --   --  49   ALT U/L  --   --  12   AST U/L  --   --  17 GLUCOSE RANDOM mg/dL 135   < > 145*    < > = values in this interval not displayed. Results from last 7 days   Lab Units 09/18/23 2046   INR  0.93             Results from last 7 days   Lab Units 09/20/23  1200 09/18/23 2046   LACTIC ACID mmol/L  --  2.0   PROCALCITONIN ng/ml 1.10*  --        Imaging: Reviewed radiology reports from this admission including: CTA PE Study    Recent Cultures (last 7 days):           Lines/Drains:  Invasive Devices     Peripheral Intravenous Line  Duration           Peripheral IV 09/19/23 Dorsal (posterior); Left Forearm 1 day    Peripheral IV 09/19/23 Right Forearm <1 day                Telemetry:        Last 24 Hours Medication List:   Current Facility-Administered Medications   Medication Dose Route Frequency Provider Last Rate   • acetaminophen  650 mg Oral Q6H PRN Herminio Gerard MD     • amLODIPine  10 mg Oral Daily Herminio Gerard MD      And   • lisinopril  20 mg Oral Daily Herminio Gerard MD     • Diclofenac Sodium  2 g Topical 4x Daily Herminio Gerard MD     • fish oil  1,000 mg Oral Daily Herminio Gerard MD     • heparin (porcine)  3-30 Units/kg/hr (Order-Specific) Intravenous Titrated Herminio Gerard MD 16 Units/kg/hr (09/20/23 1404)   • heparin (porcine)  2,200 Units Intravenous Q6H PRN Herminio Gerard MD     • heparin (porcine)  4,400 Units Intravenous Q6H PRN Herminio Gerard MD     • labetalol  10 mg Intravenous Q6H PRN Pino Chaney DO     • magnesium hydroxide  15 mL Oral Daily PRN Herminio Gerard MD     • multivitamin-minerals  1 tablet Oral Daily Herminio Gerard MD          Today, Patient Was Seen By: Pino Chaney DO    ** Please Note: This note has been constructed using a voice recognition system.  **

## 2023-09-20 NOTE — PLAN OF CARE
Problem: PAIN - ADULT  Goal: Verbalizes/displays adequate comfort level or baseline comfort level  Description: Interventions:  - Encourage patient to monitor pain and request assistance  - Assess pain using appropriate pain scale  - Administer analgesics based on type and severity of pain and evaluate response  - Implement non-pharmacological measures as appropriate and evaluate response  - Consider cultural and social influences on pain and pain management  - Notify physician/advanced practitioner if interventions unsuccessful or patient reports new pain  Outcome: Progressing     Problem: INFECTION - ADULT  Goal: Absence or prevention of progression during hospitalization  Description: INTERVENTIONS:  - Assess and monitor for signs and symptoms of infection  - Monitor lab/diagnostic results  - Monitor all insertion sites, i.e. indwelling lines, tubes, and drains  - Monitor endotracheal if appropriate and nasal secretions for changes in amount and color  - Bucks appropriate cooling/warming therapies per order  - Administer medications as ordered  - Instruct and encourage patient and family to use good hand hygiene technique  - Identify and instruct in appropriate isolation precautions for identified infection/condition  Outcome: Progressing     Problem: Potential for Falls  Goal: Patient will remain free of falls  Description: INTERVENTIONS:  - Educate patient/family on patient safety including physical limitations  - Instruct patient to call for assistance with activity   - Consult OT/PT to assist with strengthening/mobility   - Keep Call bell within reach  - Keep bed low and locked with side rails adjusted as appropriate  - Keep care items and personal belongings within reach  - Initiate and maintain comfort rounds  - Make Fall Risk Sign visible to staff  - Offer Toileting every 2 Hours, in advance of need  - Initiate/Maintain bed alarm  - Obtain necessary fall risk management equipment: Alamrs  - Apply yellow socks and bracelet for high fall risk patients  - Consider moving patient to room near nurses station  Outcome: Progressing

## 2023-09-20 NOTE — ASSESSMENT & PLAN NOTE
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

## 2023-09-20 NOTE — CASE MANAGEMENT
Case Management Assessment & Discharge Planning Note    Patient name Alfredo Kam  Location S [address] [facility] [place] [address] [address] [id number] MRN [id number]  : 1931 Date 2023       Current Admission Date: 2023  Current Admission Diagnosis:Deep vein thrombosis (DVT) of femoral vein of right lower extremity [facility])   Patient Active Problem List    Diagnosis Date Noted   • Pancreatic abnormality 2023   • Mass of soft tissue 2023   • Near syncope 2023   • Deep vein thrombosis (DVT) of femoral vein of right lower extremity ([address]) 2023   • Fall 2022   • Closed displaced fracture of fourth cervical vertebra ([address]) 2022   • Epidural hematoma ([address]) 2022   • Spinal stenosis of lumbar region with neurogenic claudication    • Lumbar disc disease with radiculopathy    • PAD (peripheral artery disease) ([address]) 10/13/2022   • Encounter for gynecological examination without abnormal finding 2021   • Primary osteoarthritis of right hip    • Nerve sheath tumor 2021   • Lumbar spine tumor 2021   • Presence of pessary 2020   • Osteoporosis without current pathological fracture 2020   • Pulmonary HTN ([address]) 10/23/2020   • Sensorineural hearing loss (SNHL), bilateral 10/16/2019   • Asymmetrical hearing loss of right ear 10/16/2019   • 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   • Family history of malignant neoplasm of breast 2018   • Anxiety 2016   • Essential hypertension 2016   • MCI (mild cognitive impairment) 2016   • Blepharospasm 2015   • Depression, recurrent ([address]) 2014   • Functional urinary incontinence 2014   • Hyperlipidemia 2014   • Osteoporosis 2014   • 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 [address]  [address]  [address] [address]  Phone: [phone] Fax: [address], [address]  [address] [facility] [address]  Phone: [phone] Fax: [phone]    [id number] [place] [place] - [address]  [address] [address]  Phone: [phone] Fax: [phone]    Primary Care Provider: Santos Barnard DO    Primary Insurance: MEDICARE  Secondary Insurance: AARP    ASSESSMENT:  [place] Proxies    There are no active Health Care Proxies on file. Readmission Root Cause  30 Day Readmission: No    Patient Information  Admitted from[de-identified] Home  Mental Status: Confused  During Assessment patient was accompanied by: Daughter  Assessment information provided by[de-identified] Daughter  Primary Caregiver: Family  Caregiver's Name[de-identified] Thomas Jeter Relationship to Patient[de-identified] Family Member  Caregiver's Telephone Number[de-identified] See face sheet  Support Systems: Daughter  What city do you live in?: [facility] entry access options.  Select all that apply.: Ramp  Type of Current Residence: 2 story home  Upon entering residence, is there a bedroom on the main floor (no further steps)?: Yes  Upon entering residence, is there a bathroom on the main floor (no further steps)?: Yes  In the last 12 months, was there a time when you were not able to pay the mortgage or rent on time?: No  In the last 12 months, how many places have you lived?: 1  In the last 12 months, was there a time when you did not have a steady place to sleep or slept in a shelter (including now)?: No  Homeless/housing insecurity resource given?: N/A  Living Arrangements: Lives w/ Daughter  Is patient a ?: No    Activities of Daily Living Prior to Admission  Functional Status: Assistance  Completes ADLs independently?: No  Level of ADL dependence: Assistance  Ambulates independently?: No  Level of ambulatory dependence: Assistance  Does patient use assisted devices?: Yes  Assisted Devices (DME) used: Walker, Shower Chair  Does patient currently own DME?: Yes  What DME does the patient currently own?: Noberto Old  Does patient have a history of Outpatient Therapy (PT/OT)?: No  Does the patient have a history of Short-Term Rehab?: Yes [facility])  Does patient have a history of HHC?: Yes  Does patient currently have [facility]?: No    Current Home Health Care  Type of Current Home Care Services: Other (Comment) (24hrs caregiver)    Patient Information Continued  Income Source: Pension/longterm  Does patient have prescription coverage?: Yes  Within the past 12 months, you worried that your food would run out before you got the money to buy more.: Never true  Within the past 12 months, the food you bought just didn't last and you didn't have money to get more.: Never true  Food insecurity resource given?: N/A  Does patient receive dialysis treatments?: No  Does patient have a history of substance abuse?: No  Does patient have a history of Mental Health Diagnosis?: No         Means of Transportation  Means of Transport to Appts[de-identified] Family transport  In the past 12 months, has lack of transportation kept you from medical appointments or from getting medications?: No  In the past 12 months, has lack of transportation kept you from meetings, work, or from getting things needed for daily living?: No  Was application for public transport provided?: N/A        DISCHARGE DETAILS:    Discharge planning discussed with[de-identified] DTR  Freedom of Choice: Yes  Comments - Freedom of Choice: CM discussed discharge plans per care team recommendations.  DTR declines rehab and prefers patient to return home with [address] [place] [address] which is the agency patient had previously and daughter loved their service. CM sent referral to [facility] and they are able to accept at discharge. CM contacted family/caregiver?: Yes  Were Treatment Team discharge recommendations reviewed with patient/caregiver?: Yes  Did patient/caregiver verbalize understanding of patient care needs?: Yes       Contacts  Patient Contacts: Daughter - Kimberly Trinidad  Relationship to Patient[de-identified] Family  Contact Method: In Person  Reason/Outcome: Discharge Planning, Referral    Requested [address] [facility]         Is the patient interested in [facility] at discharge?: Yes  [facility] requested[de-identified] Nursing, Occupational Therapy, Physical [address] [facility] Name[de-identified] [facility] External Referral Reason (only applicable if external HHA name selected): Patient has established relationship with provider  [address] [facility] Provider[de-identified] PCP  Home Health Services Needed[de-identified] Evaluate Functional Status and Safety, Gait/ADL Training, Strengthening/Theraputic Exercises to Improve Function  Homebound Criteria Met[de-identified] Requires the Assistance of Another Person for Safe Ambulation or to Leave the Home, Uses an Assist Device (i.e. cane, walker, etc)  Supporting Clincal Findings[de-identified] Limited Endurance, Fatigues Easliy in [employer]    DME Referral Provided  Referral made for DME?: No    Other Referral/Resources/Interventions Provided:  Interventions: HHC, Prescription Price Check  Referral Comments: [address] [place], [place] [address]C. CM contacted patient's local pharmacy, [facility] to inquire on cost for Eliquis - was informed by the pharmacy the cost for Eliquis is $47. CM informed SLIM and patient's daughter.     Would you like to participate in our [address] service program?  : No - Declined       Discharge Destination Plan[de-identified] Home with [address]

## 2023-09-20 NOTE — PROGRESS NOTES
-- Patient:  -- MRN: [id number]  -- Aidin Request ID: [id number]  -- Level of care reserved: [address]  -- Partner Reserved: [address] [place] [address] [phone]  -- Clinical needs requested:  -- Geography searched: [address]  -- Start of Service:  -- Request sent: 12:19pm EDT on 9/20/2023 by Rose Dorantes  -- Partner reserved: 4:04pm EDT on 9/20/2023 by Rose Dorantes  -- Choice list shared: 4:04pm EDT on 9/20/2023 by Rose Dorantes

## 2023-09-20 NOTE — ASSESSMENT & PLAN NOTE
· Patient found to have pulmonary hypertension after echo showed right ventricle systolic pressure mildly elevated with a systolic pressure of 46.61 mmHg  · Follow up with PCP at discharge   · Consider Pulmonology referral at discharge

## 2023-09-20 NOTE — ASSESSMENT & PLAN NOTE
· No overnight events, as mentioned before vasovagal is the most likely etiology for the patient's symptom  · Orthostatic hypotension seems to be unlikely at this time to since negative during evaluation  · PE also has been ruled out with negative CTA PE study  · Will continue her on IV Heparin and anticipate transition to oral anticoagulant once cleared by price check from   · Continue fall precautions  · Out of bed with assistance

## 2023-09-20 NOTE — ASSESSMENT & PLAN NOTE
· VS lower limb venous duplex showed evidence of acute nonocclusive DVT in the common femoral vein and proximal distal femoral vein no evidence of superficial for Lua's noted.   · Follow CTA PE study was done did not show any PE  · Currently on heparin but will start Eliquis or Xarelto once centimeters dose price check    Plan:  · Continue IV Heparin  · Transition to oral anticoagulant after price-check by CM  · Continue monitoring respiratory status/SpO2  · Monitor Vitals SIgns

## 2023-09-20 NOTE — PHYSICAL THERAPY NOTE
Physical Therapy Evaluation    Patient's Name: Savage Rae    Admitting Diagnosis  Vomiting [R11.10]  Abdominal pain [R10.9]  Cervical dystonia [G24.3]  Near syncope [R55]  Acute deep vein thrombosis (DVT) of femoral vein of right lower extremity ([address]) [I82.411]    Problem List  Patient Active Problem List   Diagnosis    Blepharospasm    Spasmodic torticollis    Anxiety    Depression, recurrent ([address])    Essential hypertension    Family history of malignant neoplasm of breast    Functional urinary incontinence    Hyperlipidemia    MCI (mild cognitive impairment)    Osteoporosis    Torsion dystonia fragments    Segmental dystonia    Sensorineural hearing loss (SNHL), bilateral    Asymmetrical hearing loss of right ear    Pulmonary HTN ([address])    Presence of pessary    Osteoporosis without current pathological fracture    Nerve sheath tumor    Lumbar spine tumor    Primary osteoarthritis of right hip    Encounter for gynecological examination without abnormal finding    PAD (peripheral artery disease) ([address])    Spinal stenosis of lumbar region with neurogenic claudication    Lumbar disc disease with radiculopathy    Fall    Closed displaced fracture of fourth cervical vertebra ([address])    Epidural hematoma ([address])    Near syncope    Deep vein thrombosis (DVT) of femoral vein of right lower extremity ([address])    Pancreatic abnormality    Mass of soft tissue       Past Medical History  Past Medical History:   Diagnosis Date    Anxiety 2010    Bladder infection 09/2019    Breast lump in female     HL (hearing loss) 2010    Hypertension 2010    Osteopenia     Ovarian cyst        Past Surgical History  Past Surgical History:   Procedure Laterality Date    BLADDER SURGERY      BREAST BIOPSY      CATARACT EXTRACTION Bilateral 01/01/2015    , Right Eye-2017, Left eye    ENDOMETRIAL BIOPSY      by Suction    HYSTERECTOMY        09/20/23 1146   Note Type   Note type Evaluation   Pain Assessment   Pain Assessment Tool 0-10   Pain Score No Pain   Restrictions/Precautions   Weight Bearing Precautions Per Order No   Other Precautions (S)  Cognitive; Bed Alarm; Chair Alarm;Multiple lines; Fall Risk;Pain  (per LUCIUS le HR no greater than 130)   Home Living   Type of [address] [place] [address] [address] Two level;Stairs to enter with rails; Ramped entrance  (per pt dtr, 4 steps down from garage to enter, stair glide to second floor, ramped entrance in the back, foldable ramp for 1 JES through front, bathroom upstairs(1x per week for bathing, otherwise, sponge bathes))   Bathroom Shower/Tub Tub/shower unit   Bathroom Toilet Standard   Bathroom Equipment Shower chair;Commode   [place]; Wheelchair-manual  (hospital bed)   Prior Function   Lives With Daughter   [place] Help From Family;Home health  (24 hr caregiver)   Falls in the last 6 months 0   Vocational Retired   Comments at baseline, pt ambulates short distances with RW and ax1   General   Family/Caregiver Present Yes   Cognition   Overall Cognitive Status Impaired   Orientation Level Oriented to person;Disoriented to place; Disoriented to time;Disoriented to situation   Following Commands Follows one step commands with increased time or repetition   Comments pt ID by wristband, name and    RLE Assessment   RLE Assessment X  (grossly 3+/5)   LLE Assessment   LLE Assessment X  (grossly 3+/5)   Bed Mobility   Supine to Sit Unable to assess   Sit to Supine Unable to assess   Additional Comments pt OOB in recliner chair when PT enters/exits   Transfers   Sit to Stand 3  Moderate assistance   Additional items Assist x 1; Increased time required;Verbal cues   Stand to Sit 3  Moderate assistance   Additional items Assist x 1; Increased time required;Verbal cues   Additional Comments pt fluctuates from mod to min x1 for STS transfers, performs x5 throughout session, for pericare and for initating ambulation   Ambulation/Elevation   Gait pattern Decreased L stance; Short stride;Poor UE support; Step to; Forward Flexion   Gait Assistance 3  Moderate assist   Additional items Assist x 1;Verbal cues   Assistive Device Rolling walker   Distance 12'x1, 5'x1   Ambulation/Elevation Additional Comments pt requires cues for increased proximity to RW, RW management,   Balance   Static Sitting Poor +   Dynamic Sitting Poor   Static Standing Poor -   Dynamic Standing Poor -   Ambulatory Poor -  (RW)   Activity Tolerance   Activity Tolerance Patient limited by fatigue;Treatment limited secondary to medical complications (Comment)  (HR)   Medical Staff Made Aware spoke with OT, CM,   Nurse Made Aware RN [place]   Assessment   Prognosis Poor   Problem List Decreased range of motion;Decreased strength;Decreased endurance; Impaired balance;Decreased mobility; Decreased cognition; Impaired judgement;Decreased safety awareness; Obesity; Decreased skin integrity   Assessment Pt is a 80 y.o. female seen for PT evaluation s/p admit to [facility] on 9/18/2023. Pt was admitted with a primary dx of: DVT of femoral vein of right lower extremity. PT now consulted for assessment of mobility and d/c needs. Pt with Up and OOB as tolerated orders. Pts current comorbidities and personal factors effecting treatment include: anxiety, HTN, BMI, osteopenia, depression. . Pts current clinical presentation is Unstable/Unpredictable (high complexity) due to Ongoing medical management for primary dx, Decreased activity tolerance compared to baseline, Fall risk, Increased assistance needed from caregiver at current time, Ongoing telemetry monitoring, Cog status. Prior to admission, pt was required family assistance. Upon evaluation, pt currently is requiring  ModA for transfers and [address] for ambulation 10 ft w/ RW.  Pt presents at PT eval functioning below baseline and currently w/ overall mobility deficits 2* to: BLE weakness, impaired balance, gait deviations, pain, decreased activity tolerance compared to baseline, decreased functional mobility tolerance compared to baseline, decreased safety awareness, impaired judgement, fall risk, decreased cognition. Pt currently at a fall risk 2* to impairments listed above. Pt will continue to benefit from skilled acute PT interventions to address stated impairments; to maximize functional mobility; for ongoing pt/ family training; and DME needs. At conclusion of PT session pt returned back in chair, chair alarm engaged, all needs in reach and RN notified of session findings/recommendations with phone and call bell within reach. Pt denies any further questions at this time. Recommend IP rehab upon hospital D/C. Goals   Patient Goals to get more pep   STG Expiration Date 09/30/23   Short Term Goal #1 In 10 days pt will be able to: 1. Demonstrate ability to perform all aspects of bed mobility with Richard to improve functional safety. 2. Perform functional transfers with supervision to facilitate safe return to previous living environment. 3.  Ambulate 100 ft with RW and supervision with stable vitals to improve safety with household distances and reduce fall risk. 4. Improve LE strength grades by 1 to increase ease of functional mobility with transfers and gait. 5. Pt will demonstrate improved balance by one grade in order to decrease risk of falls. 6. Climb 4 steps with 1 HR with Richard to simulate entrance to home. PT Treatment Day 0   Plan   Treatment/Interventions Functional transfer training;LE strengthening/ROM; Therapeutic exercise;Cognitive reorientation;Patient/family training;Equipment eval/education; Bed mobility;Gait training;Spoke to nursing;Spoke to case management;OT   PT Frequency 3-5x/wk   Recommendation   PT Discharge Recommendation Post acute rehabilitation services   Equipment Recommended [facility] Recommended Wheeled walker   AM-PAC Basic Mobility Inpatient   Turning in Flat Bed Without Bedrails 1   Lying on Back to Sitting on Edge of Flat Bed Without Bedrails 1   Moving Bed to Chair 2 Standing Up From Chair Using Arms 3   Walk in Room 2   Climb 3-5 Stairs With Railing 1   Basic Mobility Inpatient Raw Score 10   Turning Head Towards Sound 3   Follow Simple Instructions 2   Low Function Basic Mobility Raw Score  15   Low Function Basic Mobility Standardized Score  23.9   Highest Level Of Mobility   -[facility] Goal 4: Move to chair/commode   JH-HLM Achieved 6: Walk 10 steps or more   End of Consult   Patient Position at End of Consult Bedside chair;Bed/Chair alarm activated; All needs within reach  (dtr in room)   The patient's AM-PAC Basic Mobility Inpatient Short Form Raw Score is 10. A Raw score of less than or equal to 16 suggests the patient may benefit from discharge to post-acute rehabilitation services. Please also refer to the recommendation of the Physical Therapist for safe discharge planning.   Eva Trevizo, PT

## 2023-09-20 NOTE — ASSESSMENT & PLAN NOTE
BP: (126-140)/(66-72) 128/70  · BP Regimen: Amlodipine 10 mg PO QD and Lisinopril 20 mg PO QD  Labetalol 10 mg IV for SBP greater than 180 mmHg  · Current, BP is adequately controlled without having to use PRN med    Plan:   · Continue current regimen   · Monitor VS

## 2023-09-20 NOTE — OCCUPATIONAL THERAPY NOTE
Occupational Therapy Evaluation     Patient Name: Arian Gordon  RJGGR'R Date: 9/20/2023  Problem List  Principal Problem:    Deep vein thrombosis (DVT) of femoral vein of right lower extremity (HCC)  Active Problems:    Essential hypertension    Functional urinary incontinence    Hyperlipidemia    MCI (mild cognitive impairment)    Pulmonary HTN (HCC)    PAD (peripheral artery disease) ([address])    Spinal stenosis of lumbar region with neurogenic claudication    Near syncope    Pancreatic abnormality    Mass of soft tissue    Past Medical History  Past Medical History:   Diagnosis Date    Anxiety 2010    Bladder infection 09/2019    Breast lump in female     HL (hearing loss) 2010    Hypertension 2010    Osteopenia     Ovarian cyst      Past Surgical History  Past Surgical History:   Procedure Laterality Date    BLADDER SURGERY      BREAST BIOPSY      CATARACT EXTRACTION Bilateral 01/01/2015    , Right Eye-2017, Left eye    ENDOMETRIAL BIOPSY      by Suction    HYSTERECTOMY               09/20/23 0943   OT Last Visit   OT Visit Date 09/20/23   Note Type   Note type Evaluation   Pain Assessment   Pain Assessment Tool 0-10   Pain Score No Pain   Restrictions/Precautions   Weight Bearing Precautions Per Order No   Other Precautions Cognitive; Chair Alarm; Bed Alarm; Fall Risk;Pain;Multiple lines;Telemetry  (HR elevating between 150s and 178 during activity, LUCIUS Spence made aware)   Home Living   Type of [address] [place] [address] Two level;Stairs to enter with rails  (has stair glide to 2nd floor)   Bathroom Shower/Tub Tub/shower unit   Bathroom Toilet Standard   Bathroom Equipment Shower chair;Commode   [address]; Wheelchair-manual   Additional Comments pt a questionable historian, reports using both RW and w/c at home.  Unknown pt's level of assist at baseline with functional mobility   Prior Function   Level of [place] Needs assistance with ADLs   Lives With Daughter  Gilma Thomson)   [address] [facility] Help From Family;Home health  (per chart review pt has 24/7 care at home)   IADLs Family/Friend/Other provides transportation; Family/Friend/Other provides meals; Family/Friend/Other provides medication management   Falls in the last 6 months   (unable to recall)   Vocational Retired   Lifestyle   Autonomy PTA pt living with daughter in [address] [place] [address],[address], pt requiring (A) with ADLs and IADLs, use of RW vs w/c at baseline   Reciprocal Relationships supportive daughter + HHAs   Service to Others retired   Intrinsic Gratification unable to recall what she enjoys   General   Family/Caregiver Present No   Subjective   Subjective "Well what are you going to do, drop me on the floor?"   ADL   Eating Assistance 3  Moderate Assistance   Grooming Assistance 3  Moderate Assistance   UB Bathing Assistance 2  Maximal Assistance    [facility] 1  [address]; Thread LUE;Pull around back   LB Dressing Assistance 1  Total Assistance   LB Dressing Deficit Don/doff R sock; Don/doff L sock   Toileting Assistance  1  Total Assistance   Toileting Deficit Perineal hygiene   Bed Mobility   Supine to Sit 2  Maximal assistance   Additional items Assist x 1; Increased time required;Verbal cues;LE management   Transfers   Sit to Stand 2  Maximal assistance   Additional items Assist x 1; Increased time required;Verbal cues   Stand to Sit 2  Maximal assistance   Additional items Assist x 1; Increased time required;Verbal cues   Stand pivot 2  Maximal assistance  (fluctuating from max A x1 to dependent x1)   Additional items Assist x 1; Increased time required;Verbal cues   Additional Comments use of RW, able to advance LLE x1 step forward during SPT to pt's L   Functional Mobility   Additional Comments unable to advance further than SPT due to elevated HR   Balance   Static Sitting Poor +   Dynamic Sitting Poor   Static Standing Poor -   Dynamic Standing Poor -   Ambulatory Poor -   Activity Tolerance   Activity Tolerance Patient limited by fatigue; Other (Comment); Treatment limited secondary to medical complications (Comment)  (limited by cognition; Pt's HR s/p sitting in chair fluctuating 150s-178, LUCIUS Spence made aware)   Medical Staff Made Aware RN [place]   RUE Assessment   RUE Assessment WFL   LUE Assessment   LUE Assessment WFL   Hand Function   Gross Motor Coordination Functional   Fine Motor Coordination Functional   Cognition   Overall Cognitive Status Impaired   Arousal/Participation Alert; Cooperative   Attention Attends with cues to redirect   Orientation Level Oriented to person;Disoriented to place; Disoriented to time;Disoriented to situation  (States that it is March)   Memory Decreased short term memory;Decreased recall of recent events;Decreased recall of precautions   Following Commands Follows one step commands with increased time or repetition   Comments Pt a poor historian, unable to provide PLOF activities   Assessment   Limitation Decreased ADL status; Decreased UE strength;Decreased Safe judgement during ADL;Decreased cognition;Decreased endurance;Decreased self-care trans;Decreased high-level ADLs  (impaired balance, fxnl mobility, act moni, trunk control, standing moni, strength, fxnl sitting moni, attention to task, safety awareness, insight, orientation, problem solving, learning new tasks)   Prognosis Good   Assessment Pt is a 80 y.o. female seen for OT evaluation s/p [facility] due to abdominal pain. Diagnosed with Deep vein thrombosis (DVT) of femoral vein of right lower extremity ([address] Personal and env factors supporting pt at time of IE include supportive daughter + HHAs and accessible home environment. Personal and env factors inhibiting engagement in occupations include advanced age, limited social support, difficulty completing ADLs and difficulty completing IADLs.  Performance deficits that affect the pt’s occupational performance can be seen above. Due to pt's current functional limitations and medical complications pt is functioning below baseline. Pt would benefit from continued skilled OT treatment in order to maximize safety, independence and overall performance with ADLs, functional mobility, functional transfers and cognition in order to achieve highest level of function. Goals   Patient Goals to rest   LTG Time Frame 10-14   Long Term Goal see goals listed below   Plan   Treatment Interventions ADL retraining;Functional transfer training; Endurance training;Cognitive reorientation;Patient/family training;Equipment evaluation/education; Compensatory technique education; Activityengagement; Energy conservation   Goal Expiration Date 09/30/23   OT Treatment Day 0   OT Frequency 2-3x/wk   Recommendation   OT Discharge Recommendation Post acute rehabilitation services   AM-PAC Daily Activity Inpatient   Lower Body Dressing 1   Bathing 2   Toileting 1   Upper Body Dressing 2   Grooming 3   Eating 3   Daily Activity Raw Score 12   Daily Activity Standardized Score (Calc for Raw Score >=11) 30.6   AM-PAC Applied Cognition Inpatient   Following a Speech/Presentation 2   Understanding Ordinary Conversation 3   Taking Medications 2   Remembering Where Things Are Placed or Put Away 1   Remembering List of 4-5 Errands 1   Taking Care of Complicated Tasks 1   Applied Cognition Raw Score 10   Applied Cognition Standardized Score 24.98   End of Consult   Patient Position at End of Consult Bedside chair;Bed/Chair alarm activated; All needs within reach       GOALS:      -Patient will perform grooming tasks sitting in chair with overall Supervision in order to increase overall independence    -Patient will be Min A  with UB dressing using AE and AD as needed in order to increase (I) with ADLs    -Patient will be Min A  with UB bathing using AE and AD as needed in order to increase (I) with ADLs    -Patient will be Mod A  with LB dressing with use of AE and AD as needed in order to increase (I) with ADLs    -Patient will be Mod A  with LB bathing with use of AE and AD as needed in order to increase (I) with ADLs    -Patient will complete toileting w/ Mod A  w/ G hygiene/thoroughness in order to reduce caregiver burden    -Patient will demonstrate Min A x 1 with bed mobility for ability to manage own comfort and initiate OOB tasks.     -Patient will perform functional transfers with Min A x 1 to/from all surfaces using DME as needed in order to increase (I) with functional tasks    -Patient will be Min A x 1 with functional mobility to/from [facility] for increased independence with toileting tasks    -Patient will engage in ongoing cognitive assessment in order to assist with safe discharge planning/recommendations. The patient's raw score on the AM-PAC Daily Activity Inpatient Short Form is 12. A raw score of less than 19 suggests the patient may benefit from discharge to post-acute rehabilitation services. Please refer to the recommendation of the Occupational Therapist for safe discharge planning.     Ap Gudino MS, OTR/L

## 2023-09-21 LAB
ANION GAP SERPL CALCULATED.3IONS-SCNC: 10 MMOL/L
APTT PPP: 62 SECONDS (ref 23–37)
BUN SERPL-MCNC: 32 MG/DL (ref 5–25)
CALCIUM SERPL-MCNC: 9.3 MG/DL (ref 8.4–10.2)
CHLORIDE SERPL-SCNC: 103 MMOL/L (ref 96–108)
CO2 SERPL-SCNC: 23 MMOL/L (ref 21–32)
CREAT SERPL-MCNC: 1.03 MG/DL (ref 0.6–1.3)
ERYTHROCYTE [DISTWIDTH] IN BLOOD BY AUTOMATED COUNT: 14.6 % (ref 11.6–15.1)
GFR SERPL CREATININE-BSD FRML MDRD: 47 ML/MIN/1.73SQ M
GLUCOSE SERPL-MCNC: 142 MG/DL (ref 65–140)
HCT VFR BLD AUTO: 34.7 % (ref 34.8–46.1)
HGB BLD-MCNC: 11.2 G/DL (ref 11.5–15.4)
MCH RBC QN AUTO: 32.4 PG (ref 26.8–34.3)
MCHC RBC AUTO-ENTMCNC: 32.3 G/DL (ref 31.4–37.4)
MCV RBC AUTO: 100 FL (ref 82–98)
PLATELET # BLD AUTO: 169 THOUSANDS/UL (ref 149–390)
PMV BLD AUTO: 9.9 FL (ref 8.9–12.7)
POTASSIUM SERPL-SCNC: 3.4 MMOL/L (ref 3.5–5.3)
PROT C AG ACT/NOR PPP IA: >150 % OF NORMAL (ref 60–150)
PROT S ACT/NOR PPP: 116 % (ref 61–136)
PROT S ACT/NOR PPP: 76 % (ref 68–108)
PROT S PPP-ACNC: 122 % (ref 60–150)
RBC # BLD AUTO: 3.46 MILLION/UL (ref 3.81–5.12)
SODIUM SERPL-SCNC: 136 MMOL/L (ref 135–147)
WBC # BLD AUTO: 14.66 THOUSAND/UL (ref 4.31–10.16)

## 2023-09-21 PROCEDURE — 85027 COMPLETE CBC AUTOMATED: CPT

## 2023-09-21 PROCEDURE — 80048 BASIC METABOLIC PNL TOTAL CA: CPT

## 2023-09-21 PROCEDURE — 85730 THROMBOPLASTIN TIME PARTIAL: CPT | Performed by: INTERNAL MEDICINE

## 2023-09-21 PROCEDURE — 99232 SBSQ HOSP IP/OBS MODERATE 35: CPT | Performed by: INTERNAL MEDICINE

## 2023-09-21 PROCEDURE — 97530 THERAPEUTIC ACTIVITIES: CPT

## 2023-09-21 PROCEDURE — 97116 GAIT TRAINING THERAPY: CPT

## 2023-09-21 PROCEDURE — 85007 BL SMEAR W/DIFF WBC COUNT: CPT

## 2023-09-21 RX ORDER — POTASSIUM CHLORIDE 20 MEQ/1
40 TABLET, EXTENDED RELEASE ORAL ONCE
Status: COMPLETED | OUTPATIENT
Start: 2023-09-21 | End: 2023-09-21

## 2023-09-21 RX ADMIN — AMLODIPINE BESYLATE 10 MG: 10 TABLET ORAL at 08:44

## 2023-09-21 RX ADMIN — DICLOFENAC SODIUM 2 G: 10 GEL TOPICAL at 11:43

## 2023-09-21 RX ADMIN — HEPARIN SODIUM 16 UNITS/KG/HR: 10000 INJECTION, SOLUTION INTRAVENOUS at 08:49

## 2023-09-21 RX ADMIN — LISINOPRIL 20 MG: 20 TABLET ORAL at 08:44

## 2023-09-21 RX ADMIN — POTASSIUM CHLORIDE 40 MEQ: 1500 TABLET, EXTENDED RELEASE ORAL at 13:28

## 2023-09-21 RX ADMIN — APIXABAN 10 MG: 5 TABLET, FILM COATED ORAL at 10:58

## 2023-09-21 RX ADMIN — MULTIPLE VITAMINS W/ MINERALS TAB 1 TABLET: TAB ORAL at 08:44

## 2023-09-21 RX ADMIN — APIXABAN 10 MG: 5 TABLET, FILM COATED ORAL at 20:38

## 2023-09-21 NOTE — ASSESSMENT & PLAN NOTE
on echo report: Tricuspid Valve: The right ventricular systolic pressure is mildly elevated. The estimated right ventricular systolic pressure is 48.84 mmHg.     We will continue to monitor and consider outpatient cardiac/pulmonology follow-up

## 2023-09-21 NOTE — PLAN OF CARE
Problem: PHYSICAL THERAPY ADULT  Goal: Performs mobility at highest level of function for planned discharge setting. See evaluation for individualized goals. Description: Treatment/Interventions: Functional transfer training, LE strengthening/ROM, Therapeutic exercise, Cognitive reorientation, Patient/family training, Equipment eval/education, Bed mobility, Gait training, Spoke to nursing, Spoke to case management, OT  Equipment Recommended: Keyona Saldivar       See flowsheet documentation for full assessment, interventions and recommendations. 9/21/2023 1403 by Alexis Coleman PT  Note: Prognosis: Poor  Problem List: Decreased endurance, Decreased strength, Impaired balance, Decreased mobility, Decreased cognition, Impaired judgement, Decreased safety awareness, Impaired vision, Impaired hearing, Obesity  Assessment: Pt seen for f/u PT treatment. Pt agreeable, bilateral AROM therapeutic exercise improves pt c/o "stiffness" in her BL LE. Pt ambualtes short distance about 8 feet, then incontinent of bowel, performs transfer onto bed side commode with mod x 1 assistance. Pt requires mod x 1 assist from bed side commode for performing hygeine. Pt requires CGA to min when standing during hygeine/pericare. Pt demonstrates continued need of vc and tactile facilitation to initate STS transfers. Pt did participate in two additional ambulatory trials, walking a max of 10'. Continued to require vc for step length, RW management, obstacle negotiation. Pt hr consistently in 110s with ambulation, required seated rest of 3-4 minutes between trials in order for HR to return to 80-90s BPM. Overall, pt is progressing slowly toward pt and PT goals. WOuld reccomend continued emphasis on ambulatory distances, decreased assistance required for transfer training, LE strengthening, and dynamic balance tasks. Continue to reccomend post acute rehabilitation upon discharge.         PT Discharge Recommendation: Post acute rehabilitation services    See flowsheet documentation for full assessment. 9/21/2023 1402 by Alejandrina Puri PT  Note: Prognosis: Poor  Problem List: Decreased endurance, Decreased strength, Impaired balance, Decreased mobility, Decreased cognition, Impaired judgement, Decreased safety awareness, Impaired vision, Impaired hearing, Obesity  Assessment: Pt seen for f/u PT treatment. Pt agreeable, bilateral AROM therapeutic exercise improves pt c/o "stiffness" in her BL LE. Pt ambualtes short distance about 8 feet, then incontinent of bowel, performs transfer onto bed side commode with mod x 1 assistance. Pt requires mod x 1 assist from bed side commode for performing hygeine. Pt requires CGA to min when standing during hygeine/pericare. Pt demonstrates continued need of vc and tactile facilitation to initate STS transfers. Pt did participate in two additional ambulatory trials, walking a max of 10'. Continued to require vc for step length, RW management, obstacle negotiation. Pt hr consistently in 110s with ambulation, required seated rest of 3-4 minutes between trials in order for HR to return to 80-90s BPM. Overall, pt is progressing slowly toward pt and PT goals. WOuld reccomend continued emphasis on ambulatory distances, decreased assistance required for transfer training, LE strengthening, and dynamic balance tasks. Continue to reccomend post acute rehabilitation upon discharge. PT Discharge Recommendation: Post acute rehabilitation services    See flowsheet documentation for full assessment.

## 2023-09-21 NOTE — PHYSICAL THERAPY NOTE
Physical Therapy Treatment Note    Patient's Name: Joseph Brasher  : [id number]   Note Type   Note Type Treatment   Pain Assessment   Pain Assessment Tool FLACC   Pain Location/Orientation Location: Hip;Orientation: Right   Pain Rating: FLACC (Rest) - Face 1   Pain Rating: FLACC (Rest) - Legs 0   Pain Rating: FLACC (Rest) - Activity 0   Pain Rating: FLACC (Rest) - Cry 0   Pain Rating: FLACC (Rest) - Consolability 0   Score: FLACC (Rest) 1   Pain Rating: FLACC (Activity) - Face 1   Pain Rating: FLACC (Activity) - Legs 1   Pain Rating: FLACC (Activity) - Activity 1   Pain Rating: FLACC (Activity) - Cry 0   Pain Rating: FLACC (Activity) - Consolability 0   Score: FLACC (Activity) 3   Restrictions/Precautions   Weight Bearing Precautions Per Order No   Other Precautions Cognitive; Chair Alarm; Bed Alarm; Fall Risk;Pain   General   Chart Reviewed Yes   Response to Previous Treatment Patient with no complaints from previous session. Family/Caregiver Present Yes   Cognition   Orientation Level Oriented to person;Disoriented to place; Disoriented to time;Disoriented to situation   Following Commands Follows one step commands inconsistently   Comments pt pleasant and agreeable to PT treatment, ID by wristband, name and    Subjective   Subjective pt up in recliner chair, c/o right leg pain and stiffness   Bed Mobility   Supine to Sit Unable to assess   Sit to Supine Unable to assess   Additional Comments pt OOB in recliner chair at start/end of treatment   Transfers   Sit to Stand 3  Moderate assistance   Additional items Assist x 1; Increased time required;Verbal cues;Armrests   Stand to Sit 3  Moderate assistance   Additional items Assist x 1; Increased time required;Verbal cues;Armrests   Stand pivot 3  Moderate assistance   Additional items Assist x 1; Increased time required;Verbal cues;Armrests   Additional Comments vc for use of arm rests to stand, trunk positioning, pt requires x3 STS from commode at consistent mod x 1 assist. VC for hand placement on RW when standing. x3 STS from bed side recliner, consistent vc for hand placement throughout   Ambulation/Elevation   Gait pattern Improper Weight shift; Poor UE support   Gait Assistance 3  Moderate assist   Additional items Assist x 1;Verbal cues   Assistive Device Rolling walker   Distance 8'x1, 8'x1, 10'x1  (with chair follow)   Ambulation/Elevation Additional Comments vc for RW management, step length, obstacle negotiation, improved overall posture, HR throughout with exertion in 110s   Balance   Static Sitting Poor +   Dynamic Sitting Poor   Static Standing Poor -   Dynamic Standing Poor -   Ambulatory Poor -  (RW)   Activity Tolerance   Activity Tolerance Patient limited by fatigue   Nurse Made Aware LUCIUS le   Exercises   Knee AROM Long Arc Quad Sitting;20 reps;Bilateral  (2x10)   Assessment   Prognosis Poor   Problem List Decreased endurance;Decreased strength; Impaired balance;Decreased mobility; Decreased cognition; Impaired judgement;Decreased safety awareness; Impaired vision; Impaired hearing;Obesity   Assessment Pt seen for f/u PT treatment. Pt agreeable, bilateral AROM therapeutic exercise improves pt c/o "stiffness" in her BL LE. Pt ambualtes short distance about 8 feet, then incontinent of bowel, performs transfer onto bed side commode with mod x 1 assistance. Pt requires mod x 1 assist from bed side commode for performing hygeine. Pt requires CGA to min when standing during hygeine/pericare. Pt demonstrates continued need of vc and tactile facilitation to initate STS transfers. Pt did participate in two additional ambulatory trials, walking a max of 10'. Continued to require vc for step length, RW management, obstacle negotiation.  Pt hr consistently in 110s with ambulation, required seated rest of 3-4 minutes between trials in order for HR to return to 80-90s BPM. Overall, pt is progressing slowly toward pt and PT goals. WOuld reccomend continued emphasis on ambulatory distances, decreased assistance required for transfer training, LE strengthening, and dynamic balance tasks. Continue to reccomend post acute rehabilitation upon discharge. Goals   Patient Goals to get better   STG Expiration Date 09/30/23   Short Term Goal #1 In 10 days pt will be able to: 1. Demonstrate ability to perform all aspects of bed mobility with Richard to improve functional safety. 2. Perform functional transfers with supervision to facilitate safe return to previous living environment. 3.  Ambulate 100 ft with RW and supervision with stable vitals to improve safety with household distances and reduce fall risk. 4. Improve LE strength grades by 1 to increase ease of functional mobility with transfers and gait. 5. Pt will demonstrate improved balance by one grade in order to decrease risk of falls. 6. Climb 4 steps with 1 HR with Richard to simulate entrance to home. PT Treatment Day 1   Plan   Treatment/Interventions Functional transfer training;LE strengthening/ROM; Therapeutic exercise;Cognitive reorientation;Patient/family training;Equipment eval/education; Bed mobility; Compensatory technique education;Elevations;Gait training;Spoke to nursing;Spoke to MD;Spoke to case management;OT   Progress Slow progress, decreased activity tolerance   PT Frequency 3-5x/wk   Recommendation   PT Discharge Recommendation Post acute rehabilitation services   Equipment Recommended [facility] Recommended Wheeled walker   AM-PAC Basic Mobility Inpatient   Turning in Flat Bed Without Bedrails 1   Lying on Back to Sitting on Edge of Flat Bed Without Bedrails 1   Moving Bed to Chair 2   Standing Up From Chair Using Arms 3   Walk in Room 2   Climb 3-5 Stairs With Railing 1   Basic Mobility Inpatient Raw Score 10   Turning Head Towards Sound 3   Follow Simple Instructions 2   Low Function Basic Mobility Raw Score  15   Low Function Basic Mobility Standardized Score  23.9   Highest Level Of Mobility   -[facility] Goal 4: Move to chair/commode   -HL Achieved 6: Walk 10 steps or more   Education   Education Provided Mobility training;Assistive device   Patient Reinforcement needed   End of Consult   Patient Position at End of Consult Bedside chair; All needs within reach;Bed/Chair alarm activated   The patient's AM-PAC Basic Mobility Inpatient Short Form Raw Score is 10. A Raw score of less than or equal to 16 suggests the patient may benefit from discharge to post-acute rehabilitation services. Please also refer to the recommendation of the Physical Therapist for safe discharge planning.     London Cole, PT

## 2023-09-21 NOTE — ASSESSMENT & PLAN NOTE
Deep Vein Thrombosis of R femoral vein:  Indication for hospitalization is significant comorbid disease  Plan:    Discontinue telemetry no events noted  IV heparin discontinued and started on 10 mg Eliquis twice daily  Bilateral LE venous duplex scan confirmed deep vein thrombosis in right femoral vein   respiratory status stable saturating 96% on room air and is not short of breath

## 2023-09-21 NOTE — ASSESSMENT & PLAN NOTE
Incidental finding on CTAP.     Expansile soft tissue mass in the left L2-3 vertebral neural foramen concerning for a schwannoma or neurofibroma similar in appearance to the prior MRI dated 5/29/2021 follow-up with PCP

## 2023-09-21 NOTE — ASSESSMENT & PLAN NOTE
Patient has mild cognitive impairment. Patient appears at baseline. Patient alert to self and that she is in the hospital but is confused as to the events that led her to get here.   Patient is alert

## 2023-09-21 NOTE — ASSESSMENT & PLAN NOTE
Noted incidentally on CTAP. 8 mm nodule in the necrotic head. For simple cyst(s) less than 1.5 cm, recommend followup every 2 year for 2 times. After 4 years, no more followups. Recommend next followup in 2 years.  Preferred imaging modality: abdomen MRI and MRCP with and without IV contrast, or triple phase abdomen CT with IV contrast, or abdomen MRI and MRCP without IV contrast.  Outpatient with PCP

## 2023-09-21 NOTE — PROGRESS NOTES
[address] [facility]  Progress Note  Name: Luzma Mendieta  MRN: [id number]  Unit/Bed#: S -01 I Date of Admission: 9/18/2023   Date of Service: 9/21/2023 I Hospital Day: 2    Assessment/Plan   * Deep vein thrombosis (DVT) of femoral vein of right lower extremity [facility])  Assessment & Plan  Deep Vein Thrombosis of R femoral vein:  Indication for hospitalization is significant comorbid disease  Plan:    Discontinue telemetry no events noted  IV heparin discontinued and started on 10 mg Eliquis twice daily  Bilateral LE venous duplex scan confirmed deep vein thrombosis in right femoral vein   respiratory status stable saturating 96% on room air and is not short of breath        Near syncope  Assessment & Plan  CTA PE study negative for pulmonary embolism  orthostatic BP negative  PT/OT recommended rehab placement, family denies at this time and opts for home services and outpatient physical therapy/Occupational Therapy    Mass of soft tissue  Assessment & Plan  Incidental finding on CTAP. Expansile soft tissue mass in the left L2-3 vertebral neural foramen concerning for a schwannoma or neurofibroma similar in appearance to the prior MRI dated 5/29/2021 follow-up with PCP    Pancreatic abnormality  Assessment & Plan  Noted incidentally on CTAP. 8 mm nodule in the necrotic head. For simple cyst(s) less than 1.5 cm, recommend followup every 2 year for 2 times. After 4 years, no more followups. Recommend next followup in 2 years. Preferred imaging modality: abdomen MRI and MRCP with and without IV contrast, or triple phase abdomen CT with IV contrast, or abdomen MRI and MRCP without IV contrast.  Outpatient with PCP    Spinal stenosis of lumbar region with neurogenic claudication  Assessment & Plan  Pain management prn  PT/OT    Pulmonary HTN ([address])  Assessment & Plan  on echo report: Tricuspid Valve: The right ventricular systolic pressure is mildly elevated.  The estimated right ventricular systolic pressure is 88.60 mmHg. We will continue to monitor and consider outpatient cardiac/pulmonology follow-up    MCI (mild cognitive impairment)  Assessment & Plan  Patient has mild cognitive impairment. Patient appears at baseline. Patient alert to self and that she is in the hospital but is confused as to the events that led her to get here. Patient is alert     Hyperlipidemia  Assessment & Plan  Continue fish oil    Functional urinary incontinence  Assessment & Plan  Continue Purewick external catheter    Essential hypertension  Assessment & Plan  Blood Pressure: 125/53    Continue home antihypertensives or formulary equivalent  Prn labetalol IV for SBP >170 mmHg          VTE Pharmacologic Prophylaxis:     High Risk (Score >/= 5) - Pharmacological DVT Prophylaxis Ordered: Apixaban (Eliquis). Sequential Compression Devices Ordered. Mechanical VTE Prophylaxis in Place: No    Patient Centered Rounds: I have performed bedside rounds with nursing staff today. Discussions with Specialists or Other Care Team Provider: PT OT and case management    Education and Discussions with Family / Patient: Updated  (daughter) at bedside. Current Length of Stay: 2 day(s)    Current Patient Status: Inpatient     Discharge Plan / Estimated Discharge Date: Anticipate discharge tomorrow to home with home services. Code Status: Level 3 - DNAR and DNI      Subjective:   Patient seen  up and sitting in the chair. Patient is alert and in good spirits. Patient denies any pain at this time. Patient also denies any chest pain, shortness of breath, dizziness, lightheadedness. She does admit to a little pain in her right thigh, knee area. Patient states that overall she slept well and is not having any issues with urination or having bowel movements. It was explained to the patient that she had a blood clot in her right femoral vein.   In that we would be transitioning her to oral anticoagulation therapy rather than IV today. Patient was informed that this medication would be $47 a month. Patient and daughter are admittable to this plan. We are still awaiting results for blood culture and barring that that is negative we can plan for discharge tomorrow with outpatient PT OT, home health services. Patient and daughter are okay with this plan. Patient's family encouraged to contact us with any questions or concerns    Objective:     Vitals:   Temp (24hrs), Av.5 °F (36.9 °C), Min:98 °F (36.7 °C), Max:99 °F (37.2 °C)    Temp:  [98 °F (36.7 °C)-99 °F (37.2 °C)] 98 °F (36.7 °C)  HR:  [] 85  Resp:  [16-19] 16  BP: (118-130)/(53-66) 125/53  SpO2:  [96 %-97 %] 96 %  Body mass index is 27.67 kg/m². Input and Output Summary (last 24 hours):     No intake or output data in the 24 hours ending 23 1252    Physical Exam:     Physical Exam  Constitutional:       Appearance: Normal appearance. HENT:      Head: Normocephalic and atraumatic. Eyes:      Conjunctiva/sclera: Conjunctivae normal.   Cardiovascular:      Rate and Rhythm: Normal rate and regular rhythm. Pulses: Normal pulses. Heart sounds: Normal heart sounds. No murmur heard. Pulmonary:      Effort: Pulmonary effort is normal. No respiratory distress. Breath sounds: Normal breath sounds. No wheezing. Abdominal:      General: Bowel sounds are normal. There is no distension. Palpations: Abdomen is soft. Tenderness: There is no abdominal tenderness. There is no guarding. Musculoskeletal:      Right lower leg: No edema. Left lower leg: No edema. Neurological:      General: No focal deficit present. Mental Status: She is alert and oriented to person, place, and time. Mental status is at baseline.    Psychiatric:         Mood and Affect: Mood normal.         Behavior: Behavior normal.          Additional Data:     Labs:  Results from last 7 days   Lab Units 23  0650 23  0507 23  3283 09/18/23 2046   WBC Thousand/uL 14.66* 18.98*   < > 8.61   HEMOGLOBIN g/dL 11.2* 11.1*   < > 12.4   HEMATOCRIT % 34.7* 33.4*   < > 38.4   PLATELETS Thousands/uL 169 178   < > 201   NEUTROS PCT %  --   --   --  71   LYMPHS PCT %  --   --   --  9*   LYMPHO PCT %  --  13*  --   --    MONOS PCT %  --   --   --  18*   MONO PCT %  --  7  --   --    EOS PCT %  --  0  --  1    < > = values in this interval not displayed. Results from last 7 days   Lab Units 09/21/23  0650 09/19/23  0605 09/18/23 2046   SODIUM mmol/L 136   < > 138   POTASSIUM mmol/L 3.4*   < > 4.9   CHLORIDE mmol/L 103   < > 103   CO2 mmol/L 23   < > 26   BUN mg/dL 32*   < > 32*   CREATININE mg/dL 1.03   < > 0.88   ANION GAP mmol/L 10   < > 9   CALCIUM mg/dL 9.3   < > 10.5*   ALBUMIN g/dL  --   --  4.4   TOTAL BILIRUBIN mg/dL  --   --  0.38   ALK PHOS U/L  --   --  49   ALT U/L  --   --  12   AST U/L  --   --  17   GLUCOSE RANDOM mg/dL 142*   < > 145*    < > = values in this interval not displayed. Results from last 7 days   Lab Units 09/18/23  2046   INR  0.93             Results from last 7 days   Lab Units 09/20/23  1200 09/18/23 2046   LACTIC ACID mmol/L  --  2.0   PROCALCITONIN ng/ml 1.10*  --        Imaging: Reviewed radiology reports from this admission including: chest CT scan, ECHO and ultrasound(s)    Recent Cultures (last 7 days):     Results from last 7 days   Lab Units 09/20/23  1201 09/20/23  1159   BLOOD CULTURE  Received in Microbiology Lab. Culture in Progress. Received in Microbiology Lab. Culture in Progress. Lines/Drains:  Invasive Devices     Peripheral Intravenous Line  Duration           Peripheral IV 09/19/23 Dorsal (posterior); Left Forearm 2 days    Peripheral IV 09/19/23 Right Forearm 1 day                Telemetry: Patient not on telemetry       Last 24 Hours Medication List:   Current Facility-Administered Medications   Medication Dose Route Frequency Provider Last Rate   • acetaminophen  650 mg Oral Q6H PRN Sergio Henrandez MD     • amLODIPine  10 mg Oral Daily Sergio Hernandez MD      And   • lisinopril  20 mg Oral Daily Sergio Hernandez MD     • apixaban  10 mg Oral BID Roman Rodríguez DO     • Diclofenac Sodium  2 g Topical 4x Daily Sergio Hernandez MD     • fish oil  1,000 mg Oral Daily Sergio Hernandez MD     • labetalol  10 mg Intravenous Q6H PRN Ayana Dejan, DO     • magnesium hydroxide  15 mL Oral Daily PRN Sergio Hernandez MD     • multivitamin-minerals  1 tablet Oral Daily Sergio Hernandez MD     • potassium chloride  40 mEq Oral Once Joseph Basilio DO          Today, Patient Was Seen By: Joseph Basilio DO    ** Please Note: This note has been constructed using a voice recognition system.  **

## 2023-09-21 NOTE — ASSESSMENT & PLAN NOTE
CTA PE study negative for pulmonary embolism  orthostatic BP negative  PT/OT recommended rehab placement, family denies at this time and opts for home services and outpatient physical therapy/Occupational Therapy

## 2023-09-21 NOTE — ASSESSMENT & PLAN NOTE
Blood Pressure: 125/53    Continue home antihypertensives or formulary equivalent  Prn labetalol IV for SBP >170 mmHg

## 2023-09-22 ENCOUNTER — TELEPHONE (OUTPATIENT)
Age: 88
End: 2023-09-22

## 2023-09-22 VITALS
TEMPERATURE: 98.1 F | OXYGEN SATURATION: 95 % | HEIGHT: 59 IN | DIASTOLIC BLOOD PRESSURE: 72 MMHG | RESPIRATION RATE: 16 BRPM | WEIGHT: 137 LBS | HEART RATE: 84 BPM | SYSTOLIC BLOOD PRESSURE: 139 MMHG | BODY MASS INDEX: 27.62 KG/M2

## 2023-09-22 LAB
ANION GAP SERPL CALCULATED.3IONS-SCNC: 6 MMOL/L
APTT PPP: 37 SECONDS (ref 23–37)
BASOPHILS # BLD AUTO: 0.03 THOUSANDS/ÂΜL (ref 0–0.1)
BASOPHILS # BLD MANUAL: 0.15 THOUSAND/UL (ref 0–0.1)
BASOPHILS NFR BLD AUTO: 0 % (ref 0–1)
BASOPHILS NFR MAR MANUAL: 1 % (ref 0–1)
BUN SERPL-MCNC: 29 MG/DL (ref 5–25)
CALCIUM SERPL-MCNC: 8.9 MG/DL (ref 8.4–10.2)
CHLORIDE SERPL-SCNC: 107 MMOL/L (ref 96–108)
CO2 SERPL-SCNC: 25 MMOL/L (ref 21–32)
CREAT SERPL-MCNC: 0.83 MG/DL (ref 0.6–1.3)
EOSINOPHIL # BLD AUTO: 0.06 THOUSAND/ÂΜL (ref 0–0.61)
EOSINOPHIL # BLD MANUAL: 0 THOUSAND/UL (ref 0–0.4)
EOSINOPHIL NFR BLD AUTO: 1 % (ref 0–6)
EOSINOPHIL NFR BLD MANUAL: 0 % (ref 0–6)
ERYTHROCYTE [DISTWIDTH] IN BLOOD BY AUTOMATED COUNT: 14.3 % (ref 11.6–15.1)
GFR SERPL CREATININE-BSD FRML MDRD: 61 ML/MIN/1.73SQ M
GLUCOSE SERPL-MCNC: 117 MG/DL (ref 65–140)
HCT VFR BLD AUTO: 29.9 % (ref 34.8–46.1)
HCT VFR BLD AUTO: 32.8 % (ref 34.8–46.1)
HGB BLD-MCNC: 10.6 G/DL (ref 11.5–15.4)
HGB BLD-MCNC: 9.7 G/DL (ref 11.5–15.4)
IMM GRANULOCYTES # BLD AUTO: >0.5 THOUSAND/UL (ref 0–0.2)
IMM GRANULOCYTES NFR BLD AUTO: 7 % (ref 0–2)
LYMPHOCYTES # BLD AUTO: 1.03 THOUSAND/UL (ref 0.6–4.47)
LYMPHOCYTES # BLD AUTO: 1.22 THOUSANDS/ÂΜL (ref 0.6–4.47)
LYMPHOCYTES # BLD AUTO: 7 % (ref 14–44)
LYMPHOCYTES NFR BLD AUTO: 15 % (ref 14–44)
MCH RBC QN AUTO: 31.4 PG (ref 26.8–34.3)
MCHC RBC AUTO-ENTMCNC: 32.4 G/DL (ref 31.4–37.4)
MCV RBC AUTO: 97 FL (ref 82–98)
MONOCYTES # BLD AUTO: 1.3 THOUSAND/ÂΜL (ref 0.17–1.22)
MONOCYTES # BLD AUTO: 1.61 THOUSAND/UL (ref 0–1.22)
MONOCYTES NFR BLD AUTO: 15 % (ref 4–12)
MONOCYTES NFR BLD: 11 % (ref 4–12)
NEUTROPHILS # BLD AUTO: 5.26 THOUSANDS/ÂΜL (ref 1.85–7.62)
NEUTROPHILS # BLD MANUAL: 11.87 THOUSAND/UL (ref 1.85–7.62)
NEUTS SEG NFR BLD AUTO: 62 % (ref 43–75)
NEUTS SEG NFR BLD AUTO: 81 % (ref 43–75)
NRBC BLD AUTO-RTO: 0 /100 WBCS
PATHOLOGY REVIEW: YES
PLATELET # BLD AUTO: 168 THOUSANDS/UL (ref 149–390)
PLATELET BLD QL SMEAR: ADEQUATE
PMV BLD AUTO: 10.1 FL (ref 8.9–12.7)
POTASSIUM SERPL-SCNC: 3.8 MMOL/L (ref 3.5–5.3)
RBC # BLD AUTO: 3.09 MILLION/UL (ref 3.81–5.12)
RBC MORPH BLD: NORMAL
SODIUM SERPL-SCNC: 138 MMOL/L (ref 135–147)
TOTAL CELLS COUNTED SPEC: 100
WBC # BLD AUTO: 8.42 THOUSAND/UL (ref 4.31–10.16)

## 2023-09-22 PROCEDURE — 85014 HEMATOCRIT: CPT | Performed by: INTERNAL MEDICINE

## 2023-09-22 PROCEDURE — 99239 HOSP IP/OBS DSCHRG MGMT >30: CPT | Performed by: INTERNAL MEDICINE

## 2023-09-22 PROCEDURE — 97530 THERAPEUTIC ACTIVITIES: CPT

## 2023-09-22 PROCEDURE — 85018 HEMOGLOBIN: CPT | Performed by: INTERNAL MEDICINE

## 2023-09-22 PROCEDURE — 85730 THROMBOPLASTIN TIME PARTIAL: CPT | Performed by: INTERNAL MEDICINE

## 2023-09-22 PROCEDURE — 80048 BASIC METABOLIC PNL TOTAL CA: CPT

## 2023-09-22 PROCEDURE — 85027 COMPLETE CBC AUTOMATED: CPT

## 2023-09-22 PROCEDURE — 97110 THERAPEUTIC EXERCISES: CPT

## 2023-09-22 RX ADMIN — DICLOFENAC SODIUM 2 G: 10 GEL TOPICAL at 08:15

## 2023-09-22 RX ADMIN — MULTIPLE VITAMINS W/ MINERALS TAB 1 TABLET: TAB ORAL at 08:15

## 2023-09-22 RX ADMIN — APIXABAN 10 MG: 5 TABLET, FILM COATED ORAL at 08:15

## 2023-09-22 RX ADMIN — LISINOPRIL 20 MG: 20 TABLET ORAL at 08:15

## 2023-09-22 RX ADMIN — OMEGA-3 FATTY ACIDS CAP 1000 MG 1000 MG: 1000 CAP at 08:14

## 2023-09-22 RX ADMIN — ACETAMINOPHEN 650 MG: 325 TABLET, FILM COATED ORAL at 08:14

## 2023-09-22 RX ADMIN — AMLODIPINE BESYLATE 10 MG: 10 TABLET ORAL at 08:14

## 2023-09-22 RX ADMIN — DICLOFENAC SODIUM 2 G: 10 GEL TOPICAL at 13:12

## 2023-09-22 NOTE — PHYSICAL THERAPY NOTE
PHYSICAL THERAPY NOTE          Patient Name: Michelle Brown  [id number] Date: 9/22/2023 09/22/23 0830   PT Last Visit   PT Visit Date 09/22/23   Note Type   Note Type Treatment   Pain Assessment   Pain Assessment Tool 0-10   Pain Score 8   Pain Location/Orientation Location: Back;Orientation: Lower   Pain Onset/Description Onset: Ongoing   Effect of Pain on Daily Activities limited activity tolerance and functional mobility   Patient's Stated Pain Goal No pain   Hospital Pain Intervention(s) Repositioned; Ambulation/increased activity; Elevated; Emotional support; Rest   Multiple Pain Sites No   Pain Rating: FLACC (Rest) - Face 1   Pain Rating: FLACC (Rest) - Legs 0   Pain Rating: FLACC (Rest) - Activity 0   Pain Rating: FLACC (Rest) - Cry 0   Pain Rating: FLACC (Rest) - Consolability 0   Score: FLACC (Rest) 1   Pain Rating: FLACC (Activity) - Face 1   Pain Rating: FLACC (Activity) - Legs 1   Pain Rating: FLACC (Activity) - Activity 1   Pain Rating: FLACC (Activity) - Cry 1   Pain Rating: FLACC (Activity) - Consolability 0   Score: FLACC (Activity) 4   Restrictions/Precautions   Weight Bearing Precautions Per Order No   Other Precautions Cognitive; Chair Alarm; Bed Alarm; Fall Risk;Pain   General   Chart Reviewed Yes   Response to Previous Treatment Other (Comment)  (pt reports pain butr able to participate in PT intervention)   Cognition   Overall Cognitive Status Impaired   Arousal/Participation Alert; Responsive; Cooperative   Attention Attends with cues to redirect   Orientation Level Oriented to person;Oriented to place;Oriented to situation;Disoriented to time   Memory Decreased short term memory;Decreased recall of recent events;Decreased recall of precautions   Following Commands Follows one step commands with increased time or repetition   Comments pt was pleasant and cooperative throughout tx session   Subjective   Subjective pt was agreeable to participate in PT intervention. pt stated 8/10 lower back pain pre/post tx session RN made aware   Bed Mobility   Supine to Sit 2  Maximal assistance   Additional items Assist x 1;HOB elevated; Bedrails; Increased time required;Verbal cues;LE management; Other  (trunk management)   Sit to Supine Unable to assess   Additional Comments pt seated OOB in the recliner post tx session   Transfers   Sit to Stand 2  Maximal assistance   Additional items Assist x 1; Increased time required;Verbal cues   Stand to Sit 3  Moderate assistance   Additional items Assist x 1; Armrests; Increased time required;Verbal cues   Stand pivot 2  Maximal assistance   Additional items Assist x 1; Armrests; Increased time required;Verbal cues   Additional Comments All functional transfers completed with RW, mod to max ax1 and VC's for RW management and hand placement whiel ascending to RW and descending into recliner   Ambulation/Elevation   Gait pattern Not tested   Balance   Static Sitting Fair -   Dynamic Sitting Poor   Static Standing Poor   Dynamic Standing Poor -   Ambulatory Poor -  (w/ RW)   Endurance Deficit   Endurance Deficit Yes   Endurance Deficit Description limited bed mobility, functional transfers and activity tolerance   Activity Tolerance   Activity Tolerance Patient limited by fatigue; Other (Comment)  (generalizwed weakness)   Nurse Made Aware Spoke to RN [facility]   Exercises   Quad Sets Supine;10 reps;AROM; Bilateral   Hip Abduction Sitting;10 reps;AROM; Bilateral   Hip Adduction Sitting;10 reps;AROM; Bilateral  (pillow squeezes)   Knee AROM Long Arc Quad Sitting;10 reps;AROM; Bilateral   Ankle Pumps Sitting;10 reps;AROM; Bilateral   Assessment   Prognosis Poor   Problem List Decreased strength;Decreased endurance; Impaired balance;Decreased mobility; Impaired judgement;Decreased safety awareness;Decreased cognition; Impaired vision; Impaired hearing;Obesity   Assessment pt began tx session lying supine in the bed and was agreeable to participate in PT intervention. PT intervention to focus on bed mobility, transfer training, posture/balance w/ gait and TE activities in order to strengthen LE's Pain continues to impact overall functional mobility as pt required increased assistance for all bed mobility and functional transfers to and from RW. pt required max ax1 for bed mobility with LE and trunk management in order to complete a supine<>sit EOB. pt was able to sit EOB 6 minutes w/o LOB while RN gave pt medications and cream for lower back pain. pt again required increased assistance compared to previous tx session as pt required max ax1 for STS transfers and max ax1 for SPT from EOB to recliner. pt required VC's for hand placement and RW management. pt was bobby to participate in TE activities in recliner w/o increases in pain. pt cotninues to be at risk for falls and injuries due to limited OOB mobility, standing tolerance and functional transfers. pt would benefit from PT focus on bed mobility, transfer training and gait when appropiate. Continue to recommend post acute rehab services at the time of D/C in order to maximize pt functional independence and safety with all OOB mobility. POswt tx pt in recliner with call bell, chair alarm activated and all pt needs met   Goals   Patient Goals to sit in recliner and eat breakfast   STG Expiration Date 09/30/23   PT Treatment Day 2   Plan   Treatment/Interventions Functional transfer training;LE strengthening/ROM; Therapeutic exercise; Endurance training;Cognitive reorientation;Patient/family training;Equipment eval/education; Bed mobility;Gait training;Spoke to nursing   Progress No functional improvements   PT Frequency 3-5x/wk   Recommendation   PT Discharge Recommendation Post acute rehabilitation services   Equipment Recommended [facility] Recommended Wheeled walker   AM-PAC Basic Mobility Inpatient   Turning in Flat Bed Without Bedrails 1   Lying on Back to Sitting on Edge of Flat Bed Without Bedrails 2   Moving Bed to Chair 2   Standing Up From Chair Using Arms 2   Walk in Room 1   Climb 3-5 Stairs With Railing 1   Basic Mobility Inpatient Raw Score 9   Highest Level Of Mobility   -[facility] Goal 3: Sit at edge of bed   -HL Achieved 4: Move to [facility] Financial Provided Other  (bed mobility and functional transfers)   Patient Reinforcement needed   End of Consult   Patient Position at End of Consult Bedside chair;Bed/Chair alarm activated; All needs within reach   The patient's AM-PAC Basic Mobility Inpatient Short Form Raw Score is 9. A Raw score of less than or equal to 16 suggests the patient may benefit from discharge to post-acute rehabilitation services. Please also refer to the recommendation of the Physical Therapist for safe discharge planning.      Renata Villafuerte

## 2023-09-22 NOTE — ASSESSMENT & PLAN NOTE
Blood Pressure: 139/72    Continue home antihypertensives or formulary equivalent  Prn labetalol IV for SBP >170 mmHg  Continue home regimen upon discharge

## 2023-09-22 NOTE — PLAN OF CARE
Problem: PHYSICAL THERAPY ADULT  Goal: Performs mobility at highest level of function for planned discharge setting. See evaluation for individualized goals. Description: Treatment/Interventions: Functional transfer training, LE strengthening/ROM, Therapeutic exercise, Cognitive reorientation, Patient/family training, Equipment eval/education, Bed mobility, Gait training, Spoke to nursing, Spoke to case management, OT  Equipment Recommended: Korina Milligan       See flowsheet documentation for full assessment, interventions and recommendations. Outcome: Not Progressing  Note: Prognosis: Poor  Problem List: Decreased strength, Decreased endurance, Impaired balance, Decreased mobility, Impaired judgement, Decreased safety awareness, Decreased cognition, Impaired vision, Impaired hearing, Obesity  Assessment: pt began tx session lying supine in the bed and was agreeable to participate in PT intervention. PT intervention to focus on bed mobility, transfer training, posture/balance w/ gait and TE activities in order to strengthen LE's Pain continues to impact overall functional mobility as pt required increased assistance for all bed mobility and functional transfers to and from RW. pt required max ax1 for bed mobility with LE and trunk management in order to complete a supine<>sit EOB. pt was able to sit EOB 6 minutes w/o LOB while RN gave pt medications and cream for lower back pain. pt again required increased assistance compared to previous tx session as pt required max ax1 for STS transfers and max ax1 for SPT from EOB to recliner. pt required VC's for hand placement and RW management. pt was bobby to participate in TE activities in recliner w/o increases in pain. pt cotninues to be at risk for falls and injuries due to limited OOB mobility, standing tolerance and functional transfers. pt would benefit from PT focus on bed mobility, transfer training and gait when appropiate.  Continue to recommend post acute rehab services at the time of D/C in order to maximize pt functional independence and safety with all OOB mobility. POswt tx pt in recliner with call bell, chair alarm activated and all pt needs met        PT Discharge Recommendation: Post acute rehabilitation services    See flowsheet documentation for full assessment.

## 2023-09-22 NOTE — DISCHARGE INSTR - AVS FIRST PAGE
Dear Gali Encinas,     It was our pleasure to care for you here at [facility]. It is our hope that we were always able to exceed the expected standards for your care during your stay. You were hospitalized due to deep venous thrombosis of right femoral vein. You were cared for on the 2nd floor by Serjio Nelson DO under the service of Leslye Holliday MD with the [facility] who covers for your primary care physician (PCP), Nadir Mari DO, while you were hospitalized. If you have any questions or concerns related to this hospitalization, you may contact us at [phone]. For follow up as well as any medication refills, we recommend that you follow up with your primary care physician. A registered nurse will reach out to you by phone within a few days after your discharge to answer any additional questions that you may have after going home. However, at this time we provide for you here, the most important instructions / recommendations at discharge:     Notable Medication Adjustments -   Start taking Eliquis 10 mg (two 5mg tablets) twice daily for 6 days by mouth, then take Eliquis 5 mg(one 5mg tablet) twice daily by mouth  Testing Required after Discharge -   none  ** Please contact your PCP to request testing orders for any of the testing recommended here **  Important follow up information -   Please follow-up with hematology outpatient  Please follow-up with PCP  Please coordinate with home health services to schedule physical therapy and Occupational Therapy  Other Instructions -   none  Please review this entire after visit summary as additional general instructions including medication list, appointments, activity, diet, any pertinent wound care, and other additional recommendations from your care team that may be provided for you.       Sincerely,     Serjio Nelson DO

## 2023-09-22 NOTE — ASSESSMENT & PLAN NOTE
on echo report: Tricuspid Valve: The right ventricular systolic pressure is mildly elevated. The estimated right ventricular systolic pressure is 70.42 mmHg. We will continue to monitor and consider outpatient cardiac/pulmonology follow-up.   We will have PCP evaluate and address

## 2023-09-22 NOTE — PLAN OF CARE
Problem: Potential for Falls  Goal: Patient will remain free of falls  Description: INTERVENTIONS:  - Educate patient/family on patient safety including physical limitations  - Instruct patient to call for assistance with activity   - Consult OT/PT to assist with strengthening/mobility   - Keep Call bell within reach  - Keep bed low and locked with side rails adjusted as appropriate  - Keep care items and personal belongings within reach  - Initiate and maintain comfort rounds  - Make Fall Risk Sign visible to staff  - Offer Toileting every 2 Hours, in advance of need  - Initiate/Maintain bed alarm  - Obtain necessary fall risk management equipment: Alamrs  - Apply yellow socks and bracelet for high fall risk patients  - Consider moving patient to room near nurses station  Outcome: Progressing     Problem: MOBILITY - ADULT  Goal: Maintain or return to baseline ADL function  Description: INTERVENTIONS:  -  Assess patient's ability to carry out ADLs; assess patient's baseline for ADL function and identify physical deficits which impact ability to perform ADLs (bathing, care of mouth/teeth, toileting, grooming, dressing, etc.)  - Assess/evaluate cause of self-care deficits   - Assess range of motion  - Assess patient's mobility; develop plan if impaired  - Assess patient's need for assistive devices and provide as appropriate  - Encourage maximum independence but intervene and supervise when necessary  - Involve family in performance of ADLs  - Assess for home care needs following discharge   - Consider OT consult to assist with ADL evaluation and planning for discharge  - Provide patient education as appropriate  Outcome: Progressing     Problem: PAIN - ADULT  Goal: Verbalizes/displays adequate comfort level or baseline comfort level  Description: Interventions:  - Encourage patient to monitor pain and request assistance  - Assess pain using appropriate pain scale  - Administer analgesics based on type and severity of pain and evaluate response  - Implement non-pharmacological measures as appropriate and evaluate response  - Consider cultural and social influences on pain and pain management  - Notify physician/advanced practitioner if interventions unsuccessful or patient reports new pain  Outcome: Progressing     Problem: INFECTION - ADULT  Goal: Absence or prevention of progression during hospitalization  Description: INTERVENTIONS:  - Assess and monitor for signs and symptoms of infection  - Monitor lab/diagnostic results  - Monitor all insertion sites, i.e. indwelling lines, tubes, and drains  - Monitor endotracheal if appropriate and nasal secretions for changes in amount and color  - [place] appropriate cooling/warming therapies per order  - Administer medications as ordered  - Instruct and encourage patient and family to use good hand hygiene technique  - Identify and instruct in appropriate isolation precautions for identified infection/condition  Outcome: Progressing     Problem: DISCHARGE PLANNING  Goal: Discharge to home or other facility with appropriate resources  Description: INTERVENTIONS:  - Identify barriers to discharge w/patient and caregiver  - Arrange for needed discharge resources and transportation as appropriate  - Identify discharge learning needs (meds, wound care, etc.)  - Arrange for interpretive services to assist at discharge as needed  - Refer to Case Management Department for coordinating discharge planning if the patient needs post-hospital services based on physician/advanced practitioner order or complex needs related to functional status, cognitive ability, or social support system  Outcome: Progressing     Problem: Knowledge Deficit  Goal: Patient/family/caregiver demonstrates understanding of disease process, treatment plan, medications, and discharge instructions  Description: Complete learning assessment and assess knowledge base.   Interventions:  - Provide teaching at level of understanding  - Provide teaching via preferred learning methods  Outcome: Progressing     Problem: Prexisting or High Potential for Compromised Skin Integrity  Goal: Skin integrity is maintained or improved  Description: INTERVENTIONS:  - Identify patients at risk for skin breakdown  - Assess and monitor skin integrity  - Assess and monitor nutrition and hydration status  - Monitor labs   - Assess for incontinence   - Turn and reposition patient  - Assist with mobility/ambulation  - Relieve pressure over bony prominences  - Avoid friction and shearing  - Provide appropriate hygiene as needed including keeping skin clean and dry  - Evaluate need for skin moisturizer/barrier cream  - Collaborate with interdisciplinary team   - Patient/family teaching  - Consider wound care consult   Outcome: Progressing

## 2023-09-22 NOTE — ASSESSMENT & PLAN NOTE
Deep Vein Thrombosis of R femoral vein:  Indication for hospitalization is significant comorbid disease  Plan:    Discontinue telemetry no events noted  9/21/2023: IV heparin discontinued and started on 10 mg Eliquis twice daily   9/22/2023: Patient to continue 10 mg twice daily Eliquis day 2 of 7.   Patient educated upon discharge that after 7 days they will take 5 mg Eliquis twice daily  Bilateral LE venous duplex scan confirmed deep vein thrombosis in right femoral vein  respiratory status stable saturating 96% on room air and is not short of breath  We will follow-up with hematology outpatient

## 2023-09-22 NOTE — DISCHARGE SUMMARY
[facility]  Discharge- Iveth Prado 11/27/1931, 80 y.o. female MRN: [id number]  Unit/Bed#: S -01 Encounter: [id number]  Primary Care Provider: Sol Osborne DO   Date and time admitted to hospital: 9/18/2023  7:07 PM    * Deep vein thrombosis (DVT) of femoral vein of right lower extremity [facility])  Assessment & Plan  Deep Vein Thrombosis of R femoral vein:  Indication for hospitalization is significant comorbid disease  Plan:    Discontinue telemetry no events noted  9/21/2023: IV heparin discontinued and started on 10 mg Eliquis twice daily   9/22/2023: Patient to continue 10 mg twice daily Eliquis day 2 of 7. Patient educated upon discharge that after 7 days they will take 5 mg Eliquis twice daily  Bilateral LE venous duplex scan confirmed deep vein thrombosis in right femoral vein  respiratory status stable saturating 96% on room air and is not short of breath  We will follow-up with hematology outpatient        Near syncope  Assessment & Plan  CTA PE study negative for pulmonary embolism  orthostatic BP negative  PT/OT recommended rehab placement, family denies at this time and opts for home services and outpatient physical therapy/Occupational Therapy    Mass of soft tissue  Assessment & Plan  Incidental finding on CTAP. Expansile soft tissue mass in the left L2-3 vertebral neural foramen concerning for a schwannoma or neurofibroma similar in appearance to the prior MRI dated 5/29/2021 follow-up with PCP    Pancreatic abnormality  Assessment & Plan  Noted incidentally on CTAP. 8 mm nodule in the necrotic head. For simple cyst(s) less than 1.5 cm, recommend followup every 2 year for 2 times. After 4 years, no more followups. Recommend next followup in 2 years.  Preferred imaging modality: abdomen MRI and MRCP with and without IV contrast, or triple phase abdomen CT with IV contrast, or abdomen MRI and MRCP without IV contrast.  Outpatient with PCP    Spinal stenosis of lumbar region with neurogenic claudication  Assessment & Plan  Pain management prn  PT/OT    Pulmonary HTN ([address])  Assessment & Plan  on echo report: Tricuspid Valve: The right ventricular systolic pressure is mildly elevated. The estimated right ventricular systolic pressure is 15.99 mmHg. We will continue to monitor and consider outpatient cardiac/pulmonology follow-up. We will have PCP evaluate and address    MCI (mild cognitive impairment)  Assessment & Plan  Patient has mild cognitive impairment. Patient appears at baseline. Patient alert to self and that she is in the hospital but is confused as to the events that led her to get here. Patient is alert     Hyperlipidemia  Assessment & Plan  Continue fish oil    Functional urinary incontinence  Assessment & Plan  Continue Purewick external catheter    Essential hypertension  Assessment & Plan  Blood Pressure: 139/72    Continue home antihypertensives or formulary equivalent  Prn labetalol IV for SBP >170 mmHg  Continue home regimen upon discharge      Discharging Resident Physician: Kong Salgado DO  Attending: Franck Sims MD  PCP: Danyel Lane DO  Admission Date: 9/18/2023  Discharge Date: 09/22/23    Disposition:     Home with VNA Services (Reminder: Complete face to face encounter)    Reason for Admission: Deep vein thrombosis of right femoral vein    Consultations During Hospital Stay:  · None    Procedures Performed:     · None    Significant Findings / Test Results:     · Deep vein thrombosis of right femoral vein confirmed by lower extremity ultrasound and CT scan. Incidental Findings:   Finding: CT abdomen pelvis with contrast: There is a DVT in the right femoral vein. , 8 mm nodule in the necrotic head. For simple cyst(s) less than 1.5 cm, recommend followup every 2 year for 2 times. After 4 years, no more followups. Recommend next followup in 2 years.  Preferred imaging modality: abdomen MRI and MRCP with and without IV,  contrast, or triple phase abdomen CT with IV contrast, or abdomen MRI and MRCP without IV contrast., I personally discussed this study with HAYLEY CHATTERJEE on 9/18/2023 11:28 PM., Workstation performed: [id number]       Test Results Pending at Discharge (will require follow up): · None     Outpatient Tests Requested:  · none    Complications:  none    Hospital Course:     Iveth Prado is a 80 y.o. female patient who originally presented to the hospital on 9/18/2023 due to right femoral deep vein thrombosis. Patient came to the emergency room on 9/18/2023 due to a near syncopal event and nausea and vomiting witnessed by the family at dinner. At the restaurant while eating patient became pale and had decreased responsiveness and then vomited. Of note she also had abdominal pain at this time. In the emergency room she had a CT of the abdomen and pelvis which showed acute thrombus in the right femoral vein. Patient was started on a heparin drip. This was later confirmed by bilateral lower extremity ultrasounds. The next day she received a CT PE study which showed no sign of pulmonary embolism. In the days under our care, patient's symptoms improved. Patient denied any nausea and vomiting the next day. She denied this any shortness of breath, chest pain, dizziness. After 2 days on heparin drip patient was transitioned to Eliquis. She will take 10 mg Eliquis twice daily for a week and then transition to take 5 mg Eliquis twice daily until evaluated by a hematologist..  It was recommended that patient go to postacute rehabilitation. Family and patient denied due to patient's age and desire to be home. It was organized with case management to set up PT and OT/skilled nursing at home. Patient and daughter were agreeable to this plan.     Condition at Discharge: stable     Discharge Day Visit / Exam:     Subjective: Patient is stable and has no acute complaints and is eating breakfast.  Vitals: Blood Pressure: 139/72 (09/22/23 2568)  Pulse: 84 (09/22/23 0714)  Temperature: 98.1 °F (36.7 °C) (09/22/23 0714)  Temp Source: Oral (09/19/23 0726)  Respirations: 16 (09/21/23 0726)  Height: 4' 11" (149.9 cm) (09/19/23 0820)  Weight - Scale: 62.1 kg (137 lb) (09/19/23 0820)  SpO2: 95 % (09/22/23 0745)  Exam:   Physical Exam  Constitutional:       Appearance: Normal appearance. HENT:      Head: Normocephalic and atraumatic. Eyes:      Conjunctiva/sclera: Conjunctivae normal.      Pupils: Pupils are equal, round, and reactive to light. Cardiovascular:      Rate and Rhythm: Normal rate and regular rhythm. Pulses: Normal pulses. Heart sounds: Normal heart sounds. No murmur heard. Pulmonary:      Effort: Pulmonary effort is normal. No respiratory distress. Breath sounds: Normal breath sounds. No wheezing or rales. Abdominal:      General: Bowel sounds are normal. There is no distension. Palpations: Abdomen is soft. Tenderness: There is no abdominal tenderness. There is no guarding. Musculoskeletal:      Right lower leg: No edema. Left lower leg: No edema. Skin:     General: Skin is warm. Neurological:      General: No focal deficit present. Mental Status: She is alert. Mental status is at baseline. Psychiatric:         Mood and Affect: Mood normal.         Behavior: Behavior normal.       Discussion with Family: Discussion with daughter at patient's bedside. Discharge instructions/Information to patient and family:   See after visit summary for information provided to patient and family. Provisions for Follow-Up Care:  See after visit summary for information related to follow-up care and any pertinent home health orders. Planned Readmission: None     Discharge Medications:  See after visit summary for reconciled discharge medications provided to patient and family.       ** Please Note: This note has been constructed using a voice recognition system Gabriel Garcia DO

## 2023-09-22 NOTE — INCIDENTAL FINDINGS
The following findings require follow up:  Radiographic finding   Finding: CT abdomen pelvis with contrast: There is a DVT in the right femoral vein. , 8 mm nodule in the necrotic head. For simple cyst(s) less than 1.5 cm, recommend followup every 2 year for 2 times. After 4 years, no more followups. Recommend next followup in 2 years. Preferred imaging modality: abdomen MRI and MRCP with and without IV,  contrast, or triple phase abdomen CT with IV contrast, or abdomen MRI and MRCP without IV contrast., I personally discussed this study with HAYLEY CHATTERJEE on 9/18/2023 11:28 PM., Workstation performed: [id number]     Follow up required: For simple cyst(s) less than 1.5 cm, recommend followup every 2 year for 2 times. After 4 years, no more followups. Recommend next followup in 2 years.  Preferred imaging modality: abdomen MRI and MRCP with and without IV,  contrast, or triple phase abdomen CT with IV contrast, or abdomen MRI and MRCP without IV contrast., I personally discussed this study with HAYLEY CHATTERJEE on 9/18/2023 11:28 PM.   Follow up should be done within 6  month(s)    Please notify the following clinician to assist with the follow up:   Dr. Jamil Castellanos DO

## 2023-09-24 LAB
BACTERIA BLD CULT: NORMAL
BACTERIA BLD CULT: NORMAL

## 2023-09-25 ENCOUNTER — TRANSITIONAL CARE MANAGEMENT (OUTPATIENT)
Dept: FAMILY MEDICINE CLINIC | Facility: MEDICAL CENTER | Age: 88
End: 2023-09-25

## 2023-09-25 LAB
BACTERIA BLD CULT: NORMAL
BACTERIA BLD CULT: NORMAL

## 2023-09-28 ENCOUNTER — OFFICE VISIT (OUTPATIENT)
Dept: FAMILY MEDICINE CLINIC | Facility: MEDICAL CENTER | Age: 88
End: 2023-09-28
Payer: MEDICARE

## 2023-09-28 VITALS
SYSTOLIC BLOOD PRESSURE: 140 MMHG | BODY MASS INDEX: 25.8 KG/M2 | DIASTOLIC BLOOD PRESSURE: 70 MMHG | WEIGHT: 128 LBS | HEART RATE: 88 BPM | RESPIRATION RATE: 20 BRPM | HEIGHT: 59 IN | OXYGEN SATURATION: 96 %

## 2023-09-28 DIAGNOSIS — I82.411 ACUTE DEEP VEIN THROMBOSIS (DVT) OF FEMORAL VEIN OF RIGHT LOWER EXTREMITY (HCC): ICD-10-CM

## 2023-09-28 DIAGNOSIS — K86.2 PANCREAS CYST: ICD-10-CM

## 2023-09-28 DIAGNOSIS — D64.9 NORMOCYTIC ANEMIA: ICD-10-CM

## 2023-09-28 DIAGNOSIS — I27.20 PULMONARY HTN (HCC): ICD-10-CM

## 2023-09-28 DIAGNOSIS — Z76.89 ENCOUNTER FOR SUPPORT AND COORDINATION OF TRANSITION OF CARE: Primary | ICD-10-CM

## 2023-09-28 PROCEDURE — 99496 TRANSJ CARE MGMT HIGH F2F 7D: CPT | Performed by: STUDENT IN AN ORGANIZED HEALTH CARE EDUCATION/TRAINING PROGRAM

## 2023-09-28 NOTE — PROGRESS NOTES
425  [facility] Note  Rommel Mosher, [place], 23     Tara De La Vega MRN: [id number] : 1931 Age: 80 y.o. Assessment/Plan     1. Encounter for support and coordination of transition of care    -Patient presents for transition of care appointment after recent hospitalization for DVT  -She was discharged on Eliquis which she has been adherent to  -Blood pressure stable today in office with current regimen  -White blood cell count elevated during hospital stay normalized upon discharge, no infective symptoms today  -Normocytic anemia noted during hospital stay, will check labs and test as noted below  -To schedule follow-up with hematology  -Incidental findings during hospital stay again discussed with patient and her daughter today, we will follow-up with recommendations for surveillance for pancreatic cyst in 2 years,  Expansile soft tissue mass in the left L2-3 vertebral neural foramen concerning for a schwannoma or neurofibroma similar in appearance to the prior MRI dated 2021-discussed stability, established with Neurosurgery  -Made aware of red flag signs and symptoms in which case they should seek prompt medical attention    2. Acute deep vein thrombosis (DVT) of femoral vein of right lower extremity ([address])    - Ambulatory Referral to Hematology / Oncology; Future    3. Pancreas cyst    - MRI abdomen wo contrast and mrcp; Future    4. Normocytic anemia    - Ambulatory Referral to Hematology / Oncology; Future  - CBC and differential; Future  - TSH, 3rd generation with Free T4 reflex; Future  - Iron Panel (Includes Ferritin, Iron Sat%, Iron, and TIBC); Future  - Occult Blood, Fecal Immunochemical; Future    5. Pulmonary HTN ([address])    - Ambulatory Referral to Cardiology; Future    Tara De La Vega acknowledged understanding of treatment plan, all questions answered. Subjective      Tara De La Vega is a 80 y.o. female who presents for transition of care appointment.  She presents today with her daughter. She was hospitalized at [address] [place] [address] from September 18, 2023 to September 22, 2023 for DVT right lower extremity. He received IV heparin and discharged with Eliquis. Patient does not have a recent fall history. Patient today states that she "feels fine". She denies any chest pain or shortness of breath. Good appetite. She will soon be starting PT at home. Denies hematochezia, melena and hemoptysis. Hospital course per discharge summary:  Iveth Prado is a 80 y.o. female patient who originally presented to the hospital on 9/18/2023 due to right femoral deep vein thrombosis. Patient came to the emergency room on 9/18/2023 due to a near syncopal event and nausea and vomiting witnessed by the family at dinner. At the restaurant while eating patient became pale and had decreased responsiveness and then vomited. Of note she also had abdominal pain at this time. In the emergency room she had a CT of the abdomen and pelvis which showed acute thrombus in the right femoral vein. Patient was started on a heparin drip. This was later confirmed by bilateral lower extremity ultrasounds. The next day she received a CT PE study which showed no sign of pulmonary embolism. In the days under our care, patient's symptoms improved. Patient denied any nausea and vomiting the next day. She denied this any shortness of breath, chest pain, dizziness. After 2 days on heparin drip patient was transitioned to Eliquis. She will take 10 mg Eliquis twice daily for a week and then transition to take 5 mg Eliquis twice daily until evaluated by a hematologist..  It was recommended that patient go to postacute rehabilitation. Family and patient denied due to patient's age and desire to be home. It was organized with case management to set up PT and OT/skilled nursing at home. Patient and daughter were agreeable to this plan.     TCM Call     Date and time call was made  9/26/2023 8: [facility] reviewed  Records reviewed    Patient was hospitialized at  [facility]    Date of Admission  09/18/23    Date of discharge  09/22/23    Diagnosis  Deep vein thrombosis (DVT) of femoral vein of right lower extremity ([address])    Disposition  Home    Were the patients medications reviewed and updated  No    Current Symptoms  None      TCM Call     Post hospital issues  None    Should patient be enrolled in anticoag monitoring? No    Scheduled for follow up? Yes    Did you obtain your prescribed medications  Yes    Do you need help managing your prescriptions or medications  No    Is transportation to your appointment needed  No    I have advised the patient to call PCP with any new or worsening symptoms  [address] [place] [address]    Counseling  Family            The following portions of the patient's history were reviewed and updated as appropriate: allergies, current medications, past family history, past medical history, past social history, past surgical history and problem list.     Past Medical History:   Diagnosis Date   • Anxiety 2010   • Bladder infection 09/2019   • Breast lump in female    • HL (hearing loss) 2010   • Hypertension 2010   • Osteopenia    • Ovarian cyst        No Known Allergies    Past Surgical History:   Procedure Laterality Date   • BLADDER SURGERY     • BREAST BIOPSY     • CATARACT EXTRACTION Bilateral 01/01/2015    , Right Eye-2017, Left eye   • ENDOMETRIAL BIOPSY      by Suction   • HYSTERECTOMY         Family History   Problem Relation Age of Onset   • Heart disease Mother    • Heart disease Father    • Hypertension Family    • Osteoporosis Family        Social History     Socioeconomic History   • Marital status:       Spouse name: None   • Number of children: None   • Years of education: None   • Highest education level: None   Occupational History   • None   Tobacco Use   • Smoking status: Never   • Smokeless tobacco: Never   Vaping Use   • Vaping Use: Never used   Substance and Sexual Activity   • Alcohol use: Not Currently   • Drug use: No   • Sexual activity: Not Currently   Other Topics Concern   • None   Social History Narrative    Denied consumption of coffee    Active daily tea consumption    Denied ingesting cola containing caffeine         Social Determinants of Health     Financial Resource Strain: Low Risk  (11/10/2022)    Overall Financial Resource Strain (CARDIA)    • Difficulty of Paying Living Expenses: Not very hard   Food Insecurity: No Food Insecurity (9/20/2023)    Hunger Vital Sign    • Worried About Running Out of Food in the Last Year: Never true    • Ran Out of Food in the Last Year: Never true   Transportation Needs: No Transportation Needs (9/20/2023)    PRAPARE - Transportation    • Lack of Transportation (Medical): No    • Lack of Transportation (Non-Medical):  No   Physical Activity: Not on file   Stress: Not on file   Social Connections: Not on file   Intimate Partner Violence: Not on file   Housing Stability: Low Risk  (9/20/2023)    Housing Stability Vital Sign    • Unable to Pay for Housing in the Last Year: No    • Number of Places Lived in the Last Year: 1    • Unstable Housing in the Last Year: No       Current Outpatient Medications   Medication Sig Dispense Refill   • acetaminophen (TYLENOL) 500 mg tablet Take 1,000 mg by mouth 2 (two) times a day as needed for mild pain     • amLODIPine-benazepril (LOTREL) 10-20 MG per capsule TAKE 1 CAPSULE BY MOUTH EVERY DAY (Patient taking differently: Take 1 capsule by mouth every morning) 90 capsule 1   • apixaban (Eliquis) 5 mg Take 1 tablet (5 mg total) by mouth 2 (two) times a day Do not start before September 28, 2023. 60 tablet 0   • apixaban (ELIQUIS) 5 mg Take 2 tablets (10 mg total) by mouth 2 (two) times a day for 6 days 24 tablet 0   • calcium citrate-vitamin D (CITRACAL+D) 315-200 MG-UNIT per tablet Take 1 tablet by mouth daily     • magnesium hydroxide (MILK OF MAGNESIA) 400 mg/5 mL oral suspension Take by mouth daily as needed for constipation     • Multiple Vitamins-Minerals (MULTIVITAL) tablet Take 1 tablet by mouth daily     • Omega-3 Fatty Acids (FISH OIL) 645 MG CAPS Take 1 tablet by mouth daily       Current Facility-Administered Medications   Medication Dose Route Frequency Provider Last Rate Last Admin   • Botulinum Toxin Type A SOLR 200 Units  200 Units Injection Q3 Months Kathryn Macias MD   200 Units at 05/03/21 5469       Review of Systems     As noted in HPI    Objective      /70   Pulse 88   Resp 20   Ht 4' 11" (1.499 m)   Wt 58.1 kg (128 lb)   SpO2 96%   BMI 25.85 kg/m²     Physical Exam  Vitals reviewed. Constitutional:       General: She is not in acute distress. Appearance: Normal appearance. She is not ill-appearing, toxic-appearing or diaphoretic. HENT:      Head: Normocephalic and atraumatic. Mouth/Throat:      Mouth: Mucous membranes are moist.      Pharynx: Oropharynx is clear. Eyes:      Extraocular Movements: Extraocular movements intact. Conjunctiva/sclera: Conjunctivae normal.      Pupils: Pupils are equal, round, and reactive to light. Cardiovascular:      Rate and Rhythm: Normal rate and regular rhythm. Pulses: Normal pulses. Heart sounds: Normal heart sounds. Pulmonary:      Effort: Pulmonary effort is normal.      Breath sounds: Normal breath sounds. Abdominal:      General: Abdomen is flat. Bowel sounds are normal.      Palpations: Abdomen is soft. Tenderness: There is no abdominal tenderness. Musculoskeletal:      Right lower leg: No edema. Left lower leg: No edema. Skin:     General: Skin is warm and dry. Neurological:      Mental Status: She is alert and oriented to person, place, and time. Psychiatric:         Mood and Affect: Mood normal.         Behavior: Behavior normal.         Thought Content:  Thought content normal.             Some portions of this record may have been generated with voice recognition software. There may be translation, syntax, or grammatical errors. Occasional wrong word or "sound-a-like" substitutions may have occurred due to the inherent limitations of the voice recognition software. Read the chart carefully and recognize, using context, where substations may have occurred. If you have any questions, please contact the dictating provider for clarification or correction, as needed.

## 2023-10-12 ENCOUNTER — APPOINTMENT (OUTPATIENT)
Dept: LAB | Facility: MEDICAL CENTER | Age: 88
End: 2023-10-12
Payer: MEDICARE

## 2023-10-12 ENCOUNTER — IMMUNIZATIONS (OUTPATIENT)
Dept: FAMILY MEDICINE CLINIC | Facility: MEDICAL CENTER | Age: 88
End: 2023-10-12
Payer: MEDICARE

## 2023-10-12 DIAGNOSIS — D64.9 NORMOCYTIC ANEMIA: ICD-10-CM

## 2023-10-12 DIAGNOSIS — Z23 ENCOUNTER FOR IMMUNIZATION: Primary | ICD-10-CM

## 2023-10-12 LAB
BASOPHILS # BLD AUTO: 0.02 THOUSANDS/ÂΜL (ref 0–0.1)
BASOPHILS NFR BLD AUTO: 0 % (ref 0–1)
EOSINOPHIL # BLD AUTO: 0.02 THOUSAND/ÂΜL (ref 0–0.61)
EOSINOPHIL NFR BLD AUTO: 0 % (ref 0–6)
ERYTHROCYTE [DISTWIDTH] IN BLOOD BY AUTOMATED COUNT: 14.5 % (ref 11.6–15.1)
FERRITIN SERPL-MCNC: 65 NG/ML (ref 11–307)
HCT VFR BLD AUTO: 38 % (ref 34.8–46.1)
HGB BLD-MCNC: 12.5 G/DL (ref 11.5–15.4)
IMM GRANULOCYTES # BLD AUTO: 0.04 THOUSAND/UL (ref 0–0.2)
IMM GRANULOCYTES NFR BLD AUTO: 1 % (ref 0–2)
IRON SATN MFR SERPL: 23 % (ref 15–50)
IRON SERPL-MCNC: 85 UG/DL (ref 50–212)
LYMPHOCYTES # BLD AUTO: 1.46 THOUSANDS/ÂΜL (ref 0.6–4.47)
LYMPHOCYTES NFR BLD AUTO: 27 % (ref 14–44)
MCH RBC QN AUTO: 32.2 PG (ref 26.8–34.3)
MCHC RBC AUTO-ENTMCNC: 32.9 G/DL (ref 31.4–37.4)
MCV RBC AUTO: 98 FL (ref 82–98)
MONOCYTES # BLD AUTO: 0.86 THOUSAND/ÂΜL (ref 0.17–1.22)
MONOCYTES NFR BLD AUTO: 16 % (ref 4–12)
NEUTROPHILS # BLD AUTO: 2.95 THOUSANDS/ÂΜL (ref 1.85–7.62)
NEUTS SEG NFR BLD AUTO: 56 % (ref 43–75)
NRBC BLD AUTO-RTO: 0 /100 WBCS
PLATELET # BLD AUTO: 289 THOUSANDS/UL (ref 149–390)
PMV BLD AUTO: 9.7 FL (ref 8.9–12.7)
RBC # BLD AUTO: 3.88 MILLION/UL (ref 3.81–5.12)
TIBC SERPL-MCNC: 366 UG/DL (ref 250–450)
TSH SERPL DL<=0.05 MIU/L-ACNC: 3.86 UIU/ML (ref 0.45–4.5)
UIBC SERPL-MCNC: 281 UG/DL (ref 155–355)
WBC # BLD AUTO: 5.35 THOUSAND/UL (ref 4.31–10.16)

## 2023-10-12 PROCEDURE — 36415 COLL VENOUS BLD VENIPUNCTURE: CPT

## 2023-10-12 PROCEDURE — 83550 IRON BINDING TEST: CPT

## 2023-10-12 PROCEDURE — 82728 ASSAY OF FERRITIN: CPT

## 2023-10-12 PROCEDURE — 90662 IIV NO PRSV INCREASED AG IM: CPT

## 2023-10-12 PROCEDURE — 85025 COMPLETE CBC W/AUTO DIFF WBC: CPT

## 2023-10-12 PROCEDURE — 83540 ASSAY OF IRON: CPT

## 2023-10-12 PROCEDURE — 84443 ASSAY THYROID STIM HORMONE: CPT

## 2023-10-12 PROCEDURE — G0008 ADMIN INFLUENZA VIRUS VAC: HCPCS

## 2023-10-18 ENCOUNTER — CONSULT (OUTPATIENT)
Dept: HEMATOLOGY ONCOLOGY | Facility: CLINIC | Age: 88
End: 2023-10-18
Payer: MEDICARE

## 2023-10-18 VITALS
DIASTOLIC BLOOD PRESSURE: 80 MMHG | HEART RATE: 105 BPM | TEMPERATURE: 97.9 F | OXYGEN SATURATION: 96 % | SYSTOLIC BLOOD PRESSURE: 148 MMHG

## 2023-10-18 DIAGNOSIS — I82.411 ACUTE DEEP VEIN THROMBOSIS (DVT) OF FEMORAL VEIN OF RIGHT LOWER EXTREMITY (HCC): ICD-10-CM

## 2023-10-18 DIAGNOSIS — D64.9 NORMOCYTIC ANEMIA: ICD-10-CM

## 2023-10-18 PROCEDURE — 99204 OFFICE O/P NEW MOD 45 MIN: CPT

## 2023-10-21 ENCOUNTER — APPOINTMENT (OUTPATIENT)
Dept: LAB | Facility: MEDICAL CENTER | Age: 88
End: 2023-10-21
Payer: MEDICARE

## 2023-10-21 DIAGNOSIS — D64.9 NORMOCYTIC ANEMIA: ICD-10-CM

## 2023-10-21 LAB — HEMOCCULT STL QL IA: NEGATIVE

## 2023-10-21 PROCEDURE — G0328 FECAL BLOOD SCRN IMMUNOASSAY: HCPCS

## 2023-10-23 PROBLEM — D64.9 NORMOCYTIC ANEMIA: Status: ACTIVE | Noted: 2023-10-23

## 2023-10-23 NOTE — PROGRESS NOTES
Oncology Outpatient Consult Note  Jenelle Pompa 80 y.o. female MRN: @ Encounter: [id number]        Date:  10/18/2023        CC: Acute RLE DVT and normocytic anemia      HPI:  Jenelle Pompa is seen for initial consultation 10/18/2023 regarding acute RLE DVT and normocytic anemia. Patient is here with her daughter today, she is hard of hearing. She is in a wheelchair. Patient presented to the ED on 2023 for abdominal pain. A venous duplex was obtained and found to have evidence of acute, non-occlusive deep vein thrombosis noted in the common femoral vein and proximal-distal femoral vein. She was started on Eliquis twice daily. Patient denies any surgical intervention, falls, trauma. She does report that she does not move around much and only gets up when she has to. Her daughter also confirms this and states her mother is not ambulatory. Patient reports tolerating Eliquis without incident. Patient denies abnormal bleeding: epistaxis, gingival bleeding, hematuria, dark tarry stools. Reports compliance and has not missed a dose. Patient has a caretaker and also has a family taking care of her. She ambulates with a walker. She takes a multivitamin daily. She also gets Botox injections every 3 months for her dystonia. She is eating well. Staying hydrated. Denies any fevers or infections. No chest pain or SOB. Labs:  10/12/2023: Hgb 12.5, MCV 98, WBC 5.35, platelets 954,142, serum iron 85, iron saturation 23%, ferritin 65    Thrombosis panel was just drawn on 2023:  Lupus anticoagulant -not detected    Beta-2 glycoprotein antibodies:  IgG-1.2 - negative  IgA-1.7 -negative  IgM-38 -positive    Cardiolipin antibodies  IgG 1.4 -negative  IgA 3.9 -negative  IgM 9.8 -negative    Family history:  Father-blood clots  No family history of cancer    ECO    Cancer Staging:  Cancer Staging   No matching staging information was found for the patient.     Molecular Testing:     Oncology History    No history exists. Test Results:    Imaging: No results found.     Labs:   Lab Results   Component Value Date    WBC 5.35 10/12/2023    HGB 12.5 10/12/2023    HCT 38.0 10/12/2023    MCV 98 10/12/2023     10/12/2023     Lab Results   Component Value Date     04/07/2015    K 3.8 09/22/2023     09/22/2023    CO2 25 09/22/2023    ANIONGAP 7 04/07/2015    BUN 29 (H) 09/22/2023    CREATININE 0.83 09/22/2023    GLUCOSE 102 04/07/2015    GLUF 111 (H) 05/25/2021    CALCIUM 8.9 09/22/2023    AST 17 09/18/2023    ALT 12 09/18/2023    ALKPHOS 49 09/18/2023    PROT 7.2 04/07/2015    BILITOT 0.42 04/07/2015    EGFR 61 09/22/2023       Lab Results   Component Value Date    IRON 85 10/12/2023    TIBC 366 10/12/2023    FERRITIN 65 10/12/2023       Lab Results   Component Value Date    WDSYRALW32 993 08/09/2018       Active Problems:   Patient Active Problem List   Diagnosis    Blepharospasm    Spasmodic torticollis    Anxiety    Depression, recurrent ([address])    Essential hypertension    Family history of malignant neoplasm of breast    Functional urinary incontinence    Hyperlipidemia    MCI (mild cognitive impairment)    Osteoporosis    Torsion dystonia fragments    Segmental dystonia    Sensorineural hearing loss (SNHL), bilateral    Asymmetrical hearing loss of right ear    Pulmonary HTN ([address])    Presence of pessary    Osteoporosis without current pathological fracture    Nerve sheath tumor    Lumbar spine tumor    Primary osteoarthritis of right hip    Encounter for gynecological examination without abnormal finding    PAD (peripheral artery disease) ([address])    Spinal stenosis of lumbar region with neurogenic claudication    Lumbar disc disease with radiculopathy    Fall    Closed displaced fracture of fourth cervical vertebra (HCC)    Epidural hematoma (HCC)    Near syncope    Deep vein thrombosis (DVT) of femoral vein of right lower extremity (HCC)    Pancreatic abnormality    Mass of soft tissue Past Medical History:   Past Medical History:   Diagnosis Date    Anxiety 2010    Bladder infection 09/2019    Breast lump in female     HL (hearing loss) 2010    Hypertension 2010    Osteopenia     Ovarian cyst        Surgical History:   Past Surgical History:   Procedure Laterality Date    BLADDER SURGERY      BREAST BIOPSY      CATARACT EXTRACTION Bilateral 01/01/2015    , Right Eye-2017, Left eye    ENDOMETRIAL BIOPSY      by Suction    HYSTERECTOMY         Family History:    Family History   Problem Relation Age of Onset    Heart disease Mother     Heart disease Father     Hypertension Family     Osteoporosis Family        Cancer-related family history is not on file. Social History:   Social History     Socioeconomic History    Marital status:      Spouse name: Not on file    Number of children: Not on file    Years of education: Not on file    Highest education level: Not on file   Occupational History    Not on file   Tobacco Use    Smoking status: Never    Smokeless tobacco: Never   Vaping Use    Vaping Use: Never used   Substance and Sexual Activity    Alcohol use: Not Currently    Drug use: No    Sexual activity: Not Currently   Other Topics Concern    Not on file   Social History Narrative    Denied consumption of coffee    Active daily tea consumption    Denied ingesting cola containing caffeine         Social Determinants of Health     Financial Resource Strain: Low Risk  (11/10/2022)    Overall Financial Resource Strain (CARDIA)     Difficulty of Paying Living Expenses: Not very hard   Food Insecurity: No Food Insecurity (9/20/2023)    Hunger Vital Sign     Worried About Running Out of Food in the Last Year: Never true     [address] in the Last Year: Never true   Transportation Needs: No Transportation Needs (9/20/2023)    PRAPARE - Transportation     Lack of Transportation (Medical): No     Lack of Transportation (Non-Medical):  No   Physical Activity: Not on file   Stress: Not on file   Social Connections: Not on file   Intimate Partner Violence: Not on file   Housing Stability: Low Risk  (9/20/2023)    Housing Stability Vital Sign     Unable to Pay for Housing in the Last Year: No     Number of Places Lived in the Last Year: 1     Unstable Housing in the Last Year: No       Current Medications:   Current Outpatient Medications   Medication Sig Dispense Refill    acetaminophen (TYLENOL) 500 mg tablet Take 1,000 mg by mouth 2 (two) times a day as needed for mild pain      amLODIPine-benazepril (LOTREL) 10-20 MG per capsule TAKE 1 CAPSULE BY MOUTH EVERY DAY (Patient taking differently: Take 1 capsule by mouth every morning) 90 capsule 1    apixaban (Eliquis) 5 mg Take 1 tablet (5 mg total) by mouth 2 (two) times a day Do not start before September 28, 2023. 60 tablet 0    Multiple Vitamins-Minerals (MULTIVITAL) tablet Take 1 tablet by mouth daily      Omega-3 Fatty Acids (FISH OIL) 645 MG CAPS Take 1 tablet by mouth daily      apixaban (ELIQUIS) 5 mg Take 2 tablets (10 mg total) by mouth 2 (two) times a day for 6 days 24 tablet 0    calcium citrate-vitamin D (CITRACAL+D) 315-200 MG-UNIT per tablet Take 1 tablet by mouth daily (Patient not taking: Reported on 10/18/2023)      magnesium hydroxide (MILK OF MAGNESIA) 400 mg/5 mL oral suspension Take by mouth daily as needed for constipation (Patient not taking: Reported on 10/18/2023)       Current Facility-Administered Medications   Medication Dose Route Frequency Provider Last Rate Last Admin    Botulinum Toxin Type A SOLR 200 Units  200 Units Injection Q3 Months Yamileth Foley MD   200 Units at 05/03/21 1619       Allergies: No Known Allergies      Physical Exam:    There is no height or weight on file to calculate BSA.     Wt Readings from Last 3 Encounters:   09/28/23 58.1 kg (128 lb)   09/19/23 62.1 kg (137 lb)   05/18/23 56.7 kg (125 lb)        Temp Readings from Last 3 Encounters:   10/18/23 97.9 °F (36.6 °C) (Temporal) 09/22/23 98.1 °F (36.7 °C)   09/11/23 98.1 °F (36.7 °C)        BP Readings from Last 3 Encounters:   10/18/23 148/80   09/28/23 140/70   09/22/23 139/72         Pulse Readings from Last 3 Encounters:   10/18/23 105   09/28/23 88   09/22/23 84     @LASTSAO2(3)@    Physical Exam     Constitutional   General appearance: No acute distress, well appearing and well nourished. Eyes   Conjunctiva and lids: No swelling, erythema or discharge. Pupils and irises: Equal, round and reactive to light. Ears, Nose, Mouth, and Throat   External inspection of ears and nose: Normal.    Nasal mucosa, septum, and turbinates: Normal without edema or erythema. Oropharynx: Normal with no erythema, edema, exudate or lesions. Pulmonary   Respiratory effort: No increased work of breathing or signs of respiratory distress. Auscultation of lungs: Clear to auscultation. Cardiovascular   Palpation of heart: Normal PMI, no thrills. Auscultation of heart: Normal rate and rhythm, normal S1 and S2, without murmurs. Examination of extremities for edema and/or varicosities: Normal.    Carotid pulses: Normal.    Abdomen   Abdomen: Non-tender, no masses. Liver and spleen: No hepatomegaly or splenomegaly. Lymphatic   Palpation of lymph nodes in neck: No lymphadenopathy. Musculoskeletal   Gait and station: Normal.    Digits and nails: Normal without clubbing or cyanosis. Inspection/palpation of joints, bones, and muscles: Normal.    Skin   Skin and subcutaneous tissue: Normal without rashes or lesions. Neurologic   Cranial nerves: Cranial nerves 2-12 intact. Sensation: No sensory loss.     Psychiatric   Orientation to person, place, and time: Normal.    Mood and affect: Normal.          Assessment/ Plan:  Discussed risk factors for VTEs with Jon Rubalcava and her daughter, which include but not limited to, increasing age, prolonged immobility, surgery, trauma, malignancy, CHF, blood disorders that alter blood viscosity. We discussed her blood clot could be provoked from immobility however cannot be certain as she has been immobile for quite some time. We discussed at her age and due to co-morbidities, recommend patient continue anticoagulation with Eliquis indefinitely. Patient's daughter reports co-pay is affordable. Discussed the importance of compliance with taking the medication every day around the same time and not missing a dose. If a dose is missed, NOT to take a double dose the next day to make up for the missed dose. Discussed safety concerns while on anticoagulation:  Ambulate in a clear path, avoid falls. If patient falls, to call 911 and get to the hospital, especially if there is a head injury. If patient notices abnormal bleeding, to go to the hospital.  Patient verbalized understanding. Reviewed patient's labs. Her hemoglobin is over her iron studies. Can continue taking multivitamin daily. In review of her thrombosis panel, beta-2 glycoprotein IgM was elevated. We will repeat in 12 weeks for persistence. I will call her with the results. Patient can see us on an as-needed basis. She is aware to contact us for any worsening symptoms and/or if she has any additional questions or concerns. I spent 50 minutes in chart review, face to face counseling, coordination of care, and documentation.

## 2023-11-02 NOTE — PROGRESS NOTES
Outpatient Cardiology Consult Note - Indra May 80 y.o. female MRN: [id number]    @ Encounter: [id number]        Patient Active Problem List    Diagnosis Date Noted    Normocytic anemia 10/23/2023    Pancreatic abnormality 09/19/2023    Mass of soft tissue 09/19/2023    Near syncope 09/18/2023    Deep vein thrombosis (DVT) of femoral vein of right lower extremity ([address]) 09/18/2023    Fall 12/04/2022    Closed displaced fracture of fourth cervical vertebra ([address]) 12/04/2022    Epidural hematoma ([address]) 12/04/2022    Spinal stenosis of lumbar region with neurogenic claudication     Lumbar disc disease with radiculopathy     PAD (peripheral artery disease) ([address]) 10/13/2022    Encounter for gynecological examination without abnormal finding 11/18/2021    Primary osteoarthritis of right hip     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    Presence of pessary 11/04/2020    Osteoporosis without current pathological fracture 11/04/2020    Pulmonary HTN ([address]) 10/23/2020    Sensorineural hearing loss (SNHL), bilateral 10/16/2019    Asymmetrical hearing loss of right ear 10/16/2019    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    Family history of malignant neoplasm of breast 08/24/2018    Anxiety 03/25/2016    Essential hypertension 03/25/2016    MCI (mild cognitive impairment) 03/25/2016    Blepharospasm 08/04/2015    Depression, recurrent ([address]) 06/16/2014    Functional urinary incontinence 06/16/2014    Hyperlipidemia 06/16/2014    Osteoporosis 06/16/2014    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       Assessment:  # Pulmonary HTN,   Likely more normal variant PA pressure based on age    Studies- personally reviewed by me  CTA 9/19/23: no PE    Echo 9/19/23:   LVEF: 60%  RV: normal  PASP: 39 mmHg  RVOT: no notching    TTE: 10/22/20: LVEF: 60%, normal RV, mildly elevated PA pressures    # Acute bilateral DVTs  AC: Eliquis BID  Labs: lupus anticoagulant not detected  LE duplex 9/19/23: non occlusive DVT RLE  LLE: chronic occlusive DVT popliteal vein  # HTN- amlodipine benazepril 10-20 mg daily  # normocytic anemia  # hx of traumatic L4 laminar fracture - fall    Today's Plan:  Continue Eliquis for bilateral DVTs due to immobility  PASP mildly up with no PE, most likely due to age and vascular stiffening  No need for RHC  No current need for diuretic    HPI:     81 yo female consult for mildly elevated PA pressures on echo. She was admitted Sept with acute DVT bilateral lower extremities. Negative CTA for PE. She has since seen Heme/onc. She is rather immobile so this is thought as etiology. From functional standpoint she can walk around a little. She is happy with her quality of life. Past Medical History:   Diagnosis Date    Anxiety 2010    Bladder infection 09/2019    Breast lump in female     HL (hearing loss) 2010    Hypertension 2010    Osteopenia     Ovarian cyst        Review of Systems    No Known Allergies  .     Current Outpatient Medications:     acetaminophen (TYLENOL) 500 mg tablet, Take 1,000 mg by mouth 2 (two) times a day as needed for mild pain, Disp: , Rfl:     amLODIPine-benazepril (LOTREL) 10-20 MG per capsule, TAKE 1 CAPSULE BY MOUTH EVERY DAY (Patient taking differently: Take 1 capsule by mouth every morning), Disp: 90 capsule, Rfl: 1    apixaban (Eliquis) 5 mg, Take 1 tablet (5 mg total) by mouth 2 (two) times a day Do not start before September 28, 2023., Disp: 60 tablet, Rfl: 0    apixaban (ELIQUIS) 5 mg, Take 2 tablets (10 mg total) by mouth 2 (two) times a day for 6 days, Disp: 24 tablet, Rfl: 0    calcium citrate-vitamin D (CITRACAL+D) 315-200 MG-UNIT per tablet, Take 1 tablet by mouth daily (Patient not taking: Reported on 10/18/2023), Disp: , Rfl:     magnesium hydroxide (MILK OF MAGNESIA) 400 mg/5 mL oral suspension, Take by mouth daily as needed for constipation (Patient not taking: Reported on 10/18/2023), Disp: , Rfl:     Multiple Vitamins-Minerals (MULTIVITAL) tablet, Take 1 tablet by mouth daily, Disp: , Rfl:     Omega-3 Fatty Acids (FISH OIL) 645 MG CAPS, Take 1 tablet by mouth daily, Disp: , Rfl:     Current Facility-Administered Medications: Botulinum Toxin Type A SOLR 200 Units, 200 Units, Injection, Q3 Months, Omero Campbell MD, 200 Units at 05/03/21 1619    Social History     Socioeconomic History    Marital status:      Spouse name: Not on file    Number of children: Not on file    Years of education: Not on file    Highest education level: Not on file   Occupational History    Not on file   Tobacco Use    Smoking status: Never    Smokeless tobacco: Never   Vaping Use    Vaping Use: Never used   Substance and Sexual Activity    Alcohol use: Not Currently    Drug use: No    Sexual activity: Not Currently   Other Topics Concern    Not on file   Social History Narrative    Denied consumption of coffee    Active daily tea consumption    Denied ingesting cola containing caffeine         Social Determinants of Health     Financial Resource Strain: Low Risk  (11/10/2022)    Overall Financial Resource Strain (CARDIA)     Difficulty of Paying Living Expenses: Not very hard   Food Insecurity: No Food Insecurity (9/20/2023)    Hunger Vital Sign     Worried About Running Out of Food in the Last Year: Never true     [address] in the Last Year: Never true   Transportation Needs: No Transportation Needs (9/20/2023)    PRAPARE - Transportation     Lack of Transportation (Medical): No     Lack of Transportation (Non-Medical):  No   Physical Activity: Not on file   Stress: Not on file   Social Connections: Not on file   Intimate Partner Violence: Not on file   Housing Stability: Low Risk  (9/20/2023)    Housing Stability Vital Sign     Unable to Pay for Housing in the Last Year: No     Number of Places Lived in the Last Year: 1     Unstable Housing in the Last Year: No       Family History   Problem Relation Age of Onset    Heart disease Mother Heart disease Father     Hypertension Family     Osteoporosis Family        Physical Exam:    Vitals: not currently breastfeeding. , There is no height or weight on file to calculate BMI.,   Wt Readings from Last 3 Encounters:   09/28/23 58.1 kg (128 lb)   09/19/23 62.1 kg (137 lb)   05/18/23 56.7 kg (125 lb)       Physical Exam:  There were no vitals filed for this visit. Physical Exam    Labs & Results:    Lab Results   Component Value Date    WBC 5.35 10/12/2023    HGB 12.5 10/12/2023    HCT 38.0 10/12/2023    MCV 98 10/12/2023     10/12/2023     Lab Results   Component Value Date    SODIUM 138 09/22/2023    K 3.8 09/22/2023     09/22/2023    CO2 25 09/22/2023    BUN 29 (H) 09/22/2023    CREATININE 0.83 09/22/2023    GLUC 117 09/22/2023    CALCIUM 8.9 09/22/2023     No results found for: "BNP"   Lab Results   Component Value Date    CHOLESTEROL 251 (H) 09/02/2020    CHOLESTEROL 250 (H) 08/26/2019    CHOLESTEROL 242 (H) 08/09/2018     Lab Results   Component Value Date    HDL 72 09/02/2020    HDL 77 (H) 08/26/2019    HDL 71 (H) 08/09/2018     Lab Results   Component Value Date    TRIG 118 09/02/2020    TRIG 180 (H) 08/26/2019    TRIG 110 08/09/2018     Lab Results   Component Value Date    [facility] 171 08/09/2018             EKG personally reviewed by Marleen Hernandez DO. Counseling / Coordination of Care  Time spent today 40 minutes. Greater than 50% of total time was spent with the patient and / or family counseling and / or coordination of care. We discussed diagnoses, most recent studies and any changes in treatment  Thank you for the opportunity to participate in the care of this patient.     Jose Armando Solitario [facility]  MEDICAL DIRECTOR OF [address] [place] [address]

## 2023-11-08 ENCOUNTER — CONSULT (OUTPATIENT)
Dept: CARDIOLOGY CLINIC | Facility: CLINIC | Age: 88
End: 2023-11-08
Payer: MEDICARE

## 2023-11-08 VITALS
HEART RATE: 98 BPM | DIASTOLIC BLOOD PRESSURE: 70 MMHG | SYSTOLIC BLOOD PRESSURE: 138 MMHG | WEIGHT: 130 LBS | BODY MASS INDEX: 26.21 KG/M2 | HEIGHT: 59 IN | OXYGEN SATURATION: 99 %

## 2023-11-08 DIAGNOSIS — I10 ESSENTIAL HYPERTENSION: Chronic | ICD-10-CM

## 2023-11-08 DIAGNOSIS — I27.20 PULMONARY HTN (HCC): Primary | Chronic | ICD-10-CM

## 2023-11-08 DIAGNOSIS — I82.433 ACUTE BILATERAL DEEP VEIN THROMBOSIS (DVT) OF POPLITEAL VEINS (HCC): ICD-10-CM

## 2023-11-08 PROCEDURE — 99204 OFFICE O/P NEW MOD 45 MIN: CPT | Performed by: INTERNAL MEDICINE

## 2023-11-20 ENCOUNTER — OFFICE VISIT (OUTPATIENT)
Dept: FAMILY MEDICINE CLINIC | Facility: MEDICAL CENTER | Age: 88
End: 2023-11-20
Payer: MEDICARE

## 2023-11-20 VITALS
WEIGHT: 129 LBS | OXYGEN SATURATION: 96 % | DIASTOLIC BLOOD PRESSURE: 86 MMHG | RESPIRATION RATE: 18 BRPM | BODY MASS INDEX: 26 KG/M2 | HEIGHT: 59 IN | HEART RATE: 105 BPM | TEMPERATURE: 98.1 F | SYSTOLIC BLOOD PRESSURE: 152 MMHG

## 2023-11-20 DIAGNOSIS — Z00.00 MEDICARE ANNUAL WELLNESS VISIT, SUBSEQUENT: Primary | ICD-10-CM

## 2023-11-20 DIAGNOSIS — I82.411 ACUTE DEEP VEIN THROMBOSIS (DVT) OF FEMORAL VEIN OF RIGHT LOWER EXTREMITY (HCC): ICD-10-CM

## 2023-11-20 DIAGNOSIS — Z11.59 NEED FOR HEPATITIS C SCREENING TEST: ICD-10-CM

## 2023-11-20 DIAGNOSIS — I10 ESSENTIAL HYPERTENSION: Chronic | ICD-10-CM

## 2023-11-20 DIAGNOSIS — M25.551 PAIN OF RIGHT HIP: ICD-10-CM

## 2023-11-20 PROCEDURE — G0439 PPPS, SUBSEQ VISIT: HCPCS | Performed by: STUDENT IN AN ORGANIZED HEALTH CARE EDUCATION/TRAINING PROGRAM

## 2023-11-20 NOTE — ASSESSMENT & PLAN NOTE
Immunizations reviewed  Influenza vaccine already received this season  Counseled about newest COVID-19 vaccine, defers  Counseled about tetanus booster  Informed about RSV vaccine  Immunizations otherwise up-to-date

## 2023-11-20 NOTE — PROGRESS NOTES
Assessment and Plan:     Problem List Items Addressed This Visit          Cardiovascular and Mediastinum    Essential hypertension (Chronic)    Relevant Orders    Basic metabolic panel    Deep vein thrombosis (DVT) of femoral vein of right lower extremity (HCC)    Relevant Medications    apixaban (Eliquis) 5 mg       Other    Medicare annual wellness visit, subsequent - Primary     Immunizations reviewed  Influenza vaccine already received this season  Counseled about newest COVID-19 vaccine, defers  Counseled about tetanus booster  Informed about RSV vaccine  Immunizations otherwise up-to-date          Other Visit Diagnoses       Pain of right hip        Relevant Orders    Referral to [address] [place] [address] Drive [facility]    Need for hepatitis C screening test        Relevant Orders    Hepatitis C antibody          BMI Counseling: Body mass index is 26.05 kg/m². The BMI is above normal. Nutrition recommendations include encouraging healthy choices of fruits and vegetables and increasing intake of lean protein. Exercise recommendations include strength training exercises. Rationale for BMI follow-up plan is due to patient being overweight or obese. Preventive health issues were discussed with patient, and age appropriate screening tests were ordered as noted in patient's After Visit Summary. Personalized health advice and appropriate referrals for health education or preventive services given if needed, as noted in patient's After Visit Summary. Follow-up in 6 months and sooner as needed. History of Present Illness:     Patient presents for a Medicare Wellness Visit. Patient presents with her daughter. Daughter mentions she certainly feels mother would benefit from further physical therapy. She has ongoing right hip pain. Consulted with orthopedics and pain management. Has follow-up with pain management in the new year.     HPI   Patient Care Team:  Prudencio Lewis DO as PCP - General (Family Medicine)  Ramona Rockwell Flo Singh MD (Neurology)  Nilesh Winslow MD (Urogynecology)     Review of Systems:     Review of Systems    As noted in HPI      Problem List:     Patient Active Problem List   Diagnosis    Blepharospasm    Spasmodic torticollis    Anxiety    Depression, recurrent ([address])    Essential hypertension    Family history of malignant neoplasm of breast    Functional urinary incontinence    Hyperlipidemia    MCI (mild cognitive impairment)    Osteoporosis    Torsion dystonia fragments    Segmental dystonia    Sensorineural hearing loss (SNHL), bilateral    Asymmetrical hearing loss of right ear    Pulmonary HTN ([address])    Presence of pessary    Osteoporosis without current pathological fracture    Nerve sheath tumor    Lumbar spine tumor    Primary osteoarthritis of right hip    Encounter for gynecological examination without abnormal finding    PAD (peripheral artery disease) ([address])    Medicare annual wellness visit, subsequent    Spinal stenosis of lumbar region with neurogenic claudication    Lumbar disc disease with radiculopathy    Fall    Closed displaced fracture of fourth cervical vertebra ([address])    Epidural hematoma ([address])    Near syncope    Deep vein thrombosis (DVT) of femoral vein of right lower extremity ([address])    Pancreatic abnormality    Mass of soft tissue    Normocytic anemia      Past Medical and Surgical History:     Past Medical History:   Diagnosis Date    Anxiety 2010    Bladder infection 09/2019    Breast lump in female     HL (hearing loss) 2010    Hypertension 2010    Osteopenia     Ovarian cyst      Past Surgical History:   Procedure Laterality Date    BLADDER SURGERY      BREAST BIOPSY      CATARACT EXTRACTION Bilateral 01/01/2015    , Right Eye-2017, Left eye    ENDOMETRIAL BIOPSY      by Suction    HYSTERECTOMY        Family History:     Family History   Problem Relation Age of Onset    Heart disease Mother     Heart disease Father     Hypertension Family     Osteoporosis Family       Social History:     Social History     Socioeconomic History    Marital status:      Spouse name: None    Number of children: None    Years of education: None    Highest education level: None   Occupational History    None   Tobacco Use    Smoking status: Never    Smokeless tobacco: Never   Vaping Use    Vaping Use: Never used   Substance and Sexual Activity    Alcohol use: Not Currently    Drug use: No    Sexual activity: Not Currently   Other Topics Concern    None   Social History Narrative    Denied consumption of coffee    Active daily tea consumption    Denied ingesting cola containing caffeine         Social Determinants of Health     Financial Resource Strain: Low Risk  (11/13/2023)    Overall Financial Resource Strain (CARDIA)     Difficulty of Paying Living Expenses: Not very hard   Food Insecurity: No Food Insecurity (9/20/2023)    Hunger Vital Sign     Worried About Running Out of Food in the Last Year: Never true     Ran Out of Food in the Last Year: Never true   Transportation Needs: No Transportation Needs (11/13/2023)    PRAPARE - Transportation     Lack of Transportation (Medical): No     Lack of Transportation (Non-Medical):  No   Physical Activity: Not on file   Stress: Not on file   Social Connections: Not on file   Intimate Partner Violence: Not on file   Housing Stability: Low Risk  (9/20/2023)    Housing Stability Vital Sign     Unable to Pay for Housing in the Last Year: No     Number of Places Lived in the Last Year: 1     Unstable Housing in the Last Year: No      Medications and Allergies:     Current Outpatient Medications   Medication Sig Dispense Refill    acetaminophen (TYLENOL) 500 mg tablet Take 1,000 mg by mouth 2 (two) times a day as needed for mild pain      amLODIPine-benazepril (LOTREL) 10-20 MG per capsule TAKE 1 CAPSULE BY MOUTH EVERY DAY (Patient taking differently: Take 1 capsule by mouth every morning) 90 capsule 1    apixaban (Eliquis) 5 mg Take 1 tablet (5 mg total) by mouth 2 (two) times a day 180 tablet 1    Multiple Vitamins-Minerals (MULTIVITAL) tablet Take 1 tablet by mouth daily      Omega-3 Fatty Acids (FISH OIL) 645 MG CAPS Take 1 tablet by mouth daily      calcium citrate-vitamin D (CITRACAL+D) 315-200 MG-UNIT per tablet Take 1 tablet by mouth daily (Patient not taking: Reported on 10/18/2023)      magnesium hydroxide (MILK OF MAGNESIA) 400 mg/5 mL oral suspension Take by mouth daily as needed for constipation (Patient not taking: Reported on 10/18/2023)       Current Facility-Administered Medications   Medication Dose Route Frequency Provider Last Rate Last Admin    Botulinum Toxin Type A SOLR 200 Units  200 Units Injection Q3 Months Josse Martinez MD   200 Units at 05/03/21 1619     No Known Allergies   Immunizations:     Immunization History   Administered Date(s) Administered    COVID-19 PFIZER VACCINE 0.3 ML IM 01/21/2021, 02/11/2021, 10/13/2021    COVID-19 Pfizer Vac BIVALENT Gilson-sucrose 12 Yr+ IM 10/31/2022    COVID-19 Pfizer vac (Gilson-sucrose, gray cap) 12 yr+ IM 04/11/2022    INFLUENZA 09/27/2013, 10/17/2014, 10/16/2015, 10/25/2016, 09/26/2017, 01/03/2018, 10/09/2019, 09/28/2021, 10/12/2022    Influenza Split High Dose Preservative Free IM 09/26/2017, 01/03/2018    Influenza, high dose seasonal 0.7 mL 10/02/2018, 10/05/2020, 10/12/2023    Pneumococcal Conjugate 13-Valent 08/21/2017    Pneumococcal Conjugate Vaccine 20-valent (Pcv20), Polysace 05/18/2023    Pneumococcal Polysaccharide PPV23 08/24/2018    Zoster 01/22/2018, 01/31/2018      Health Maintenance: There are no preventive care reminders to display for this patient. Topic Date Due    COVID-19 Vaccine (6 - Pfizer series) 02/28/2023      Medicare Screening Tests and Risk Assessments:     Jon Rubalcava is here for her Subsequent Wellness visit. Health Risk Assessment:   Patient rates overall health as good. Patient feels that their physical health rating is same.  Patient is satisfied with their life. Kristine [place] was rated as same. Hearing was rated as same. Patient feels that their emotional and mental health rating is same. Patients states they are never, rarely angry. Patient states they are sometimes unusually tired/fatigued. Pain experienced in the last 7 days has been some. Patient's pain rating has been 5/10. Patient states that she has experienced no weight loss or gain in last 6 months. Depression Screening:   PHQ-9 Score: 1      Fall Risk Screening: In the past year, patient has experienced: history of falling in past year    Number of falls: 1  Injured during fall?: Yes    Feels unsteady when standing or walking?: No    Worried about falling?: No      Urinary Incontinence Screening:   Patient has leaked urine accidently in the last six months. Home Safety:  Patient does not have trouble with stairs inside or outside of their home. Patient has working smoke alarms and has working carbon monoxide detector. Home safety hazards include: none. Nutrition:   Current diet is Regular. Medications:   Patient is currently taking over-the-counter supplements. OTC medications include: see medication list. Patient is not able to manage medications. Daughter assists. Activities of Daily Living (ADLs)/Instrumental Activities of Daily Living (IADLs):   Walk and transfer into and out of bed and chair?: Yes  Dress and groom yourself?: No    Bathe or shower yourself?: No    Feed yourself? Yes  Do your laundry/housekeeping?: No  Manage your money, pay your bills and track your expenses?: No  Make your own meals?: No    Do your own shopping?: No    ADL comments: Daughter and caregiver assist. Uses walker at home.      Previous Hospitalizations:   Any hospitalizations or ED visits within the last 12 months?: Yes    How many hospitalizations have you had in the last year?: 1-2    Hospitalization Comments: 2 visits- Fractured neck and blood clot    Advance Care Planning:   Living will: Yes    Durable POA for healthcare: Yes    Advanced directive: Yes      Cognitive Screening:   Provider or family/friend/caregiver concerned regarding cognition?: No    PREVENTIVE SCREENINGS      Cardiovascular Screening:    General: History Lipid Disorder and Patient Declines      Diabetes Screening:     General: Screening Current      Colorectal Cancer Screening:     General: Screening Not Indicated      Breast Cancer Screening:     General: Screening Not Indicated      Cervical Cancer Screening:    General: Screening Not Indicated      Osteoporosis Screening:    General: History Osteoporosis and Patient Declines      Abdominal Aortic Aneurysm (AAA) Screening:        General: Screening Not Indicated      Lung Cancer Screening:     General: Screening Not Indicated      Hepatitis C Screening:    General: Risks and Benefits Discussed    Hep C Screening Accepted: Yes      Screening, Brief Intervention, and Referral to Treatment (SBIRT)    Screening  Typical number of drinks in a day: 0  Typical number of drinks in a week: 0  Interpretation: Low risk drinking behavior. AUDIT-C Screenin) How often did you have a drink containing alcohol in the past year? monthly or less  2) How many drinks did you have on a typical day when you were drinking in the past year? 1 to 2  3) How often did you have 6 or more drinks on one occasion in the past year? never    AUDIT-C Score: 1  Interpretation: Score 0-2 (female): Negative screen for alcohol misuse    Single Item Drug Screening:  How often have you used an illegal drug (including marijuana) or a prescription medication for non-medical reasons in the past year? never    Single Item Drug Screen Score: 0  Interpretation: Negative screen for possible drug use disorder    Other Counseling Topics:   Car/seat belt/driving safety, skin self-exam, sunscreen and calcium and vitamin D intake. No results found.      Physical Exam:     /86 (BP Location: Left arm, Patient Position: Sitting, Cuff Size: Adult)   Pulse 105   Temp 98.1 °F (36.7 °C)   Resp 18   Ht 4' 11" (1.499 m)   Wt 58.5 kg (129 lb)   SpO2 96%   BMI 26.05 kg/m²     Physical Exam  Vitals reviewed. Constitutional:       General: She is not in acute distress. Appearance: Normal appearance. HENT:      Head: Normocephalic and atraumatic. Eyes:      Conjunctiva/sclera: Conjunctivae normal.   Pulmonary:      Effort: Pulmonary effort is normal.   Neurological:      Mental Status: She is alert and oriented to person, place, and time. Psychiatric:         Mood and Affect: Mood normal.         Behavior: Behavior normal.         Thought Content:  Thought content normal.          Yeni Runnemede, DO

## 2023-11-21 ENCOUNTER — TELEPHONE (OUTPATIENT)
Dept: FAMILY MEDICINE CLINIC | Facility: MEDICAL CENTER | Age: 88
End: 2023-11-21

## 2023-11-21 DIAGNOSIS — I82.411 ACUTE DEEP VEIN THROMBOSIS (DVT) OF FEMORAL VEIN OF RIGHT LOWER EXTREMITY (HCC): ICD-10-CM

## 2023-11-21 NOTE — TELEPHONE ENCOUNTER
Aniket Helms DO  P [facility] Clinical; P [facility] Clerical  Please see me          Previous Messages       ----- Message -----  From: Aaron Mcleod LPN  Sent: 30/98/6550   4:07 PM EST  To: Aniket Helms DO    I am sorry due to our census we are unable to accept this patients referral related to team covering zip code area or skill/discipline being requested at this time. I do see she was just recently DC from [address] [place] [address] so perhaps try them as she has a history with that agency. Otherwise you can also try our [address] provided by our [facility] therapist as they do not have to go by the same criteria or same restrictions. I do believe they can be found on [facility]. Their FAX number is [phone] or [address] rehab as well.

## 2023-11-22 ENCOUNTER — TELEPHONE (OUTPATIENT)
Age: 88
End: 2023-11-22

## 2023-11-22 DIAGNOSIS — M48.062 SPINAL STENOSIS OF LUMBAR REGION WITH NEUROGENIC CLAUDICATION: Primary | Chronic | ICD-10-CM

## 2023-11-22 DIAGNOSIS — M16.11 PRIMARY OSTEOARTHRITIS OF RIGHT HIP: ICD-10-CM
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
## 2023-11-22 NOTE — TELEPHONE ENCOUNTER
May Brandt from [facility] is calling to see if we can fax over the following documents for Erika: They will need her Home Health Referral, Last office visit notes and demographics    Fax: [phone]      Please advise, thank you!

## 2023-11-27 NOTE — TELEPHONE ENCOUNTER
Received a call from Raquel Maloney again, she is asking if they can get a nursing referral so they can have an nurse go out to see the patient. They said nursing could be added to the physical therapy referral if that made it easier. It can then be faxed to [phone].

## 2023-11-28 ENCOUNTER — TELEPHONE (OUTPATIENT)
Age: 88
End: 2023-11-28

## 2023-11-28 DIAGNOSIS — I82.411 ACUTE DEEP VEIN THROMBOSIS (DVT) OF FEMORAL VEIN OF RIGHT LOWER EXTREMITY (HCC): ICD-10-CM

## 2023-11-28 NOTE — TELEPHONE ENCOUNTER
Dari Jarquin from [facility] called to follow up to have a home nursing referral ordered and added to the PT referral. Fax [phone]. Please  follow up.

## 2023-11-28 NOTE — TELEPHONE ENCOUNTER
Mail order is coming late needs 1 or 2 weeks of Eliqus 5 mg 1 every morning sent to   [facility]   [address]  Phone [phone]

## 2023-12-05 DIAGNOSIS — I82.411 ACUTE DEEP VEIN THROMBOSIS (DVT) OF FEMORAL VEIN OF RIGHT LOWER EXTREMITY (HCC): ICD-10-CM

## 2023-12-05 RX ORDER — APIXABAN 5 MG/1
5 TABLET, FILM COATED ORAL 2 TIMES DAILY
Qty: 180 TABLET | Refills: 1 | Status: SHIPPED | OUTPATIENT
Start: 2023-12-05

## 2023-12-19 ENCOUNTER — APPOINTMENT (OUTPATIENT)
Dept: LAB | Facility: MEDICAL CENTER | Age: 88
End: 2023-12-19
Payer: MEDICARE

## 2023-12-19 DIAGNOSIS — I10 ESSENTIAL HYPERTENSION: Chronic | ICD-10-CM

## 2023-12-19 DIAGNOSIS — Z11.59 NEED FOR HEPATITIS C SCREENING TEST: ICD-10-CM

## 2023-12-19 DIAGNOSIS — I82.411 ACUTE DEEP VEIN THROMBOSIS (DVT) OF FEMORAL VEIN OF RIGHT LOWER EXTREMITY (HCC): ICD-10-CM

## 2023-12-19 LAB
ANION GAP SERPL CALCULATED.3IONS-SCNC: 9 MMOL/L
BUN SERPL-MCNC: 21 MG/DL (ref 5–25)
CALCIUM SERPL-MCNC: 9.6 MG/DL (ref 8.4–10.2)
CHLORIDE SERPL-SCNC: 105 MMOL/L (ref 96–108)
CO2 SERPL-SCNC: 26 MMOL/L (ref 21–32)
CREAT SERPL-MCNC: 0.76 MG/DL (ref 0.6–1.3)
GFR SERPL CREATININE-BSD FRML MDRD: 68 ML/MIN/1.73SQ M
GLUCOSE P FAST SERPL-MCNC: 108 MG/DL (ref 65–99)
HCV AB SER QL: NORMAL
POTASSIUM SERPL-SCNC: 4.1 MMOL/L (ref 3.5–5.3)
SODIUM SERPL-SCNC: 140 MMOL/L (ref 135–147)

## 2023-12-19 PROCEDURE — 86803 HEPATITIS C AB TEST: CPT

## 2023-12-19 PROCEDURE — 86146 BETA-2 GLYCOPROTEIN ANTIBODY: CPT

## 2023-12-19 PROCEDURE — 36415 COLL VENOUS BLD VENIPUNCTURE: CPT

## 2023-12-19 PROCEDURE — 80048 BASIC METABOLIC PNL TOTAL CA: CPT

## 2023-12-23 LAB
B2 GLYCOPROT1 IGA SERPL IA-ACNC: 1.7
B2 GLYCOPROT1 IGG SERPL IA-ACNC: 1.1
B2 GLYCOPROT1 IGM SERPL IA-ACNC: 50

## 2024-01-08 ENCOUNTER — PROCEDURE VISIT (OUTPATIENT)
Dept: NEUROLOGY | Facility: CLINIC | Age: 89
End: 2024-01-08
Payer: MEDICARE

## 2024-01-08 VITALS — SYSTOLIC BLOOD PRESSURE: 130 MMHG | DIASTOLIC BLOOD PRESSURE: 78 MMHG | TEMPERATURE: 97.5 F

## 2024-01-08 DIAGNOSIS — G24.3 CERVICAL DYSTONIA: Primary | ICD-10-CM

## 2024-01-08 DIAGNOSIS — G24.5 BLEPHAROSPASM: ICD-10-CM

## 2024-01-08 PROCEDURE — 64612 DESTROY NERVE FACE MUSCLE: CPT | Performed by: PSYCHIATRY & NEUROLOGY

## 2024-01-08 NOTE — PROGRESS NOTES
Universal Protocol   Consent: Written consent obtained.  Consent given by: patient      Chemodenervation     Date/Time  1/8/2024 1:30 PM     Performed by  Mira Hernandez MD   Authorized by  Mira Hernandez MD     Pre-procedure details      Prepped With: Alcohol     Anesthesia  (see MAR for exact dosages):     Anesthesia method:  None   Procedure details      Position:  Upright   Botox      Brand:  Botox    mL's of preservative free sterile saline:  2    Final Concentration per CC:  50 units   Procedures      Botox Procedures: blepharospasm and cervical dystonia      Indications: blepharospasm and spasmodic torticollis      Date of last injection:  9/11/2023   Post-procedure details      Chemodenervation:  Head or face and neck, excluding muscles of the larynx    Patient tolerance of procedure:  Tolerated well, no immediate complications   Comments        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 Cervical fx in Spring 2023. Recent decline in gait.       Overall good response to injections. Response lasts about 4 months with mild return of blinking. Head turn with hypertrophy of left SCM.         Exam: Mild blinking.  Left SCM hypertrophy.         Injections:   2.5 unit(s) in 1 injection(s) was injected into the right  muscle.   2.5 unit(s) in 1 injection(s) was injected into the left  muscle.   10 unit(s) in 4 (2.5 ) injection(s) was injected into the right orbicularis oculi (upper inner, upper and lower outer and lateral canthus)  10 unit(s) in 4 (x2.5) injection(s) was injected into the left orbicularis oculi      Injected: 25 units     Cervical dystonia injections:   25 unit(s) was injected into the left sternocleidomastoid muscle.--    50 (25 x2 side) unit(s) was injected into the right splenius capitus muscle.--    25 unit(s) was injected into the right levator scapulae muscle     Total injected 125  units  Discarded 75

## 2024-01-18 DIAGNOSIS — I10 ESSENTIAL HYPERTENSION: ICD-10-CM

## 2024-01-18 RX ORDER — AMLODIPINE BESYLATE AND BENAZEPRIL HYDROCHLORIDE 10; 20 MG/1; MG/1
1 CAPSULE ORAL DAILY
Qty: 90 CAPSULE | Refills: 1 | Status: SHIPPED | OUTPATIENT
Start: 2024-01-18

## 2024-01-19 PROBLEM — Z00.00 MEDICARE ANNUAL WELLNESS VISIT, SUBSEQUENT: Status: RESOLVED | Noted: 2022-11-17 | Resolved: 2024-01-19

## 2024-02-16 DIAGNOSIS — I82.411 ACUTE DEEP VEIN THROMBOSIS (DVT) OF FEMORAL VEIN OF RIGHT LOWER EXTREMITY (HCC): ICD-10-CM

## 2024-02-16 NOTE — TELEPHONE ENCOUNTER
----- Message from Dalia Nair sent at 9/5/2023  2:38 PM CDT -----  Type:  Sooner Apoointment Request    Caller is requesting a sooner appointment.  Caller declined first available appointment listed below.  Caller will not accept being placed on the waitlist and is requesting a message be sent to doctor.  Name of Caller: Pt  When is the first available appointment? October   Symptoms: Annual appt  Would the patient rather a call back or a response via [facility]? Call  Best Call Back Number: [phone]  Additional Information: Pt will need a refill on blood pressure medication & tacrolimus (PROGRAF) 1 MG [facility] [place] [place] [facility] [place], [place] - [address]        Reason for call:   [x] Refill   [] Prior Auth  [] Other:     Office:   [x] PCP/Provider -   [] Specialty/Provider -     Medication: Eliquis 5 MG     Dose/Frequency: TAKE 1 TABLET BY MOUTH TWICE A DAY     Quantity: #180    Pharmacy: [facility] - [address] [phone]     Does the patient have enough for 3 days?   [x] Yes   [] No - Send as HP to POD

## 2024-02-20 ENCOUNTER — TELEPHONE (OUTPATIENT)
Age: 89
End: 2024-02-20

## 2024-02-20 NOTE — TELEPHONE ENCOUNTER
Caller: Hilda (Daughter)     Doctor:     Reason for call: Would like to get a repeat injection on the hip area of the right side.     Please advise     Call back#: [phone]

## 2024-02-21 PROBLEM — Z01.419 ENCOUNTER FOR GYNECOLOGICAL EXAMINATION WITHOUT ABNORMAL FINDING: Status: RESOLVED | Noted: 2021-11-18 | Resolved: 2024-02-21

## 2024-02-29 ENCOUNTER — OFFICE VISIT (OUTPATIENT)
Dept: PAIN MEDICINE | Facility: CLINIC | Age: 89
End: 2024-02-29
Payer: MEDICARE

## 2024-02-29 VITALS — HEART RATE: 98 BPM | RESPIRATION RATE: 18 BRPM | DIASTOLIC BLOOD PRESSURE: 92 MMHG | SYSTOLIC BLOOD PRESSURE: 162 MMHG

## 2024-02-29 DIAGNOSIS — R93.7 ABNORMAL MRI, LUMBAR SPINE: ICD-10-CM

## 2024-02-29 DIAGNOSIS — F33.9 DEPRESSION, RECURRENT (HCC): ICD-10-CM

## 2024-02-29 DIAGNOSIS — I82.411 ACUTE DEEP VEIN THROMBOSIS (DVT) OF FEMORAL VEIN OF RIGHT LOWER EXTREMITY (HCC): ICD-10-CM

## 2024-02-29 DIAGNOSIS — M51.16 LUMBAR DISC DISEASE WITH RADICULOPATHY: Primary | ICD-10-CM

## 2024-02-29 PROCEDURE — 99214 OFFICE O/P EST MOD 30 MIN: CPT | Performed by: PHYSICAL MEDICINE & REHABILITATION

## 2024-02-29 NOTE — PROGRESS NOTES
Assessment:  1. Lumbar disc disease with radiculopathy    2. Abnormal MRI, lumbar spine    3. Depression, recurrent (HCC)    4. Acute deep vein thrombosis (DVT) of femoral vein of right lower extremity (HCC)        Plan:  Ms. Quigley is a pleasant 89-year-old female significant past medical history of an L2-L3 neural sheath tumor presents to [facility] for follow-up and reevaluation.  We previously treated her with a right-sided L3-L4 and L4-L5 TFESI in November 2022.  Difficult to gauge true relief as the patient unfortunately suffered a fall with subsequent cervical fractures at C4-C5 treated nonoperatively.  Her family member at bedside does report that the patient stopped complaining of radicular pain into her right leg after the injection and believes that it did work but in light of the neck trauma it was difficult to truly gauge impact of the injection.  It has been a substantial amount of time since patient has both been evaluated and considered for further interventional approaches.  Given the significant neural sheath tumor at L2-L3 noted on previous MRI we will now order repeat lumbar MRI without contrast to assess for changes.  If no major changes on follow-up MRI would consider repeat right-sided L3-L4 and L4-L5 TFESI under fluoroscopy guidance.  For now we will await imaging results.    My impressions and treatment recommendations were discussed in detail with the patient who verbalized understanding and had no further questions.  Discharge instructions were provided. I personally saw and examined the patient and I agree with the above discussed plan of care.    Orders Placed This Encounter   Procedures    MRI lumbar spine without contrast     Standing Status:   Future     Standing Expiration Date:   2/29/2028     Scheduling Instructions:      There is no preparation for this test. Please leave your jewelry and valuables at home, wedding rings are the exception. All patients will be  required to change into a hospital gown and pants.  Street clothes are not permitted in the MRI.  Magnetic nail polish must be removed prior to arrival for your test. Please bring your insurance cards, a form of photo ID and a list of your medications with you. Arrive 15 minutes prior to your appointment time in order to register. Please bring any prior CT or MRI studies of this area that were not performed at a [facility] facility.            To schedule this appointment, please contact Central Scheduling at [phone].            Prior to your appointment, please make sure you complete the MRI Screening Form when you e-Check in for your appointment. This will be available starting 7 days before your appointment in [employer]. You may receive an e-mail with an activation code if you do not have a [employer] account. If you do not have access to a device, we will complete your screening at your appointment.     Order Specific Question:   What is the patient's sedation requirement? If Medication for Claustrophobia is selected, order medication at this point.     Answer:   No Sedation     Order Specific Question:   Does this procedure require the 3T MRI at [facility] or [facility]?     Answer:   No     Order Specific Question:   Release to patient through [employer]     Answer:   Immediate     Order Specific Question:   Is order priority selected as STAT?     Answer:   No     Order Specific Question:   Reason for Exam (FREE TEXT)     Answer:   History of neural sheath tumor     No orders of the defined types were placed in this encounter.      History of Present Illness:  Erika Quigley is a 89 y.o. female who presents for a follow up office visit in regards to Back Pain, Hip Pain (Right), and Leg Pain (RLE).   The patient’s current symptoms include low back pain with radiating symptoms into the right lower extremity currently rated 8-10 out of 10 and described as an intermittent throbbing, shooting, aching sensation.  Presents today  for follow-up and reevaluation.    I have personally reviewed and/or updated the patient's past medical history, past surgical history, family history, social history, current medications, allergies, and vital signs today.     Review of Systems   Respiratory:  Negative for shortness of breath.    Cardiovascular:  Negative for chest pain.   Gastrointestinal:  Negative for constipation, diarrhea, nausea and vomiting.   Musculoskeletal:  Positive for back pain, gait problem and joint swelling. Negative for arthralgias and myalgias.        Right Hip Pain  RLE Pain   Skin:  Negative for rash.   Neurological:  Positive for weakness. Negative for dizziness and seizures.   Psychiatric/Behavioral:  Positive for decreased concentration.    All other systems reviewed and are negative.      Patient Active Problem List   Diagnosis    Blepharospasm    Spasmodic torticollis    Anxiety    Depression, recurrent (HCC)    Essential hypertension    Family history of malignant neoplasm of breast    Functional urinary incontinence    Hyperlipidemia    MCI (mild cognitive impairment)    Osteoporosis    Torsion dystonia fragments    Segmental dystonia    Sensorineural hearing loss (SNHL), bilateral    Asymmetrical hearing loss of right ear    Pulmonary HTN (HCC)    Presence of pessary    Osteoporosis without current pathological fracture    Nerve sheath tumor    Lumbar spine tumor    Primary osteoarthritis of right hip    PAD (peripheral artery disease) (HCC)    Spinal stenosis of lumbar region with neurogenic claudication    Lumbar disc disease with radiculopathy    Fall    Closed displaced fracture of fourth cervical vertebra (HCC)    Epidural hematoma (HCC)    Near syncope    Deep vein thrombosis (DVT) of femoral vein of right lower extremity (HCC)    Pancreatic abnormality    Mass of soft tissue    Normocytic anemia       Past Medical History:   Diagnosis Date    Anxiety 2010    Bladder infection 09/2019    Breast lump in female     HL  (hearing loss) 2010    Hypertension 2010    Osteopenia     Ovarian cyst        Past Surgical History:   Procedure Laterality Date    BLADDER SURGERY      BREAST BIOPSY      CATARACT EXTRACTION Bilateral 01/01/2015    , Right Eye-2017, Left eye    ENDOMETRIAL BIOPSY      by Suction    HYSTERECTOMY         Family History   Problem Relation Age of Onset    Heart disease Mother     Heart disease Father     Hypertension Family     Osteoporosis Family        Social History     Occupational History    Not on file   Tobacco Use    Smoking status: Never    Smokeless tobacco: Never   Vaping Use    Vaping status: Never Used   Substance and Sexual Activity    Alcohol use: Not Currently    Drug use: No    Sexual activity: Not Currently       Current Outpatient Medications on File Prior to Visit   Medication Sig    acetaminophen (TYLENOL) 500 mg tablet Take 1,300 mg by mouth 2 (two) times a day as needed for mild pain    amLODIPine-benazepril (LOTREL) 10-20 MG per capsule TAKE 1 CAPSULE BY MOUTH EVERY DAY    apixaban (Eliquis) 5 mg Take 1 tablet (5 mg total) by mouth 2 (two) times a day    calcium citrate-vitamin D (CITRACAL+D) 315-200 MG-UNIT per tablet Take 1 tablet by mouth daily    Multiple Vitamins-Minerals (MULTIVITAL) tablet Take 1 tablet by mouth daily    Omega-3 Fatty Acids (FISH OIL) 645 MG CAPS Take 1 tablet by mouth daily    magnesium hydroxide (MILK OF MAGNESIA) 400 mg/5 mL oral suspension Take by mouth daily as needed for constipation (Patient not taking: Reported on 10/18/2023)     Current Facility-Administered Medications on File Prior to Visit   Medication    Botulinum Toxin Type A SOLR 200 Units    onabotulinumtoxin A (BOTOX) injection 200 Units       No Known Allergies    Physical Exam:    /92   Pulse 98   Resp 18     Constitutional:normal, well developed, well nourished, alert, in no distress and non-toxic and no overt pain behavior.  Eyes:anicteric  HEENT:grossly intact  Neck:supple, symmetric,  trachea midline and no masses   Pulmonary:even and unlabored  Cardiovascular:No edema or pitting edema present  Skin:Normal without rashes or lesions and well hydrated  Psychiatric:Mood and affect appropriate  Neurologic:Cranial Nerves II-XII grossly intact  Musculoskeletal:in wheelchair    Imaging

## 2024-03-04 ENCOUNTER — APPOINTMENT (OUTPATIENT)
Dept: LAB | Facility: MEDICAL CENTER | Age: 89
End: 2024-03-04
Payer: MEDICARE

## 2024-03-04 ENCOUNTER — TELEPHONE (OUTPATIENT)
Age: 89
End: 2024-03-04

## 2024-03-04 DIAGNOSIS — R39.9 UTI SYMPTOMS: Primary | ICD-10-CM

## 2024-03-04 DIAGNOSIS — R39.9 UTI SYMPTOMS: ICD-10-CM

## 2024-03-04 LAB
AMORPH URATE CRY URNS QL MICRO: ABNORMAL
BACTERIA UR QL AUTO: ABNORMAL /HPF
BILIRUB UR QL STRIP: NEGATIVE
CLARITY UR: ABNORMAL
COLOR UR: ABNORMAL
GLUCOSE UR STRIP-MCNC: NEGATIVE MG/DL
HGB UR QL STRIP.AUTO: NEGATIVE
HYALINE CASTS #/AREA URNS LPF: ABNORMAL /LPF
KETONES UR STRIP-MCNC: NEGATIVE MG/DL
LEUKOCYTE ESTERASE UR QL STRIP: NEGATIVE
MUCOUS THREADS UR QL AUTO: ABNORMAL
NITRITE UR QL STRIP: NEGATIVE
NON-SQ EPI CELLS URNS QL MICRO: ABNORMAL /HPF
PH UR STRIP.AUTO: 8 [PH]
PROT UR STRIP-MCNC: ABNORMAL MG/DL
RBC #/AREA URNS AUTO: ABNORMAL /HPF
SP GR UR STRIP.AUTO: 1.01 (ref 1–1.03)
TRI-PHOS CRY URNS QL MICRO: ABNORMAL /HPF
UROBILINOGEN UR STRIP-ACNC: <2 MG/DL
WBC #/AREA URNS AUTO: ABNORMAL /HPF

## 2024-03-04 PROCEDURE — 81001 URINALYSIS AUTO W/SCOPE: CPT

## 2024-03-04 NOTE — TELEPHONE ENCOUNTER
Patients daughter said she believes her mother has a UTI. There is a strong odor and she seems weak. As she is in a wheelchair, daughter would like to know if she can collect a sample at home and bring it in for evaluation. Please advise.

## 2024-03-05 ENCOUNTER — TELEMEDICINE (OUTPATIENT)
Dept: FAMILY MEDICINE CLINIC | Facility: MEDICAL CENTER | Age: 89
End: 2024-03-05
Payer: MEDICARE

## 2024-03-05 DIAGNOSIS — R39.9 UTI SYMPTOMS: Primary | ICD-10-CM

## 2024-03-05 PROCEDURE — G2211 COMPLEX E/M VISIT ADD ON: HCPCS | Performed by: STUDENT IN AN ORGANIZED HEALTH CARE EDUCATION/TRAINING PROGRAM

## 2024-03-05 PROCEDURE — 99213 OFFICE O/P EST LOW 20 MIN: CPT | Performed by: STUDENT IN AN ORGANIZED HEALTH CARE EDUCATION/TRAINING PROGRAM

## 2024-03-05 RX ORDER — SULFAMETHOXAZOLE AND TRIMETHOPRIM 800; 160 MG/1; MG/1
1 TABLET ORAL 2 TIMES DAILY
Qty: 6 TABLET | Refills: 0 | Status: SHIPPED | OUTPATIENT
Start: 2024-03-05 | End: 2024-03-08

## 2024-03-05 NOTE — ASSESSMENT & PLAN NOTE
Advised to hydrate adequately  Start antibiotic per orders; concurrent probiotic use  Provide update in 3 to 5 days and sooner as needed  If any worsening symptoms sooner, seek prompt medical attention with physical examination  Component      Latest Ref Rng 3/4/2024   RBC Urine      None Seen, 1-2 /hpf 2-4 !    WBC, UA      None Seen, 1-2 /hpf None Seen    Epithelial Cells      None Seen, Occasional /hpf Occasional    Bacteria, UA      None Seen, Occasional /hpf Occasional    Hyaline Casts, UA      None Seen /lpf 5-10 !    MUCUS THREADS      None Seen  Occasional !    Amorphous Crystals, UA Moderate    Triplep Phos Vickie, UA      /hpf Occasional       Component      Latest Ref Rng 3/4/2024   Color, UA Light Yellow    Clarity, UA Turbid    pH, UA      4.5, 5.0, 5.5, 6.0, 6.5, 7.0, 7.5, 8.0  8.0    Leukocytes, UA      Negative  Negative    Nitrite, UA      Negative  Negative    POCT URINE PROTEIN      Negative mg/dl Trace !    Glucose, UA      Negative mg/dl Negative    Ketones, UA      Negative mg/dl Negative    Bilirubin, UA      Negative  Negative    Blood, UA      Negative  Negative    SL AMB SPECIFIC GRAVITY_URINE      1.003 - 1.030  1.015    Urobilinogen, UA      <2.0 mg/dl mg/dl <2.0

## 2024-03-05 NOTE — PROGRESS NOTES
Virtual Regular Visit    Verification of patient location:    Patient is located at Home in the following state in which I hold an active license [place]      Assessment/Plan:    Problem List Items Addressed This Visit          Other    UTI symptoms - Primary     Advised to hydrate adequately  Start antibiotic per orders; concurrent probiotic use  Provide update in 3 to 5 days and sooner as needed  If any worsening symptoms sooner, seek prompt medical attention with physical examination  Component      Latest Ref Rng 3/4/2024   RBC Urine      None Seen, 1-2 /hpf 2-4 !    WBC, UA      None Seen, 1-2 /hpf None Seen    Epithelial Cells      None Seen, Occasional /hpf Occasional    Bacteria, UA      None Seen, Occasional /hpf Occasional    Hyaline Casts, UA      None Seen /lpf 5-10 !    MUCUS THREADS      None Seen  Occasional !    Amorphous Crystals, UA Moderate    Triplep Phos Vickie, UA      /hpf Occasional       Component      Latest Ref Rng 3/4/2024   Color, UA Light Yellow    Clarity, UA Turbid    pH, UA      4.5, 5.0, 5.5, 6.0, 6.5, 7.0, 7.5, 8.0  8.0    Leukocytes, UA      Negative  Negative    Nitrite, UA      Negative  Negative    POCT URINE PROTEIN      Negative mg/dl Trace !    Glucose, UA      Negative mg/dl Negative    Ketones, UA      Negative mg/dl Negative    Bilirubin, UA      Negative  Negative    Blood, UA      Negative  Negative    SL AMB SPECIFIC GRAVITY_URINE      1.003 - 1.030  1.015    Urobilinogen, UA      <2.0 mg/dl mg/dl <2.0              Relevant Medications    sulfamethoxazole-trimethoprim (BACTRIM DS) 800-160 mg per tablet            Reason for visit is   Chief Complaint   Patient presents with    Virtual Regular Visit          Encounter provider Audra Bellamy DO    Provider located at [address] [place] [address]      Recent Visits  No visits were found meeting these conditions.  Showing recent visits within past 7 days  "and meeting all other requirements  Today's Visits  Date Type Provider Dept   03/05/24 Telemedicine Audra Bellamy DO [facility]   Showing today's visits and meeting all other requirements  Future Appointments  No visits were found meeting these conditions.  Showing future appointments within next 150 days and meeting all other requirements       The patient was identified by name and date of birth. Erika Quigley was informed that this is a telemedicine visit and that the visit is being conducted through the [employer] platform. She agrees to proceed..  My office door was closed. The patient was notified the following individuals were present in the room : AP.  She acknowledged consent and understanding of privacy and security of the video platform. The patient has agreed to participate and understands they can discontinue the visit at any time.    Patient is aware this is a billable service.     Subjective    Erika Quigley is a 89 y.o. female who presents for virtual visit.  Daughter is present during virtual visit and connects us.  Daughter reports that over this past weekend patient seemed \" more confused and weak, when tried to have her stand, she put her foot out but too weak to stand\".  They describe patient is eating well.  Daughter states patient can certainly hydrate better.  Daughter also states patient tends to \" hold urine a long time\".     Past Medical History:   Diagnosis Date    Anxiety 2010    Bladder infection 09/2019    Breast lump in female     HL (hearing loss) 2010    Hypertension 2010    Osteopenia     Ovarian cyst        Past Surgical History:   Procedure Laterality Date    BLADDER SURGERY      BREAST BIOPSY      CATARACT EXTRACTION Bilateral 01/01/2015    , Right Eye-2017, Left eye    ENDOMETRIAL BIOPSY      by Suction    HYSTERECTOMY         Current Outpatient Medications   Medication Sig Dispense Refill    sulfamethoxazole-trimethoprim (BACTRIM DS) 800-160 mg per tablet Take 1 tablet by " mouth 2 (two) times a day for 3 days 6 tablet 0    acetaminophen (TYLENOL) 500 mg tablet Take 1,300 mg by mouth 2 (two) times a day as needed for mild pain      amLODIPine-benazepril (LOTREL) 10-20 MG per capsule TAKE 1 CAPSULE BY MOUTH EVERY DAY 90 capsule 1    apixaban (Eliquis) 5 mg Take 1 tablet (5 mg total) by mouth 2 (two) times a day 180 tablet 1    calcium citrate-vitamin D (CITRACAL+D) 315-200 MG-UNIT per tablet Take 1 tablet by mouth daily      magnesium hydroxide (MILK OF MAGNESIA) 400 mg/5 mL oral suspension Take by mouth daily as needed for constipation (Patient not taking: Reported on 10/18/2023)      Multiple Vitamins-Minerals (MULTIVITAL) tablet Take 1 tablet by mouth daily      Omega-3 Fatty Acids (FISH OIL) 645 MG CAPS Take 1 tablet by mouth daily       Current Facility-Administered Medications   Medication Dose Route Frequency Provider Last Rate Last Admin    Botulinum Toxin Type A SOLR 200 Units  200 Units Injection Q3 Months Mira Hernandez MD   200 Units at 05/03/21 1619    onabotulinumtoxin A (BOTOX) injection 200 Units  200 Units Intramuscular Q3 Months Mira Hernandez MD   200 Units at 01/08/24 1400        No Known Allergies    Review of Systems    As noted in HPI    As noted in HPI     Video Exam    There were no vitals filed for this visit.    Physical Exam  Constitutional:       General: She is not in acute distress.     Appearance: She is not ill-appearing, toxic-appearing or diaphoretic.   HENT:      Head: Normocephalic and atraumatic.      Mouth/Throat:      Mouth: Mucous membranes are moist.      Pharynx: Oropharynx is clear.   Eyes:      Conjunctiva/sclera: Conjunctivae normal.   Pulmonary:      Effort: Pulmonary effort is normal. No respiratory distress.   Abdominal:      Comments: When prompting daughter to palpate patient's abdomen, patient did not exhibit any pain   Neurological:      Mental Status: She is alert and oriented to person, place, and time.    Psychiatric:         Mood and Affect: Mood normal.         Behavior: Behavior normal.         Thought Content: Thought content normal.          Visit Time  Total Visit Duration: 15 minutes

## 2024-03-08 DIAGNOSIS — I82.411 ACUTE DEEP VEIN THROMBOSIS (DVT) OF FEMORAL VEIN OF RIGHT LOWER EXTREMITY (HCC): ICD-10-CM

## 2024-03-10 ENCOUNTER — HOSPITAL ENCOUNTER (OUTPATIENT)
Dept: MRI IMAGING | Facility: HOSPITAL | Age: 89
Discharge: HOME | End: 2024-03-10
Attending: PHYSICAL MEDICINE & REHABILITATION
Payer: MEDICARE

## 2024-03-10 DIAGNOSIS — R93.7 ABNORMAL MRI, LUMBAR SPINE: ICD-10-CM

## 2024-03-10 DIAGNOSIS — M51.16 LUMBAR DISC DISEASE WITH RADICULOPATHY: ICD-10-CM

## 2024-03-10 PROCEDURE — 72148 MRI LUMBAR SPINE W/O DYE: CPT

## 2024-03-11 NOTE — TELEPHONE ENCOUNTER
Thank you for update, glad to hear patient is doing better.  If any new or worsening symptoms please schedule for in office evaluation.

## 2024-03-11 NOTE — TELEPHONE ENCOUNTER
Patient's daughter, Hilda, called back to give the patient's status.  Hilda stated she was weak and confused last week, but she does seem better now.  There are times when the patient says she can't stand, but once they get her up and walking she is okay.

## 2024-03-18 ENCOUNTER — TELEPHONE (OUTPATIENT)
Dept: PAIN MEDICINE | Facility: CLINIC | Age: 89
End: 2024-03-18

## 2024-03-18 NOTE — TELEPHONE ENCOUNTER
Message from Baudilio Keyes DO sent at 3/18/2024  3:46 PM EDT -----  Please notify patient MRI lumbar spine results demonstrating stable appearance of the previously noted large intradural and extradural mass at L2-L3  I have included neurosurgery team Zeyad Snyder to this results task.   Zeyad would you guys like to see her in office or just follow-up as needed?  May also schedule an office follow-up and reevaluation with me if patient is in pain  Please advise  Thank you     ----- Message -----  From: Interface, Radiology Results In  Sent: 3/15/2024   5:09 PM EDT  To: Baudilio Keyes DO

## 2024-03-18 NOTE — TELEPHONE ENCOUNTER
----- Message from Baudilio Keyes DO sent at 3/18/2024  3:46 PM EDT -----  Please notify patient MRI lumbar spine results demonstrating stable appearance of the previously noted large intradural and extradural mass at L2-L3  I have included neurosurgery team Zeyad Snyder to this results task.   Zeyad would you guys like to see her in office or just follow-up as needed?  May also schedule an office follow-up and reevaluation with me if patient is in pain  Please advise  Thank you    ----- Message -----  From: Interface, Radiology Results In  Sent: 3/15/2024   5:09 PM EDT  To: Baudilio Keyes DO

## 2024-03-18 NOTE — TELEPHONE ENCOUNTER
Caller: Osiris     Doctor: Wali     Reason for call: MRI lumbar spine significant findings from 3/10    Call back#: [phone] option 3

## 2024-03-18 NOTE — TELEPHONE ENCOUNTER
Attempted to reach daughter Hilda as per medical release of information document. VMMLOM with Cb# and [place].

## 2024-03-19 ENCOUNTER — TELEPHONE (OUTPATIENT)
Dept: OTHER | Facility: HOSPITAL | Age: 89
End: 2024-03-19

## 2024-03-19 NOTE — TELEPHONE ENCOUNTER
Caller: pt daughter (Hilda)    Doctor: Wali    Reason for call: Hilda returning nurses call.    Call back#: [phone]

## 2024-03-19 NOTE — TELEPHONE ENCOUNTER
S/w pt's daughter Hilda (ok per medical communication consent on file) and advised of same.     Pt was seen by Dr Dempsey in 2021 for L2L3 mass and surgery not recommended d/t age. Hilda is reluctant to have her mother have surgery unless absolutely necessary. Can pt have injection? Pain is right lateral thigh between hip and knee and pt has difficulty straightening knee. Pt is using a walker but it is difficult. Tylenol helps her pain somewhat.    If injection not possible, Hilda would like appt with KW  Please advise

## 2024-03-19 NOTE — TELEPHONE ENCOUNTER
Contacted by Dr Keyes to review Lumbar spine MRI. I called patient's daughter, Hilda, regarding patient 's Lumbar spine MRI findings.  L2-3  IDEM mass lesion which appears more or less  stable since her last  Lumbar MRI 2021. Hilda says her mom has some leg pain, but able to walk with a walker and following up with Dr Keyes for some injection.From Nsx perspective, given her advanced age, I don't think she is a good candidate for any extensive spine surgery to remove the mass as long as the mass remains stable and patient without red flags and able to walk.  Advised to follow up with Nsx on PRN basis. Daughter in agreement.

## 2024-03-20 ENCOUNTER — HOSPITAL ENCOUNTER (INPATIENT)
Facility: HOSPITAL | Age: 89
LOS: 1 days | Discharge: HOME HEALTH | DRG: 310 | End: 2024-03-22
Attending: EMERGENCY MEDICINE | Admitting: INTERNAL MEDICINE
Payer: MEDICARE

## 2024-03-20 ENCOUNTER — APPOINTMENT (EMERGENCY)
Dept: CT IMAGING | Facility: HOSPITAL | Age: 89
DRG: 310 | End: 2024-03-20
Payer: MEDICARE

## 2024-03-20 DIAGNOSIS — R29.90 STROKE-LIKE SYMPTOMS: ICD-10-CM

## 2024-03-20 DIAGNOSIS — I10 ESSENTIAL HYPERTENSION: ICD-10-CM

## 2024-03-20 DIAGNOSIS — G45.9 TIA (TRANSIENT ISCHEMIC ATTACK): Primary | ICD-10-CM

## 2024-03-20 DIAGNOSIS — R73.9 HYPERGLYCEMIA: ICD-10-CM

## 2024-03-20 DIAGNOSIS — I48.0 PAROXYSMAL ATRIAL FIBRILLATION (HCC): ICD-10-CM

## 2024-03-20 DIAGNOSIS — I48.91 NEW ONSET ATRIAL FIBRILLATION (HCC): ICD-10-CM

## 2024-03-20 LAB
ALBUMIN SERPL BCP-MCNC: 4.5 G/DL (ref 3.5–5)
ALP SERPL-CCNC: 51 U/L (ref 34–104)
ALT SERPL W P-5'-P-CCNC: 12 U/L (ref 7–52)
ANION GAP SERPL CALCULATED.3IONS-SCNC: 10 MMOL/L (ref 4–13)
AST SERPL W P-5'-P-CCNC: 16 U/L (ref 13–39)
BACTERIA UR QL AUTO: ABNORMAL /HPF
BASOPHILS # BLD AUTO: 0.01 THOUSANDS/ÂΜL (ref 0–0.1)
BASOPHILS NFR BLD AUTO: 0 % (ref 0–1)
BILIRUB SERPL-MCNC: 0.39 MG/DL (ref 0.2–1)
BILIRUB UR QL STRIP: NEGATIVE
BUN SERPL-MCNC: 21 MG/DL (ref 5–25)
CALCIUM SERPL-MCNC: 9.7 MG/DL (ref 8.4–10.2)
CHLORIDE SERPL-SCNC: 104 MMOL/L (ref 96–108)
CLARITY UR: CLEAR
CO2 SERPL-SCNC: 24 MMOL/L (ref 21–32)
COLOR UR: YELLOW
CREAT SERPL-MCNC: 0.92 MG/DL (ref 0.6–1.3)
EOSINOPHIL # BLD AUTO: 0.02 THOUSAND/ÂΜL (ref 0–0.61)
EOSINOPHIL NFR BLD AUTO: 0 % (ref 0–6)
ERYTHROCYTE [DISTWIDTH] IN BLOOD BY AUTOMATED COUNT: 14.8 % (ref 11.6–15.1)
GFR SERPL CREATININE-BSD FRML MDRD: 54 ML/MIN/1.73SQ M
GLUCOSE SERPL-MCNC: 181 MG/DL (ref 65–140)
GLUCOSE SERPL-MCNC: 183 MG/DL (ref 65–140)
GLUCOSE UR STRIP-MCNC: NEGATIVE MG/DL
HCT VFR BLD AUTO: 37.8 % (ref 34.8–46.1)
HGB BLD-MCNC: 12 G/DL (ref 11.5–15.4)
HGB UR QL STRIP.AUTO: NEGATIVE
HYALINE CASTS #/AREA URNS LPF: ABNORMAL /LPF
IMM GRANULOCYTES # BLD AUTO: 0.08 THOUSAND/UL (ref 0–0.2)
IMM GRANULOCYTES NFR BLD AUTO: 2 % (ref 0–2)
KETONES UR STRIP-MCNC: NEGATIVE MG/DL
LEUKOCYTE ESTERASE UR QL STRIP: NEGATIVE
LYMPHOCYTES # BLD AUTO: 1.03 THOUSANDS/ÂΜL (ref 0.6–4.47)
LYMPHOCYTES NFR BLD AUTO: 21 % (ref 14–44)
MCH RBC QN AUTO: 30.7 PG (ref 26.8–34.3)
MCHC RBC AUTO-ENTMCNC: 31.7 G/DL (ref 31.4–37.4)
MCV RBC AUTO: 97 FL (ref 82–98)
MONOCYTES # BLD AUTO: 0.47 THOUSAND/ÂΜL (ref 0.17–1.22)
MONOCYTES NFR BLD AUTO: 10 % (ref 4–12)
NEUTROPHILS # BLD AUTO: 3.2 THOUSANDS/ÂΜL (ref 1.85–7.62)
NEUTS SEG NFR BLD AUTO: 67 % (ref 43–75)
NITRITE UR QL STRIP: NEGATIVE
NON-SQ EPI CELLS URNS QL MICRO: ABNORMAL /HPF
NRBC BLD AUTO-RTO: 0 /100 WBCS
PH UR STRIP.AUTO: 6.5 [PH]
PLATELET # BLD AUTO: 267 THOUSANDS/UL (ref 149–390)
PMV BLD AUTO: 9.4 FL (ref 8.9–12.7)
POTASSIUM SERPL-SCNC: 4.4 MMOL/L (ref 3.5–5.3)
PROT SERPL-MCNC: 7.3 G/DL (ref 6.4–8.4)
PROT UR STRIP-MCNC: ABNORMAL MG/DL
RBC # BLD AUTO: 3.91 MILLION/UL (ref 3.81–5.12)
RBC #/AREA URNS AUTO: ABNORMAL /HPF
SODIUM SERPL-SCNC: 138 MMOL/L (ref 135–147)
SP GR UR STRIP.AUTO: 1.04 (ref 1–1.03)
UROBILINOGEN UR STRIP-ACNC: <2 MG/DL
WBC # BLD AUTO: 4.81 THOUSAND/UL (ref 4.31–10.16)
WBC #/AREA URNS AUTO: ABNORMAL /HPF

## 2024-03-20 PROCEDURE — 36415 COLL VENOUS BLD VENIPUNCTURE: CPT

## 2024-03-20 PROCEDURE — 85025 COMPLETE CBC W/AUTO DIFF WBC: CPT

## 2024-03-20 PROCEDURE — 99285 EMERGENCY DEPT VISIT HI MDM: CPT | Performed by: EMERGENCY MEDICINE

## 2024-03-20 PROCEDURE — 70498 CT ANGIOGRAPHY NECK: CPT

## 2024-03-20 PROCEDURE — 80053 COMPREHEN METABOLIC PANEL: CPT

## 2024-03-20 PROCEDURE — 81001 URINALYSIS AUTO W/SCOPE: CPT

## 2024-03-20 PROCEDURE — 70496 CT ANGIOGRAPHY HEAD: CPT

## 2024-03-20 PROCEDURE — 82948 REAGENT STRIP/BLOOD GLUCOSE: CPT

## 2024-03-20 PROCEDURE — 99223 1ST HOSP IP/OBS HIGH 75: CPT | Performed by: INTERNAL MEDICINE

## 2024-03-20 PROCEDURE — 99215 OFFICE O/P EST HI 40 MIN: CPT | Performed by: PSYCHIATRY & NEUROLOGY

## 2024-03-20 PROCEDURE — 93005 ELECTROCARDIOGRAM TRACING: CPT

## 2024-03-20 PROCEDURE — 99285 EMERGENCY DEPT VISIT HI MDM: CPT

## 2024-03-20 RX ORDER — LISINOPRIL 20 MG/1
20 TABLET ORAL DAILY
Status: DISCONTINUED | OUTPATIENT
Start: 2024-03-21 | End: 2024-03-22 | Stop reason: HOSPADM

## 2024-03-20 RX ORDER — ACETAMINOPHEN 325 MG/1
1300 TABLET ORAL 2 TIMES DAILY PRN
Status: DISCONTINUED | OUTPATIENT
Start: 2024-03-20 | End: 2024-03-20

## 2024-03-20 RX ORDER — POLYETHYLENE GLYCOL 3350 17 G/17G
17 POWDER, FOR SOLUTION ORAL DAILY
Status: DISCONTINUED | OUTPATIENT
Start: 2024-03-21 | End: 2024-03-22 | Stop reason: HOSPADM

## 2024-03-20 RX ORDER — ACETAMINOPHEN 325 MG/1
975 TABLET ORAL 2 TIMES DAILY PRN
Status: DISCONTINUED | OUTPATIENT
Start: 2024-03-20 | End: 2024-03-22 | Stop reason: HOSPADM

## 2024-03-20 RX ORDER — AMLODIPINE BESYLATE 10 MG/1
10 TABLET ORAL DAILY
Status: DISCONTINUED | OUTPATIENT
Start: 2024-03-21 | End: 2024-03-21

## 2024-03-20 RX ORDER — CHLORAL HYDRATE 500 MG
1000 CAPSULE ORAL DAILY
Status: DISCONTINUED | OUTPATIENT
Start: 2024-03-21 | End: 2024-03-22 | Stop reason: HOSPADM

## 2024-03-20 RX ADMIN — APIXABAN 5 MG: 5 TABLET, FILM COATED ORAL at 19:32

## 2024-03-20 RX ADMIN — IOHEXOL 70 ML: 350 INJECTION, SOLUTION INTRAVENOUS at 13:39

## 2024-03-20 NOTE — ASSESSMENT & PLAN NOTE
"Baseline ambulatory status- walker. Baseline mentation on and off confusion.  Presented with witnessed episode of speech arrest and RUE shaking with one eye forcefully closed.  No reported urinary/fecal incontinence or tongue bite.  Patient returned back to \"normal state\" afterward per caregiver. S/S resolved when arriving ED.  On my exam in the ED- patient was OA x1 to herself. Chronic L facial droop. Slurred speech at baseline.   UA negative for WBC, nitrates, bacteria.  Etiology unclear-TIA versus seizure versus dystonia (No known h/o unresponsive) versus cardiac.    Workup:  CTA: No acute pathology identified.  MRI: Pending  EEG: Pending    Plan:  Neurology onboard. Appreciated recs.  Monitor on tele.  Follow MRI/ EEG.  No need for stroke pathway per neurology.  "

## 2024-03-20 NOTE — ASSESSMENT & PLAN NOTE
Erika Quigley is a 89 y.o. female with chronic right cerebellar infarct, prior bilateral DVTs (September 2023) on Eliquis, cervical dystonia receiving Botox, HTN, HLD, lumbar spinal stenosis with neurogenic claudication, mild cognitive impairment, functional urinary incontinence who presents to [place] ED on 3/20/2024 after witnessed episode of left facial weakness and right upper extremity shaking.    Witnessed episode of speech arrest and LUE shaking sting approximately 2 minutes followed by confusional state lasting 5 to 7 minutes.  No reported urinary/fecal incontinence or tongue bite.  Where patient was attempting to signal caregiver with LUE during the speech arrest.    Workup:  - CTA head and neck:  Unremarkable for acute intracranial abnormalities   Chronic right inferior cerebellar infarct with encephalomalacia noted  Right vertebral artery V2 segment not visualized, likely chronically occluded vs hypoplastic  - MRI brain w wo:   Unremarkable for acute intracranial abnormalities or abnormal enhancement  Chronic right PICA distribution infarct noted  - UA: Negative nitrite, 4-10 WBC, no bacteria seen  - Hemoglobin A1c: Elevated 6.3  - Lipid panel: Total cholesterol 95, triglycerides elevated 167, HDL 54, LDL elevated 108    Etiology remains unclear. Differentials include new onset seizure in the setting of cognitive impairment vs cardiogenic etiology.  Caregiver denied noticing any paleness or diaphoresis at the time of the event, however patient was found to be in new onset A-fib with RVR this admission.  Patient may have gone into the arrhythmia, resulting in transient hypoperfusion and speech arrest. MRI brain unremarkable for acute intracranial abnormalities.    Plan:  - Routine EEG official read pending  - Hold off on AEDs for now; can consider depending on EEG results  - Telemetry  - Seizure precautions   - Will hold off on [employer] form at this time as patient does not drive per chart review   -  Medical management and supportive care per primary team, notify with changes

## 2024-03-20 NOTE — H&P
"[facility]  H&P  Name: Erika Quigley 89 y.o. female I MRN: [id number]  Unit/Bed#: ED-42 I Date of Admission: 3/20/2024   Date of Service: 3/20/2024 I Hospital Day: 0      Assessment/Plan   * Stroke/ Seizure-like symptoms  Assessment & Plan  Baseline ambulatory status- walker. Baseline mentation on and off confusion.  Presented with witnessed episode of speech arrest and RUE shaking with one eye forcefully closed.  No reported urinary/fecal incontinence or tongue bite.  Patient returned back to \"normal state\" afterward per caregiver. S/S resolved when arriving ED.  On my exam in the ED- patient was OA x1 to herself. Chronic L facial droop. Slurred speech at baseline.   UA negative for WBC, nitrates, bacteria.  Etiology unclear-TIA versus seizure versus dystonia (No known h/o unresponsive) versus cardiac.    Workup:  CTA: No acute pathology identified.  MRI: Pending  EEG: Pending    Plan:  Neurology onboard. Appreciated recs.  Monitor on tele.  Follow MRI/ EEG.  No need for stroke pathway per neurology.    Deep vein thrombosis (DVT) of femoral vein of right lower extremity (HCC)  Assessment & Plan  Continue Eliquis 5mg BID.    Hyperlipidemia  Assessment & Plan  Not on meds.    Essential hypertension  Assessment & Plan  Continue home amlodipine/ ACEI with hospital formula.         VTE Pharmacologic Prophylaxis: VTE Score: 5 High Risk (Score >/= 5) - Pharmacological DVT Prophylaxis Ordered: apixaban (Eliquis). Sequential Compression Devices Ordered.  Code Status: Level-1. Full code- discussed with daughter.  Discussion with family: Updated  (daughter) at bedside.    Anticipated Length of Stay: Patient will be admitted on an observation basis with an anticipated length of stay of less than 2 midnights secondary to stroke/ seizure like activities.    Chief Complaint: witnessed RUE shakiness this     History of Present Illness:  Erika Quigley is a 89 y.o. female with a PMH of HTN, " "HLD, DVT on Elquis, TIA, cervical dystonia receiving Botox (last 1/8/24), dementia who presents with witnessed episode of RUE shakiness during breakfast per caregiver. Patient had chronic likely L facial droop and baseline on and off confusion. Per daughter at bedside, patient had witnessed episode of speech arrest and RUE shaking sting also along with one eye closed forcefully. No reported urinary/fecal incontinence or tongue bite. Patient returned back to \"normal state\" afterward per caregiver. Patient denied any recent HA, dizziness, fever, CP, SOB, urinary s/s. Last BM on Monday.     In the ED, vital signs stable. OA x1 to herself. CBC/CMP unremarkable.  UA negative for WBC, nitrates, bacteria.  EKG normal sinus rhythm.  CTA head and neck with no acute pathology findings.  Neurology was counseled.    Review of Systems:  Review of Systems   Unable to perform ROS: Dementia   Otherwise per HPI.    Past Medical and Surgical History:   Past Medical History:   Diagnosis Date    Anxiety 2010    Bladder infection 09/2019    Breast lump in female     HL (hearing loss) 2010    Hypertension 2010    Osteopenia     Ovarian cyst        Past Surgical History:   Procedure Laterality Date    BLADDER SURGERY      BREAST BIOPSY      CATARACT EXTRACTION Bilateral 01/01/2015    , Right Eye-2017, Left eye    ENDOMETRIAL BIOPSY      by Suction    HYSTERECTOMY         Meds/Allergies:  Prior to Admission medications    Medication Sig Start Date End Date Taking? Authorizing Provider   acetaminophen (TYLENOL) 500 mg tablet Take 1,300 mg by mouth 2 (two) times a day as needed for mild pain   Yes Historical Provider, MD   amLODIPine-benazepril (LOTREL) 10-20 MG per capsule TAKE 1 CAPSULE BY MOUTH EVERY DAY 1/18/24  Yes Audra Bellamy, DO   apixaban (Eliquis) 5 mg Take 1 tablet (5 mg total) by mouth 2 (two) times a day 3/8/24  Yes Audra Bellamy, DO   Multiple Vitamins-Minerals (MULTIVITAL) tablet Take 1 tablet by mouth daily   Yes Historical " "Provider, MD   Omega-3 Fatty Acids (FISH OIL) 645 MG CAPS Take 1 tablet by mouth daily   Yes Historical Provider, MD   calcium citrate-vitamin D (CITRACAL+D) 315-200 MG-UNIT per tablet Take 1 tablet by mouth daily  Patient not taking: Reported on 3/20/2024    Historical Provider, MD   magnesium hydroxide (MILK OF MAGNESIA) 400 mg/5 mL oral suspension Take by mouth daily as needed for constipation  Patient not taking: Reported on 10/18/2023    Historical Provider, MD     I have reviewed home medications with patient family member.    Allergies: No Known Allergies    Social History:  Marital Status:    Occupation:   Patient Pre-hospital Living Situation: With other family member: daughter  Patient Pre-hospital Level of Mobility: walks with walker  Patient Pre-hospital Diet Restrictions:   Substance Use History:   Social History     Substance and Sexual Activity   Alcohol Use Not Currently     Social History     Tobacco Use   Smoking Status Never   Smokeless Tobacco Never     Social History     Substance and Sexual Activity   Drug Use No       Family History:  Family History   Problem Relation Age of Onset    Heart disease Mother     Heart disease Father     Hypertension Family     Osteoporosis Family        Physical Exam:     Vitals:   Blood Pressure: 158/76 (03/20/24 1624)  Pulse: 105 (03/20/24 1624)  Temperature: 97.8 °F (36.6 °C) (03/20/24 1202)  Temp Source: Oral (03/20/24 1202)  Respirations: 16 (03/20/24 1445)  Height: 4' 11\" (149.9 cm) (03/20/24 1202)  Weight - Scale: 58.2 kg (128 lb 4.9 oz) (03/20/24 1202)  SpO2: 97 % (03/20/24 1445)    Physical Exam  Vitals and nursing note reviewed.   Constitutional:       General: She is not in acute distress.     Appearance: She is well-developed. She is not ill-appearing.   HENT:      Head: Normocephalic and atraumatic.      Right Ear: External ear normal.      Left Ear: External ear normal.      Nose: No congestion or rhinorrhea.      Mouth/Throat:      Mouth: " Mucous membranes are moist.   Eyes:      General:         Right eye: No discharge.         Left eye: No discharge.      Extraocular Movements: Extraocular movements intact.      Conjunctiva/sclera: Conjunctivae normal.      Pupils: Pupils are equal, round, and reactive to light.   Cardiovascular:      Rate and Rhythm: Normal rate and regular rhythm.      Pulses: Normal pulses.   Pulmonary:      Effort: Pulmonary effort is normal. No respiratory distress.      Breath sounds: Normal breath sounds. No wheezing, rhonchi or rales.   Chest:      Chest wall: No tenderness.   Abdominal:      General: Bowel sounds are normal. There is distension.      Palpations: Abdomen is soft.      Tenderness: There is no abdominal tenderness. There is no guarding or rebound.   Musculoskeletal:         General: No swelling or tenderness.      Cervical back: Normal range of motion and neck supple. No rigidity or tenderness.   Skin:     General: Skin is warm and dry.      Capillary Refill: Capillary refill takes less than 2 seconds.   Neurological:      Mental Status: She is alert.      Comments: Chronic L facial droop, tongue midline. Normal tongue movement. Chronic slurred speech.  Normal vision, normal EOM, normal hearing.  Bilateral hand  strength 4-5/5.  Bilateral upper extremities strength 5/5.  LLE strength 3/5.   RLE strength 2/5.   Psychiatric:         Mood and Affect: Mood normal.         Behavior: Behavior normal.         Thought Content: Thought content normal.         Judgment: Judgment normal.      Comments: Pleasant          Additional Data:     Lab Results:  Results from last 7 days   Lab Units 03/20/24  1229   WBC Thousand/uL 4.81   HEMOGLOBIN g/dL 12.0   HEMATOCRIT % 37.8   PLATELETS Thousands/uL 267   NEUTROS PCT % 67   LYMPHS PCT % 21   MONOS PCT % 10   EOS PCT % 0     Results from last 7 days   Lab Units 03/20/24  1229   SODIUM mmol/L 138   POTASSIUM mmol/L 4.4   CHLORIDE mmol/L 104   CO2 mmol/L 24   BUN mg/dL 21    CREATININE mg/dL 0.92   ANION GAP mmol/L 10   CALCIUM mg/dL 9.7   ALBUMIN g/dL 4.5   TOTAL BILIRUBIN mg/dL 0.39   ALK PHOS U/L 51   ALT U/L 12   AST U/L 16   GLUCOSE RANDOM mg/dL 183*         Results from last 7 days   Lab Units 03/20/24  1204   POC GLUCOSE mg/dl 181*               Lines/Drains:  Invasive Devices       Peripheral Intravenous Line  Duration             Peripheral IV 03/20/24 Left Antecubital <1 day                        Imaging: Reviewed radiology reports from this admission including: chest xray and chest CT scan  CTA head and neck with and without contrast   Final Result by Carlos Walker MD (03/20 1437)      CT brain:  No acute intracranial abnormality. 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 Chronic microangiopathic ischemic changes.      CTA head: Negative for large vessel intracranial occlusion . Areas of moderate stenosis involving the bilateral intracranial vertebral arteries.      CTA neck:  No extracranial carotid stenosis. The right vertebral artery V2 segment is again not visualized and likely chronically occluded or hypoplastic. Attenuated enhancement of the right V1 and V3 segments. The left vertebral artery is patent with    moderate origin stenosis.               Workstation performed: [id number]         MRI inpatient order    (Results Pending)       EKG and Other Studies Reviewed on Admission:   EKG: NSR. HR 84.    ** Please Note: This note has been constructed using a voice recognition system. **

## 2024-03-20 NOTE — ED PROVIDER NOTES
History  Chief Complaint   Patient presents with    CVA/TIA-like Symptoms     Per caregiver pt was sitting at table noticed R arm shaking when ask if she was ok pt had delayed speech      The patient is a 89 year old female who presents to ED with her caregiver and her daughter for evaluation of altered mental status. PMHx: TIA on eliquis. The patient's caregiver states that this morning around 11am while the pt was reading the paper, th pt's RUE started shaking and the pt developed right facial droop with decreased responsiveness. Upon presentation to the ED, the pt's symptoms have resolved and she feels back to her normal baseline. She states symptoms felt similar to her prior TIA. Denies chest pain, confusion, difficulty speaking, dizziness, abdominal pain, SOB, nausea or vomiting. Pt has some chronic weakness to the RLE from prior back pain.         Prior to Admission Medications   Prescriptions Last Dose Informant Patient Reported? Taking?   Multiple Vitamins-Minerals (MULTIVITAL) tablet 3/20/2024 Self, Child Yes Yes   Sig: Take 1 tablet by mouth daily   Omega-3 Fatty Acids (FISH OIL) 645 MG CAPS 3/20/2024 Self, Child Yes Yes   Sig: Take 1 tablet by mouth daily   acetaminophen (TYLENOL) 500 mg tablet 3/20/2024 Self, Child Yes Yes   Sig: Take 1,300 mg by mouth 2 (two) times a day as needed for mild pain   amLODIPine-benazepril (LOTREL) 10-20 MG per capsule 3/20/2024 Child, Self No Yes   Sig: TAKE 1 CAPSULE BY MOUTH EVERY DAY   apixaban (Eliquis) 5 mg 3/20/2024  No Yes   Sig: Take 1 tablet (5 mg total) by mouth 2 (two) times a day   calcium citrate-vitamin D (CITRACAL+D) 315-200 MG-UNIT per tablet Not Taking Self, Child Yes No   Sig: Take 1 tablet by mouth daily   Patient not taking: Reported on 3/20/2024   magnesium hydroxide (MILK OF MAGNESIA) 400 mg/5 mL oral suspension Not Taking Self, Child Yes No   Sig: Take by mouth daily as needed for constipation   Patient not taking: Reported on 10/18/2023       Facility-Administered Medications Last Administration Doses Remaining   Botulinum Toxin Type A SOLR 200 Units 5/3/2021  4:19 PM    onabotulinumtoxin A (BOTOX) injection 200 Units 1/8/2024  2:00 PM           Past Medical History:   Diagnosis Date    Anxiety 2010    Bladder infection 09/2019    Breast lump in female     HL (hearing loss) 2010    Hypertension 2010    Osteopenia     Ovarian cyst        Past Surgical History:   Procedure Laterality Date    BLADDER SURGERY      BREAST BIOPSY      CATARACT EXTRACTION Bilateral 01/01/2015    , Right Eye-2017, Left eye    ENDOMETRIAL BIOPSY      by Suction    HYSTERECTOMY         Family History   Problem Relation Age of Onset    Heart disease Mother     Heart disease Father     Hypertension Family     Osteoporosis Family      I have reviewed and agree with the history as documented.    E-Cigarette/Vaping    E-Cigarette Use Never User      E-Cigarette/Vaping Substances    Nicotine No     THC No     CBD No     Flavoring No     Other No     Unknown No      Social History     Tobacco Use    Smoking status: Never    Smokeless tobacco: Never   Vaping Use    Vaping status: Never Used   Substance Use Topics    Alcohol use: Not Currently    Drug use: No        Review of Systems   Constitutional:  Positive for activity change. Negative for diaphoresis and fever.   HENT:  Negative for congestion, rhinorrhea and sore throat.    Eyes:  Negative for pain and visual disturbance.   Respiratory:  Negative for cough and shortness of breath.    Cardiovascular:  Negative for chest pain, palpitations and leg swelling.   Gastrointestinal:  Negative for abdominal pain, diarrhea and vomiting.   Genitourinary:  Negative for difficulty urinating, flank pain, hematuria and urgency.   Musculoskeletal:  Negative for back pain and neck pain.   Skin:  Negative for rash and wound.   Neurological:  Positive for facial asymmetry and speech difficulty.       Physical Exam  ED Triage Vitals   Temperature  Pulse Respirations Blood Pressure SpO2   03/20/24 1202 03/20/24 1202 03/20/24 1202 03/20/24 1202 03/20/24 1202   97.8 °F (36.6 °C) 89 16 157/70 96 %      Temp Source Heart Rate Source Patient Position - Orthostatic VS BP Location FiO2 (%)   03/20/24 1202 03/20/24 1539 03/20/24 1539 03/20/24 1539 --   Oral Monitor Sitting Right arm       Pain Score       03/20/24 1202       No Pain             Orthostatic Vital Signs  Vitals:    03/20/24 1415 03/20/24 1445 03/20/24 1539 03/20/24 1624   BP: 152/68 158/76 152/68 158/76   Pulse: 75 85 100 105   Patient Position - Orthostatic VS:   Sitting        Physical Exam  Vitals and nursing note reviewed.   HENT:      Head: Normocephalic and atraumatic.      Nose: Nose normal.      Mouth/Throat:      Mouth: Mucous membranes are moist.      Pharynx: Oropharynx is clear.   Eyes:      Conjunctiva/sclera: Conjunctivae normal.      Pupils: Pupils are equal, round, and reactive to light.   Cardiovascular:      Rate and Rhythm: Regular rhythm. Tachycardia present.      Pulses: Normal pulses.      Heart sounds: Normal heart sounds.   Pulmonary:      Effort: Pulmonary effort is normal. No respiratory distress.      Breath sounds: Normal breath sounds. No wheezing, rhonchi or rales.   Abdominal:      General: Abdomen is flat. Bowel sounds are normal.      Palpations: Abdomen is soft.      Tenderness: There is no abdominal tenderness. There is no right CVA tenderness, left CVA tenderness or guarding.   Musculoskeletal:         General: No swelling, tenderness, deformity or signs of injury. Normal range of motion.      Cervical back: Normal range of motion.      Right lower leg: No edema.      Left lower leg: No edema.   Skin:     General: Skin is warm and dry.   Neurological:      General: No focal deficit present.      Mental Status: She is alert and oriented to person, place, and time.      Cranial Nerves: No cranial nerve deficit.      Sensory: No sensory deficit.      Motor: No weakness.    Psychiatric:         Mood and Affect: Mood normal.         Behavior: Behavior normal.         Thought Content: Thought content normal.         Judgment: Judgment normal.         ED Medications  Medications   polyethylene glycol (MIRALAX) packet 17 g (has no administration in time range)   amLODIPine (NORVASC) tablet 10 mg (has no administration in time range)   lisinopril (ZESTRIL) tablet 20 mg (has no administration in time range)   apixaban (ELIQUIS) tablet 5 mg (has no administration in time range)   multivitamin stress formula tablet 1 tablet (has no administration in time range)   fish oil capsule 1,000 mg (has no administration in time range)   acetaminophen (TYLENOL) tablet 975 mg (has no administration in time range)   iohexol (OMNIPAQUE) 350 MG/ML injection (MULTI-DOSE) 100 mL (70 mL Intravenous Given 3/20/24 1339)       Diagnostic Studies  Results Reviewed       Procedure Component Value Units Date/Time    Urine Microscopic [[id number]]  (Abnormal) Collected: 03/20/24 1453    Lab Status: Final result Specimen: Urine, Clean Catch Updated: 03/20/24 1508     RBC, UA None Seen /hpf      WBC, UA 4-10 /hpf      Epithelial Cells Occasional /hpf      Bacteria, UA None Seen /hpf      Hyaline Casts, UA 25-50 /lpf     UA w Reflex to Microscopic w Reflex to Culture [[id number]]  (Abnormal) Collected: 03/20/24 1453    Lab Status: Final result Specimen: Urine, Clean Catch Updated: 03/20/24 1505     Color, UA Yellow     Clarity, UA Clear     Specific Gravity, UA 1.037     pH, UA 6.5     Leukocytes, UA Negative     Nitrite, UA Negative     Protein, UA Trace mg/dl      Glucose, UA Negative mg/dl      Ketones, UA Negative mg/dl      Urobilinogen, UA <2.0 mg/dl      Bilirubin, UA Negative     Occult Blood, UA Negative    Comprehensive metabolic panel [[id number]]  (Abnormal) Collected: 03/20/24 1229    Lab Status: Final result Specimen: Blood from Arm, Left Updated: 03/20/24 1300     Sodium 138 mmol/L      Potassium 4.4  mmol/L      Chloride 104 mmol/L      CO2 24 mmol/L      ANION GAP 10 mmol/L      BUN 21 mg/dL      Creatinine 0.92 mg/dL      Glucose 183 mg/dL      Calcium 9.7 mg/dL      AST 16 U/L      ALT 12 U/L      Alkaline Phosphatase 51 U/L      Total Protein 7.3 g/dL      Albumin 4.5 g/dL      Total Bilirubin 0.39 mg/dL      eGFR 54 ml/min/1.73sq m     Narrative:      [employer] guidelines for Chronic Kidney Disease (CKD):     Stage 1 with normal or high GFR (GFR > 90 mL/min/1.73 square meters)    Stage 2 Mild CKD (GFR = 60-89 mL/min/1.73 square meters)    Stage 3A Moderate CKD (GFR = 45-59 mL/min/1.73 square meters)    Stage 3B Moderate CKD (GFR = 30-44 mL/min/1.73 square meters)    Stage 4 Severe CKD (GFR = 15-29 mL/min/1.73 square meters)    Stage 5 End Stage CKD (GFR <15 mL/min/1.73 square meters)  Note: GFR calculation is accurate only with a steady state creatinine    CBC and differential [[id number]] Collected: 03/20/24 1229    Lab Status: Final result Specimen: Blood from Arm, Left Updated: 03/20/24 1237     WBC 4.81 Thousand/uL      RBC 3.91 Million/uL      Hemoglobin 12.0 g/dL      Hematocrit 37.8 %      MCV 97 fL      MCH 30.7 pg      MCHC 31.7 g/dL      RDW 14.8 %      MPV 9.4 fL      Platelets 267 Thousands/uL      nRBC 0 /100 WBCs      Neutrophils Relative 67 %      Immature Grans % 2 %      Lymphocytes Relative 21 %      Monocytes Relative 10 %      Eosinophils Relative 0 %      Basophils Relative 0 %      Neutrophils Absolute 3.20 Thousands/µL      Absolute Immature Grans 0.08 Thousand/uL      Absolute Lymphocytes 1.03 Thousands/µL      Absolute Monocytes 0.47 Thousand/µL      Eosinophils Absolute 0.02 Thousand/µL      Basophils Absolute 0.01 Thousands/µL     Fingerstick Glucose (POCT) [[id number]]  (Abnormal) Collected: 03/20/24 1204    Lab Status: Final result Specimen: Blood Updated: 03/20/24 1206     POC Glucose 181 mg/dl                    CTA head and neck with and without  contrast   Final Result by Carlos Walker MD (03/20 5708)      CT brain:  No acute intracranial abnormality. 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 Chronic microangiopathic ischemic changes.      CTA head: Negative for large vessel intracranial occlusion . Areas of moderate stenosis involving the bilateral intracranial vertebral arteries.      CTA neck:  No extracranial carotid stenosis. The right vertebral artery V2 segment is again not visualized and likely chronically occluded or hypoplastic. Attenuated enhancement of the right V1 and V3 segments. The left vertebral artery is patent with    moderate origin stenosis.               Workstation performed: [id number]         MRI inpatient order    (Results Pending)         Procedures  ECG 12 Lead Documentation Only    Date/Time: 3/20/2024 12:12 PM    Performed by: Yesenia Zuniga MD  Authorized by: Yesenia Zuniga MD    Indications / Diagnosis:  Transient AMS  ECG reviewed by me, the ED Provider: yes    Previous ECG:     Previous ECG:  Compared to current    Comparison ECG info:  09/20/2023  Interpretation:     Interpretation: abnormal    Rate:     ECG rate:  84    ECG rate assessment: normal    Rhythm:     Rhythm: sinus rhythm    Ectopy:     Ectopy: none    QRS:     QRS axis:  Normal    QRS intervals:  Normal  Conduction:     Conduction: normal    ST segments:     ST segments:  Normal  T waves:     T waves: flattening    Q waves:     Q waves:  V6, V5, V4 and V3        ED Course  ED Course as of 03/20/24 1918   Wed Mar 20, 2024   1312 CBC and differential  Completely WNL   1312 Comprehensive metabolic panel(!)  Elevated glucose, consistent with finger stick, otherwise grossly normal   1502 CTA head and neck with and without contrast  IMPRESSION:  CT brain:  No acute intracranial abnormality. 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 Chronic microangiopathic ischemic changes.     CTA head: Negative for  large vessel intracranial occlusion . Areas of moderate stenosis involving the bilateral intracranial vertebral arteries.     CTA neck:  No extracranial carotid stenosis. The right vertebral artery V2 segment is again not visualized and likely chronically occluded or hypoplastic. Attenuated enhancement of the right V1 and V3 segments. The left vertebral artery is patent with   moderate origin stenosis.   1508 Urine Microscopic(!)  NO bacteria seen on microscopy   1517 Reached out to [facility] admitting on call physician, DR Bruno requests me to notify Neuro on call.    1520 Contacted DR Petros Kang with neurology to notify them of this pt.                                        Medical Decision Making  Ddx: CVA, TIA, UTI, carotid artery dissection, cardiac arrhythmia, electrolyte abnormality    CTA head and neck negative for acute process,   Ua negative for signs of infection  CBC, CMP unremarkable aside from hyperglycemia  Admitted to observation with [facility] service in stable condition    Amount and/or Complexity of Data Reviewed  Labs: ordered. Decision-making details documented in ED Course.  Radiology: ordered. Decision-making details documented in ED Course.    Risk  Prescription drug management.  Decision regarding hospitalization.          Disposition  Final diagnoses:   TIA (transient ischemic attack)   Hyperglycemia     Time reflects when diagnosis was documented in both MDM as applicable and the Disposition within this note       Time User Action Codes Description Comment    3/20/2024  2:51 PM Tanika Osborne Add [G45.9] TIA (transient ischemic attack)     3/20/2024  2:52 PM Tanika Osborne Add [R73.9] Hyperglycemia           ED Disposition       ED Disposition   Admit    Condition   Stable    Date/Time   Wed Mar 20, 2024  2:52 PM    Comment   Case was discussed with Dr. Bruno and the patient's admission status was agreed to be Admission Status: observation status to the service of Dr. Bruno .               Follow-up Information     None         Patient's Medications   Discharge Prescriptions    No medications on file     No discharge procedures on file.    PDMP Review         Value Time User    PDMP Reviewed  Yes 9/22/2023  6:42 AM Corky Lala DO             ED Provider  Attending physically available and evaluated Erika Zavala I managed the patient along with the ED Attending.    Electronically Signed by           Yesenia Zuniga MD  03/20/24 8701

## 2024-03-20 NOTE — CONSULTS
Consultation - Neurology   Erika Quigley 89 y.o. female MRN: [id number]  Unit/Bed#: ED-42 Encounter: [id number]      Assessment/Plan   * Stroke-like symptoms  Assessment & Plan  Erika Quigley is a 89 y.o. 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    Witnessed episode of speech arrest and LUE shaking sting approximately 2 minutes followed by confusional state lasting 5 to 7 minutes.  No reported urinary/fecal incontinence or tongue bite.  Where patient was attempting to signal caregiver with LUE during the speech arrest.    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 Differentials include new onset seizure in the setting of cognitive impairment vs cardiogenic etiology.  Cardiogenic etiology lower in suspicion as caregiver denied noticing any paleness or diaphoresis at the time of the event, however cannot completely rule out.  Cannot rule out acute infarct, however lower suspicion. Will obtain MRI brain to rule out.    Plan:  - MRI brain w wo seizure protocol pending  - Routine EEG pending  - Hold off on AEDs for now; can consider depending on MRI and the EEG results  - Telemetry  - Will plan to discuss seizure precautions with patient and family  - Will hold off on [employer] form at this time and clarify with family if patient drives  - Medical management and supportive care per primary team, notify with  "changes      Recommendations for outpatient neurological follow up have yet to be determined.    History of Present Illness     Reason for Consult / Principal Problem: \"TIA\"  Hx and PE limited by: Moderate cognitive impairment at baseline, patient unable to provide history  HPI: Erika Quigley is a 89 y.o.  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    Patient was sitting at kitchen table with caregiver, having a conversation, when she was witnessed to have an episode of reported left facial weakness described as \" her left eye was shut\", speech arrest, and left upper extremity shaking.  Caregiver reports that she was not sure if the shaking was involuntary or if the patient was trying to signal her.  She also described the right hand as being open, denying any clenching or stiffening.  Also denies any shaking in lower extremities. Shaking episode lasted approximately 2 minutes and resolved on its own. After resolution of the shaking, patient was still not speaking to caregiver for approximately 5 minutes afterwards.  Caregiver reports patient then slowly began to speak to her.  No reported urinary/fecal incontinence or tongue bite.  Caregiver denies noticing the patient appearing to be pale or diaphoretic at the time of the episode.  Family denies history of seizures, family history of seizures, recent head trauma, new medications, changes to current medications, recent illnesses, over-the-counter medications, poor oral intake, or poor sleep.    At baseline, patient does have moderate cognitive impairment which has been going on since 2022.  Daughter reports patient sometimes gets " confused as to where she is.  Caregiver states that patient is able to recognize family members at baseline. Patient is normally incontinent of urine at night in bed, but does not have accidents during the day. She is able to ambulate with assistance of a walker. Family states patient eats and drinks well and patient sleeps 12 hours a night.     Of note, patient follows with outpatient neurologist Dr. Rodriguez for cervical dystonia, receiving Botox injections.  Last Botox injection completed on 1/8/2024. Normally she has a raspy voice from the dystonia.    Patient presented to [place] ED on 3/20/2024, BP on presentation 157/70.  CTA head and neck completed and unremarkable for acute pathology.  CTA did demonstrate chronically occluded right vertebral V2 segment and attic inferior right cerebellar infarct with encephalomalacia.  Neurology consulted for seizure-like event.    Consult to neurology  Consult performed by: Jyoti Alonzo PA-C  Consult ordered by: Tanika Osborne MD        Review of Systems  12 point ROS limited to dementia process  Historical Information   Past Medical History:   Diagnosis Date    Anxiety 2010    Bladder infection 09/2019    Breast lump in female     HL (hearing loss) 2010    Hypertension 2010    Osteopenia     Ovarian cyst      Past Surgical History:   Procedure Laterality Date    BLADDER SURGERY      BREAST BIOPSY      CATARACT EXTRACTION Bilateral 01/01/2015    , Right Eye-2017, Left eye    ENDOMETRIAL BIOPSY      by Suction    HYSTERECTOMY       Social History   Social History     Substance and Sexual Activity   Alcohol Use Not Currently     Social History     Substance and Sexual Activity   Drug Use No     E-Cigarette/Vaping    E-Cigarette Use Never User      E-Cigarette/Vaping Substances    Nicotine No     THC No     CBD No     Flavoring No     Other No     Unknown No      Social History     Tobacco Use   Smoking Status Never   Smokeless Tobacco Never     Family History:   Family History  "  Problem Relation Age of Onset    Heart disease Mother     Heart disease Father     Hypertension Family     Osteoporosis Family        Review of previous medical records was completed.    Meds/Allergies   all current active meds have been reviewed, current meds:   Current Facility-Administered Medications   Medication Dose Route Frequency    Botulinum Toxin Type A SOLR 200 Units  200 Units Injection Q3 Months    onabotulinumtoxin A (BOTOX) injection 200 Units  200 Units Intramuscular Q3 Months   , and PTA meds:   Prior to Admission Medications   Prescriptions Last Dose Informant Patient Reported? Taking?   Multiple Vitamins-Minerals (MULTIVITAL) tablet  Self, Child Yes Yes   Sig: Take 1 tablet by mouth daily   Omega-3 Fatty Acids (FISH OIL) 645 MG CAPS  Self, Child Yes Yes   Sig: Take 1 tablet by mouth daily   acetaminophen (TYLENOL) 500 mg tablet  Self, Child Yes Yes   Sig: Take 1,300 mg by mouth 2 (two) times a day as needed for mild pain   amLODIPine-benazepril (LOTREL) 10-20 MG per capsule  Child, Self No Yes   Sig: TAKE 1 CAPSULE BY MOUTH EVERY DAY   apixaban (Eliquis) 5 mg   No Yes   Sig: Take 1 tablet (5 mg total) by mouth 2 (two) times a day   calcium citrate-vitamin D (CITRACAL+D) 315-200 MG-UNIT per tablet Not Taking Self, Child Yes No   Sig: Take 1 tablet by mouth daily   Patient not taking: Reported on 3/20/2024   magnesium hydroxide (MILK OF MAGNESIA) 400 mg/5 mL oral suspension  Self, Child Yes No   Sig: Take by mouth daily as needed for constipation   Patient not taking: Reported on 10/18/2023      Facility-Administered Medications Last Administration Doses Remaining   Botulinum Toxin Type A SOLR 200 Units 5/3/2021  4:19 PM    onabotulinumtoxin A (BOTOX) injection 200 Units 1/8/2024  2:00 PM           No Known Allergies    Objective   Vitals:Blood pressure 158/76, pulse 105, temperature 97.8 °F (36.6 °C), temperature source Oral, resp. rate 16, height 4' 11\" (1.499 m), weight 58.2 kg (128 lb 4.9 oz), " SpO2 97%, not currently breastfeeding.,Body mass index is 25.91 kg/m².  No intake or output data in the 24 hours ending 24    Invasive Devices:   Invasive Devices       Peripheral Intravenous Line  Duration             Peripheral IV 24 Left Antecubital <1 day                    Physical Exam  Vitals and nursing note reviewed.   Constitutional:       General: She is not in acute distress.     Appearance: Normal appearance. She is not ill-appearing, toxic-appearing or diaphoretic.   HENT:      Head: Normocephalic and atraumatic.   Eyes:      General: No scleral icterus.        Right eye: No discharge.         Left eye: No discharge.      Extraocular Movements: EOM normal.      Conjunctiva/sclera: Conjunctivae normal.      Pupils: Pupils are equal, round, and reactive to light.   Skin:     General: Skin is warm and dry.   Neurological:      Mental Status: She is alert.   Psychiatric:         Mood and Affect: Mood normal.       Neurologic Exam     Mental Status   Patient is alert, sitting up in bed eating a sandwich, accompanied by caregiver and daughter.    Oriented to person and recognizes family/caregiver in the room.  Able to state that she is in the hospital, however unable to name the hospital. Able to state  and current address.    Some difficulty with performing simple calculations.  Unable to name the current president. Able to follow central and appendicular commands, and answers all questions appropriately.     Registers 3/3 words immediately  Recalls 0/3 words with hints     Cranial Nerves     CN II   Visual fields full to confrontation.     CN III, IV, VI   Pupils are equal, round, and reactive to light.  Extraocular motions are normal.   Nystagmus: none   Upgaze: normal  Downgaze: normal  Conjugate gaze: present    CN V   Facial sensation intact.     CN VII   Facial expression full, symmetric.     CN VIII   Hearing: intact    CN XI   CN XI normal.     CN XII   Tongue deviation: none  No  tongue lacerations noted      Motor Exam   Muscle bulk: normal  Overall muscle tone: normal  Decreased bulk in RLE in comparison to LLE    BUE strength 5/5    Limited proximal BLE strength testing   Able to minimally elevate left heel off the bed  Did not attempt to elevate right heel off the bed  Bilateral dorsiflexion and plantar flexion 5/5     Sensory Exam   Light touch normal.     Gait, Coordination, and Reflexes     Tremor   Resting tremor: absent  No ataxia or dysmetria noted in BUE finger to nose testing     No involuntary movements or rhythmic seizure like activity noted throughout exam      Lab Results: I have personally reviewed pertinent reports.  Recent Results (from the past 24 hour(s))   Fingerstick Glucose (POCT)    Collection Time: 03/20/24 12:04 PM   Result Value Ref Range    POC Glucose 181 (H) 65 - 140 mg/dl   ECG 12 lead    Collection Time: 03/20/24 12:12 PM   Result Value Ref Range    Ventricular Rate 84 BPM    Atrial Rate 84 BPM    VA Interval 128 ms    QRSD Interval 82 ms    QT Interval 364 ms    QTC Interval 430 ms    P Axis 70 degrees    QRS Axis -16 degrees    T Wave Axis 26 degrees   CBC and differential    Collection Time: 03/20/24 12:29 PM   Result Value Ref Range    WBC 4.81 4.31 - 10.16 Thousand/uL    RBC 3.91 3.81 - 5.12 Million/uL    Hemoglobin 12.0 11.5 - 15.4 g/dL    Hematocrit 37.8 34.8 - 46.1 %    MCV 97 82 - 98 fL    MCH 30.7 26.8 - 34.3 pg    MCHC 31.7 31.4 - 37.4 g/dL    RDW 14.8 11.6 - 15.1 %    MPV 9.4 8.9 - 12.7 fL    Platelets 267 149 - 390 Thousands/uL    nRBC 0 /100 WBCs    Neutrophils Relative 67 43 - 75 %    Immature Grans % 2 0 - 2 %    Lymphocytes Relative 21 14 - 44 %    Monocytes Relative 10 4 - 12 %    Eosinophils Relative 0 0 - 6 %    Basophils Relative 0 0 - 1 %    Neutrophils Absolute 3.20 1.85 - 7.62 Thousands/µL    Absolute Immature Grans 0.08 0.00 - 0.20 Thousand/uL    Absolute Lymphocytes 1.03 0.60 - 4.47 Thousands/µL    Absolute Monocytes 0.47 0.17 - 1.22  Thousand/µL    Eosinophils Absolute 0.02 0.00 - 0.61 Thousand/µL    Basophils Absolute 0.01 0.00 - 0.10 Thousands/µL   Comprehensive metabolic panel    Collection Time: 03/20/24 12:29 PM   Result Value Ref Range    Sodium 138 135 - 147 mmol/L    Potassium 4.4 3.5 - 5.3 mmol/L    Chloride 104 96 - 108 mmol/L    CO2 24 21 - 32 mmol/L    ANION GAP 10 4 - 13 mmol/L    BUN 21 5 - 25 mg/dL    Creatinine 0.92 0.60 - 1.30 mg/dL    Glucose 183 (H) 65 - 140 mg/dL    Calcium 9.7 8.4 - 10.2 mg/dL    AST 16 13 - 39 U/L    ALT 12 7 - 52 U/L    Alkaline Phosphatase 51 34 - 104 U/L    Total Protein 7.3 6.4 - 8.4 g/dL    Albumin 4.5 3.5 - 5.0 g/dL    Total Bilirubin 0.39 0.20 - 1.00 mg/dL    eGFR 54 ml/min/1.73sq m   UA w Reflex to Microscopic w Reflex to Culture    Collection Time: 03/20/24  2:53 PM    Specimen: Urine, Clean Catch   Result Value Ref Range    Color, UA Yellow     Clarity, UA Clear     Specific Gravity, UA 1.037 (H) 1.003 - 1.030    pH, UA 6.5 4.5, 5.0, 5.5, 6.0, 6.5, 7.0, 7.5, 8.0    Leukocytes, UA Negative Negative    Nitrite, UA Negative Negative    Protein, UA Trace (A) Negative mg/dl    Glucose, UA Negative Negative mg/dl    Ketones, UA Negative Negative mg/dl    Urobilinogen, UA <2.0 <2.0 mg/dl mg/dl    Bilirubin, UA Negative Negative    Occult Blood, UA Negative Negative   Urine Microscopic    Collection Time: 03/20/24  2:53 PM   Result Value Ref Range    RBC, UA None Seen None Seen, 1-2 /hpf    WBC, UA 4-10 (A) None Seen, 1-2 /hpf    Epithelial Cells Occasional None Seen, Occasional /hpf    Bacteria, UA None Seen None Seen, Occasional /hpf    Hyaline Casts, UA 25-50 (A) None Seen /lpf     Imaging Studies: I have personally reviewed pertinent reports and I have personally reviewed pertinent films in PACS.    EKG, Pathology, and Other Studies: I have personally reviewed pertinent reports.    VTE Prophylaxis: On Eliquis 5 mg twice daily at home    Code Status: Prior    Dictation voice to text software has been  used in the creation of this document.  Please consider this in light of any contextual or grammatical errors.

## 2024-03-21 ENCOUNTER — APPOINTMENT (OUTPATIENT)
Dept: MRI IMAGING | Facility: HOSPITAL | Age: 89
DRG: 310 | End: 2024-03-21
Payer: MEDICARE

## 2024-03-21 ENCOUNTER — APPOINTMENT (OUTPATIENT)
Dept: NEUROLOGY | Facility: HOSPITAL | Age: 89
DRG: 310 | End: 2024-03-21
Payer: MEDICARE

## 2024-03-21 PROBLEM — I48.0 PAROXYSMAL ATRIAL FIBRILLATION (HCC): Status: ACTIVE | Noted: 2024-03-21

## 2024-03-21 PROBLEM — G45.9 TIA (TRANSIENT ISCHEMIC ATTACK): Status: ACTIVE | Noted: 2024-03-21

## 2024-03-21 LAB
2HR DELTA HS TROPONIN: 0 NG/L
ANION GAP SERPL CALCULATED.3IONS-SCNC: 9 MMOL/L (ref 4–13)
ATRIAL RATE: 84 BPM
BUN SERPL-MCNC: 18 MG/DL (ref 5–25)
CALCIUM SERPL-MCNC: 9.1 MG/DL (ref 8.4–10.2)
CARDIAC TROPONIN I PNL SERPL HS: 10 NG/L
CARDIAC TROPONIN I PNL SERPL HS: 10 NG/L
CHLORIDE SERPL-SCNC: 107 MMOL/L (ref 96–108)
CHOLEST SERPL-MCNC: 195 MG/DL
CO2 SERPL-SCNC: 23 MMOL/L (ref 21–32)
CREAT SERPL-MCNC: 0.75 MG/DL (ref 0.6–1.3)
ERYTHROCYTE [DISTWIDTH] IN BLOOD BY AUTOMATED COUNT: 14.7 % (ref 11.6–15.1)
EST. AVERAGE GLUCOSE BLD GHB EST-MCNC: 134 MG/DL
GFR SERPL CREATININE-BSD FRML MDRD: 69 ML/MIN/1.73SQ M
GLUCOSE P FAST SERPL-MCNC: 131 MG/DL (ref 65–99)
GLUCOSE SERPL-MCNC: 131 MG/DL (ref 65–140)
HBA1C MFR BLD: 6.3 %
HCT VFR BLD AUTO: 34.3 % (ref 34.8–46.1)
HDLC SERPL-MCNC: 54 MG/DL
HGB BLD-MCNC: 11.1 G/DL (ref 11.5–15.4)
LDLC SERPL CALC-MCNC: 108 MG/DL (ref 0–100)
MAGNESIUM SERPL-MCNC: 2.2 MG/DL (ref 1.9–2.7)
MCH RBC QN AUTO: 30.9 PG (ref 26.8–34.3)
MCHC RBC AUTO-ENTMCNC: 32.4 G/DL (ref 31.4–37.4)
MCV RBC AUTO: 96 FL (ref 82–98)
P AXIS: 70 DEGREES
PLATELET # BLD AUTO: 237 THOUSANDS/UL (ref 149–390)
PMV BLD AUTO: 9.6 FL (ref 8.9–12.7)
POTASSIUM SERPL-SCNC: 4.2 MMOL/L (ref 3.5–5.3)
PR INTERVAL: 128 MS
QRS AXIS: -16 DEGREES
QRSD INTERVAL: 82 MS
QT INTERVAL: 364 MS
QTC INTERVAL: 430 MS
RBC # BLD AUTO: 3.59 MILLION/UL (ref 3.81–5.12)
SODIUM SERPL-SCNC: 139 MMOL/L (ref 135–147)
T WAVE AXIS: 26 DEGREES
TRIGL SERPL-MCNC: 167 MG/DL
TSH SERPL DL<=0.05 MIU/L-ACNC: 2.33 UIU/ML (ref 0.45–4.5)
VENTRICULAR RATE: 84 BPM
WBC # BLD AUTO: 7.67 THOUSAND/UL (ref 4.31–10.16)

## 2024-03-21 PROCEDURE — 84484 ASSAY OF TROPONIN QUANT: CPT | Performed by: INTERNAL MEDICINE

## 2024-03-21 PROCEDURE — 99233 SBSQ HOSP IP/OBS HIGH 50: CPT | Performed by: INTERNAL MEDICINE

## 2024-03-21 PROCEDURE — 70553 MRI BRAIN STEM W/O & W/DYE: CPT

## 2024-03-21 PROCEDURE — 85027 COMPLETE CBC AUTOMATED: CPT

## 2024-03-21 PROCEDURE — 80048 BASIC METABOLIC PNL TOTAL CA: CPT

## 2024-03-21 PROCEDURE — 84443 ASSAY THYROID STIM HORMONE: CPT | Performed by: NURSE PRACTITIONER

## 2024-03-21 PROCEDURE — 95816 EEG AWAKE AND DROWSY: CPT | Performed by: PSYCHIATRY & NEUROLOGY

## 2024-03-21 PROCEDURE — 93010 ELECTROCARDIOGRAM REPORT: CPT | Performed by: INTERNAL MEDICINE

## 2024-03-21 PROCEDURE — 97163 PT EVAL HIGH COMPLEX 45 MIN: CPT

## 2024-03-21 PROCEDURE — A9585 GADOBUTROL INJECTION: HCPCS | Performed by: INTERNAL MEDICINE

## 2024-03-21 PROCEDURE — 97167 OT EVAL HIGH COMPLEX 60 MIN: CPT

## 2024-03-21 PROCEDURE — 80061 LIPID PANEL: CPT | Performed by: INTERNAL MEDICINE

## 2024-03-21 PROCEDURE — 99223 1ST HOSP IP/OBS HIGH 75: CPT | Performed by: INTERNAL MEDICINE

## 2024-03-21 PROCEDURE — 83735 ASSAY OF MAGNESIUM: CPT

## 2024-03-21 PROCEDURE — 92610 EVALUATE SWALLOWING FUNCTION: CPT

## 2024-03-21 PROCEDURE — 95816 EEG AWAKE AND DROWSY: CPT

## 2024-03-21 PROCEDURE — 99233 SBSQ HOSP IP/OBS HIGH 50: CPT | Performed by: PSYCHIATRY & NEUROLOGY

## 2024-03-21 PROCEDURE — 83036 HEMOGLOBIN GLYCOSYLATED A1C: CPT | Performed by: INTERNAL MEDICINE

## 2024-03-21 RX ORDER — GADOBUTROL 604.72 MG/ML
6 INJECTION INTRAVENOUS
Status: COMPLETED | OUTPATIENT
Start: 2024-03-21 | End: 2024-03-21

## 2024-03-21 RX ORDER — LABETALOL HYDROCHLORIDE 5 MG/ML
10 INJECTION, SOLUTION INTRAVENOUS ONCE
Status: DISCONTINUED | OUTPATIENT
Start: 2024-03-21 | End: 2024-03-21

## 2024-03-21 RX ORDER — DILTIAZEM HYDROCHLORIDE 5 MG/ML
10 INJECTION INTRAVENOUS ONCE
Status: COMPLETED | OUTPATIENT
Start: 2024-03-21 | End: 2024-03-21

## 2024-03-21 RX ORDER — AMLODIPINE BESYLATE 5 MG/1
5 TABLET ORAL DAILY
Status: DISCONTINUED | OUTPATIENT
Start: 2024-03-22 | End: 2024-03-22 | Stop reason: HOSPADM

## 2024-03-21 RX ORDER — METOPROLOL TARTRATE 50 MG/1
50 TABLET, FILM COATED ORAL EVERY 12 HOURS SCHEDULED
Status: DISCONTINUED | OUTPATIENT
Start: 2024-03-21 | End: 2024-03-22 | Stop reason: HOSPADM

## 2024-03-21 RX ADMIN — B-COMPLEX W/ C & FOLIC ACID TAB 1 TABLET: TAB at 08:34

## 2024-03-21 RX ADMIN — APIXABAN 2.5 MG: 2.5 TABLET, FILM COATED ORAL at 18:42

## 2024-03-21 RX ADMIN — AMLODIPINE BESYLATE 10 MG: 10 TABLET ORAL at 08:34

## 2024-03-21 RX ADMIN — GADOBUTROL 6 ML: 604.72 INJECTION INTRAVENOUS at 12:00

## 2024-03-21 RX ADMIN — POLYETHYLENE GLYCOL 3350 17 G: 17 POWDER, FOR SOLUTION ORAL at 08:34

## 2024-03-21 RX ADMIN — METOPROLOL TARTRATE 25 MG: 25 TABLET, FILM COATED ORAL at 10:33

## 2024-03-21 RX ADMIN — ACETAMINOPHEN 975 MG: 325 TABLET, FILM COATED ORAL at 20:51

## 2024-03-21 RX ADMIN — APIXABAN 5 MG: 5 TABLET, FILM COATED ORAL at 08:34

## 2024-03-21 RX ADMIN — DILTIAZEM HYDROCHLORIDE 10 MG: 5 INJECTION INTRAVENOUS at 10:33

## 2024-03-21 RX ADMIN — OMEGA-3 FATTY ACIDS CAP 1000 MG 1000 MG: 1000 CAP at 08:34

## 2024-03-21 RX ADMIN — LISINOPRIL 20 MG: 20 TABLET ORAL at 08:34

## 2024-03-21 RX ADMIN — METOPROLOL TARTRATE 50 MG: 50 TABLET, FILM COATED ORAL at 20:51

## 2024-03-21 NOTE — CONSULTS
Consultation - Cardiology Team One  Erika Quigley 89 y.o. female MRN: [id number]  Unit/Bed#: S -01 Encounter: [id number]    Inpatient consult to Cardiology  Consult performed by: MAGDALENE Wiseman  Consult ordered by: Mich Mayorga DO      Physician Requesting Consult: Cass Garcia MD  Reason for Consult / Principal Problem: new onset afib    Assessment    New onset paroxysmal atrial fibrillation  ECG on admission showed NSR  Tele review shows new onset afib with RVR from around 0915 to 1115 today. Rates in the 120s-140s  No reported symptoms  [id number]- already anticoagulated on Eliquis 5 mg BID for history of DVT  TTE to be completed    Stroke vs seizure like symptoms  Back to baseline; MRI pending    HTN- mildly elevated, avg /72. On amlodipine 10 mg daily, lisinopril 20 mg daily and started on metoprolol tartrate 25 mg BID for new onset afib.    HLD- , , HDL 54, [id number]. Not on statin therapy.     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 Consider amiodarone to maintain NSR.  Continue telemetry  Follow up on echo results  Check updated TSH  Anticoagulated on Eliquis 5 mg BID for hx of DVT. Once treatment for DVT completed, she would be a candidate for reduced dose Eliquis 2.5 mg BID for stroke risk reduction given her age and body weight.      History of Present Illness   HPI: Erika Quigley is a 89 y.o. year old female with HTN, HLD, DVT of RLE, and dementia who presented to the ED on 3/20/24 with witnessed RUE shaking and speech difficulties. This had resolved by the time she got to the ED. She has a chronic L facial droop and slurred speech at baseline. Neurology consulted and she is undergoing work up for stroke vs seizure. CTA of the head and neck was negative for large vessel occlusion or acute  abnormality. She is currently undergoing an MRI. This morning she was noted to go into Afib with RVR on telemetry with heart rates in the 130s. She was given 10 mg IV diltiazem and started on metoprolol tartrate 25 mg BID. She is already anticoagulated on Eliquis. An echocardiogram has been ordered and cardiology consulted for further evaluation and management of her new onset atrial fibrillation    EKG reviewed personally: 3/20- NSR    Telemetry reviewed personally: Afib with RVR    Review of Systems   Unable to perform ROS: Dementia     Historical Information   Past Medical History:   Diagnosis Date    Anxiety 2010    Bladder infection 09/2019    Breast lump in female     HL (hearing loss) 2010    Hypertension 2010    Osteopenia     Ovarian cyst      Past Surgical History:   Procedure Laterality Date    BLADDER SURGERY      BREAST BIOPSY      CATARACT EXTRACTION Bilateral 01/01/2015    , Right Eye-2017, Left eye    ENDOMETRIAL BIOPSY      by Suction    HYSTERECTOMY       Social History     Substance and Sexual Activity   Alcohol Use Not Currently     Social History     Substance and Sexual Activity   Drug Use No     Social History     Tobacco Use   Smoking Status Never   Smokeless Tobacco Never     Family History:   Family History   Problem Relation Age of Onset    Heart disease Mother     Heart disease Father     Hypertension Family     Osteoporosis Family        Meds/Allergies   all current active meds have been reviewed and current meds:   Current Facility-Administered Medications   Medication Dose Route Frequency    acetaminophen (TYLENOL) tablet 975 mg  975 mg Oral BID PRN    amLODIPine (NORVASC) tablet 10 mg  10 mg Oral Daily    apixaban (ELIQUIS) tablet 5 mg  5 mg Oral BID    fish oil capsule 1,000 mg  1,000 mg Oral Daily    lisinopril (ZESTRIL) tablet 20 mg  20 mg Oral Daily    metoprolol tartrate (LOPRESSOR) tablet 25 mg  25 mg Oral Q12H [facility]    multivitamin stress formula tablet 1 tablet  1 tablet Oral  "Daily    polyethylene glycol (MIRALAX) packet 17 g  17 g Oral Daily          No Known Allergies    Objective   Vitals: Blood pressure 164/86, pulse (!) 156, temperature 98.4 °F (36.9 °C), temperature source Oral, resp. rate 17, height 4' 11\" (1.499 m), weight 58.2 kg (128 lb 4.9 oz), SpO2 96%, not currently breastfeeding., Body mass index is 25.91 kg/m².,     Systolic (24hrs), Av , Min:127 , Max:164     Diastolic (24hrs), Av, Min:60, Max:86        Intake/Output Summary (Last 24 hours) at 3/21/2024 1149  Last data filed at 3/20/2024 2346  Gross per 24 hour   Intake 480 ml   Output --   Net 480 ml     Wt Readings from Last 3 Encounters:   24 58.2 kg (128 lb 4.9 oz)   24 58.2 kg (128 lb 4.9 oz)   23 58.5 kg (129 lb)     Invasive Devices       Peripheral Intravenous Line  Duration             Peripheral IV 24 Right Antecubital <1 day                    Physical Exam  Vitals reviewed.   Constitutional:       General: She is not in acute distress.     Comments: Pleasantly confused elderly female resting comfortably in bed with caregiver at bedside   Neck:      Vascular: No hepatojugular reflux or JVD.   Cardiovascular:      Rate and Rhythm: Normal rate and regular rhythm.      Heart sounds: Normal heart sounds. No murmur heard.     No friction rub. No gallop.   Pulmonary:      Effort: Pulmonary effort is normal. No respiratory distress.      Breath sounds: No rales.      Comments: Diminished at bases, room air  Abdominal:      General: Bowel sounds are normal. There is no distension.      Palpations: Abdomen is soft.      Tenderness: There is no abdominal tenderness.   Musculoskeletal:         General: No tenderness. Normal range of motion.      Cervical back: Neck supple.      Right lower leg: No edema.      Left lower leg: No edema.   Skin:     General: Skin is warm and dry.      Findings: No erythema.   Neurological:      Mental Status: She is alert. Mental status is at baseline.      " "Comments: Oriented to self. Speech slurred/garbled at times   Psychiatric:         Mood and Affect: Mood normal.         LABORATORY RESULTS:      CBC with diff:   Results from last 7 days   Lab Units 03/21/24  0542 03/20/24  1229   WBC Thousand/uL 7.67 4.81   HEMOGLOBIN g/dL 11.1* 12.0   HEMATOCRIT % 34.3* 37.8   MCV fL 96 97   PLATELETS Thousands/uL 237 267   RBC Million/uL 3.59* 3.91   MCH pg 30.9 30.7   MCHC g/dL 32.4 31.7   RDW % 14.7 14.8   MPV fL 9.6 9.4   NRBC AUTO /100 WBCs  --  0       CMP:  Results from last 7 days   Lab Units 03/21/24  0542 03/20/24  1229   POTASSIUM mmol/L 4.2 4.4   CHLORIDE mmol/L 107 104   CO2 mmol/L 23 24   BUN mg/dL 18 21   CREATININE mg/dL 0.75 0.92   CALCIUM mg/dL 9.1 9.7   AST U/L  --  16   ALT U/L  --  12   ALK PHOS U/L  --  51   EGFR ml/min/1.73sq m 69 54       BMP:  Results from last 7 days   Lab Units 03/21/24  0542 03/20/24  1229   POTASSIUM mmol/L 4.2 4.4   CHLORIDE mmol/L 107 104   CO2 mmol/L 23 24   BUN mg/dL 18 21   CREATININE mg/dL 0.75 0.92   CALCIUM mg/dL 9.1 9.7       Lab Results   Component Value Date    CREATININE 0.75 03/21/2024    CREATININE 0.92 03/20/2024    CREATININE 0.76 12/19/2023       No results found for: \"NTBNP\"       Results from last 7 days   Lab Units 03/21/24  0542   MAGNESIUM mg/dL 2.2                         Lipid Profile:   Lab Results   Component Value Date    CHOL 194 04/07/2015    CHOL 197 10/28/2014    CHOL 196 04/29/2014     Lab Results   Component Value Date    HDL 54 03/21/2024    HDL 72 09/02/2020    HDL 77 (H) 08/26/2019     Lab Results   Component Value Date    LDLCALC 108 (H) 03/21/2024    LDLCALC 155 (H) 09/02/2020    LDLCALC 137 (H) 08/26/2019     Lab Results   Component Value Date    TRIG 167 (H) 03/21/2024    TRIG 118 09/02/2020    TRIG 180 (H) 08/26/2019       Cardiac testing:   Results for orders placed during the hospital encounter of 10/22/20    Echo complete with contrast if indicated    [facility]  [id number] " [place] [address]  [phone]    Transthoracic Echocardiogram  2D, M-mode, Doppler, and Color Doppler    Study date:  22-Oct-2020    Patient: GAVIOTA MONTES DE OCA  MR number: [id number]  Account number: [id number]  : 1931  Age: 88 years  Gender: Female  Status: Outpatient  Location: [facility]  Height: 59 in  Weight: 130 lb  BP: 142/ 70 mmHg    Indications: Shortness of breath.    Diagnoses: R06.02 - Shortness of breath    Sonographer:  Chata Ortiz RDCS  Referring Physician:  Lucho Ochoa MD  Group:  [facility]  Interpreting Physician:  Jacky Delgado MD    SUMMARY    LEFT VENTRICLE:  Size was normal.  Systolic function was normal. Ejection fraction was estimated to be 60 %.  Wall thickness was mildly increased.  Doppler parameters were consistent with abnormal left ventricular relaxation (grade 1 diastolic dysfunction).    RIGHT VENTRICLE:  The size was normal.  Systolic function was normal.    LEFT ATRIUM:  The atrium was mildly dilated.    MITRAL VALVE:  There was mild regurgitation.    AORTIC VALVE:  There was trace regurgitation.    TRICUSPID VALVE:  There was mild regurgitation.  Pulmonary artery systolic pressure was mildly increased.  The findings suggest mild pulmonary hypertension.    HISTORY: PRIOR HISTORY: hyperlipidemia, hypertension, vertigo    PROCEDURE: The study was performed in the [facility]. This was a routine study. The transthoracic approach was used. The study included complete 2D imaging, M-mode, complete spectral Doppler, and color Doppler. The heart rate was  75 bpm, at the start of the study. Images were obtained from the parasternal, apical, subcostal, and suprasternal notch acoustic windows. Image quality was adequate.    LEFT VENTRICLE: Size was normal. Systolic function was normal. Ejection fraction was estimated to be 60 %. There were no regional wall motion abnormalities. Wall thickness was mildly increased.  DOPPLER: Doppler parameters were consistent  with abnormal left ventricular relaxation (grade 1 diastolic dysfunction).    RIGHT VENTRICLE: The size was normal. Systolic function was normal. Wall thickness was normal.    LEFT ATRIUM: The atrium was mildly dilated.    RIGHT ATRIUM: Size was normal.    MITRAL VALVE: Valve structure was normal. There was normal leaflet separation. DOPPLER: The transmitral velocity was within the normal range. There was no evidence for stenosis. There was mild regurgitation.    AORTIC VALVE: The valve was trileaflet. Leaflets exhibited mildly increased thickness, normal cuspal separation, and sclerosis. DOPPLER: Transaortic velocity was within the normal range. There was no evidence for stenosis. There was trace  regurgitation.    TRICUSPID VALVE: The valve structure was normal. There was normal leaflet separation. DOPPLER: The transtricuspid velocity was within the normal range. There was no evidence for stenosis. There was mild regurgitation. Pulmonary artery  systolic pressure was mildly increased. Estimated peak PA pressure was 41 mmHg. The findings suggest mild pulmonary hypertension.    PULMONIC VALVE: Leaflets exhibited normal thickness, no calcification, and normal cuspal separation. DOPPLER: The transpulmonic velocity was within the normal range. There was mild regurgitation.    PERICARDIUM: There was no pericardial effusion. The pericardium was normal in appearance.    AORTA: The root exhibited normal size.    SYSTEMIC VEINS: IVC: The inferior vena cava was normal in size and course. Respirophasic changes were normal.    SYSTEM MEASUREMENT TABLES    2D  %FS: 40.92 %  AVC: 354.92 ms  Ao Diam: 2.69 cm  EDV(Teich): 78.84 ml  EF(Teich): 72.11 %  ESV(Teich): 21.98 ml  HR_2Ch_Q: 67.42 BPM  HR_4Ch_Q: 68.7 BPM  IVSd: 0.82 cm  LA Area: 23.73 cm2  LA Diam: 3.17 cm  LVCO_2Ch_Q: 2.5 L/min  LVCO_4Ch_Q: 2.88 L/min  LVCO_BiP_Q: 2.69 L/min  LVEDV MOD A4C: 96.52 ml  LVEF MOD A4C: 52.58  %  LVEF_2Ch_Q: 64.91 %  LVEF_4Ch_Q: 61.72 %  LVEF_BiP_Q: 62.96 %  LVESV MOD A4C: 45.77 ml  LVIDd: 4.21 cm  LVIDs: 2.48 cm  LVLd A4C: 7.28 cm  LVLd_2Ch_Q: 6.02 cm  LVLd_4Ch_Q: 7.17 cm  LVLs A4C: 6.07 cm  LVLs_2Ch_Q: 4.71 cm  LVLs_4Ch_Q: 5.64 cm  LVPWd: 0.9 cm  LVSV_2Ch_Q: 37.13 ml  LVSV_4Ch_Q: 41.88 ml  LVSV_BiP_Q: 39.12 ml  LVVED_2Ch_Q: 57.2 ml  LVVED_4Ch_Q: 67.85 ml  LVVED_BiP_Q: 62.13 ml  LVVES_2Ch_Q: 20.07 ml  LVVES_4Ch_Q: 25.97 ml  LVVES_BiP_Q: 23.02 ml  RA Area: 17.09 cm2  RVIDd: 3.26 cm  RWT: 0.43  SV MOD A4C: 50.74 ml  SV(Teich): 56.85 ml    CW  TR MaxP.21 mmHg  TR Vmax: 2.79 m/s    MM  TAPSE: 1.84 cm    PW  E' Sept: 0.06 m/s  E/E' Sept: 12.43  MV A Chirag: 1.23 m/s  MV Dec Stephens: 4.01 m/s2  MV DecT: 201.3 ms  MV E Chirag: 0.8 m/s  MV E/A Ratio: 0.65    [facility] Accredited Echocardiography Laboratory    Prepared and electronically signed by    Jacky Delgado MD  Signed 22-Oct-2020 16:02:42    Imaging: I have personally reviewed pertinent reports.   and I have personally reviewed pertinent films in PACS  CTA head and neck with and without contrast    Result Date: 3/20/2024  Narrative: CTA NECK AND BRAIN WITH AND WITHOUT CONTRAST INDICATION: transient altered mental status. COMPARISON:   CTA head and neck 2020 TECHNIQUE:  Routine CT imaging of the Brain without contrast.  Post contrast imaging was performed after administration of iodinated contrast through the neck and brain. Post contrast axial 0.625 mm images timed to opacify the arterial system. 3D rendering was performed on an independent workstation.   MIP reconstructions performed. Coronal reconstructions were performed of the noncontrast portion of the brain. Radiation dose length product (DLP) for this visit:  2005 mGy-cm .  This examination, like all CT scans performed in the [facility], was performed utilizing techniques to minimize radiation dose exposure, including the use of iterative reconstruction and  automated exposure control. IV Contrast:  70 mL of iohexol (OMNIPAQUE) IMAGE QUALITY:   Diagnostic FINDINGS: NONCONTRAST BRAIN PARENCHYMA: Decreased attenuation is noted in periventricular and subcortical white matter demonstrating an appearance that is statistically most likely to represent moderate microangiopathic change. Chronic infarct and encephalomalacia involving the inferior right cerebellum. No CT signs of acute infarction.  No intracranial mass, mass effect or midline shift.  No acute parenchymal hemorrhage. VENTRICLES AND EXTRA-AXIAL SPACES:  Enlargement of ventricles and extra-axial CSF spaces, out of proportion to the patient's age most consistent with cerebral and cerebellar atrophy. VISUALIZED ORBITS: Normal visualized orbits. PARANASAL SINUSES: Mild bilateral sphenoid sinus mucosal thickening. CERVICAL VASCULATURE AORTIC ARCH AND GREAT VESSELS:  Normal aortic arch and great vessel origins. Normal visualized subclavian vessels. RIGHT VERTEBRAL ARTERY CERVICAL SEGMENT: The right vertebral artery V2 segment is again not visualized and likely chronically occluded or hypoplastic. There is attenuated enhancement of the right V1 and V3 segments. LEFT VERTEBRAL ARTERY CERVICAL SEGMENT: Moderate origin stenosis. The vessel is normal in caliber throughout the neck. RIGHT EXTRACRANIAL CAROTID SEGMENT:  Mild atherosclerotic disease of the distal common carotid artery and proximal cervical internal carotid artery without significant stenosis compared to the more distal ICA. Retropharyngeal course of the right common carotid artery. LEFT EXTRACRANIAL CAROTID SEGMENT:  Mild atherosclerotic disease of the distal common carotid artery and proximal cervical internal carotid artery without significant stenosis compared to the more distal ICA. Retropharyngeal course of the left common carotid artery NASCET criteria was used to determine the degree of internal carotid artery diameter stenosis. INTRACRANIAL VASCULATURE  INTERNAL CAROTID ARTERIES:  Normal enhancement of the intracranial portions of the internal carotid arteries.  Normal ophthalmic artery origins.  Normal ICA terminus. ANTERIOR CIRCULATION:  Symmetric A1 segments and anterior cerebral arteries with normal enhancement.  Normal anterior communicating artery. MIDDLE CEREBRAL ARTERY CIRCULATION:  M1 segment and middle cerebral artery branches demonstrate normal enhancement bilaterally. DISTAL VERTEBRAL ARTERIES: Patent distal vertebral arteries with moderate bilateral midsegment stenosis. The right PICA branch is markedly attenuated. The left PICA branch is patent. Normal vertebral basilar junction. BASILAR ARTERY:  Basilar artery is normal in caliber.  Normal superior cerebellar arteries. POSTERIOR CEREBRAL ARTERIES: There is fetal origin of the right posterior cerebral artery.  The left posterior cerebral artery arises from the basilar tip.  Both demonstrate no focal stenosis.   Normal posterior communicating arteries. VENOUS STRUCTURES:  Normal. NON VASCULAR ANATOMY BONY STRUCTURES: Multilevel cervical spondylotic and degenerative disc disease. Grade 1 degenerative anterolisthesis is noted at the C3-4, C4-5, and C7-T1 levels. Multilevel facet arthrosis. Chronic fracture deformity of the C4 vertebral body. SOFT TISSUES OF THE NECK: Subcentimeter hypodense thyroid nodules are noted. Incidental discovery of one or more thyroid nodule(s) measuring less than 1.5 cm and without suspicious features is noted in this patient who is above 35 years old; according to  guidelines published in the February 2015 white paper on incidental thyroid nodules in the Journal of the American College of Radiology (JACR), no further evaluation is recommended. THORACIC INLET:  Normal.     Impression: CT brain:  No acute intracranial abnormality. 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 Chronic microangiopathic ischemic changes. CTA head: Negative for large vessel  intracranial occlusion . Areas of moderate stenosis involving the bilateral intracranial vertebral arteries. CTA neck:  No extracranial carotid stenosis. The right vertebral artery V2 segment is again not visualized and likely chronically occluded or hypoplastic. Attenuated enhancement of the right V1 and V3 segments. The left vertebral artery is patent with moderate origin stenosis. Workstation performed: [id number]     MRI lumbar spine without contrast    Result Date: 3/15/2024  Narrative: MRI LUMBAR SPINE WITHOUT CONTRAST INDICATION: R93.7: Abnormal findings on diagnostic imaging of other parts of musculoskeletal system M51.16: Intervertebral disc disorders with radiculopathy, lumbar region. COMPARISON: 5/29/2021 TECHNIQUE:  Multiplanar, multisequence imaging of the lumbar spine was performed. . IMAGE QUALITY:  Diagnostic FINDINGS: VERTEBRAL BODIES:  There are 5 lumbar type vertebral bodies. Stable levoscoliosis of the lower thoracic spine and dextroscoliosis of the upper lumbar spine. Stable grade 1 anterior spondylolisthesis of L4 upon L5 and L5 upon S1. Normal marrow signal is identified within the visualized bony structures.  No discrete marrow lesion. SACRUM:  Normal signal within the sacrum. No evidence of insufficiency or stress fracture. DISTAL CORD AND CONUS: Normal signal within the distal cord and conus.  Once again identified is a large elongated mass within the spinal canal at the level of the L2-3 disc space extending through the neural foramen into the paraspinal soft tissues. This mass measures 5.7 cm in long axis, slightly larger than the prior examination and approximately 2.2 cm in short axis, see measurements on series 5 image 28. Craniocaudad dimension is grossly unchanged, measuring approximately 3.6 cm. This results in  severe canal stenosis with anterior and right lateral compression and displacement of the thecal sac and cauda equina nerve roots which are severely compressed.  PARASPINAL SOFT TISSUES: Atrophy of the posterior paraspinal musculature. Incidentally noted lipoma within the right paramedian superficial subcutaneous fat measuring 4 cm in craniocaudad dimension. LOWER THORACIC DISC SPACES: Lower thoracic degenerative disc disease T10-11, T11-12 and T12-L1. Small disc herniations at T11-12 and T12-L1 with mild canal stenosis. Mild foraminal narrowing at the T12-L1 level. LUMBAR DISC SPACES: L1-L2: Disc desiccation and loss of disc height. Annular bulging with mild endplate and facet arthropathy. The severe canal stenosis as a result of the above described mass begins at this level displacing the thecal sac towards the right. Only mild foraminal narrowing. L2-L3: Disc desiccation and loss of disc height with severe canal stenosis. The above described mass compresses the thecal sac and displaces it anteriorly and towards the right. There is no right-sided foraminal narrowing. 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 L3-L4: Stable annular bulging diffusely and facet degenerative change with mild canal stenosis and minimal foraminal narrowing. L4-L5: Mild annular bulging diffusely. Moderate facet hypertrophic degenerative change. Mild canal stenosis and right foraminal narrowing. L5-S1: Grade 1 anterior spondylolisthesis of L5 upon S1 with annular bulging and uncovering of the cephalad disc margin. Bilateral facet hypertrophic degenerative change. Stable mild canal stenosis and foraminal narrowing. OTHER FINDINGS: Bilateral adnexal cysts are identified. On the right there is a cyst measuring 2.1 cm and on the left there is a 3 cm cyst.     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 The intradural component results in severe canal stenosis with compression of the proximal cauda equina nerve roots. 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 Additional mild degenerative disc disease within the lower thoracic spine at T11-12, T12-L1 and within the lumbar spine at the L3-4, L4-5 and L5-S1 levels. Bilateral adnexal cysts are identified atypical for a patient of this age. Pelvic ultrasound follow-up recommended. Considerations related to the patient's age and/or comorbidities may be used to alter these recommendations. The study was marked in EPIC for significant notification. Workstation performed: [id number]     Counseling / Coordination of Care  Total floor / unit time spent today 45 minutes.  Greater than 50% of total time was spent with the patient and / or family counseling and / or coordination of care.  A description of the counseling / coordination of care: Review of history, current assessment, development of a plan.    Code Status: Level 1 - Full Code    ** Please Note: Dragon 360 Dictation voice to text software may have been used in the creation of this document. **

## 2024-03-21 NOTE — ASSESSMENT & PLAN NOTE
New onset A-fib with RVR noted this admission  Patient previously on Eliquis 5 mg BID for history of bilateral DVTs  Cardiology following, recommending reducing Eliquis to 2.5 mg as patient meets 2/3 criteria for low-dose Eliquis  Management per primary team

## 2024-03-21 NOTE — PROGRESS NOTES
"[facility]  Progress Note  Name: Erika Quigley I  MRN: [id number]  Unit/Bed#: S -[facility] I Date of Admission: 3/20/2024   Date of Service: 3/21/2024 I Hospital Day: 0    Assessment/Plan   * Stroke/ Seizure-like symptoms  Assessment & Plan  Baseline ambulatory status - walker. Baseline mentation on and off confusion.  Presented with witnessed episode of speech arrest and RUE shaking with one eye forcefully closed.  No reported urinary/fecal incontinence or tongue bite.  Patient returned back to \"normal state\" afterward per caregiver. S/S resolved when arriving ED.  On my exam in the ED- patient was OA x1 to herself. Chronic L facial droop. Slurred speech at baseline.   UA negative for WBC, nitrates, bacteria.  Etiology unclear - TIA versus seizure versus cardiac versus vascular dementia    - 3/20 - CT head - no acute intracranial abnormality.  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 Chronic microangiopathic ischemic changes  - 3/20 - CTA head/neck -negative for large vessel intracranial occlusion.  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  No extracranial carotid stenosis.  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 Appreciated recs. No need for stroke pathway per neurology.  Monitor on tele.  - MRI head pending  - F/u on EEG  - PT/OT eval    Paroxysmal atrial fibrillation (HCC)  Assessment & Plan  - Initial EKG with NSR  - On 3/21 AM - had new onset atrial fibrillation with RVR in HR 120s-140s. Asymptomatic. Converted back to NSR after IV diltiazem 10 mg push  - [id number] score of 7; already on Eliquis 5 mg BID for Hx of DVT  - Echocardiogram (9/19/23) - EF 60%. G1DD.  Aortic valve sclerosis  - 10/12/23 - TSH 3.859    Plan:  - Echocardiogram  - Continue Eliquis 5 mg twice daily for Hx of DVT.  Once therapy is completed, she would " be a candidate for reduced dose Eliquis 2.5 mg BID due to age and body weight  - Cardiology consulted for new onset paroxysmal atrial fibrillation.  - Continue metoprolol tartrate 25 mg BID  - Monitor on telemetry    Deep vein thrombosis (DVT) of femoral vein of right lower extremity (HCC)  Assessment & Plan  Continue Eliquis 5mg BID.    Essential hypertension  Assessment & Plan  - Meds: amlodipine-benazepril 10-20 mg    Plan:  - Continue amlodipine 10 mg and lisinopril 20 mg while in the hospital.  Restart home regimen upon discharge  - Continue metoprolol tartrate 25 mg BID for new onset paroxysmal atrial fibrillation    Hyperlipidemia  Assessment & Plan  - 3/21 - , , HDL 54,   - Not currently on any statins           VTE Pharmacologic Prophylaxis: VTE Score: 5 High Risk (Score >/= 5) - Pharmacological DVT Prophylaxis Ordered: apixaban (Eliquis). Sequential Compression Devices Ordered.    Mobility:   Basic Mobility Inpatient Raw Score: 8  JH-HLM Goal: 3: Sit at edge of bed  JH-HLM Achieved: 3: Sit at edge of bed  JH-HLM Goal achieved. Continue to encourage appropriate mobility.    Patient Centered Rounds: I performed bedside rounds with nursing staff today.  Discussions with Specialists or Other Care Team Provider: Neurology, cardiology    Education and Discussions with Family / Patient: Updated  (daughter and caregiver) at bedside.    Current Length of Stay: 0 day(s)  Current Patient Status: Inpatient   Discharge Plan: Anticipate discharge in 48-72 hrs to rehab facility.    Code Status: Level 1 - Full Code    Subjective:   Patient was seen and examined at bedside.  Daughter was at bedside at the time of examination.  Patient's left-sided facial droop and left eye droop are at baseline.  She also states that patient's current slurred speech is also at baseline.  Patient is currently alert and oriented to self only, which is also at baseline per daughter.  Patient denies any chest  pain,, headache, abdominal pain.    Later, during rounds, patient developed atrial fibrillation with rapid ventricular rate with heart rates in the 120s to 140s.  She was given IV diltiazem 10 mg and started on metoprolol to tartrate 25 mg BID after which see spontaneously converted to normal sinus rhythm again.  During this time, patient did not have any chest pain, difficulty breathing, lightheadedness.      Objective:     Vitals:   Temp (24hrs), Av.6 °F (37 °C), Min:98.2 °F (36.8 °C), Max:99.1 °F (37.3 °C)    Temp:  [98.2 °F (36.8 °C)-99.1 °F (37.3 °C)] 99.1 °F (37.3 °C)  HR:  [] 66  Resp:  [16-17] 16  BP: (119-164)/(63-86) 119/63  SpO2:  [94 %-97 %] 94 %  Body mass index is 25.91 kg/m².     Input and Output Summary (last 24 hours):     Intake/Output Summary (Last 24 hours) at 3/21/2024 1647  Last data filed at 3/20/2024 2346  Gross per 24 hour   Intake 480 ml   Output --   Net 480 ml       Physical Exam:   Physical Exam  Constitutional:       Appearance: Normal appearance.   HENT:      Head: Normocephalic and atraumatic.   Eyes:      Extraocular Movements: Extraocular movements intact.      Pupils: Pupils are equal, round, and reactive to light.   Cardiovascular:      Rate and Rhythm: Normal rate and regular rhythm.   Pulmonary:      Breath sounds: No wheezing, rhonchi or rales.   Abdominal:      General: There is no distension.      Tenderness: There is no abdominal tenderness. There is no guarding or rebound.   Musculoskeletal:         General: Tenderness (right leg) present. No swelling.      Cervical back: Normal range of motion and neck supple.   Neurological:      Mental Status: She is alert.      Comments: A&O to self only. Left eye droop and left facial droop, at baseline per daughter. Slurring of speech at baseline, per daughter. BUE 5/5 strength. RLE in flexed position due to hip pain, unable to assess to pain with movement. Unable to lift right leg off bed. Able to lift left leg off bed           Additional Data:     Labs:  Results from last 7 days   Lab Units 03/21/24  0542 03/20/24  1229   WBC Thousand/uL 7.67 4.81   HEMOGLOBIN g/dL 11.1* 12.0   HEMATOCRIT % 34.3* 37.8   PLATELETS Thousands/uL 237 267   NEUTROS PCT %  --  67   LYMPHS PCT %  --  21   MONOS PCT %  --  10   EOS PCT %  --  0     Results from last 7 days   Lab Units 03/21/24  0542 03/20/24  1229   SODIUM mmol/L 139 138   POTASSIUM mmol/L 4.2 4.4   CHLORIDE mmol/L 107 104   CO2 mmol/L 23 24   BUN mg/dL 18 21   CREATININE mg/dL 0.75 0.92   ANION GAP mmol/L 9 10   CALCIUM mg/dL 9.1 9.7   ALBUMIN g/dL  --  4.5   TOTAL BILIRUBIN mg/dL  --  0.39   ALK PHOS U/L  --  51   ALT U/L  --  12   AST U/L  --  16   GLUCOSE RANDOM mg/dL 131 183*         Results from last 7 days   Lab Units 03/20/24  1204   POC GLUCOSE mg/dl 181*     Results from last 7 days   Lab Units 03/21/24  0542   HEMOGLOBIN A1C % 6.3*           Lines/Drains:  Invasive Devices       Peripheral Intravenous Line  Duration             Peripheral IV 03/21/24 Right Antecubital <1 day                      Telemetry:  Telemetry Orders (From admission, onward)               24 Hour Telemetry Monitoring  Continuous x 24 Hours (Telem)        Expiring   Question:  Reason for 24 Hour Telemetry  Answer:  TIA/Suspected CVA/ Confirmed CVA                     Telemetry Reviewed: Normal Sinus Rhythm and subsequently went into atrial fibrillation with RVR  Indication for Continued Telemetry Use: Arrthymias requiring medical therapy             Imaging: Reviewed radiology reports from this admission including: CT head    Recent Cultures (last 7 days):         Last 24 Hours Medication List:   Current Facility-Administered Medications   Medication Dose Route Frequency Provider Last Rate    acetaminophen  975 mg Oral BID PRN Dylan Solares MD      [START ON 3/22/2024] amLODIPine  5 mg Oral Daily MAGDALENE Wiseman      apixaban  2.5 mg Oral BID Lisa Caal MD      fish  oil  1,000 mg Oral Daily Dylan Solares MD      lisinopril  20 mg Oral Daily Dylan Solares MD      metoprolol tartrate  50 mg Oral Q12H LOLITA MAGDALENE Wiseman      multivitamin stress formula  1 tablet Oral Daily Dylan Solares MD      polyethylene glycol  17 g Oral Daily Dylan Solares MD          Today, Patient Was Seen By: Mich Mayorga DO    **Please Note: This note may have been constructed using a voice recognition system.**

## 2024-03-21 NOTE — SPEECH THERAPY NOTE
"Speech-Language Pathology Bedside Swallow Evaluation        Patient Name: Erika Quigley    Today's Date: 3/21/2024     Problem List  Principal Problem:    Stroke/ Seizure-like symptoms  Active Problems:    Essential hypertension    Hyperlipidemia    Deep vein thrombosis (DVT) of femoral vein of right lower extremity (HCC)    TIA (transient ischemic attack)    Paroxysmal atrial fibrillation (HCC)         Past Medical History  Past Medical History:   Diagnosis Date    Anxiety 2010    Bladder infection 09/2019    Breast lump in female     HL (hearing loss) 2010    Hypertension 2010    Osteopenia     Ovarian cyst          Summary    Pt presents with functional appearing oral and pharyngeal  swallowing skills, no overt s/s aspiration and no c/o food dysphagia symptoms.      Recommendations:   Diet: regular diet and thin liquids   Meds: whole with liquid   Frequent Oral care: 2x/day  Other Recommendations/ considerations: no follow up tx needed.        Current Medical Status  Pt is a 89 y.o. female who presented to [facility]  with stroke, seizure like symptoms. Pt presents with witnessed episode of RUE shakiness during breakfast per caregiver. Patient had chronic likely L facial droop and baseline on and off confusion. Per daughter at bedside, patient had witnessed episode of speech arrest and RUE shaking sting also along with one eye closed forcefully. No reported urinary/fecal incontinence or tongue bite. Patient returned back to \"normal state\" afterward per caregiver. Patient denied any recent HA, dizziness, fever, CP, SOB, urinary s/s.     Past medical history:   Please see H&P for details    Special Studies:  MRI: 3/21/24 No acute intracranial pathology. 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     Social/Education/Vocational Hx:  Pt lives with family    Swallow Information   Prior speech/swallowing tx: dec 2022, rec dysphagia 3 w/ thin liquids  Current Risks for Dysphagia & " Aspiration: AMS and suspected neuro event   Current Symptoms/Concerns:  AMS, suspected neuro event  Current Diet: regular diet and thin liquids   Baseline Diet: regular diet and thin liquids  Takes pills- whole w/ water     Baseline Assessment   Behavior/Cognition: alert  Speech/Language Status: able to participate in basic conversation and able to follow commands  Patient Positioning: upright in bed     Swallow Mechanism Exam   Facial: symmetrical  Labial: WFL  Lingual: WFL  Velum: unable to visualize  Mandible: adequate ROM  Dentition: adequate  Vocal quality:clear/adequate   Volitional Cough: strong/productive   Respiratory: RA    Consistencies Assessed and Performance   Consistencies Administered: thin liquids, puree, and soft solids- pt seen at lunch w/ meatloaf, mashed potatoes, peas, and water by straw.     Oral Stage: pt able to self feed, took small bites of various food items. Pt able to drink from straw w/ good oral control and transfer.     Pharyngeal Stage: swallow initiation was timely w/ complete laryngeal excursion. No coughing, throat clearing or wet voice noted. No c/o food dysphagia symptoms.       Esophageal Concerns: none reported      Results Reviewed with: patient, RN, and family   Dysphagia Goals: none at this time      Roxana Madrigal MA CCC-SLP  Speech Pathologist  [place] license # [id number]  [place] license # [id number]  Available via [employer]

## 2024-03-21 NOTE — QUICK NOTE
Notified by nursing that patient seemed to have acute worsening speech. Pt has a history of chronic garbled speech, however baseline is uncertain.     On exam pt is oriented to self and year. CN normal, Strength is 5/5 in UE, and foot. LE strength showed 3/5 strength in her legs and 5/5 in her foot. Appears stable vs prior. No imaging necessary.

## 2024-03-21 NOTE — ASSESSMENT & PLAN NOTE
"Baseline ambulatory status - walker. Baseline mentation on and off confusion.  Presented with witnessed episode of speech arrest and RUE shaking with one eye forcefully closed.  No reported urinary/fecal incontinence or tongue bite.  Patient returned back to \"normal state\" afterward per caregiver. S/S resolved when arriving ED.  On my exam in the ED- patient was OA x1 to herself. Chronic L facial droop. Slurred speech at baseline.   UA negative for WBC, nitrates, bacteria.  Etiology unclear - TIA versus seizure versus cardiac versus vascular dementia    - 3/20 - CT head - no acute intracranial abnormality.  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 Chronic microangiopathic ischemic changes  - 3/20 - CTA head/neck -negative for large vessel intracranial occlusion.  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  No extracranial carotid stenosis.  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 Appreciated recs. No need for stroke pathway per neurology.  Monitor on tele.  - MRI head pending  - F/u on EEG  - PT/OT eval  "

## 2024-03-21 NOTE — OCCUPATIONAL THERAPY NOTE
Occupational Therapy Evaluation     Patient Name: Erika Quigley  Today's Date: 3/21/2024  Problem List  Principal Problem:    Stroke/ Seizure-like symptoms  Active Problems:    Essential hypertension    Hyperlipidemia    Deep vein thrombosis (DVT) of femoral vein of right lower extremity (HCC)    TIA (transient ischemic attack)    Past Medical History  Past Medical History:   Diagnosis Date    Anxiety 2010    Bladder infection 09/2019    Breast lump in female     HL (hearing loss) 2010    Hypertension 2010    Osteopenia     Ovarian cyst      Past Surgical History  Past Surgical History:   Procedure Laterality Date    BLADDER SURGERY      BREAST BIOPSY      CATARACT EXTRACTION Bilateral 01/01/2015    , Right Eye-2017, Left eye    ENDOMETRIAL BIOPSY      by Suction    HYSTERECTOMY             03/21/24 1021   OT Last Visit   OT Visit Date 03/21/24   Note Type   Note type Evaluation   Pain Assessment   Pain Assessment Tool FLACC   Pain Location/Orientation Orientation: Right;Location: Hip   Pain Radiating Towards n/a   Pain Onset/Description Frequency: Intermittent   Effect of Pain on Daily Activities limits comfort   Patient's Stated Pain Goal No pain   Hospital Pain Intervention(s) Repositioned;Ambulation/increased activity;Emotional support   Multiple Pain Sites No   Pain Rating: FLACC (Rest) - Face 0   Pain Rating: FLACC (Rest) - Legs 0   Pain Rating: FLACC (Rest) - Activity 0   Pain Rating: FLACC (Rest) - Cry 0   Pain Rating: FLACC (Rest) - Consolability 0   Score: FLACC (Rest) 0   Pain Rating: FLACC (Activity) - Face 0   Pain Rating: FLACC (Activity) - Legs 0   Pain Rating: FLACC (Activity) - Activity 0   Pain Rating: FLACC (Activity) - Cry 1   Pain Rating: FLACC (Activity) - Consolability 0   Score: FLACC (Activity) 1   Restrictions/Precautions   Weight Bearing Precautions Per Order No   Other Precautions Cognitive;Chair Alarm;Bed Alarm;Telemetry;Fall Risk;Hard of hearing  (tachycardic)   Home Living   Type  of Home House   Home Layout Two level;Ramped entrance  (Full bathroom located on the 2nd floor of the home w/ stair glide access)   Bathroom Shower/Tub Tub/shower unit   Bathroom Toilet   (pt does not utilize toilet, BSC at bedside on the first floor of the home)   Bathroom Equipment Grab bars in shower;Shower chair;Commode   Bathroom Accessibility Accessible   Home Equipment Walker;Wheelchair-manual;Grab bars; gait belt    Additional Comments Pt ambulates w/ RW has SBA at all times. Use of w/c with pushing assistance for community distances.   Prior Function   Level of [place] Needs assistance with ADLs;Needs assistance with functional mobility;Needs assistance with IADLS  (SBA w/ mobility)   Lives With Daughter   Receives Help From Family;Home health  (Caregiver Monday-Thursday 9AM-5PM; Friday night to Sunday for 24 hour care)   IADLs Family/Friend/Other provides transportation;Family/Friend/Other provides meals;Family/Friend/Other provides medication management  (Caregiver + daughter assist with IADLs and home managment tasks.)   Falls in the last 6 months 0  (Pt and caregiver deny a hx of falls in the past 6 months)   Vocational Retired   Comments Pt is a poor historian w/ underlying cognitive impairment. Caregiver present to provide insight into PLOF and home setup. Pt has assist with ADLs/IADLs and functional mobility. Use of gait belt PRN for mobility   Lifestyle   Autonomy Pt lives w/ daughter, has 24 hour care. A w/ ADLs/IADLs and functional mobility. (+) falls. (-) driving   Reciprocal Relationships Supportive family and caregiver   Service to Others Retired   General   Additional Pertinent History 89 y/o presents after witnessed episode of left facial weakness and right upper extremity shaking. CTA head and neck (-). MRI and EEG pending.   Family/Caregiver Present Yes  (Caregiver)   Additional General Comments PMHx: chronic right cerebellar infarct, prior bilateral DVTs (September 2023), cervical  "dystonia receiving Botox, HTN, HLD, lumbar spinal stenosis with neurogenic claudication, mild cognitive impairment, functional urinary incontinence.    Subjective   Subjective \"Can I get to the chair\"   ADL   Eating Assistance 5  Supervision/Setup   Grooming Assistance 4  Minimal Assistance   UB Bathing Assistance 3  Moderate Assistance   LB Bathing Assistance 2  Maximal Assistance   UB Dressing Assistance 3  Moderate Assistance   LB Dressing Assistance 2  Maximal Assistance   Toileting Assistance  2  Maximal Assistance   Toileting Deficit Perineal hygiene  (pt incontient of urine upon arrival to the room, total assist for pericare completed in side lying.)   Additional Comments Unable to formally assess bathing, dressing, grooming or toileting at time of eval. The above levels of assistance are anticipated based on functional performance deficits with use of clinical judgement.   Bed Mobility   Rolling R 3  Moderate assistance   Additional items Assist x 1;Increased time required;Verbal cues;LE management  (trunk managment)   Rolling L 3  Moderate assistance   Additional items Assist x 1;Increased time required;Verbal cues;LE management  (trunk managment)   Supine to Sit 2  Maximal assistance   Additional items Assist x 1;Increased time required;Verbal cues;LE management  (trunk managment)   Sit to Supine 2  Maximal assistance   Additional items Assist x 1;Increased time required;Verbal cues;LE management  (trunk managment)   Additional Comments Ax1 to scoot forward towards the EOB for optimal seated position and balance. Fluccuating assist of supervision - minimal assist to maintain static sitting balance w/ intermittent L sided lean. Pt tolerated approx ~5 min, noted to be tachycardic w/ HR in the 170s. Pt denies chest pain. Pt safetly returned to supine. SLIM notifed and became present in the room.   Transfers   Sit to Stand Unable to assess   Stand to Sit Unable to assess   Additional Comments Transfers and " mobility deffered at this time d/c tachycardic. SLIM and RN notified. Will continue to assess.   Balance   Static Sitting Fair -   Dynamic Sitting Poor +   Activity Tolerance   Activity Tolerance Patient limited by fatigue;Other (Comment)  (cognition, tachycardic)   Medical Staff Made Aware Spoke with PT and SLIM   Nurse Made Aware Spoke with RN Shannon   RUE Assessment   RUE Assessment WFL   RUE Strength   RUE Overall Strength Due to cognitive deficits  (formal MMT limited by patients cognition)   LUE Assessment   LUE Assessment WFL   LUE Strength   LUE Overall Strength Due to cognitive deficits  (formal MMT limited by patients cognition)   Vision-Basic Assessment   Current Vision Wears glasses all the time   Cognition   Overall Cognitive Status Impaired   Arousal/Participation Alert;Responsive;Cooperative   Attention Attends with cues to redirect   Orientation Level Oriented to person;Disoriented to place;Disoriented to time;Disoriented to situation   Memory Decreased recall of precautions;Decreased recall of recent events;Decreased short term memory;Decreased long term memory   Following Commands Follows one step commands inconsistently   Comments Pt ID via wristband, name and . Pt is pleasent and cooperative. Questionable insight into current limitations and deficits. Dystonia present.   Assessment   Limitation Decreased ADL status;Decreased UE ROM;Decreased UE strength;Decreased Safe judgement during ADL;Decreased cognition;Decreased endurance;Decreased self-care trans;Decreased high-level ADLs   Prognosis Good   Assessment Patient is a 89 y.o. female seen for OT evaluation at [facility] following admission on 3/20/2024  s/p Stroke-like symptoms. Please see above for comprehensive list of comorbidities and significant PMHx impacting functional performance.  At baseline, Pt lives w/ daughter, has 24 hour care. A w/ ADLs/IADLs and functional mobility. (+) falls. (-) driving. Upon initial  evaluation, pt appears to be performing below baseline functional status. Pt requires BLAIR for UB ADLs, MAXA for LB ADLs, MAXA for bed mobility. The AM-PAC & Barthel Index outcome tools were used to assist in determining pt safety w/self care /mobility and appropriate d/c recommendations, see above for score. Occupational performance is affected by the following deficits: endurance ,  decreased muscular strength , decreased balance , decreased trunk control , impaired judgement and problem solving , impaired learning ability d/t current cognitive status , and impaired global mental status. Personal/Environmental factors impacting D/C include: (+) Hx of falls , steps to enter/navigate the home, Assistance needed for ADL/IADLs, Assistance needed for ADLs and functional mobility, High fall risk , decreased insight toward deficits , decreased recall of precautions , and baseline cognitive deficits. Supporting factors include: able to maintain FFSU, accessible home environment, support system available, 24/7 supervision available , and attitude towards recovery Patient would benefit from OT services within the acute care setting to maximize level of functional independence in the following areas fall prevention , self-care transfers, bed mobility , functional mobility, formal cognitive evaluation, and ADLs.  From OT standpoint, recommendation at time of D/C would be level II (moderate resource intensity).   Goals   Patient Goals to sit in the chair   LTG Time Frame 10-14   Long Term Goal See below   Plan   Treatment Interventions ADL retraining;Functional transfer training;UE strengthening/ROM;Endurance training;Cognitive reorientation;Patient/family training;Equipment evaluation/education;Compensatory technique education;Activityengagement;Energy conservation   Goal Expiration Date 03/31/24   OT Treatment Day 0   OT Frequency 3-5x/wk   Discharge Recommendation   Rehab Resource Intensity Level, OT II (Moderate Resource  Intensity)   Additional Comments  The patient's raw score on the AM-PAC Daily Activity Inpatient Short Form is 15. A raw score of less than 19 suggests the patient may benefit from discharge to post-acute rehabilitation services. Please refer to the recommendation of the Occupational Therapist for safe discharge planning.   AM-PAC Daily Activity Inpatient   Lower Body Dressing 2   Bathing 2   Toileting 2   Upper Body Dressing 3   Grooming 3   Eating 3   Daily Activity Raw Score 15   Daily Activity Standardized Score (Calc for Raw Score >=11) 34.69   AM-PAC Applied Cognition Inpatient   Following a Speech/Presentation 2   Understanding Ordinary Conversation 3   Taking Medications 2   Remembering Where Things Are Placed or Put Away 2   Remembering List of 4-5 Errands 2   Taking Care of Complicated Tasks 1   Applied Cognition Raw Score 12   Applied Cognition Standardized Score 28.82   Barthel Index   Feeding 5   Bathing 0   Grooming Score 0   Dressing Score 5   Bladder Score 5   Bowels Score 10   Toilet Use Score 5   Transfers (Bed/Chair) Score 5   Mobility (Level Surface) Score 0   Stairs Score 0   Barthel Index Score 35   End of Consult   Education Provided Yes;Family or social support of family present for education by provider   Patient Position at End of Consult Supine;Bed/Chair alarm activated;All needs within reach   Nurse Communication Nurse aware of consult     Goals established on initial evaluation in order to achieve pt's goal of getting to the chair.      Pt will complete UB ADLs Supervision for increased ADL independence within 10 days.     Pt will complete LB ADLs Mod A  for increased ADL independence within 10 days.     Pt will complete toileting Mod A  with use of DME for increased ADL independence within 10 days.     Pt will demonstrate proper body mechanics to complete self-care transfers MIN A w/ use of LRAD for increased safety and functional independence within 10 days.     Functional mobility  goals pending formal OOB assessment     Pt will demonstrate standing tolerance of 2 min with Min A and use of LRAD for increased activity tolerance during ADL/IADL tasks within 10 days.     Pt will complete bed mobility Mod A  for increased independence in repositioning, pressure offloading, and managing comfort.     Pt will demonstrate proper body mechanics and fall prevention strategies during 100% of tx sessions for increased safety awareness during ADL/IADLs    Pt will improve functional activity tolerance to 25 mins of sustained functional tasks to increase participation in basic self-care tasks and minimize assistance level.     Pt will receive education on use of compensatory memory strategies for increased safety and independent with IADL tasks.     Pt will consistently follow multi step directions during ADL performances w/ less than 1 cue and/or prompt to maximize independence in AD safety with ADL performance.     Pt will participate in ongoing cognitive assessments to assist with safe D/C planning and supervision/assistance recommendations.     Pt will verbalize and demonstrate understanding of B UE HEP program increase strength and endurance during self care transfers within 10 days.     Pt will demonstrate OOB sitting tolerance of 2-4 hr/day for increased activity tolerance and engagement in leisure activities within 10 days.     Patient's caregivers will be independent with providing appropriate cognitive cues in order to facilitate patient's independence during functional tasks.     Pt benefited from co-session of skilled OT and PT therapists in order to most appropriately address functional deficits d/t regression from functioning level prior to admission  and decreased activity tolerance.  OT/PT objectives were addressed separately; please see PT note for specific goal areas targeted.    Chata Griffin MS OTR/L   [place] Licensure# [id number]

## 2024-03-21 NOTE — PHYSICAL THERAPY NOTE
PHYSICAL THERAPY EVALUATION NOTE          Patient Name: Erika Quigley  Today's Date: 3/21/2024        AGE:   89 y.o.  Mrn:   [id number]  ADMIT DX:  TIA (transient ischemic attack) [G45.9]  Hyperglycemia [R73.9]  Stroke-like symptoms [R29.90]    Past Medical History:  Past Medical History:   Diagnosis Date    Anxiety     Bladder infection 2019    Breast lump in female     HL (hearing loss)     Hypertension     Osteopenia     Ovarian cyst        Past Surgical History:  Past Surgical History:   Procedure Laterality Date    BLADDER SURGERY      BREAST BIOPSY      CATARACT EXTRACTION Bilateral 2015    , Right Eye-, Left eye    ENDOMETRIAL BIOPSY      by Suction    HYSTERECTOMY       Length Of Stay: 0        PHYSICAL THERAPY EVALUATION:    Patient's identity confirmed via 2 patient identifiers (full name and ) at start of session       24 0959   PT Last Visit   PT Visit Date 24   Note Type   Note type Evaluation   Pain Assessment   Pain Assessment Tool 0-10   Pain Score No Pain   Restrictions/Precautions   Weight Bearing Precautions Per Order No   Other Precautions Cognitive;Chair Alarm;Bed Alarm;Telemetry;Fall Risk   Home Living   Type of Home House   Home Layout Two level;Ramped entrance  (Bed on main level. Stair glide to bathroom on 2nd floor)   Bathroom Shower/Tub Tub/shower unit   Bathroom Toilet Standard  (Pt only uses commode on first floor)   Bathroom Equipment Shower chair;Grab bars in shower;Commode   Home Equipment Walker;Wheelchair-manual   Prior Function   Level of [place] Needs assistance with ADLs;Needs assistance with functional mobility;Needs assistance with IADLS   Lives With Daughter   Receives Help From Family;Personal care attendant  (Caregiver comes during day (9am-5pm) while daughter is at work and during weekends for 24 hour care.)   IADLs Family/Friend/Other provides  "transportation;Family/Friend/Other provides meals;Family/Friend/Other provides medication management   Falls in the last 6 months 0  (Per pt and caregiver, no falls)   Comments Pt is an unreliable historian. Obtained information about home setup and PLOF from chart review and caregiver at bedside. PTA pt ambulates household distance with RW with assistance from her daughter or caregiver. Uses wheelchair with assistance for outside the house. Pt always has someone with her to assist with functional mobility. Pt has 24/7 care.   General   Additional Pertinent History Prior to mobility training, -140s. While sitting at EOB, HR elevated to 174bpm. Supine in bed post mobility /86 and HR 130s-140s. Notified Residents and Attending Dr. Garcia immediately. Residents and Attending entered room to evaluate.   Family/Caregiver Present Yes  (Caregiver present)   Cognition   Overall Cognitive Status Impaired   Arousal/Participation Cooperative   Orientation Level Oriented to person;Disoriented to place;Disoriented to time;Disoriented to situation   Memory Decreased short term memory;Decreased recall of recent events;Decreased recall of precautions   Following Commands Follows one step commands inconsistently   Comments Pt ID via name and . Pt alert and agreeable to PT evaluation. Requires repeated instructions and simple commands. Presents with impaired judgement as demonstrated by laying in soiled linens without asking for help.   Subjective   Subjective \"I'm probably at home\"   RLE Assessment   RLE Assessment X   RLE Overall PROM   R Knee Extension Limited to at least -60 degrees of knee extension   Strength RLE   R Hip Flexion 2/5   R Knee Flexion 3+/5   R Knee Extension 2/5  (Within available PROM)   R Ankle Dorsiflexion 3-/5   R Ankle Plantar Flexion 3/5   LLE Assessment   LLE Assessment X   Strength LLE   L Hip Flexion 3-/5   L Knee Flexion 3+/5   L Knee Extension 3+/5   L Ankle Dorsiflexion 3/5   L Ankle " Plantar Flexion 3/5   Light Touch   RLE Light Touch Grossly intact   LLE Light Touch Grossly intact   Bed Mobility   Rolling R 3  Moderate assistance   Additional items Assist x 1;Increased time required;Verbal cues;LE management  (VC for hand placement and assistance with trunk management)   Rolling L 3  Moderate assistance   Additional items Assist x 1;Increased time required;Verbal cues;LE management  (VC for hand placement and assistance with trunk management)   Supine to Sit 2  Maximal assistance   Additional items Assist x 1;HOB elevated;Bedrails;Increased time required;Verbal cues;LE management   Sit to Supine 2  Maximal assistance   Additional items Assist x 1;HOB elevated;Increased time required;Verbal cues;LE management   Additional Comments Pt able to maintain static sitting balance at EOB for approx 5 minutes with occasional correction of left lateral lean. Pt scooted forward with Ax1. Further mobility training deferred due to elevated HR.   Transfers   Sit to Stand Unable to assess   Stand to Sit Unable to assess   Additional Comments OOB mobility deferred due to pt's medical status   Ambulation/Elevation   Gait pattern Not tested;Not appropriate   Balance   Static Sitting Fair -   Dynamic Sitting Poor +   Activity Tolerance   Activity Tolerance Treatment limited secondary to medical complications (Comment)  (Tachycardia)   Medical Staff Made Aware PT Noreen, FCO [place], Residents and Attending   Nurse Made Aware RN Shannon   Assessment   Prognosis Fair   Problem List Decreased strength;Decreased range of motion;Decreased endurance;Impaired balance;Decreased mobility;Decreased cognition;Impaired judgement;Decreased safety awareness   Assessment Pt. is a 89 y.o. female who presents to [facility] on [facility]24 with CVA/TIA like symptoms and a diagnosis of stroke/seizure like symptoms. She was alert and agreeable to physical therapy evaluation and vitals were monitored throughout session. Orders placed for PT  "evaluation and treat and up and OOB as tolerated. Comorbidities impacting functional mobility at time of evaluation include: lumbar stenosis and chronic RLE weakness, HTN, MCI, chronic R cerebellar infarct, DVTs. Personal factors impacting discharge include: lack insight into impairments, inability to perform ADLs/IADLs. Prior to admission, pt. mobilizes with RW with assistance. Pt lives in a 2  with stair glide to 2nd floor with her daughter. Caregiver comes during weekdays while daughter is at work to provide assistance and during weekends for 24 hour care. During physical therapy evaluation, pt required mod-max Ax1 with bed mobility. HR was elevated upon sitting at EOB for 5 minutes that prompted return to supine in bed. Pt. presented with the following deficits: decreased strength, decreased ROM, cognitive impairment, diminished balance, decreased mobility, decreased safety awareness, decreased problem solving. Pt’s clinical presentation was unstable due to the following factors: abnormal lab values, poor control of blood pressure, unable to perform functional mobility without assistance, unable to perform ADLs/IADLs without assistance, ongoing medical management, decreased activity tolerance compared to baseline, tachycardia. Pt would benefit from continued skilled PT to address her goals and impairments, maximize functional mobility, and ensure safe discharge from acute care setting. Recommend level level II (moderate resource intensity) once pt is medically cleared for discharge.   Barriers to Discharge None   Goals   Patient Goals \"To get stronger'\"   [facility] Expiration Date 03/31/24   Short Term Goal #1 Pt. will be able to complete all bed mobility activities with supervision to improve repositioning capability and reduce risk of skin compromise; will be able to complete all transfers with min Ax1 and LRAD to improve functional and OOB mobility and reduce burden of care; will ambulate 50 ft with LRAD and min " "Ax1 to improve activity tolerance and facilitate return and engagement to previous living environment; will improve in all balance measures by at least 1 point to improve functional mobility and reduce fall risk.   PT Treatment Day 0   Plan   Treatment/Interventions Functional transfer training;LE strengthening/ROM;Therapeutic exercise;Endurance training;Cognitive reorientation;Patient/family training;Equipment eval/education;Bed mobility;Gait training;Compensatory technique education   PT Frequency 3-5x/wk   Discharge Recommendation   Rehab Resource Intensity Level, PT II (Moderate Resource Intensity)   Equipment Recommended Walker   Walker Package Recommended Wheeled walker   Change/add to Walker Package? Yes, Change Size   Walker Size Kang (Ht <5'1\")   AM-PAC Basic Mobility Inpatient   Turning in Flat Bed Without Bedrails 2   Lying on Back to Sitting on Edge of Flat Bed Without Bedrails 2   Moving Bed to Chair 1   Standing Up From Chair Using Arms 1   Walk in Room 1   Climb 3-5 Stairs With Railing 1   Basic Mobility Inpatient Raw Score 8   [facility] Highest Level Of Mobility   -[facility] Goal 3: Sit at edge of bed   -[facility] Achieved 3: Sit at edge of bed   End of Consult   Patient Position at End of Consult Supine;Bed/Chair alarm activated;All needs within reach   End of Consult Comments HR at end of session 130-140s. Residents and Attending present in room.       The patient's AM-PAC Basic Mobility Inpatient Short Form Raw Score is 8. A Raw score of less than or equal to 16 suggests the patient may benefit from discharge to post-acute rehabilitation services. Please also refer to the recommendation of the Physical Therapist for safe discharge planning.    Pt will benefit from skilled inpatient PT during this admission in order to facilitate progress towards goals and to maximize functional independence prior to DC      DC rec: level II (Moderate Rehab Resource Intensity)        Sebastian Fajardo, " SPT  03/21/24

## 2024-03-21 NOTE — PROGRESS NOTES
Progress Note - Neurology   Erika Quigley 89 y.o. female MRN: [id number]  Unit/Bed#: S -01 Encounter: [id number]      Assessment/Plan   * Stroke/ Seizure-like symptoms  Assessment & Plan  Erika Quigley is a 89 y.o. 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    Witnessed episode of speech arrest and LUE shaking sting approximately 2 minutes followed by confusional state lasting 5 to 7 minutes.  No reported urinary/fecal incontinence or tongue bite.  Where patient was attempting to signal caregiver with LUE during the speech arrest.    Workup:  - CTA head and neck:  Unremarkable for acute intracranial abnormalities   Chronic right inferior cerebellar infarct with encephalomalacia noted  Right vertebral artery V2 segment not visualized, likely chronically occluded vs hypoplastic  - MRI brain w wo:   Unremarkable for acute intracranial abnormalities or abnormal enhancement  Chronic right PICA distribution infarct noted  - UA: Negative nitrite, 4-10 WBC, no bacteria seen  - Hemoglobin A1c: Elevated 6.3  - Lipid panel: Total cholesterol 95, triglycerides elevated 167, HDL 54, LDL elevated 108    Etiology remains unclear. Differentials include new onset seizure in the setting of cognitive impairment vs cardiogenic etiology.  Caregiver denied noticing any paleness or diaphoresis at the time of the event, however patient was found to be in new onset A-fib with RVR this admission.  Patient may have gone into the arrhythmia, resulting in transient hypoperfusion and speech arrest. MRI brain unremarkable for acute intracranial abnormalities.    Plan:  - Routine EEG official read pending  - Hold  "off on AEDs for now; can consider depending on EEG results  - Telemetry  - Seizure precautions   - Will hold off on [employer] form at this time as patient does not drive per chart review   - Medical management and supportive care per primary team, notify with changes    Paroxysmal atrial fibrillation (HCC)  Assessment & Plan  New onset A-fib with RVR noted this admission  Patient previously on Eliquis 5 mg BID for history of bilateral DVTs  Cardiology following, recommending reducing Eliquis to 2.5 mg as patient meets 2/3 criteria for low-dose Eliquis  Management per primary team      Erika Quigley will need follow up in 6-8 weeks with movement disorder and memory attending. She will not require outpatient neurological testing.    Subjective:   Today patient states she feels okay and admits to feeling at her baseline. She states she continues to have some pain in her lower extremities which is not new for her.     ROS:  12 point ROS limited to dementia process.     Vitals: Blood pressure 164/86, pulse (!) 156, temperature 98.4 °F (36.9 °C), temperature source Oral, resp. rate 17, height 4' 11\" (1.499 m), weight 58.2 kg (128 lb 4.9 oz), SpO2 96%, not currently breastfeeding.,Body mass index is 25.91 kg/m².    Physical Exam  Vitals and nursing note reviewed.   Constitutional:       General: She is not in acute distress.     Appearance: Normal appearance. She is not ill-appearing, toxic-appearing or diaphoretic.   HENT:      Head: Normocephalic and atraumatic.   Eyes:      General: No scleral icterus.        Right eye: No discharge.         Left eye: No discharge.      Conjunctiva/sclera: Conjunctivae normal.   Skin:     General: Skin is warm and dry.   Neurological:      Mental Status: She is alert.   Psychiatric:         Mood and Affect: Mood normal.         Behavior: Behavior normal.       Neurologic Exam     Mental Status   Patient is alert, lying in bed. Oriented to person. Incorrectly states she is at school " currently. When told she is in the hospital, patient unable to name the hospital. Incorrectly states the month is November. Incorrectly states the year is 1929.     Moderate dysarthria noted, chronic due to dystonia, currently at baseline. No evidence of aphasia. Able to follow central and appendicular commands, and answers questions appropriately.      Cranial Nerves   Primary gaze midline, conjugate gaze noted   EOMs intact, no evidence of nystagmus   Blink to threat intact bilaterally   No obvious facial asymmetry noted      Motor Exam   Muscle bulk: normal  Overall muscle tone: normal  Bilateral  4+/5  Bilateral biceps, triceps, deltoid 5/5  Unable to elevate BLE heels off the bed  RLE in flexed position in right hip and knee, pain with manual movement  BLE strength testing deferred at this time due to pain      Sensory Exam   Light touch normal.     Gait, Coordination, and Reflexes     Tremor   Resting tremor: absent  No involuntary movements or rhythmic seizure like activity noted throughout exam      Lab Results: I have personally reviewed pertinent reports.  Recent Results (from the past 24 hour(s))   Basic metabolic panel    Collection Time: 03/21/24  5:42 AM   Result Value Ref Range    Sodium 139 135 - 147 mmol/L    Potassium 4.2 3.5 - 5.3 mmol/L    Chloride 107 96 - 108 mmol/L    CO2 23 21 - 32 mmol/L    ANION GAP 9 4 - 13 mmol/L    BUN 18 5 - 25 mg/dL    Creatinine 0.75 0.60 - 1.30 mg/dL    Glucose 131 65 - 140 mg/dL    Glucose, Fasting 131 (H) 65 - 99 mg/dL    Calcium 9.1 8.4 - 10.2 mg/dL    eGFR 69 ml/min/1.73sq m   CBC (With Platelets)    Collection Time: 03/21/24  5:42 AM   Result Value Ref Range    WBC 7.67 4.31 - 10.16 Thousand/uL    RBC 3.59 (L) 3.81 - 5.12 Million/uL    Hemoglobin 11.1 (L) 11.5 - 15.4 g/dL    Hematocrit 34.3 (L) 34.8 - 46.1 %    MCV 96 82 - 98 fL    MCH 30.9 26.8 - 34.3 pg    MCHC 32.4 31.4 - 37.4 g/dL    RDW 14.7 11.6 - 15.1 %    Platelets 237 149 - 390 Thousands/uL    MPV  "9.6 8.9 - 12.7 fL   Lipid Panel with Direct LDL reflex    Collection Time: 03/21/24  5:42 AM   Result Value Ref Range    Cholesterol 195 See Comment mg/dL    Triglycerides 167 (H) See Comment mg/dL    HDL, Direct 54 >=50 mg/dL    LDL Calculated 108 (H) 0 - 100 mg/dL   Hemoglobin A1C w/ EAG Estimation    Collection Time: 03/21/24  5:42 AM   Result Value Ref Range    Hemoglobin A1C 6.3 (H) Normal 4.0-5.6%; PreDiabetic 5.7-6.4%; Diabetic >=6.5%; Glycemic control for adults with diabetes <7.0% %     mg/dl   Magnesium    Collection Time: 03/21/24  5:42 AM   Result Value Ref Range    Magnesium 2.2 1.9 - 2.7 mg/dL   HS Troponin 0hr (reflex protocol)    Collection Time: 03/21/24 10:33 AM   Result Value Ref Range    hs TnI 0hr 10 \"Refer to ACS Flowchart\"- see link ng/L   HS Troponin I 2hr    Collection Time: 03/21/24 12:40 PM   Result Value Ref Range    hs TnI 2hr 10 \"Refer to ACS Flowchart\"- see link ng/L    Delta 2hr hsTnI 0 <20 ng/L     Imaging Studies: I have personally reviewed pertinent reports and I have personally reviewed pertinent films in PACS.    EKG, Pathology, and Other Studies: I have personally reviewed pertinent reports.    VTE Prophylaxis: Eliquis    Dictation voice to text software has been used in the creation of this document.  Please consider this in light of any contextual or grammatical errors.   "

## 2024-03-21 NOTE — ASSESSMENT & PLAN NOTE
- Initial EKG with NSR  - On 3/21 AM - had new onset atrial fibrillation with RVR in HR 120s-140s. Asymptomatic. Converted back to NSR after IV diltiazem 10 mg push  - [id number] score of 7; already on Eliquis 5 mg BID for Hx of DVT  - Echocardiogram (9/19/23) - EF 60%. G1DD.  Aortic valve sclerosis  - 10/12/23 - TSH 3.859    Plan:  - Echocardiogram  - Continue Eliquis 5 mg twice daily for Hx of DVT.  Once therapy is completed, she would be a candidate for reduced dose Eliquis 2.5 mg BID due to age and body weight  - Cardiology consulted for new onset paroxysmal atrial fibrillation.  - Continue metoprolol tartrate 25 mg BID  - Monitor on telemetry

## 2024-03-21 NOTE — ASSESSMENT & PLAN NOTE
- Meds: amlodipine-benazepril 10-20 mg    Plan:  - Continue amlodipine 10 mg and lisinopril 20 mg while in the hospital.  Restart home regimen upon discharge  - Continue metoprolol tartrate 25 mg BID for new onset paroxysmal atrial fibrillation

## 2024-03-21 NOTE — PLAN OF CARE
Problem: OCCUPATIONAL THERAPY ADULT  Goal: Performs self-care activities at highest level of function for planned discharge setting.  See evaluation for individualized goals.  Description: Treatment Interventions: ADL retraining, Functional transfer training, UE strengthening/ROM, Endurance training, Cognitive reorientation, Patient/family training, Equipment evaluation/education, Compensatory technique education, Activityengagement, Energy conservation          See flowsheet documentation for full assessment, interventions and recommendations.   Note: Limitation: Decreased ADL status, Decreased UE ROM, Decreased UE strength, Decreased Safe judgement during ADL, Decreased cognition, Decreased endurance, Decreased self-care trans, Decreased high-level ADLs  Prognosis: Good  Assessment: Patient is a 89 y.o. female seen for OT evaluation at [facility] following admission on 3/20/2024  s/p Stroke-like symptoms. Please see above for comprehensive list of comorbidities and significant PMHx impacting functional performance.  At baseline, Pt lives w/ daughter, has 24 hour care. A w/ ADLs/IADLs and functional mobility. (+) falls. (-) driving. Upon initial evaluation, pt appears to be performing below baseline functional status. Pt requires BLAIR for UB ADLs, MAXA for LB ADLs, MAXA for bed mobility. The AM-PAC & Barthel Index outcome tools were used to assist in determining pt safety w/self care /mobility and appropriate d/c recommendations, see above for score. Occupational performance is affected by the following deficits: endurance ,  decreased muscular strength , decreased balance , decreased trunk control , impaired judgement and problem solving , impaired learning ability d/t current cognitive status , and impaired global mental status. Personal/Environmental factors impacting D/C include: (+) Hx of falls , steps to enter/navigate the home, Assistance needed for ADL/IADLs, Assistance needed for ADLs and  functional mobility, High fall risk , decreased insight toward deficits , decreased recall of precautions , and baseline cognitive deficits. Supporting factors include: able to maintain FFSU, accessible home environment, support system available, 24/7 supervision available , and attitude towards recovery Patient would benefit from OT services within the acute care setting to maximize level of functional independence in the following areas fall prevention , self-care transfers, bed mobility , functional mobility, formal cognitive evaluation, and ADLs.  From OT standpoint, recommendation at time of D/C would be level II (moderate resource intensity).     Rehab Resource Intensity Level, OT: II (Moderate Resource Intensity)     Chata Griffin MS OTR/L   [place] Licensure# [id number]

## 2024-03-21 NOTE — CASE MANAGEMENT
Case Management Assessment & Discharge Planning Note    Patient name Erika DE LA GARZA /S -01 MRN [id number]  : 1931 Date 3/21/2024       Current Admission Date: 3/20/2024  Current Admission Diagnosis:Stroke/ Seizure-like symptoms   Patient Active Problem List    Diagnosis Date Noted    TIA (transient ischemic attack) 2024    Paroxysmal atrial fibrillation (HCC) 2024    Stroke/ Seizure-like symptoms 2024    UTI symptoms 2024    Normocytic anemia 10/23/2023    Pancreatic abnormality 2023    Mass of soft tissue 2023    Near syncope 2023    Deep vein thrombosis (DVT) of femoral vein of right lower extremity ([facility]) 2023    Fall 2022    Closed displaced fracture of fourth cervical vertebra ([facility]) 2022    Epidural hematoma ([facility]) 2022    Spinal stenosis of lumbar region with neurogenic claudication     Lumbar disc disease with radiculopathy     PAD (peripheral artery disease) ([facility]) 10/13/2022    Primary osteoarthritis of right hip     Nerve sheath tumor 2021    Lumbar spine tumor 2021    Presence of pessary 2020    Osteoporosis without current pathological fracture 2020    Pulmonary HTN ([facility]) 10/23/2020    Sensorineural hearing loss (SNHL), bilateral 10/16/2019    Asymmetrical hearing loss of right ear 10/16/2019    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    Family history of malignant neoplasm of breast 2018    Anxiety 2016    Essential hypertension 2016    MCI (mild cognitive impairment) 2016    Blepharospasm 2015    Depression, recurrent (HCC) 2014    Functional urinary incontinence 2014    Hyperlipidemia 2014    Osteoporosis 2014    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      LOS (days): 0  Geometric Mean LOS (GMLOS) (days):   Days to GMLOS:     OBJECTIVE:    Risk of Unplanned Readmission Score: 12.15         Current  admission status: Inpatient       Preferred Pharmacy:   [id number] - [place], [place] - [address]  [address] [place] [address]  Phone: [phone] Fax: [phone]    [id number] - [place], [place] - [address]  [address] [place] [address]  Phone: [phone] Fax: [phone]    [id number] - [place], [place] - [address]  [address]  [place] [address]  Phone: [phone] Fax: [phone]    [id number] - [place], [place] - [address]  [address] [place] [address]  Phone: [phone] Fax: [phone]    [facility] - [place] - [address]  [address]  [place] [address]  Phone: [phone] Fax: [phone]    [facility] - [place], [place] - [address]  [address] [address]  Phone: [phone] Fax: [phone]    Primary Care Provider: Audra Bellamy DO    Primary Insurance: MEDICARE  Secondary Insurance: AARP    ASSESSMENT:  Active Health Care Proxies    There are no active Health Care Proxies on file.                      Patient Information  Admitted from:: Home  Mental Status: Alert  During Assessment patient was accompanied by: Other-Comment (private caregiver)  Assessment information provided by:: Patient  Primary Caregiver: Family  Caregiver's Name:: Hilda dtr and private caregiver Stacy  Caregiver's Relationship to Patient:: Family Member  Caregiver's Telephone Number:: [phone]  Support Systems: Family members  County of Residence: [place]  What city do you live in?: [place]  Home entry access options. Select all that apply.: Ramp  Type of Current Residence: 2 story home  Upon entering residence, is there a bedroom on the main floor (no further steps)?: Yes  Upon entering residence, is there a bathroom on the main floor (no further steps)?: Yes  Indicate which floors  of current residence have a bathroom (select all the apply):: 2nd Floor (strair glide access)  Number of steps to 2nd floor from main floor: One Flight  Living Arrangements: Lives w/ Daughter    Activities of Daily Living Prior to Admission  Functional Status: Assistance  Completes ADLs independently?: No  Level of ADL dependence: Assistance  Ambulates independently?: Yes  Does patient use assisted devices?: Yes  Assisted Devices (DME) used: Walker, Wheelchair, Stair Chair/Glide, Shower Chair, Bedside Commode, Hospital Bed  Does patient currently own DME?: Yes  What DME does the patient currently own?: Walker, Wheelchair, Bedside Commode, Shower Chair, Stair Chair/Glide, Hospital Bed  Does patient have a history of Outpatient Therapy (PT/OT)?: No  Does the patient have a history of Short-Term Rehab?: No  Does patient have a history of HHC?: Yes  Does patient currently have HHC?: No         Patient Information Continued  Does patient have prescription coverage?: Yes  Does patient receive dialysis treatments?: No         Means of Transportation  Means of Transport to [facility]:: Family transport      Social Determinants of Health (SDOH)      Flowsheet Row Most Recent Value   Housing Stability    In the last 12 months, was there a time when you were not able to pay the mortgage or rent on time? N   In the last 12 months, was there a time when you did not have a steady place to sleep or slept in a shelter (including now)? N   Transportation Needs    In the past 12 months, has lack of transportation kept you from medical appointments or from getting medications? no   In the past 12 months, has lack of transportation kept you from meetings, work, or from getting things needed for daily living? No   Food Insecurity    Within the past 12 months, you worried that your food would run out before you got the money to buy more. Never true   Within the past 12 months, the food you bought just didn't last and you didn't have money to  get more. Never true   Utilities    In the past 12 months has the electric, gas, oil, or water company threatened to shut off services in your home? No            DISCHARGE DETAILS:    Discharge planning discussed with:: patient, pt's caregiver Stacy at bedside and pt's dtr Hilda via phone  Freedom of Choice: Yes  Comments - Freedom of Choice: CM met with pt and pt's private caregiver Stacy at bedside re: assessment. Patient lives w/ dtr in two story home, [facility], half bath on first floor, uses stair glide for second floor bathroom for showers on Saturdays. Patient's caregiver is with 9-5 M-F and all weekend, dtr is with pt at all other times. Patient primarily utilizes RW to ambulate and dtr and caregiver assist pt with ADLS/IADLS.  Pt has hx of [facility] ([address] [place] [address]). Family or caregiver provides pt transport to appointments. CM f/u with pt's dtr Hilda via phone - introduced self/role and discussed PT/OT recs for STR. Patient's dtr declined STR, opting for pt to return home with [facility] and continued caregiver support. Dtr preference for [address] [place] [address] as pt had previously. [facility] referral sent to [address] [place] [address] via aidin, awaiting response.  CM contacted family/caregiver?: Yes  Were Treatment Team discharge recommendations reviewed with patient/caregiver?: Yes  Did patient/caregiver verbalize understanding of patient care needs?: Yes  Were patient/caregiver advised of the risks associated with not following Treatment Team discharge recommendations?: Yes    Contacts  Patient Contacts: Stacy (caregiver) at bedside and Hilda (dtr) via phone  Relationship to Patient:: Family  Contact Method: Phone  Phone Number: [phone]  Reason/Outcome: Discharge Planning, Referral, Continuity of Care, Emergency Contact    Requested Home Health Care         Is the patient interested in [facility] at discharge?: Yes  Home Health Discipline requested:: Occupational Therapy, Nursing, Physical Therapy  Home Health Agency Name:: [facility] External  Referral Reason (only applicable if external HHA name selected): Patient has established relationship with provider  Home Health Follow-Up Provider:: PCP  Home Health Services Needed:: Evaluate Functional Status and Safety, Gait/ADL Training, Strengthening/Theraputic Exercises to Improve Function  Homebound Criteria Met:: Requires the Assistance of Another Person for Safe Ambulation or to Leave the Home, Uses an Assist Device (i.e. cane, walker, etc)  Supporting Clincal Findings:: Limited Endurance, Fatigues Easliy in Short Distances         Other Referral/Resources/Interventions Provided:  Interventions: HHC  Referral Comments: PT/OT rcmd STR. Dtr declined STR opting for home with HHC - pt preference for [facility] care HHC - referral sent via aidin, awaiting response.         Treatment Team Recommendation: Short Term Rehab  Discharge Destination Plan:: Home with Home Health Care

## 2024-03-21 NOTE — PLAN OF CARE
Problem: Potential for Falls  Goal: Patient will remain free of falls  Description: INTERVENTIONS:  - Educate patient/family on patient safety including physical limitations  - Instruct patient to call for assistance with activity   - Consult OT/PT to assist with strengthening/mobility   - Keep Call bell within reach  - Keep bed low and locked with side rails adjusted as appropriate  - Keep care items and personal belongings within reach  - Initiate and maintain comfort rounds  - Make Fall Risk Sign visible to staff  - Apply yellow socks and bracelet for high fall risk patients  - Consider moving patient to room near nurses station  Outcome: Progressing     Problem: PAIN - ADULT  Goal: Verbalizes/displays adequate comfort level or baseline comfort level  Description: Interventions:  - Encourage patient to monitor pain and request assistance  - Assess pain using appropriate pain scale  - Administer analgesics based on type and severity of pain and evaluate response  - Implement non-pharmacological measures as appropriate and evaluate response  - Consider cultural and social influences on pain and pain management  - Notify physician/advanced practitioner if interventions unsuccessful or patient reports new pain  Outcome: Progressing     Problem: INFECTION - ADULT  Goal: Absence or prevention of progression during hospitalization  Description: INTERVENTIONS:  - Assess and monitor for signs and symptoms of infection  - Monitor lab/diagnostic results  - Monitor all insertion sites, i.e. indwelling lines, tubes, and drains  - Monitor endotracheal if appropriate and nasal secretions for changes in amount and color  - [place] appropriate cooling/warming therapies per order  - Administer medications as ordered  - Instruct and encourage patient and family to use good hand hygiene technique  - Identify and instruct in appropriate isolation precautions for identified infection/condition  Outcome: Progressing  Goal: Absence  of fever/infection during neutropenic period  Description: INTERVENTIONS:  - Monitor WBC    Outcome: Progressing     Problem: SAFETY ADULT  Goal: Patient will remain free of falls  Description: INTERVENTIONS:  - Educate patient/family on patient safety including physical limitations  - Instruct patient to call for assistance with activity   - Consult OT/PT to assist with strengthening/mobility   - Keep Call bell within reach  - Keep bed low and locked with side rails adjusted as appropriate  - Keep care items and personal belongings within reach  - Initiate and maintain comfort rounds  - Make Fall Risk Sign visible to staff  - Apply yellow socks and bracelet for high fall risk patients  - Consider moving patient to room near nurses station  Outcome: Progressing  Goal: Maintain or return to baseline ADL function  Description: INTERVENTIONS:  -  Assess patient's ability to carry out ADLs; assess patient's baseline for ADL function and identify physical deficits which impact ability to perform ADLs (bathing, care of mouth/teeth, toileting, grooming, dressing, etc.)  - Assess/evaluate cause of self-care deficits   - Assess range of motion  - Assess patient's mobility; develop plan if impaired  - Assess patient's need for assistive devices and provide as appropriate  - Encourage maximum independence but intervene and supervise when necessary  - Involve family in performance of ADLs  - Assess for home care needs following discharge   - Consider OT consult to assist with ADL evaluation and planning for discharge  - Provide patient education as appropriate  Outcome: Progressing  Goal: Maintains/Returns to pre admission functional level  Description: INTERVENTIONS:  - Perform AM-PAC 6 Click Basic Mobility/ Daily Activity assessment daily.  - Set and communicate daily mobility goal to care team and patient/family/caregiver.   - Collaborate with rehabilitation services on mobility goals if consulted  - Out of bed for  toileting  - Record patient progress and toleration of activity level   Outcome: Progressing     Problem: DISCHARGE PLANNING  Goal: Discharge to home or other facility with appropriate resources  Description: INTERVENTIONS:  - Identify barriers to discharge w/patient and caregiver  - Arrange for needed discharge resources and transportation as appropriate  - Identify discharge learning needs (meds, wound care, etc.)  - Arrange for interpretive services to assist at discharge as needed  - Refer to Case Management Department for coordinating discharge planning if the patient needs post-hospital services based on physician/advanced practitioner order or complex needs related to functional status, cognitive ability, or social support system  Outcome: Progressing     Problem: Knowledge Deficit  Goal: Patient/family/caregiver demonstrates understanding of disease process, treatment plan, medications, and discharge instructions  Description: Complete learning assessment and assess knowledge base.  Interventions:  - Provide teaching at level of understanding  - Provide teaching via preferred learning methods  Outcome: Progressing     Problem: Nutrition/Hydration-ADULT  Goal: Nutrient/Hydration intake appropriate for improving, restoring or maintaining nutritional needs  Description: Monitor and assess patient's nutrition/hydration status for malnutrition. Collaborate with interdisciplinary team and initiate plan and interventions as ordered.  Monitor patient's weight and dietary intake as ordered or per policy. Utilize nutrition screening tool and intervene as necessary. Determine patient's food preferences and provide high-protein, high-caloric foods as appropriate.     INTERVENTIONS:  - Monitor oral intake, urinary output, labs, and treatment plans  - Assess nutrition and hydration status and recommend course of action  - Evaluate amount of meals eaten  - Assist patient with eating if necessary   - Allow adequate time for  meals  - Recommend/ encourage appropriate diets, oral nutritional supplements, and vitamin/mineral supplements  - Order, calculate, and assess calorie counts as needed  - Recommend, monitor, and adjust tube feedings and TPN/PPN based on assessed needs  - Assess need for intravenous fluids  - Provide specific nutrition/hydration education as appropriate  - Include patient/family/caregiver in decisions related to nutrition  Outcome: Progressing

## 2024-03-21 NOTE — ASSESSMENT & PLAN NOTE
- Meds: amlodipine-benazepril 10-20 mg    Plan:  - Continue amlodipine 10 mg and lisinopril 20 mg while in the hospital.  Restart home regimen upon discharge

## 2024-03-21 NOTE — PLAN OF CARE
Problem: PHYSICAL THERAPY ADULT  Goal: Performs mobility at highest level of function for planned discharge setting.  See evaluation for individualized goals.  Description: Treatment/Interventions: Functional transfer training, LE strengthening/ROM, Therapeutic exercise, Endurance training, Cognitive reorientation, Patient/family training, Equipment eval/education, Bed mobility, Gait training, Compensatory technique education  Equipment Recommended: Walker       See flowsheet documentation for full assessment, interventions and recommendations.  Note: Prognosis: Fair  Problem List: Decreased strength, Decreased range of motion, Decreased endurance, Impaired balance, Decreased mobility, Decreased cognition, Impaired judgement, Decreased safety awareness  Assessment: Pt. is a 89 y.o. female who presents to [facility] on 3/20/24 with CVA/TIA like symptoms and a diagnosis of stroke/seizure like symptoms. She was alert and agreeable to physical therapy evaluation and vitals were monitored throughout session. Orders placed for PT evaluation and treat and up and OOB as tolerated. Comorbidities impacting functional mobility at time of evaluation include: lumbar stenosis and chronic RLE weakness, HTN, MCI, chronic R cerebellar infarct, DVTs. Personal factors impacting discharge include: lack insight into impairments, inability to perform ADLs/IADLs. Prior to admission, pt. mobilizes with RW with assistance. Pt lives in a 2  with stair glide to 2nd floor with her daughter. Caregiver comes during weekdays while daughter is at work to provide assistance and during weekends for 24 hour care. During physical therapy evaluation, pt required mod-max Ax1 with bed mobility. HR was elevated upon sitting at EOB for 5 minutes that prompted return to supine in bed. Pt. presented with the following deficits: decreased strength, decreased ROM, cognitive impairment, diminished balance, decreased mobility, decreased safety awareness, decreased  problem solving. Pt’s clinical presentation was unstable due to the following factors: abnormal lab values, poor control of blood pressure, unable to perform functional mobility without assistance, unable to perform ADLs/IADLs without assistance, ongoing medical management, decreased activity tolerance compared to baseline, tachycardia. Pt would benefit from continued skilled PT to address her goals and impairments, maximize functional mobility, and ensure safe discharge from acute care setting. Recommend level level II (moderate resource intensity) once pt is medically cleared for discharge.  Barriers to Discharge: None     Rehab Resource Intensity Level, PT: II (Moderate Resource Intensity)    See flowsheet documentation for full assessment.

## 2024-03-22 ENCOUNTER — APPOINTMENT (INPATIENT)
Dept: NON INVASIVE DIAGNOSTICS | Facility: HOSPITAL | Age: 89
DRG: 310 | End: 2024-03-22
Payer: MEDICARE

## 2024-03-22 VITALS
DIASTOLIC BLOOD PRESSURE: 61 MMHG | RESPIRATION RATE: 20 BRPM | HEIGHT: 59 IN | WEIGHT: 128 LBS | BODY MASS INDEX: 25.8 KG/M2 | HEART RATE: 57 BPM | OXYGEN SATURATION: 98 % | SYSTOLIC BLOOD PRESSURE: 111 MMHG | TEMPERATURE: 98.7 F

## 2024-03-22 LAB
ANION GAP SERPL CALCULATED.3IONS-SCNC: 8 MMOL/L (ref 4–13)
AORTIC ROOT: 2.5 CM
APICAL FOUR CHAMBER EJECTION FRACTION: 68 %
ASCENDING AORTA: 3.2 CM
BSA FOR ECHO PROCEDURE: 1.53 M2
BUN SERPL-MCNC: 19 MG/DL (ref 5–25)
CALCIUM SERPL-MCNC: 8.8 MG/DL (ref 8.4–10.2)
CHLORIDE SERPL-SCNC: 107 MMOL/L (ref 96–108)
CO2 SERPL-SCNC: 24 MMOL/L (ref 21–32)
CREAT SERPL-MCNC: 0.85 MG/DL (ref 0.6–1.3)
E WAVE DECELERATION TIME: 277 MS
E/A RATIO: 0.53
ERYTHROCYTE [DISTWIDTH] IN BLOOD BY AUTOMATED COUNT: 14.7 % (ref 11.6–15.1)
FRACTIONAL SHORTENING: 36 (ref 28–44)
GFR SERPL CREATININE-BSD FRML MDRD: 59 ML/MIN/1.73SQ M
GLUCOSE SERPL-MCNC: 107 MG/DL (ref 65–140)
HCT VFR BLD AUTO: 34.3 % (ref 34.8–46.1)
HGB BLD-MCNC: 11.1 G/DL (ref 11.5–15.4)
INTERVENTRICULAR SEPTUM IN DIASTOLE (PARASTERNAL SHORT AXIS VIEW): 1.3 CM
INTERVENTRICULAR SEPTUM: 1.3 CM (ref 0.6–1.1)
LAAS-AP2: 15.7 CM2
LAAS-AP4: 22.7 CM2
LEFT ATRIUM SIZE: 3.8 CM
LEFT ATRIUM VOLUME (MOD BIPLANE): 57 ML
LEFT ATRIUM VOLUME INDEX (MOD BIPLANE): 37.3 ML/M2
LEFT INTERNAL DIMENSION IN SYSTOLE: 2.3 CM (ref 2.1–4)
LEFT VENTRICULAR INTERNAL DIMENSION IN DIASTOLE: 3.6 CM (ref 3.5–6)
LEFT VENTRICULAR POSTERIOR WALL IN END DIASTOLE: 1.1 CM
LEFT VENTRICULAR STROKE VOLUME: 38 ML
LVSV (TEICH): 38 ML
MAGNESIUM SERPL-MCNC: 2.1 MG/DL (ref 1.9–2.7)
MCH RBC QN AUTO: 31 PG (ref 26.8–34.3)
MCHC RBC AUTO-ENTMCNC: 32.4 G/DL (ref 31.4–37.4)
MCV RBC AUTO: 96 FL (ref 82–98)
MV E'TISSUE VEL-SEP: 4 CM/S
MV PEAK A VEL: 1.06 M/S
MV PEAK E VEL: 56 CM/S
MV STENOSIS PRESSURE HALF TIME: 80 MS
MV VALVE AREA P 1/2 METHOD: 2.75
PLATELET # BLD AUTO: 217 THOUSANDS/UL (ref 149–390)
PMV BLD AUTO: 9.5 FL (ref 8.9–12.7)
POTASSIUM SERPL-SCNC: 4.2 MMOL/L (ref 3.5–5.3)
RA PRESSURE ESTIMATED: 3 MMHG
RBC # BLD AUTO: 3.58 MILLION/UL (ref 3.81–5.12)
RIGHT ATRIUM AREA SYSTOLE A4C: 10.6 CM2
RIGHT VENTRICLE ID DIMENSION: 3.1 CM
RV PSP: 34 MMHG
SL CV LEFT ATRIUM LENGTH A2C: 4.8 CM
SL CV LV EF: 60
SL CV PED ECHO LEFT VENTRICLE DIASTOLIC VOLUME (MOD BIPLANE) 2D: 56 ML
SL CV PED ECHO LEFT VENTRICLE SYSTOLIC VOLUME (MOD BIPLANE) 2D: 18 ML
SODIUM SERPL-SCNC: 139 MMOL/L (ref 135–147)
TR MAX PG: 31 MMHG
TR PEAK VELOCITY: 2.8 M/S
TRICUSPID ANNULAR PLANE SYSTOLIC EXCURSION: 1.9 CM
TRICUSPID VALVE PEAK REGURGITATION VELOCITY: 2.78 M/S
WBC # BLD AUTO: 7.09 THOUSAND/UL (ref 4.31–10.16)

## 2024-03-22 PROCEDURE — 93306 TTE W/DOPPLER COMPLETE: CPT

## 2024-03-22 PROCEDURE — 80048 BASIC METABOLIC PNL TOTAL CA: CPT

## 2024-03-22 PROCEDURE — 85027 COMPLETE CBC AUTOMATED: CPT

## 2024-03-22 PROCEDURE — 83735 ASSAY OF MAGNESIUM: CPT

## 2024-03-22 PROCEDURE — 99232 SBSQ HOSP IP/OBS MODERATE 35: CPT | Performed by: INTERNAL MEDICINE

## 2024-03-22 PROCEDURE — 97530 THERAPEUTIC ACTIVITIES: CPT

## 2024-03-22 PROCEDURE — 93306 TTE W/DOPPLER COMPLETE: CPT | Performed by: INTERNAL MEDICINE

## 2024-03-22 PROCEDURE — 97535 SELF CARE MNGMENT TRAINING: CPT

## 2024-03-22 PROCEDURE — 99239 HOSP IP/OBS DSCHRG MGMT >30: CPT | Performed by: INTERNAL MEDICINE

## 2024-03-22 PROCEDURE — 97116 GAIT TRAINING THERAPY: CPT

## 2024-03-22 RX ORDER — AMLODIPINE BESYLATE AND BENAZEPRIL HYDROCHLORIDE 5; 20 MG/1; MG/1
1 CAPSULE ORAL DAILY
Qty: 30 CAPSULE | Refills: 0 | Status: SHIPPED | OUTPATIENT
Start: 2024-03-22 | End: 2024-04-21

## 2024-03-22 RX ADMIN — POLYETHYLENE GLYCOL 3350 17 G: 17 POWDER, FOR SOLUTION ORAL at 12:38

## 2024-03-22 RX ADMIN — APIXABAN 2.5 MG: 2.5 TABLET, FILM COATED ORAL at 09:35

## 2024-03-22 RX ADMIN — ACETAMINOPHEN 975 MG: 325 TABLET, FILM COATED ORAL at 09:35

## 2024-03-22 RX ADMIN — OMEGA-3 FATTY ACIDS CAP 1000 MG 1000 MG: 1000 CAP at 09:35

## 2024-03-22 RX ADMIN — B-COMPLEX W/ C & FOLIC ACID TAB 1 TABLET: TAB at 09:36

## 2024-03-22 RX ADMIN — AMLODIPINE BESYLATE 5 MG: 5 TABLET ORAL at 09:35

## 2024-03-22 RX ADMIN — LISINOPRIL 20 MG: 20 TABLET ORAL at 09:35

## 2024-03-22 RX ADMIN — METOPROLOL TARTRATE 50 MG: 50 TABLET, FILM COATED ORAL at 09:32

## 2024-03-22 RX ADMIN — ACETAMINOPHEN 650 MG: 325 TABLET, FILM COATED ORAL at 16:09

## 2024-03-22 NOTE — ASSESSMENT & PLAN NOTE
"Baseline ambulatory status - walker. Baseline mentation on and off confusion.  Presented with witnessed episode of speech arrest and RUE shaking with one eye forcefully closed.  No reported urinary/fecal incontinence or tongue bite.  Patient returned back to \"normal state\" afterward per caregiver. S/S resolved when arriving ED.  On my exam in the ED- patient was OA x1 to herself. Chronic L facial droop. Slurred speech at baseline.   UA negative for WBC, nitrates, bacteria.  Etiology unclear - TIA versus seizure versus cardiac versus vascular dementia    - 3/20 - CT head - no acute intracranial abnormality.  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 Chronic microangiopathic ischemic changes  - 3/20 - CTA head/neck -negative for large vessel intracranial occlusion.  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  No extracranial carotid stenosis.  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   - EEG - mild to moderate nonspecific diffuse cerebral dysfunction  - PT/OT recommends postacute rehab.  Family wants home with home health    Plan:  - Discharge patient to home with home health  "

## 2024-03-22 NOTE — DISCHARGE INSTR - AVS FIRST PAGE
Dear Erika Quigley,     It was our pleasure to care for you here at [facility].  It is our hope that we were always able to exceed the expected standards for your care during your stay.  You were hospitalized due to altered mental status, new onset atrial fibrillation.  You were cared for on the 3rd floor by Mich Mayorga DO under the service of Cass Garcia MD with the [facility] who covers for your primary care physician (PCP), Audra Bellamy DO, while you were hospitalized.  If you have any questions or concerns related to this hospitalization, you may contact us at .  For follow up as well as any medication refills, we recommend that you follow up with your primary care physician.  A registered nurse will reach out to you by phone within a few days after your discharge to answer any additional questions that you may have after going home.  However, at this time we provide for you here, the most important instructions / recommendations at discharge:     Notable Medication Adjustments -   Stop taking Lotrel 10-20 mg.  Start taking Lotrel 5-20 mg every day  Stop taking Eliquis.  Start taking Eliquis 2.5 mg every day  Start taking metoprolol 25 mg two times per day  Testing Required after Discharge -   None  ** Please contact your PCP to request testing orders for any of the testing recommended here **  Important follow up information -   Please follow-up with your primary care physician within 1 week.  You have an appointment scheduled with cardiology on Wednesday, 4/10. Please follow-up  Other Instructions -   Please return to the emergency department if you have chest pain, difficulty breathing, dizziness, abdominal pain, nausea, vomiting, diarrhea, leg swelling, or any other symptoms concerning to you.  Please review this entire after visit summary as additional general instructions including medication list, appointments,  activity, diet, any pertinent wound care, and other additional recommendations from your care team that may be provided for you.      Sincerely,     Mich Mayorga, DO

## 2024-03-22 NOTE — OCCUPATIONAL THERAPY NOTE
Occupational Therapy Treatment Note     Patient Name: Erika Quigley  Today's Date: 3/22/2024  Problem List  Principal Problem:    Stroke/ Seizure-like symptoms  Active Problems:    Essential hypertension    Hyperlipidemia    Deep vein thrombosis (DVT) of femoral vein of right lower extremity (HCC)    TIA (transient ischemic attack)    Paroxysmal atrial fibrillation (HCC)       03/22/24 1429   OT Last Visit   OT Visit Date 03/22/24   Note Type   Note Type Treatment   Pain Assessment   Pain Assessment Tool FLACC   Pain Location/Orientation Orientation: Right;Location: Leg   Pain Radiating Towards n/a   Pain Onset/Description Frequency: Intermittent  (w/ mobility)   Effect of Pain on Daily Activities limits mobility   Patient's Stated Pain Goal No pain   Hospital Pain Intervention(s) Repositioned;Ambulation/increased activity;Emotional support   Multiple Pain Sites No   Pain Rating: FLACC (Rest) - Face 0   Pain Rating: FLACC (Rest) - Legs 0   Pain Rating: FLACC (Rest) - Activity 0   Pain Rating: FLACC (Rest) - Cry 0   Pain Rating: FLACC (Rest) - Consolability 0   Score: FLACC (Rest) 0   Pain Rating: FLACC (Activity) - Face 0   Pain Rating: FLACC (Activity) - Legs 0   Pain Rating: FLACC (Activity) - Activity 0   Pain Rating: FLACC (Activity) - Cry 1   Pain Rating: FLACC (Activity) - Consolability 0   Score: FLACC (Activity) 1   Restrictions/Precautions   Weight Bearing Precautions Per Order No   Other Precautions Cognitive;Chair Alarm;Bed Alarm;Fall Risk;Pain;Hard of hearing   Lifestyle   Autonomy Pt lives w/ daughter, has 24 hour care. A w/ ADLs/IADLs and functional mobility. (+) falls. (-) driving   Reciprocal Relationships Supportive family and caregiver   Service to Others Retired   ADL   Eating Assistance 5  Supervision/Setup   Eating Deficit Setup;Beverage management   LB Dressing Assistance 1  Total Assistance   LB Dressing Deficit Setup;Requires assistive device for steadying;Steadying;Verbal  cueing;Supervision/safety;Increased time to complete;Don/doff R sock;Don/doff L sock;Thread RLE into underwear;Thread LLE into underwear;Thread RLE into pants;Thread LLE into pants;Pull up over hips;Don/doff R shoe;Don/doff L shoe;Tie shoes   LB Dressing Comments A to steady and pull over hips in standing w/ support of BUE on RW   Functional Standing Tolerance   Time ~15 seconds   Activity LB dressing   Comments MODA x 1 person to maintain static standing balance w/ assist to mange clothing over hips.   Bed Mobility   Additional Comments Pt OOB in recliner chair   Transfers   Sit to Stand 3  Moderate assistance   Additional items Assist x 2;Increased time required;Verbal cues;Armrests   Stand to Sit 3  Moderate assistance   Additional items Assist x 1;Increased time required;Verbal cues;Armrests   Additional Comments total STS completed x 4 from recliner chair w/ fluccuating assist of moderate assist w/ 1-2 people. Continued education for optimal hand placement and body mechanics for safe transfers.   Functional Mobility   Functional Mobility 3  Moderate assistance   Additional Comments Ax1 for short household distance x 3 w/ seated rest break inbetween. Use of RW w/ assist for managment. Pt w/ R knee buckling and L foot drap d/t LE length descrepency. R knee blocking provided w/ minimal improvments w/ gait.   Additional items Rolling walker   Subjective   Subjective Pt is pleasently confused but cooperative and pleasent.   Cognition   Overall Cognitive Status Impaired   Arousal/Participation Alert;Responsive;Cooperative   Attention Attends with cues to redirect   Orientation Level Oriented to person;Disoriented to place;Disoriented to time;Disoriented to situation   Memory Decreased recall of precautions;Decreased recall of recent events;Decreased short term memory;Decreased long term memory   Following Commands Follows one step commands with increased time or repetition   Comments Pt ID via wristband, name and  . Pt is pleasently confused. Able to follow simple one step commands w/ increased time. Poor insight into current limitations and deficits.   Activity Tolerance   Activity Tolerance Patient tolerated treatment well   Medical Staff Made Aware Spoke with PT, RN Justin   Assessment   Assessment Patient seen for OT treatment on 3/22/2024 s/p admission for Stroke-like symptoms Patient agreeable to OT session. Patient participated in continued evaluation, fall prevention , self-care transfers, functional mobility, and ADLs with intervention focus on optimizing independence in functional mobility and ADLs. Erika Quigley is showing improvements in transfers and endurance but is continuing to perform below baseline due to the following deficits:  decreased muscular strength , decreased balance , decreased standing tolerance for self care tasks , decreased dynamic balance impacting functional reach, poor postural control , decreased functional reach , decreased activity tolerance , impaired judgement and problem solving , impaired safety awareness , (+) pain , impaired learning ability d/t current cognitive status , impaired global mental status, and direction following. Personal factors continuing to impact D/C include: (+) Hx of falls , steps to enter/navigate the home, Assistance needed for ADL/IADLs, Assistance needed for ADLs and functional mobility, High fall risk , decreased insight toward deficits , decreased recall of precautions , and baseline cognitive deficits From OT standpoint, patient would benefit from skilled intervention to maximize independence with ADLs and functional mobility. Goals remain appropriate, continue POC. At this time, recommending D/C to level II (moderate resource intensity).   Plan   Treatment Interventions ADL retraining;Functional transfer training;Endurance training;Cognitive reorientation;Patient/family training;Equipment evaluation/education;Compensatory technique education;Energy  conservation;Activityengagement   Goal Expiration Date 03/31/24   OT Treatment Day 1   OT Frequency 3-5x/wk   Discharge Recommendation   Rehab Resource Intensity Level, OT II (Moderate Resource Intensity)   Additional Comments  The patient's raw score on the AM-PAC Daily Activity Inpatient Short Form is 11. A raw score of less than 19 suggests the patient may benefit from discharge to post-acute rehabilitation services. Please refer to the recommendation of the Occupational Therapist for safe discharge planning.   AM-PAC Daily Activity Inpatient   Lower Body Dressing 1   Bathing 1   Toileting 1   Upper Body Dressing 2   Grooming 3   Eating 3   Daily Activity Raw Score 11   Daily Activity Standardized Score (Calc for Raw Score >=11) 29.04   AM-PAC Applied Cognition Inpatient   Following a Speech/Presentation 2   Understanding Ordinary Conversation 3   Taking Medications 1   Remembering Where Things Are Placed or Put Away 2   Remembering List of 4-5 Errands 1   Taking Care of Complicated Tasks 1   Applied Cognition Raw Score 10   Applied Cognition Standardized Score 24.98   End of Consult   Education Provided Yes;Family or social support of family present for education by provider  (Daughter present throughout the session)   Patient Position at End of Consult Bedside chair;Bed/Chair alarm activated;All needs within reach   Nurse Communication Nurse aware of consult     Goals established on initial evaluation in order to achieve pt's goal of going home.   ~Goal updated to reflect patients current level of status~     Pt will complete UB ADLs Min A for increased ADL independence within 10 days.     Pt will complete LB ADLs Max A for increased ADL independence within 10 days.     Pt will complete toileting Max A with use of DME for increased ADL independence within 10 days.     Pt will demonstrate proper body mechanics to complete self-care transfers and functional mobility with Min A x 1 and use of LRAD for increased  safety and functional independence within 10 days.     Pt will perform grooming tasks sitting at the sink with Min A in order to increase overall independence and return to PLOF.     Pt will demonstrate standing tolerance of 2 min with Min A and use of LRAD for increased activity tolerance during ADL/IADL tasks within 10 days.     Pt will complete bed mobility Mod A  for increased independence in repositioning, pressure offloading, and managing comfort.     Pt will demonstrate proper body mechanics and fall prevention strategies during 100% of tx sessions for increased safety awareness during ADL/IADLs    Pt will improve functional activity tolerance to 25 mins of sustained functional tasks to increase participation in basic self-care tasks and minimize assistance level.     Pt will receive education on use of compensatory memory strategies for increased safety and independent with IADL tasks.     Pt will consistently follow multi step directions during ADL performances w/ less than 1 cue and/or prompt to maximize independence in AD safety with ADL performance.     Pt will participate in ongoing cognitive assessments to assist with safe D/C planning and supervision/assistance recommendations.     Pt will verbalize and demonstrate understanding of B UE HEP program increase strength and endurance during self care transfers within 10 days.     Pt benefited from co-session of skilled OT and PT therapists in order to most appropriately address functional deficits d/t regression from functioning level prior to admission  and decreased activity tolerance.  OT/PT objectives were addressed separately; please see PT note for specific goal areas targeted.    Chata Griffin MS OTR/L   [place] Licensure# [id number]

## 2024-03-22 NOTE — PHYSICAL THERAPY NOTE
PHYSICAL THERAPY TREATMENT NOTE          Patient Name: Erika Quigley  Today's Date: 3/22/2024          AGE:   89 y.o.  Mrn:   [id number]  ADMIT DX:  TIA (transient ischemic attack) [G45.9]  Hyperglycemia [R73.9]  Stroke-like symptoms [R29.90]    Past Medical History:  Past Medical History:   Diagnosis Date    Anxiety 2010    Bladder infection 09/2019    Breast lump in female     HL (hearing loss) 2010    Hypertension 2010    Osteopenia     Ovarian cyst           03/22/24 1405   PT Last Visit   PT Visit Date 03/22/24   Note Type   Note Type Treatment   Pain Assessment   Pain Assessment Tool FLACC   Pain Location/Orientation Orientation: Right;Location: Leg   Pain Onset/Description Onset: Ongoing  (chronic per pt's daughter)   Hospital Pain Intervention(s) Repositioned;Ambulation/increased activity   Pain Rating: FLACC (Rest) - Face 0   Pain Rating: FLACC (Rest) - Legs 0   Pain Rating: FLACC (Rest) - Activity 0   Pain Rating: FLACC (Rest) - Cry 0   Pain Rating: FLACC (Rest) - Consolability 0   Score: FLACC (Rest) 0   Pain Rating: FLACC (Activity) - Face 0   Pain Rating: FLACC (Activity) - Legs 0   Pain Rating: FLACC (Activity) - Activity 0   Pain Rating: FLACC (Activity) - Cry 1   Pain Rating: FLACC (Activity) - Consolability 0   Score: FLACC (Activity) 1   Restrictions/Precautions   Weight Bearing Precautions Per Order No   Other Precautions Cognitive;Chair Alarm;Bed Alarm;Telemetry;Fall Risk;Pain   General   Chart Reviewed Yes   Additional Pertinent History Pt's HR remained <100 throughout mobility   Family/Caregiver Present Yes  (pt's daughter Hilda present)   Cognition   Overall Cognitive Status Impaired   Arousal/Participation Alert;Cooperative   Attention Attends with cues to redirect   Orientation Level Oriented to person;Disoriented to place;Disoriented to time;Disoriented to situation   Memory Decreased recall of recent events;Decreased recall of  "precautions   Following Commands Follows one step commands with increased time or repetition   Comments Pt ID via name and ; pt agreeable to PT tx and mobility, pt very pleasant throughout   Subjective   Subjective \"when is the next party?\"   Bed Mobility   Additional Comments pt OOB in recliner chair upon arrival, returned to chair at end of session   Transfers   Sit to Stand 3  Moderate assistance   Additional items Assist x 2;Armrests;Increased time required;Verbal cues  (fluctuating w/ mod Ax1)   Stand to Sit 3  Moderate assistance   Additional items Assist x 2;Armrests;Increased time required;Verbal cues  (fluctuating w/ mod Ax1)   Additional Comments 4 sit<>stands performed, pt retropulsive in standing, verbal and tactile cues provided   Ambulation/Elevation   Gait pattern R Knee Yesenia;Improper Weight shift;Decreased foot clearance;Inconsistent taina;Short stride;Excessively slow;Decreased hip extension;Decreased heel strike;Decreased toe off  (decreased LLE step length due to R knee buckling and leg length discrepancy L>R)   Gait Assistance 3  Moderate assist   Additional items Assist x 1;Verbal cues  (w/ 2nd person for RW assistance and 3rd w/ chair follow)   Assistive Device Rolling walker   Distance 5'+6'+12'   Ambulation/Elevation Additional Comments VC and assistance w/ RW management, pt's daughter reports pt walks w/ the walker \"way out front\" of her at baseline. Tactile assistance, knee blocking, on R quad during LLE swing phase of gait provided prn   Balance   Static Sitting Fair   Dynamic Sitting Fair -   Static Standing Poor +  (w/ RW)   Dynamic Standing Poor   Ambulatory Poor  (w/ RW)   Activity Tolerance   Activity Tolerance Patient tolerated treatment well   Medical Staff Made Aware Pt benefited from PT/OT care coordination w/ OT Chata due to signficant level of assistance required w/ mobility, cognitive/behavioral impairments affecting functional mobility, and to allow for challenge of " pt's activity tolerance, PT and OT goals were addressed individually during session; SPT Sebastian   Nurse Made Aware RN Justin   Assessment   Prognosis Fair   Problem List Decreased strength;Impaired balance;Decreased mobility;Decreased cognition;Impaired judgement;Decreased safety awareness;Pain   Assessment PT treatment provided today to address deficits of: decreased mobility, impaired balance. Interventions provided include: multiple transfer, gait, and endurance trials in order to challenge pt's activity tolerance, progress pt towards PLOF, and to maximize pt independence w/ functional mobility during current admission w/ stroke/ seizure-like symptoms. Compared to previous session, pt improved overall activity tolerance. Pt's HR remained <100 throughout. Pt demonstrated ability to ambulate multiple trials of short distances w/ mod Ax1, RW, and chair follow. Pt's daughter present throughout and reports at baseline pt is able to ambulate approx 60-70', and pt has multiple chairs positioned throughout the house to rest breaks as needed. Education provided to pt on RW management and gait sequencing. Pt continues to be functioning below baseline level, and remains limited in functional mobility due to pain, gait deviations, and impaired balance. Pt will continue benefit from PT to promote independence w/ functional mobility and progress towards set goals. Recommend DC w/ level: II (Moderate Rehab Resource Intensity) when medically cleared.   Goals   Patient Goals to get dressed   STG Expiration Date 03/31/24   Short Term Goal #1 Pt. will be able to complete all bed mobility activities with supervision to improve repositioning capability and reduce risk of skin compromise; will be able to complete all transfers with min Ax1 and LRAD to improve functional and OOB mobility and reduce burden of care; will ambulate 50 ft with LRAD and min Ax1 to improve activity tolerance and facilitate return and engagement to previous  "living environment; will improve in all balance measures by at least 1 point to improve functional mobility and reduce fall risk.   PT Treatment Day 1   Plan   Treatment/Interventions Functional transfer training;LE strengthening/ROM;Therapeutic exercise;Endurance training;Cognitive reorientation;Patient/family training;Equipment eval/education;Bed mobility;Gait training;Compensatory technique education   Progress Progressing toward goals   PT Frequency 3-5x/wk   Discharge Recommendation   Rehab Resource Intensity Level, PT II (Moderate Resource Intensity)   Equipment Recommended Walker   Walker Package Recommended Wheeled walker   Change/add to Walker Package? No;Yes, Change Size   Walker Size Kang (Ht <5'1\")   AM-PAC Basic Mobility Inpatient   Turning in Flat Bed Without Bedrails 2   Lying on Back to Sitting on Edge of Flat Bed Without Bedrails 2   Moving Bed to Chair 2   Standing Up From Chair Using Arms 2   Walk in Room 2   Climb 3-5 Stairs With Railing 1   Basic Mobility Inpatient Raw Score 11   Basic Mobility Standardized Score 30.25   [facility] Highest Level Of Mobility   -[facility] Goal 4: Move to chair/commode   -[facility] Achieved 6: Walk 10 steps or more   Education   Education Provided Mobility training;Assistive device   End of Consult   Patient Position at End of Consult Supine;Bed/Chair alarm activated;All needs within reach  (pt's daughter remaining present in room)       The patient's AM-PAC Basic Mobility Inpatient Short Form Raw Score is 11. A Raw score of less than or equal to 16 suggests the patient may benefit from discharge to post-acute rehabilitation services. Please also refer to the recommendation of the Physical Therapist for safe discharge planning.    Pt will continue to benefit from skilled inpatient PT during this admission in order to facilitate progress towards goals and to maximize pt's independence w/ functional mobility.    DC rec: level II (Moderate Rehab Resource " Intensity)      Noreen Frias, PT, DPT  03/22/24

## 2024-03-22 NOTE — PLAN OF CARE
Problem: PAIN - ADULT  Goal: Verbalizes/displays adequate comfort level or baseline comfort level  Description: Interventions:  - Encourage patient to monitor pain and request assistance  - Assess pain using appropriate pain scale  - Administer analgesics based on type and severity of pain and evaluate response  - Implement non-pharmacological measures as appropriate and evaluate response  - Consider cultural and social influences on pain and pain management  - Notify physician/advanced practitioner if interventions unsuccessful or patient reports new pain  Outcome: Progressing     Problem: INFECTION - ADULT  Goal: Absence or prevention of progression during hospitalization  Description: INTERVENTIONS:  - Assess and monitor for signs and symptoms of infection  - Monitor lab/diagnostic results  - Monitor all insertion sites, i.e. indwelling lines, tubes, and drains  - Monitor endotracheal if appropriate and nasal secretions for changes in amount and color  - [place] appropriate cooling/warming therapies per order  - Administer medications as ordered  - Instruct and encourage patient and family to use good hand hygiene technique  - Identify and instruct in appropriate isolation precautions for identified infection/condition  Outcome: Progressing  Goal: Absence of fever/infection during neutropenic period  Description: INTERVENTIONS:  - Monitor WBC    Outcome: Progressing     Problem: SAFETY ADULT  Goal: Patient will remain free of falls  Description: INTERVENTIONS:  - Educate patient/family on patient safety including physical limitations  - Instruct patient to call for assistance with activity   - Consult OT/PT to assist with strengthening/mobility   - Keep Call bell within reach  - Keep bed low and locked with side rails adjusted as appropriate  - Keep care items and personal belongings within reach  - Initiate and maintain comfort rounds  - Make Fall Risk Sign visible to staff  - Apply yellow socks and bracelet  for high fall risk patients  - Consider moving patient to room near nurses station  Outcome: Progressing  Goal: Maintain or return to baseline ADL function  Description: INTERVENTIONS:  -  Assess patient's ability to carry out ADLs; assess patient's baseline for ADL function and identify physical deficits which impact ability to perform ADLs (bathing, care of mouth/teeth, toileting, grooming, dressing, etc.)  - Assess/evaluate cause of self-care deficits   - Assess range of motion  - Assess patient's mobility; develop plan if impaired  - Assess patient's need for assistive devices and provide as appropriate  - Encourage maximum independence but intervene and supervise when necessary  - Involve family in performance of ADLs  - Assess for home care needs following discharge   - Consider OT consult to assist with ADL evaluation and planning for discharge  - Provide patient education as appropriate  Outcome: Progressing  Goal: Maintains/Returns to pre admission functional level  Description: INTERVENTIONS:  - Perform AM-PAC 6 Click Basic Mobility/ Daily Activity assessment daily.  - Set and communicate daily mobility goal to care team and patient/family/caregiver.   - Collaborate with rehabilitation services on mobility goals if consulted  - Out of bed for toileting  - Record patient progress and toleration of activity level   Outcome: Progressing     Problem: DISCHARGE PLANNING  Goal: Discharge to home or other facility with appropriate resources  Description: INTERVENTIONS:  - Identify barriers to discharge w/patient and caregiver  - Arrange for needed discharge resources and transportation as appropriate  - Identify discharge learning needs (meds, wound care, etc.)  - Arrange for interpretive services to assist at discharge as needed  - Refer to Case Management Department for coordinating discharge planning if the patient needs post-hospital services based on physician/advanced practitioner order or complex needs  related to functional status, cognitive ability, or social support system  Outcome: Progressing     Problem: Knowledge Deficit  Goal: Patient/family/caregiver demonstrates understanding of disease process, treatment plan, medications, and discharge instructions  Description: Complete learning assessment and assess knowledge base.  Interventions:  - Provide teaching at level of understanding  - Provide teaching via preferred learning methods  Outcome: Progressing     Problem: Nutrition/Hydration-ADULT  Goal: Nutrient/Hydration intake appropriate for improving, restoring or maintaining nutritional needs  Description: Monitor and assess patient's nutrition/hydration status for malnutrition. Collaborate with interdisciplinary team and initiate plan and interventions as ordered.  Monitor patient's weight and dietary intake as ordered or per policy. Utilize nutrition screening tool and intervene as necessary. Determine patient's food preferences and provide high-protein, high-caloric foods as appropriate.     INTERVENTIONS:  - Monitor oral intake, urinary output, labs, and treatment plans  - Assess nutrition and hydration status and recommend course of action  - Evaluate amount of meals eaten  - Assist patient with eating if necessary   - Allow adequate time for meals  - Recommend/ encourage appropriate diets, oral nutritional supplements, and vitamin/mineral supplements  - Order, calculate, and assess calorie counts as needed  - Recommend, monitor, and adjust tube feedings and TPN/PPN based on assessed needs  - Assess need for intravenous fluids  - Provide specific nutrition/hydration education as appropriate  - Include patient/family/caregiver in decisions related to nutrition  Outcome: Progressing

## 2024-03-22 NOTE — ASSESSMENT & PLAN NOTE
- Meds: amlodipine-benazepril 10-20 mg  - Regimen changed to amlodipine 5 mg qd and continue lisinopril 20 mg due to starting Lopressor 50 mg BID    Plan:  - Discharge home on Lotrel 5-20 mg. Patient's daughter would like to minimize number of pills  - Discharge home on metoprolol 25 mg BID (due to asymptomatic bradycardic episodes on day of discharge)

## 2024-03-22 NOTE — PROGRESS NOTES
Patient:  GAVIOTA MONTES DE OCA    MRN:  [id number]    [place] Request ID:  [id number]    Level of care reserved:  Home Health Agency    Partner Reserved:  [address] [place] [address] [facility], [place] [address] [phone]    Clinical needs requested:    Geography searched:  [address]    Start of Service:    Request sent:  8:12am EDT on 3/22/2024 by Dionne Cesar    Partner reserved:  8:47am EDT on 3/22/2024 by Dionne Cesar    Choice list shared:  8:19am EDT on 3/22/2024 by Dionne Cesar

## 2024-03-22 NOTE — PLAN OF CARE
Problem: Potential for Falls  Goal: Patient will remain free of falls  Description: INTERVENTIONS:  - Educate patient/family on patient safety including physical limitations  - Instruct patient to call for assistance with activity   - Consult OT/PT to assist with strengthening/mobility   - Keep Call bell within reach  - Keep bed low and locked with side rails adjusted as appropriate  - Keep care items and personal belongings within reach  - Initiate and maintain comfort rounds  - Make Fall Risk Sign visible to staff  - Offer Toileting every  Hours, in advance of need  - Initiate/Maintain alarm  - Obtain necessary fall risk management equipment:   - Apply yellow socks and bracelet for high fall risk patients  - Consider moving patient to room near nurses station  Outcome: Progressing     Problem: PAIN - ADULT  Goal: Verbalizes/displays adequate comfort level or baseline comfort level  Description: Interventions:  - Encourage patient to monitor pain and request assistance  - Assess pain using appropriate pain scale  - Administer analgesics based on type and severity of pain and evaluate response  - Implement non-pharmacological measures as appropriate and evaluate response  - Consider cultural and social influences on pain and pain management  - Notify physician/advanced practitioner if interventions unsuccessful or patient reports new pain  Outcome: Progressing     Problem: INFECTION - ADULT  Goal: Absence or prevention of progression during hospitalization  Description: INTERVENTIONS:  - Assess and monitor for signs and symptoms of infection  - Monitor lab/diagnostic results  - Monitor all insertion sites, i.e. indwelling lines, tubes, and drains  - Monitor endotracheal if appropriate and nasal secretions for changes in amount and color  - [place] appropriate cooling/warming therapies per order  - Administer medications as ordered  - Instruct and encourage patient and family to use good hand hygiene technique  -  Identify and instruct in appropriate isolation precautions for identified infection/condition  Outcome: Progressing  Goal: Absence of fever/infection during neutropenic period  Description: INTERVENTIONS:  - Monitor WBC    Outcome: Progressing     Problem: SAFETY ADULT  Goal: Patient will remain free of falls  Description: INTERVENTIONS:  - Educate patient/family on patient safety including physical limitations  - Instruct patient to call for assistance with activity   - Consult OT/PT to assist with strengthening/mobility   - Keep Call bell within reach  - Keep bed low and locked with side rails adjusted as appropriate  - Keep care items and personal belongings within reach  - Initiate and maintain comfort rounds  - Make Fall Risk Sign visible to staff  - Offer Toileting every  Hours, in advance of need  - Initiate/Maintain alarm  - Obtain necessary fall risk management equipment:   - Apply yellow socks and bracelet for high fall risk patients  - Consider moving patient to room near nurses station  Outcome: Progressing  Goal: Maintain or return to baseline ADL function  Description: INTERVENTIONS:  -  Assess patient's ability to carry out ADLs; assess patient's baseline for ADL function and identify physical deficits which impact ability to perform ADLs (bathing, care of mouth/teeth, toileting, grooming, dressing, etc.)  - Assess/evaluate cause of self-care deficits   - Assess range of motion  - Assess patient's mobility; develop plan if impaired  - Assess patient's need for assistive devices and provide as appropriate  - Encourage maximum independence but intervene and supervise when necessary  - Involve family in performance of ADLs  - Assess for home care needs following discharge   - Consider OT consult to assist with ADL evaluation and planning for discharge  - Provide patient education as appropriate  Outcome: Progressing  Goal: Maintains/Returns to pre admission functional level  Description:  INTERVENTIONS:  - Perform AM-PAC 6 Click Basic Mobility/ Daily Activity assessment daily.  - Set and communicate daily mobility goal to care team and patient/family/caregiver.   - Collaborate with rehabilitation services on mobility goals if consulted  - Perform Range of Motion  times a day.  - Reposition patient every  hours.  - Dangle patient  times a day  - Stand patient  times a day  - Ambulate patient  times a day  - Out of bed to chair  times a day   - Out of bed for meals  times a day  - Out of bed for toileting  - Record patient progress and toleration of activity level   Outcome: Progressing     Problem: DISCHARGE PLANNING  Goal: Discharge to home or other facility with appropriate resources  Description: INTERVENTIONS:  - Identify barriers to discharge w/patient and caregiver  - Arrange for needed discharge resources and transportation as appropriate  - Identify discharge learning needs (meds, wound care, etc.)  - Arrange for interpretive services to assist at discharge as needed  - Refer to Case Management Department for coordinating discharge planning if the patient needs post-hospital services based on physician/advanced practitioner order or complex needs related to functional status, cognitive ability, or social support system  Outcome: Progressing     Problem: Knowledge Deficit  Goal: Patient/family/caregiver demonstrates understanding of disease process, treatment plan, medications, and discharge instructions  Description: Complete learning assessment and assess knowledge base.  Interventions:  - Provide teaching at level of understanding  - Provide teaching via preferred learning methods  Outcome: Progressing     Problem: Nutrition/Hydration-ADULT  Goal: Nutrient/Hydration intake appropriate for improving, restoring or maintaining nutritional needs  Description: Monitor and assess patient's nutrition/hydration status for malnutrition. Collaborate with interdisciplinary team and initiate plan and  interventions as ordered.  Monitor patient's weight and dietary intake as ordered or per policy. Utilize nutrition screening tool and intervene as necessary. Determine patient's food preferences and provide high-protein, high-caloric foods as appropriate.     INTERVENTIONS:  - Monitor oral intake, urinary output, labs, and treatment plans  - Assess nutrition and hydration status and recommend course of action  - Evaluate amount of meals eaten  - Assist patient with eating if necessary   - Allow adequate time for meals  - Recommend/ encourage appropriate diets, oral nutritional supplements, and vitamin/mineral supplements  - Order, calculate, and assess calorie counts as needed  - Recommend, monitor, and adjust tube feedings and TPN/PPN based on assessed needs  - Assess need for intravenous fluids  - Provide specific nutrition/hydration education as appropriate  - Include patient/family/caregiver in decisions related to nutrition  Outcome: Progressing     Problem: Prexisting or High Potential for Compromised Skin Integrity  Goal: Skin integrity is maintained or improved  Description: INTERVENTIONS:  - Identify patients at risk for skin breakdown  - Assess and monitor skin integrity  - Assess and monitor nutrition and hydration status  - Monitor labs   - Assess for incontinence   - Turn and reposition patient  - Assist with mobility/ambulation  - Relieve pressure over bony prominences  - Avoid friction and shearing  - Provide appropriate hygiene as needed including keeping skin clean and dry  - Evaluate need for skin moisturizer/barrier cream  - Collaborate with interdisciplinary team   - Patient/family teaching  - Consider wound care consult   Outcome: Progressing

## 2024-03-22 NOTE — CASE MANAGEMENT
Case Management Progress Note    Patient name Erika DE LA GARZA /S -01 MRN [id number]  : 1931 Date 3/22/2024       LOS (days): 1  Geometric Mean LOS (GMLOS) (days): 2.4  Days to GMLOS:1.4        OBJECTIVE:        Current admission status: Inpatient  Preferred Pharmacy:   [facility], [place] - [address] [place] [address] [place] [address]  Phone: [phone] Fax: [phone]    [facility] [place] - [address] [place] [address]  Phone: [phone] Fax: [phone]    [id number] - [place], [place] - [address] [facility]  [address] [facility] [place] [address]  Phone: [phone] Fax: [phone]    [id number] - [place], [place] - [address] [facility]  [address] [place] [address] [place] [address]  Phone: [phone] Fax: [phone]    [facility] [place] - [address] [facility]  [address] [facility]  [place] [address]  Phone: [phone] Fax: [phone]    [facility] - [place], [place] - [facility]  [facility] [facility] [address]  Phone: [phone] Fax: [phone]    Primary Care Provider: Audra Bellamy DO    Primary Insurance: MEDICARE  Secondary Insurance: AARP    PROGRESS NOTE:    Response received from [facility] via aidin - confirmed available. Agency reserved in aidin. AVS updated.

## 2024-03-22 NOTE — ASSESSMENT & PLAN NOTE
- Initial EKG with NSR  - On 3/21 AM - had new onset atrial fibrillation with RVR in HR 120s-140s. Asymptomatic. Converted back to NSR after IV diltiazem 10 mg push. Started on metoprolol 25 mg BID  - [id number] score of 7; already on Eliquis 5 mg BID for Hx of DVT  - Echocardiogram (9/19/23) - EF 60%. G1DD.  Aortic valve sclerosis  - 10/12/23 - TSH 3.859  - Echocardiogram (3/22/24) - EF 60%. G1DD. No PFO. Mild MR. Mild to moderate TR    Plan:  - Change to Eliquis 2.5 mg BID due to age and body weight  - Discharge home on Lotrel 5-20 mg. Patient's daughter would like to minimize number of pills  - Discharge home on metoprolol 25 mg BID (due to asymptomatic bradycardic episodes on day of discharge)  - Follow-up with cardiology as outpatient

## 2024-03-22 NOTE — DISCHARGE SUMMARY
"[facility]  Discharge- Erika RIVAS Trach 11/27/1931, 89 y.o. female MRN: [id number]  Unit/Bed#: S [facility] Encounter: [id number]  Primary Care Provider: Audra Bellamy DO   Date and time admitted to hospital: 3/20/2024 11:51 AM    * Stroke/ Seizure-like symptoms  Assessment & Plan  Baseline ambulatory status - walker. Baseline mentation on and off confusion.  Presented with witnessed episode of speech arrest and RUE shaking with one eye forcefully closed.  No reported urinary/fecal incontinence or tongue bite.  Patient returned back to \"normal state\" afterward per caregiver. S/S resolved when arriving ED.  On my exam in the ED- patient was OA x1 to herself. Chronic L facial droop. Slurred speech at baseline.   UA negative for WBC, nitrates, bacteria.  Etiology unclear - TIA versus seizure versus cardiac versus vascular dementia    - 3/20 - CT head - no acute intracranial abnormality.  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 Chronic microangiopathic ischemic changes  - 3/20 - CTA head/neck -negative for large vessel intracranial occlusion.  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  No extracranial carotid stenosis.  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   - EEG - mild to moderate nonspecific diffuse cerebral dysfunction  - PT/OT recommends postacute rehab.  Family wants home with home health    Plan:  - Discharge patient to home with home health    Paroxysmal atrial fibrillation (HCC)  Assessment & Plan  - Initial EKG with NSR  - On 3/21 AM - had new onset atrial fibrillation with RVR in HR 120s-140s. Asymptomatic. Converted back to NSR after IV diltiazem 10 mg push. Started on metoprolol 25 mg BID  - [id number] score of 7; already on Eliquis 5 mg " BID for Hx of DVT  - Echocardiogram (9/19/23) - EF 60%. G1DD.  Aortic valve sclerosis  - 10/12/23 - TSH 3.859  - Echocardiogram (3/22/24) - EF 60%. G1DD. No PFO. Mild MR. Mild to moderate TR    Plan:  - Change to Eliquis 2.5 mg BID due to age and body weight  - Discharge home on Lotrel 5-20 mg. 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    Plan:  - Discharge home on Eliquis 2.5 mg BID    Essential hypertension  Assessment & Plan  - Meds: amlodipine-benazepril 10-20 mg  - Regimen changed to amlodipine 5 mg qd and continue lisinopril 20 mg due to starting Lopressor 50 mg BID    Plan:  - Discharge home on Lotrel 5-20 mg. Patient's daughter would like to minimize number of pills  - Discharge home on metoprolol 25 mg BID (due to asymptomatic bradycardic episodes on day of discharge)    Hyperlipidemia  Assessment & Plan  - 3/21 - , , HDL 54,   - Not currently on any statins        Medical Problems       Resolved Problems  Date Reviewed: 3/22/2024   None       Discharging Resident: Mich Mayorga DO  Discharging Attending: Cass Garcia MD  PCP: Audra Bellamy DO  Admission Date:   Admission Orders (From admission, onward)       Ordered        03/21/24 1441  Inpatient Admission  Once            03/20/24 1518  Place in Observation  Once                          Discharge Date: 03/22/24    Consultations During Hospital Stay:  Neurology, cardiology    Procedures Performed:   None    Significant Findings / Test Results:   CT head - no acute intracranial abnormality.  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 Chronic microangiopathic ischemic changes  CTA head/neck -negative for large vessel  intracranial occlusion.  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  No extracranial carotid stenosis.  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   EEG - mild to moderate nonspecific diffuse cerebral dysfunction  Echocardiogram - EF 60%. G1DD. No PFO. Mild MR. Mild to moderate TR    Incidental Findings:   None    Test Results Pending at Discharge (will require follow up):  None     Outpatient Tests Requested:  None    Complications:  None    Reason for Admission: Altered mental status    Hospital Course:   Erika Quigley is a 89 y.o. female patient who originally presented to the hospital on 3/20/2024 due to altered mental status concerning for stroke/seizure-like activity.  She had left upper extremity shaking, left-sided facial droop, left eye of close forcefully, speech arrest, decreased responsiveness while at home.  Witnessed episode, patient was reading the paper and talking during onset of symptoms.  Reportedly, the symptoms lasted for 2 minutes.  Patient did not take her 5 minutes afterwards.  No urinary/fecal incontinence or tongue biting.  She reportedly returned back to normal state afterwards.  At baseline, she has on and off confusion.  She is able to recognize family members, she has a chronic left-sided facial droop and slurred speech.  In the emergency room, her vital signs were stable, lab work was unremarkable.  She was placed on the stroke pathway and neurology was consulted. During the course of her stay, she developed new onset atrial fibrillation with rapid ventricular response.  She converted back to normal sinus rhythm with IV Cardizem and was started on metoprolol tartrate 25 mg BID.  During her stay, cardiology was consulted and made some changes to her medication regimen.  She  "was switched to Eliquis 2.5 mg BID for history of DVT and now for atrial fibrillation.  Her amlodipine dose was reduced to 5 mg.  Lisinopril was continued at 20 mg.  Her Metroprolol tartrate 25 mg BID was increased cardiology to 50 mg BID.  However, on the day of discharge, she remained in sinus rhythm however asymptomatic bradycardic episodes on telemetry with heart rate in the upper 40s.  As a result, her metoprolol tartrate dose was reduced to 25 mg BID. Plan for follow-up with primary care physician within 1 week.  Plan for follow-up with cardiology as an outpatient.  Plan for follow-up with neurology as outpatient. PT/OT recommended postacute rehab.  Family wanted home with home health.discharge home because patient has caregiver or daughter taking care of her at all times.  Stable for discharge.    Please see above list of diagnoses and related plan for additional information.     Condition at Discharge: good    Discharge Day Visit / Exam:   Subjective:  Patient was seen and examined at bedside.  Daughter at bedside.  Daughter states that patient is currently at her baseline.  They are amenable to getting discharged today.  Patient denies any chest pain, palpitations, difficulty breathing.  Telemetry shows asymptomatic sinus bradycardia episodes.      Vitals: Blood Pressure: 111/61 (03/22/24 1519)  Pulse: 57 (03/22/24 1519)  Temperature: 98.7 °F (37.1 °C) (03/22/24 1519)  Temp Source: Axillary (03/22/24 0934)  Respirations: 20 (03/22/24 0934)  Height: 4' 11\" (149.9 cm) (03/22/24 0934)  Weight - Scale: 58.1 kg (128 lb) (03/22/24 0934)  SpO2: 98 % (03/22/24 1519)  Exam:   Physical Exam  Constitutional:       Appearance: Normal appearance.   HENT:      Head: Normocephalic and atraumatic.   Eyes:      Extraocular Movements: Extraocular movements intact.      Pupils: Pupils are equal, round, and reactive to light.   Cardiovascular:      Rate and Rhythm: Normal rate and regular rhythm.   Pulmonary:      Breath " sounds: No wheezing, rhonchi or rales.   Abdominal:      General: There is no distension.      Tenderness: There is no abdominal tenderness. There is no guarding or rebound.   Musculoskeletal:         General: Tenderness (right leg) present. No swelling.      Cervical back: Normal range of motion and neck supple.   Neurological:      Mental Status: She is alert.      Comments: A&O to self only. Slurring of speech at baseline, per daughter. BUE 5/5 strength. RLE in flexed position due to hip pain, unable to assess to pain with movement.  When patient attempts, able to lift both heels off the bed           Discussion with Family: Updated  (daughter) at bedside.    Discharge instructions/Information to patient and family:   See after visit summary for information provided to patient and family.      Provisions for Follow-Up Care:  See after visit summary for information related to follow-up care and any pertinent home health orders.      Mobility at time of Discharge:   Basic Mobility Inpatient Raw Score: 11  JH-HLM Goal: 4: Move to chair/commode  JH-HLM Achieved: 6: Walk 10 steps or more  HLM Goal achieved. Continue to encourage appropriate mobility.     Disposition:   Home    Planned Readmission: No    Discharge Medications:  See after visit summary for reconciled discharge medications provided to patient and/or family.      **Please Note: This note may have been constructed using a voice recognition system**

## 2024-03-22 NOTE — PLAN OF CARE
Problem: Potential for Falls  Goal: Patient will remain free of falls  Description: INTERVENTIONS:  - Educate patient/family on patient safety including physical limitations  - Instruct patient to call for assistance with activity   - Consult OT/PT to assist with strengthening/mobility   - Keep Call bell within reach  - Keep bed low and locked with side rails adjusted as appropriate  - Keep care items and personal belongings within reach  - Initiate and maintain comfort rounds  - Make Fall Risk Sign visible to staff  - Apply yellow socks and bracelet for high fall risk patients  - Consider moving patient to room near nurses station  3/22/2024 1403 by Kelsey Ashby RN  Outcome: Adequate for Discharge  3/22/2024 1135 by Kelsey Ashby RN  Outcome: Progressing     Problem: PAIN - ADULT  Goal: Verbalizes/displays adequate comfort level or baseline comfort level  Description: Interventions:  - Encourage patient to monitor pain and request assistance  - Assess pain using appropriate pain scale  - Administer analgesics based on type and severity of pain and evaluate response  - Implement non-pharmacological measures as appropriate and evaluate response  - Consider cultural and social influences on pain and pain management  - Notify physician/advanced practitioner if interventions unsuccessful or patient reports new pain  3/22/2024 1403 by Kelsey Ashby RN  Outcome: Adequate for Discharge  3/22/2024 1135 by Kelsey Ashby RN  Outcome: Progressing     Problem: INFECTION - ADULT  Goal: Absence or prevention of progression during hospitalization  Description: INTERVENTIONS:  - Assess and monitor for signs and symptoms of infection  - Monitor lab/diagnostic results  - Monitor all insertion sites, i.e. indwelling lines, tubes, and drains  - Monitor endotracheal if appropriate and nasal secretions for changes in amount and color  - [place] appropriate cooling/warming therapies per order  - Administer  medications as ordered  - Instruct and encourage patient and family to use good hand hygiene technique  - Identify and instruct in appropriate isolation precautions for identified infection/condition  3/22/2024 1403 by Kelsey Ashby RN  Outcome: Adequate for Discharge  3/22/2024 1135 by Kelsey Ashby RN  Outcome: Progressing  Goal: Absence of fever/infection during neutropenic period  Description: INTERVENTIONS:  - Monitor WBC    3/22/2024 1403 by Kelsey Ashby RN  Outcome: Adequate for Discharge  3/22/2024 1135 by Kelsey Ashby RN  Outcome: Progressing     Problem: SAFETY ADULT  Goal: Patient will remain free of falls  Description: INTERVENTIONS:  - Educate patient/family on patient safety including physical limitations  - Instruct patient to call for assistance with activity   - Consult OT/PT to assist with strengthening/mobility   - Keep Call bell within reach  - Keep bed low and locked with side rails adjusted as appropriate  - Keep care items and personal belongings within reach  - Initiate and maintain comfort rounds  - Make Fall Risk Sign visible to staff  - Apply yellow socks and bracelet for high fall risk patients  - Consider moving patient to room near nurses station  3/22/2024 1403 by Kelsey Ashby RN  Outcome: Adequate for Discharge  3/22/2024 1135 by Kelsey Ashby RN  Outcome: Progressing  Goal: Maintain or return to baseline ADL function  Description: INTERVENTIONS:  -  Assess patient's ability to carry out ADLs; assess patient's baseline for ADL function and identify physical deficits which impact ability to perform ADLs (bathing, care of mouth/teeth, toileting, grooming, dressing, etc.)  - Assess/evaluate cause of self-care deficits   - Assess range of motion  - Assess patient's mobility; develop plan if impaired  - Assess patient's need for assistive devices and provide as appropriate  - Encourage maximum independence but intervene and supervise when necessary  -  Involve family in performance of ADLs  - Assess for home care needs following discharge   - Consider OT consult to assist with ADL evaluation and planning for discharge  - Provide patient education as appropriate  3/22/2024 1403 by Kelsey Ashby RN  Outcome: Adequate for Discharge  3/22/2024 1135 by Kelsey Ashby RN  Outcome: Progressing  Goal: Maintains/Returns to pre admission functional level  Description: INTERVENTIONS:  - Perform AM-PAC 6 Click Basic Mobility/ Daily Activity assessment daily.  - Set and communicate daily mobility goal to care team and patient/family/caregiver.   - Collaborate with rehabilitation services on mobility goals if consulted  - Out of bed for toileting  - Record patient progress and toleration of activity level   3/22/2024 1403 by Kelsey Ashby RN  Outcome: Adequate for Discharge  3/22/2024 1135 by Kelsey Ashby RN  Outcome: Progressing     Problem: DISCHARGE PLANNING  Goal: Discharge to home or other facility with appropriate resources  Description: INTERVENTIONS:  - Identify barriers to discharge w/patient and caregiver  - Arrange for needed discharge resources and transportation as appropriate  - Identify discharge learning needs (meds, wound care, etc.)  - Arrange for interpretive services to assist at discharge as needed  - Refer to Case Management Department for coordinating discharge planning if the patient needs post-hospital services based on physician/advanced practitioner order or complex needs related to functional status, cognitive ability, or social support system  3/22/2024 1403 by Kelsey Ashby RN  Outcome: Adequate for Discharge  3/22/2024 1135 by Kelsey Ashby RN  Outcome: Progressing     Problem: Knowledge Deficit  Goal: Patient/family/caregiver demonstrates understanding of disease process, treatment plan, medications, and discharge instructions  Description: Complete learning assessment and assess knowledge  base.  Interventions:  - Provide teaching at level of understanding  - Provide teaching via preferred learning methods  3/22/2024 1403 by Kelsey Ashby RN  Outcome: Adequate for Discharge  3/22/2024 1135 by Kelsey Ashby RN  Outcome: Progressing     Problem: Nutrition/Hydration-ADULT  Goal: Nutrient/Hydration intake appropriate for improving, restoring or maintaining nutritional needs  Description: Monitor and assess patient's nutrition/hydration status for malnutrition. Collaborate with interdisciplinary team and initiate plan and interventions as ordered.  Monitor patient's weight and dietary intake as ordered or per policy. Utilize nutrition screening tool and intervene as necessary. Determine patient's food preferences and provide high-protein, high-caloric foods as appropriate.     INTERVENTIONS:  - Monitor oral intake, urinary output, labs, and treatment plans  - Assess nutrition and hydration status and recommend course of action  - Evaluate amount of meals eaten  - Assist patient with eating if necessary   - Allow adequate time for meals  - Recommend/ encourage appropriate diets, oral nutritional supplements, and vitamin/mineral supplements  - Order, calculate, and assess calorie counts as needed  - Recommend, monitor, and adjust tube feedings and TPN/PPN based on assessed needs  - Assess need for intravenous fluids  - Provide specific nutrition/hydration education as appropriate  - Include patient/family/caregiver in decisions related to nutrition  3/22/2024 1403 by Kelsey Ashby RN  Outcome: Adequate for Discharge  3/22/2024 1135 by Kelsey Ashby RN  Outcome: Progressing     Problem: Prexisting or High Potential for Compromised Skin Integrity  Goal: Skin integrity is maintained or improved  Description: INTERVENTIONS:  - Identify patients at risk for skin breakdown  - Assess and monitor skin integrity  - Assess and monitor nutrition and hydration status  - Monitor labs   - Assess  for incontinence   - Turn and reposition patient  - Assist with mobility/ambulation  - Relieve pressure over bony prominences  - Avoid friction and shearing  - Provide appropriate hygiene as needed including keeping skin clean and dry  - Evaluate need for skin moisturizer/barrier cream  - Collaborate with interdisciplinary team   - Patient/family teaching  - Consider wound care consult   3/22/2024 1403 by Kelsey Ashby RN  Outcome: Adequate for Discharge  3/22/2024 1135 by Kelsey Ashby RN  Outcome: Progressing

## 2024-03-22 NOTE — PLAN OF CARE
Problem: PHYSICAL THERAPY ADULT  Goal: Performs mobility at highest level of function for planned discharge setting.  See evaluation for individualized goals.  Description: Treatment/Interventions: Functional transfer training, LE strengthening/ROM, Therapeutic exercise, Endurance training, Cognitive reorientation, Patient/family training, Equipment eval/education, Bed mobility, Gait training, Compensatory technique education  Equipment Recommended: Walker       See flowsheet documentation for full assessment, interventions and recommendations.  Outcome: Progressing  Note: Prognosis: Fair  Problem List: Decreased strength, Impaired balance, Decreased mobility, Decreased cognition, Impaired judgement, Decreased safety awareness, Pain  Assessment: PT treatment provided today to address deficits of: decreased mobility, impaired balance. Interventions provided include: multiple transfer, gait, and endurance trials in order to challenge pt's activity tolerance, progress pt towards PLOF, and to maximize pt independence w/ functional mobility during current admission w/ stroke/ seizure-like symptoms. Compared to previous session, pt improved overall activity tolerance. Pt's HR remained <100 throughout. Pt demonstrated ability to ambulate multiple trials of short distances w/ mod Ax1, RW, and chair follow. Pt's daughter present throughout and reports at baseline pt is able to ambulate approx 60-70', and pt has multiple chairs positioned throughout the house to rest breaks as needed. Education provided to pt on RW management and gait sequencing. Pt continues to be functioning below baseline level, and remains limited in functional mobility due to pain, gait deviations, and impaired balance. Pt will continue benefit from PT to promote independence w/ functional mobility and progress towards set goals. Recommend DC w/ level: II (Moderate Rehab Resource Intensity) when medically cleared.  Barriers to Discharge: None     Rehab  Resource Intensity Level, PT: II (Moderate Resource Intensity)    See flowsheet documentation for full assessment.

## 2024-03-22 NOTE — PLAN OF CARE
Problem: OCCUPATIONAL THERAPY ADULT  Goal: Performs self-care activities at highest level of function for planned discharge setting.  See evaluation for individualized goals.  Description: Treatment Interventions: ADL retraining, Functional transfer training, UE strengthening/ROM, Endurance training, Cognitive reorientation, Patient/family training, Equipment evaluation/education, Compensatory technique education, Activityengagement, Energy conservation          See flowsheet documentation for full assessment, interventions and recommendations.   Outcome: Progressing  Note: Limitation: Decreased ADL status, Decreased UE ROM, Decreased UE strength, Decreased Safe judgement during ADL, Decreased cognition, Decreased endurance, Decreased self-care trans, Decreased high-level ADLs  Prognosis: Good  Assessment: Patient seen for OT treatment on 3/22/2024 s/p admission for Stroke-like symptoms Patient agreeable to OT session. Patient participated in continued evaluation, fall prevention , self-care transfers, functional mobility, and ADLs with intervention focus on optimizing independence in functional mobility and ADLs. Erika Quigley is showing improvements in transfers and endurance but is continuing to perform below baseline due to the following deficits:  decreased muscular strength , decreased balance , decreased standing tolerance for self care tasks , decreased dynamic balance impacting functional reach, poor postural control , decreased functional reach , decreased activity tolerance , impaired judgement and problem solving , impaired safety awareness , (+) pain , impaired learning ability d/t current cognitive status , impaired global mental status, and direction following. Personal factors continuing to impact D/C include: (+) Hx of falls , steps to enter/navigate the home, Assistance needed for ADL/IADLs, Assistance needed for ADLs and functional mobility, High fall risk , decreased insight toward deficits ,  decreased recall of precautions , and baseline cognitive deficits From OT standpoint, patient would benefit from skilled intervention to maximize independence with ADLs and functional mobility. Goals remain appropriate, continue POC. At this time, recommending D/C to level II (moderate resource intensity).     Rehab Resource Intensity Level, OT: II (Moderate Resource Intensity)     Chata Griffin MS OTR/L   [place] Licensure# [id number]

## 2024-03-22 NOTE — PROGRESS NOTES
"General Cardiology   Progress Note -  [facility] One   Erika Quigley 89 y.o. female MRN: [id number]  Unit/Bed#: S -01 Encounter: [id number]    Assessment     New onset paroxysmal atrial fibrillation  ECG on admission showed NSR  Tele review shows new onset afib with RVR from around 0915 to 1115 3/21. 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 On amlodipine 5 mg daily, lisinopril 20 mg daily and started on metoprolol tartrate 50 mg BID for new onset afib.     HLD- , , HDL 54, XMP152. Not on statin therapy.      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 Per heme/onc- to remain on lifelong anticoagulation      Pre diabetes- A1c 6.3%     Dementia      Plan  Back in NSR after two hours of afib RVR on 3/21; converted with PO metoprolol and IV diltiazem  Remains in NSR on telemetry review  Continue metoprolol tartrate 50 mg BID.   Follow up on echo results  Anticoagulated on Eliquis 5 mg BID for hx of DVT. From an afib standpoint, she would be a candidate for reduced dose Eliquis 2.5 mg BID for stroke risk reduction given her age and body weight. Defer to heme/onc regarding dosing.  Stable for d/c from cardiology standpoint, will arrange outpatient follow up. Please call with questions.     Subjective  Review of Systems   Unable to perform ROS: Dementia     Objective:   Vitals: Blood pressure 124/68, pulse 70, temperature 99.1 °F (37.3 °C), resp. rate 16, height 4' 11\" (1.499 m), weight 58.2 kg (128 lb 4.9 oz), SpO2 94%, not currently breastfeeding., Body mass index is 25.91 kg/m².,     Systolic (24hrs), Av , Min:119 , Max:164     Diastolic (24hrs), Av, Min:63, Max:86      No intake or output data in the 24 hours ending " 03/22/24 0837  Wt Readings from Last 3 Encounters:   03/20/24 58.2 kg (128 lb 4.9 oz)   03/20/24 58.2 kg (128 lb 4.9 oz)   11/20/23 58.5 kg (129 lb)     Telemetry Review: NSR    Physical Exam  Vitals reviewed.   Constitutional:       General: She is not in acute distress.  Neck:      Vascular: No hepatojugular reflux or JVD.   Cardiovascular:      Rate and Rhythm: Normal rate and regular rhythm.      Heart sounds: Normal heart sounds. No murmur heard.     No friction rub. No gallop.   Pulmonary:      Effort: Pulmonary effort is normal. No respiratory distress.      Breath sounds: No rales.      Comments: Clear, room air  Abdominal:      General: Bowel sounds are normal. There is no distension.      Palpations: Abdomen is soft.      Tenderness: There is no abdominal tenderness.   Musculoskeletal:         General: No tenderness. Normal range of motion.      Cervical back: Neck supple.   Skin:     General: Skin is warm and dry.      Findings: No erythema.   Neurological:      Mental Status: She is alert. Mental status is at baseline.      Comments: Oriented to self   Psychiatric:         Mood and Affect: Mood normal.       LABORATORY RESULTS      CBC with diff:   Results from last 7 days   Lab Units 03/22/24  0557 03/21/24  0542 03/20/24  1229   WBC Thousand/uL 7.09 7.67 4.81   HEMOGLOBIN g/dL 11.1* 11.1* 12.0   HEMATOCRIT % 34.3* 34.3* 37.8   MCV fL 96 96 97   PLATELETS Thousands/uL 217 237 267   RBC Million/uL 3.58* 3.59* 3.91   MCH pg 31.0 30.9 30.7   MCHC g/dL 32.4 32.4 31.7   RDW % 14.7 14.7 14.8   MPV fL 9.5 9.6 9.4   NRBC AUTO /100 WBCs  --   --  0     CMP:  Results from last 7 days   Lab Units 03/22/24  0557 03/21/24  0542 03/20/24  1229   POTASSIUM mmol/L 4.2 4.2 4.4   CHLORIDE mmol/L 107 107 104   CO2 mmol/L 24 23 24   BUN mg/dL 19 18 21   CREATININE mg/dL 0.85 0.75 0.92   CALCIUM mg/dL 8.8 9.1 9.7   AST U/L  --   --  16   ALT U/L  --   --  12   ALK PHOS U/L  --   --  51   EGFR ml/min/1.73sq m 59 69 54      BMP:  Results from last 7 days   Lab Units 24  0557 24  0542 24  1229   POTASSIUM mmol/L 4.2 4.2 4.4   CHLORIDE mmol/L 107 107 104   CO2 mmol/L    BUN mg/dL    CREATININE mg/dL 0.85 0.75 0.92   CALCIUM mg/dL 8.8 9.1 9.7     Lab Results   Component Value Date    CREATININE 0.85 2024    CREATININE 0.75 2024    CREATININE 0.92 2024      Results from last 7 days   Lab Units 24  0557 24  0542   MAGNESIUM mg/dL 2.1 2.2      Results from last 7 days   Lab Units 24  0542   HEMOGLOBIN A1C % 6.3*     Results from last 7 days   Lab Units 24  0542   TSH 3RD GENERATON uIU/mL 2.327     Lipid Profile:   Lab Results   Component Value Date    CHOL 194 2015    CHOL 197 10/28/2014    CHOL 196 2014     Lab Results   Component Value Date    HDL 54 2024    HDL 72 2020    HDL 77 (H) 2019     Lab Results   Component Value Date    LDLCALC 108 (H) 2024    LDLCALC 155 (H) 2020    LDLCALC 137 (H) 2019     Lab Results   Component Value Date    TRIG 167 (H) 2024    TRIG 118 2020    TRIG 180 (H) 2019       Cardiac testing:   Results for orders placed during the hospital encounter of 10/22/20    Echo complete with contrast if indicated    [id number] [place] [address] [phone]    Transthoracic Echocardiogram  2D, M-mode, Doppler, and Color Doppler    Study date:  22-Oct-2020    Patient: GAVIOTA MONTES DE OCA  MR number: [id number]  Account number: [id number]  : 1931  Age: 88 years  Gender: Female  Status: Outpatient  Location: [facility]  Height: 59 in  Weight: 130 lb  BP: 142/ 70 mmHg    Indications: Shortness of breath.    Diagnoses: R06.02 - Shortness of breath    Sonographer:  Chata Ortiz RDCS  Referring Physician:  Lucho Ochoa MD  Group:  [facility]  Interpreting Physician:  Jacky Delgado,  MD    SUMMARY    LEFT VENTRICLE:  Size was normal.  Systolic function was normal. Ejection fraction was estimated to be 60 %.  Wall thickness was mildly increased.  Doppler parameters were consistent with abnormal left ventricular relaxation (grade 1 diastolic dysfunction).    RIGHT VENTRICLE:  The size was normal.  Systolic function was normal.    LEFT ATRIUM:  The atrium was mildly dilated.    MITRAL VALVE:  There was mild regurgitation.    AORTIC VALVE:  There was trace regurgitation.    TRICUSPID VALVE:  There was mild regurgitation.  Pulmonary artery systolic pressure was mildly increased.  The findings suggest mild pulmonary hypertension.    HISTORY: PRIOR HISTORY: hyperlipidemia, hypertension, vertigo    PROCEDURE: The study was performed in the [facility]. This was a routine study. The transthoracic approach was used. The study included complete 2D imaging, M-mode, complete spectral Doppler, and color Doppler. The heart rate was  75 bpm, at the start of the study. Images were obtained from the parasternal, apical, subcostal, and suprasternal notch acoustic windows. Image quality was adequate.    LEFT VENTRICLE: Size was normal. Systolic function was normal. Ejection fraction was estimated to be 60 %. There were no regional wall motion abnormalities. Wall thickness was mildly increased. DOPPLER: Doppler parameters were consistent  with abnormal left ventricular relaxation (grade 1 diastolic dysfunction).    RIGHT VENTRICLE: The size was normal. Systolic function was normal. Wall thickness was normal.    LEFT ATRIUM: The atrium was mildly dilated.    RIGHT ATRIUM: Size was normal.    MITRAL VALVE: Valve structure was normal. There was normal leaflet separation. DOPPLER: The transmitral velocity was within the normal range. There was no evidence for stenosis. There was mild regurgitation.    AORTIC VALVE: The valve was trileaflet. Leaflets exhibited mildly increased thickness, normal cuspal separation,  and sclerosis. DOPPLER: Transaortic velocity was within the normal range. There was no evidence for stenosis. There was trace  regurgitation.    TRICUSPID VALVE: The valve structure was normal. There was normal leaflet separation. DOPPLER: The transtricuspid velocity was within the normal range. There was no evidence for stenosis. There was mild regurgitation. Pulmonary artery  systolic pressure was mildly increased. Estimated peak PA pressure was 41 mmHg. The findings suggest mild pulmonary hypertension.    PULMONIC VALVE: Leaflets exhibited normal thickness, no calcification, and normal cuspal separation. DOPPLER: The transpulmonic velocity was within the normal range. There was mild regurgitation.    PERICARDIUM: There was no pericardial effusion. The pericardium was normal in appearance.    AORTA: The root exhibited normal size.    SYSTEMIC VEINS: IVC: The inferior vena cava was normal in size and course. Respirophasic changes were normal.    SYSTEM MEASUREMENT TABLES    2D  %FS: 40.92 %  AVC: 354.92 ms  Ao Diam: 2.69 cm  EDV(Teich): 78.84 ml  EF(Teich): 72.11 %  ESV(Teich): 21.98 ml  HR_2Ch_Q: 67.42 BPM  HR_4Ch_Q: 68.7 BPM  IVSd: 0.82 cm  LA Area: 23.73 cm2  LA Diam: 3.17 cm  LVCO_2Ch_Q: 2.5 L/min  LVCO_4Ch_Q: 2.88 L/min  LVCO_BiP_Q: 2.69 L/min  LVEDV MOD A4C: 96.52 ml  LVEF MOD A4C: 52.58 %  LVEF_2Ch_Q: 64.91 %  LVEF_4Ch_Q: 61.72 %  LVEF_BiP_Q: 62.96 %  LVESV MOD A4C: 45.77 ml  LVIDd: 4.21 cm  LVIDs: 2.48 cm  LVLd A4C: 7.28 cm  LVLd_2Ch_Q: 6.02 cm  LVLd_4Ch_Q: 7.17 cm  LVLs A4C: 6.07 cm  LVLs_2Ch_Q: 4.71 cm  LVLs_4Ch_Q: 5.64 cm  LVPWd: 0.9 cm  LVSV_2Ch_Q: 37.13 ml  LVSV_4Ch_Q: 41.88 ml  LVSV_BiP_Q: 39.12 ml  LVVED_2Ch_Q: 57.2 ml  LVVED_4Ch_Q: 67.85 ml  LVVED_BiP_Q: 62.13 ml  LVVES_2Ch_Q: 20.07 ml  LVVES_4Ch_Q: 25.97 ml  LVVES_BiP_Q: 23.02 ml  RA Area: 17.09 cm2  RVIDd: 3.26 cm  RWT: 0.43  SV MOD A4C: 50.74 ml  SV(Teich): 56.85 ml    CW  TR MaxP.21 mmHg  TR Vmax: 2.79 m/s    MM  TAPSE: 1.84 cm    PW  E'  Sept: 0.06 m/s  E/E' Sept: 12.43  MV A Chirag: 1.23 m/s  MV Dec Charlton: 4.01 m/s2  MV DecT: 201.3 ms  MV E Chirag: 0.8 m/s  MV E/A Ratio: 0.65    [facility] Accredited [facility]chocardi[facility]    Prepared and electronically signed by    Jacky Delgado MD  Signed 22-Oct-2020 16:02:42    No results found for this or any previous visit.    No results found for this or any previous visit.    No valid procedures specified.  No results found for this or any previous visit.      Meds/Allergies   all current active meds have been reviewed and current meds:   Current Facility-Administered Medications   Medication Dose Route Frequency    acetaminophen (TYLENOL) tablet 975 mg  975 mg Oral BID PRN    amLODIPine (NORVASC) tablet 5 mg  5 mg Oral Daily    apixaban (ELIQUIS) tablet 2.5 mg  2.5 mg Oral BID    fish oil capsule 1,000 mg  1,000 mg Oral Daily    lisinopril (ZESTRIL) tablet 20 mg  20 mg Oral Daily    metoprolol tartrate (LOPRESSOR) tablet 50 mg  50 mg Oral Q12H LOLITA    multivitamin stress formula tablet 1 tablet  1 tablet Oral Daily    polyethylene glycol (MIRALAX) packet 17 g  17 g Oral Daily     Facility-Administered Medications Prior to Admission   Medication    Botulinum Toxin Type A SOLR 200 Units    onabotulinumtoxin A (BOTOX) injection 200 Units     Medications Prior to Admission   Medication    acetaminophen (TYLENOL) 500 mg tablet    amLODIPine-benazepril (LOTREL) 10-20 MG per capsule    apixaban (Eliquis) 5 mg    Multiple Vitamins-Minerals (MULTIVITAL) tablet    Omega-3 Fatty Acids (FISH OIL) 645 MG CAPS    calcium citrate-vitamin D (CITRACAL+D) 315-200 MG-UNIT per tablet    magnesium hydroxide (MILK OF MAGNESIA) 400 mg/5 mL oral suspension            Assessment:  Principal Problem:    Stroke/ Seizure-like symptoms  Active Problems:    Paroxysmal atrial fibrillation (HCC)    Essential hypertension    Hyperlipidemia    Deep vein thrombosis (DVT) of femoral vein of right lower extremity (HCC)    TIA  (transient ischemic attack)      Counseling / Coordination of Care  Total floor / unit time spent today 20 minutes.  Greater than 50% of total time was spent with the patient and / or family counseling and / or coordination of care.      ** Please Note: Dragon 360 Dictation voice to text software may have been used in the creation of this document. **

## 2024-03-25 ENCOUNTER — TRANSITIONAL CARE MANAGEMENT (OUTPATIENT)
Dept: FAMILY MEDICINE CLINIC | Facility: MEDICAL CENTER | Age: 89
End: 2024-03-25

## 2024-03-25 NOTE — ED ATTENDING ATTESTATION
3/20/2024  ITanika MD, saw and evaluated the patient. I have discussed the patient with the resident/non-physician practitioner and agree with the resident's/non-physician practitioner's findings, Plan of Care, and MDM as documented in the resident's/non-physician practitioner's note, except where noted. All available labs and Radiology studies were reviewed.  I was present for key portions of any procedure(s) performed by the resident/non-physician practitioner and I was immediately available to provide assistance.       At this point I agree with the current assessment done in the Emergency Department.  I have conducted an independent evaluation of this patient a history and physical is as follows:    ED Course  ED Course as of 03/25/24 0134   Wed Mar 20, 2024   1209 POC Glucose(!): 181    89-year-old woman presenting to the emergency department for altered mental status.  Her past medical history is significant for TIA on apixaban.  Caretaker reports that around 11 AM today, patient was reading the paper then all of a sudden started having tremulous movement in the right upper extremity then developed a right-sided facial droop and became minimally responsive.  This self resolved within minutes.  Patient has had previous episodes of this in the past which were diagnosed as a TIA.  On arrival to the ED, patient is at baseline mentation, and has no focal deficits.  Vital signs are within normal limits.   1353 Patient states that she feels well at this time.  Does not remember what happened.    Her physical exam is completely unremarkable.  Patient is well-appearing, cooperative, happy.  Heart, lung, limits.  No focal deficits.    Clinical presentation puts patient at risk for the following differential diagnoses:    TIA/CVA, electrolyte imbalance, infection/sepsis.  Lab work and imaging was ordered.   1451 CTA head and neck with and without contrast  CT brain:  No acute intracranial abnormality. Chronic  inferior right cerebellar infarct with encephalomalacia. Chronic microangiopathic ischemic changes.     CTA head: Negative for large vessel intracranial occlusion . Areas of moderate stenosis involving the bilateral intracranial vertebral arteries.     CTA neck:  No extracranial carotid stenosis. The right vertebral artery V2 segment is again not visualized and likely chronically occluded or hypoplastic. Attenuated enhancement of the right V1 and V3 segments. The left vertebral artery is patent with   moderate origin stenosis.     1524 Case discussed with general medicine for placement in obs tele.  EKG showed no STEMI.  CBC, CMP, UA grossly unremarkable aside from hyperglycemia.         Critical Care Time  Procedures

## 2024-03-29 ENCOUNTER — TELEPHONE (OUTPATIENT)
Dept: ADMINISTRATIVE | Facility: OTHER | Age: 89
End: 2024-03-29

## 2024-03-29 NOTE — TELEPHONE ENCOUNTER
03/29/24 11:32 AM    Patient contacted (left message daughter) to bring Advance Directive, POLST, or Living Will document to next scheduled pcp visit.    Thank you.  Mata Fountain [facility] VALUE BASED VIR

## 2024-04-01 ENCOUNTER — OFFICE VISIT (OUTPATIENT)
Dept: FAMILY MEDICINE CLINIC | Facility: MEDICAL CENTER | Age: 89
End: 2024-04-01
Payer: MEDICARE

## 2024-04-01 VITALS
TEMPERATURE: 99 F | SYSTOLIC BLOOD PRESSURE: 138 MMHG | OXYGEN SATURATION: 96 % | BODY MASS INDEX: 25.85 KG/M2 | WEIGHT: 128 LBS | RESPIRATION RATE: 18 BRPM | HEART RATE: 75 BPM | DIASTOLIC BLOOD PRESSURE: 66 MMHG

## 2024-04-01 DIAGNOSIS — Z76.89 ENCOUNTER FOR SUPPORT AND COORDINATION OF TRANSITION OF CARE: Primary | ICD-10-CM

## 2024-04-01 DIAGNOSIS — I10 ESSENTIAL HYPERTENSION: ICD-10-CM

## 2024-04-01 PROBLEM — R73.03 PREDIABETES: Status: ACTIVE | Noted: 2024-04-01

## 2024-04-01 PROBLEM — I51.89 DIASTOLIC DYSFUNCTION: Status: ACTIVE | Noted: 2024-04-01

## 2024-04-01 LAB
DME PARACHUTE DELIVERY DATE REQUESTED: NORMAL
DME PARACHUTE ITEM DESCRIPTION: NORMAL
DME PARACHUTE ITEM DESCRIPTION: NORMAL
DME PARACHUTE ORDER STATUS: NORMAL
DME PARACHUTE SUPPLIER NAME: NORMAL
DME PARACHUTE SUPPLIER PHONE: NORMAL

## 2024-04-01 PROCEDURE — 99495 TRANSJ CARE MGMT MOD F2F 14D: CPT | Performed by: STUDENT IN AN ORGANIZED HEALTH CARE EDUCATION/TRAINING PROGRAM

## 2024-04-01 NOTE — PROGRESS NOTES
[facility] - Clinic Note  Audra Bellamy DO, 24     Erika Quigley MRN: [id number] : 1931 Age: 89 y.o.     Assessment/Plan     1. Encounter for support and coordination of transition of care    -Patient presents for transition of care appointment after recent hospitalization for altered mental status, Neurology was consulted  -Pertinent labs and imaging from hospital stay reviewed  -3/21/2024 MRI brain  IMPRESSION:   No acute intracranial pathology. 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  -Patient has follow-up with Neurology 2024, messaged provider about recent hospitalization  -During hospital stay patient with new onset A-fib with RVR, Cardiology was consulted and medication regimen adjusted, patient has follow-up with Cardiology 4/10/24  -Home health services in place    2. Essential hypertension    - Basic metabolic panel; Future      Depression Screening and Follow-up Plan: Patient was screened for depression during today's encounter. They screened negative with a PHQ-9 score of 2.    Falls Plan of Care: balance, strength, and gait training instructions were provided. Recommended assistive device to help with gait and balance.       Erika Quigley acknowledged understanding of treatment plan, all questions answered.    Subjective      Erika Quigley is a 89 y.o. female who presents for transition of care appointment.  Patient was hospitalized from 3/20/2024 to 3/22/2024 for altered mental status.  Patient presents with her daughter and caregiver.  They report patient has been doing well since discharge home.  No complaints.  Appetite good.  Regular bowel movements.    Hospital course per discharge summary:  Erika Quigley is a 89 y.o. female patient who originally presented to the hospital on 3/20/2024 due to altered mental status concerning for stroke/seizure-like activity.  She had left upper extremity shaking, left-sided facial droop, left eye of  close forcefully, speech arrest, decreased responsiveness while at home.  Witnessed episode, patient was reading the paper and talking during onset of symptoms.  Reportedly, the symptoms lasted for 2 minutes.  Patient did not take her 5 minutes afterwards.  No urinary/fecal incontinence or tongue biting.  She reportedly returned back to normal state afterwards.  At baseline, she has on and off confusion.  She is able to recognize family members, she has a chronic left-sided facial droop and slurred speech.  In the emergency room, her vital signs were stable, lab work was unremarkable.  She was placed on the stroke pathway and neurology was consulted. During the course of her stay, she developed new onset atrial fibrillation with rapid ventricular response.  She converted back to normal sinus rhythm with IV Cardizem and was started on metoprolol tartrate 25 mg BID.  During her stay, cardiology was consulted and made some changes to her medication regimen.  She was switched to Eliquis 2.5 mg BID for history of DVT and now for atrial fibrillation.  Her amlodipine dose was reduced to 5 mg.  Lisinopril was continued at 20 mg.  Her Metroprolol tartrate 25 mg BID was increased cardiology to 50 mg BID.  However, on the day of discharge, she remained in sinus rhythm however asymptomatic bradycardic episodes on telemetry with heart rate in the upper 40s.  As a result, her metoprolol tartrate dose was reduced to 25 mg BID. Plan for follow-up with primary care physician within 1 week.  Plan for follow-up with cardiology as an outpatient.  Plan for follow-up with neurology as outpatient. PT/OT recommended postacute rehab.  Family wanted home with home health.discharge home because patient has caregiver or daughter taking care of her at all times.  Stable for discharge.       [facility] Call       Date and time call was made  3/25/2024  9:06 AM    Hospital care reviewed  Records reviewed    Patient was hospitialized at  [facility]  [place]    Date of Admission  03/20/24    Date of discharge  03/22/24    Diagnosis  Stroke/ Seizure-like symptoms    Disposition  Home    Were the patients medications reviewed and updated  No    Current Symptoms  None          TCM Call       Post hospital issues  None    Should patient be enrolled in anticoag monitoring?  No    Scheduled for follow up?  Yes    Patients specialists  Neurologist    Did you obtain your prescribed medications  Yes    Do you need help managing your prescriptions or medications  No    Is transportation to your appointment needed  No    I have advised the patient to call PCP with any new or worsening symptoms  Suzie Barksdale    Living Arrangements  Alone    Are you recieving any outpatient services  No    Are you recieving home care services  Yes    Types of home care services  Nurse visit    Counseling  Family            The following portions of the patient's history were reviewed and updated as appropriate: allergies, current medications, past family history, past medical history, past social history, past surgical history and problem list.     Past Medical History:   Diagnosis Date    Anxiety 2010    Bladder infection 09/2019    Breast lump in female     HL (hearing loss) 2010    Hypertension 2010    Osteopenia     Ovarian cyst        No Known Allergies    Past Surgical History:   Procedure Laterality Date    BLADDER SURGERY      BREAST BIOPSY      CATARACT EXTRACTION Bilateral 01/01/2015    , Right Eye-2017, Left eye    ENDOMETRIAL BIOPSY      by Suction    HYSTERECTOMY         Family History   Problem Relation Age of Onset    Heart disease Mother     Heart disease Father     Hypertension Family     Osteoporosis Family        Social History     Socioeconomic History    Marital status:      Spouse name: None    Number of children: None    Years of education: None    Highest education level: None   Occupational History    None   Tobacco Use    Smoking status: Never     Smokeless tobacco: Never   Vaping Use    Vaping status: Never Used   Substance and Sexual Activity    Alcohol use: Not Currently    Drug use: No    Sexual activity: Not Currently   Other Topics Concern    None   Social History Narrative    Denied consumption of coffee    Active daily tea consumption    Denied ingesting cola containing caffeine         Social Determinants of Health     Financial Resource Strain: Low Risk  (11/13/2023)    Overall Financial Resource Strain (CARDIA)     Difficulty of Paying Living Expenses: Not very hard   Food Insecurity: No Food Insecurity (3/21/2024)    Hunger Vital Sign     Worried About Running Out of Food in the Last Year: Never true     Ran Out of Food in the Last Year: Never true   Transportation Needs: No Transportation Needs (3/21/2024)    PRAPARE - Transportation     Lack of Transportation (Medical): No     Lack of Transportation (Non-Medical): No   Physical Activity: Not on file   Stress: Not on file   Social Connections: Not on file   Intimate Partner Violence: Not on file   Housing Stability: Low Risk  (3/21/2024)    Housing Stability Vital Sign     Unable to Pay for Housing in the Last Year: No     Number of Places Lived in the Last Year: 1     Unstable Housing in the Last Year: No       Current Outpatient Medications   Medication Sig Dispense Refill    acetaminophen (TYLENOL) 500 mg tablet Take 1,300 mg by mouth 2 (two) times a day as needed for mild pain      amLODIPine-benazepril (LOTREL 5-20) 5-20 MG per capsule Take 1 capsule by mouth daily 30 capsule 0    apixaban (ELIQUIS) 2.5 mg Take 1 tablet (2.5 mg total) by mouth 2 (two) times a day 60 tablet 0    metoprolol tartrate (LOPRESSOR) 25 mg tablet Take 1 tablet (25 mg total) by mouth every 12 (twelve) hours 60 tablet 0    Multiple Vitamins-Minerals (MULTIVITAL) tablet Take 1 tablet by mouth daily      Omega-3 Fatty Acids (FISH OIL) 645 MG CAPS Take 1 tablet by mouth daily       Current Facility-Administered  Medications   Medication Dose Route Frequency Provider Last Rate Last Admin    onabotulinumtoxin A (BOTOX) injection 200 Units  200 Units Intramuscular Q3 Months Mira Hernandez MD   200 Units at 01/08/24 1400       Review of Systems     As noted in HPI    Objective      /66 (BP Location: Left arm, Patient Position: Sitting, Cuff Size: Standard)   Pulse 75   Temp 99 °F (37.2 °C) (Temporal)   Resp 18   Wt 58.1 kg (128 lb)   SpO2 96%   BMI 25.85 kg/m²     Physical Exam  Vitals reviewed.   Constitutional:       General: She is not in acute distress.     Appearance: Normal appearance. She is not ill-appearing or toxic-appearing.   HENT:      Head: Normocephalic and atraumatic.      Right Ear: External ear normal.      Left Ear: External ear normal.      Nose: Nose normal.      Mouth/Throat:      Mouth: Mucous membranes are moist.      Pharynx: Oropharynx is clear.   Eyes:      Extraocular Movements: Extraocular movements intact.      Conjunctiva/sclera: Conjunctivae normal.      Pupils: Pupils are equal, round, and reactive to light.   Cardiovascular:      Rate and Rhythm: Normal rate and regular rhythm.      Pulses: Normal pulses.      Heart sounds: Normal heart sounds.   Pulmonary:      Effort: Pulmonary effort is normal. No respiratory distress.      Breath sounds: No wheezing or rales.   Abdominal:      General: Bowel sounds are normal.      Palpations: Abdomen is soft.      Tenderness: There is no abdominal tenderness.   Musculoskeletal:      Cervical back: Neck supple.      Right lower leg: No edema.      Left lower leg: No edema.   Lymphadenopathy:      Cervical: No cervical adenopathy.   Skin:     General: Skin is warm and dry.      Capillary Refill: Capillary refill takes less than 2 seconds.   Neurological:      Mental Status: She is alert. Mental status is at baseline.   Psychiatric:         Mood and Affect: Mood normal.         Behavior: Behavior normal.         Thought Content: Thought  "content normal.             Some portions of this record may have been generated with voice recognition software. There may be translation, syntax, or grammatical errors. Occasional wrong word or \"sound-a-like\" substitutions may have occurred due to the inherent limitations of the voice recognition software. Read the chart carefully and recognize, using context, where substations may have occurred. If you have any questions, please contact the dictating provider for clarification or correction, as needed.  "

## 2024-04-02 ENCOUNTER — OFFICE VISIT (OUTPATIENT)
Dept: PAIN MEDICINE | Facility: CLINIC | Age: 89
End: 2024-04-02
Payer: MEDICARE

## 2024-04-02 ENCOUNTER — TELEPHONE (OUTPATIENT)
Age: 89
End: 2024-04-02

## 2024-04-02 ENCOUNTER — TELEPHONE (OUTPATIENT)
Dept: PAIN MEDICINE | Facility: CLINIC | Age: 89
End: 2024-04-02

## 2024-04-02 VITALS — HEART RATE: 86 BPM | DIASTOLIC BLOOD PRESSURE: 84 MMHG | SYSTOLIC BLOOD PRESSURE: 144 MMHG

## 2024-04-02 DIAGNOSIS — R30.0 DYSURIA: Primary | ICD-10-CM

## 2024-04-02 DIAGNOSIS — M48.062 SPINAL STENOSIS OF LUMBAR REGION WITH NEUROGENIC CLAUDICATION: Primary | Chronic | ICD-10-CM

## 2024-04-02 DIAGNOSIS — D49.2 NERVE SHEATH TUMOR: ICD-10-CM

## 2024-04-02 PROCEDURE — 99214 OFFICE O/P EST MOD 30 MIN: CPT | Performed by: PHYSICAL MEDICINE & REHABILITATION

## 2024-04-02 NOTE — TELEPHONE ENCOUNTER
Okay to hold Eliquis 3 days prior to procedure and resume promptly thereafter as long as no contraindication such as major bleeding.  Patient is currently being evaluated to rule out UTI, will follow-up urine testing and advise.

## 2024-04-02 NOTE — TELEPHONE ENCOUNTER
Mervat with [facility] called patient is having burning when urinates and would like a order for UA with [facility]. States did leave a cup in patient's home. If any questions can be reached at [phone]

## 2024-04-02 NOTE — TELEPHONE ENCOUNTER
Scheduled patient for TFESI 04/22/2024  Patient is taking eliquis  Nothing to eat or drink 1 hour prior to procedure  Needs to arrange transportation  Proper clothing for procedure  No vaccines 2 weeks prior or after procedure  If ill or place on antibiotics, please call to reschedule        Dear Dr. Bellamy ,    Patient has been scheduled for an epidural steroid injection with Dr. Keyes on 04/22/2024. The patient is currently taking Eliquis which needs to be held 3 days prior to procedure. Is the patient okay to hold medication for required number of days prior to procedure?

## 2024-04-02 NOTE — TELEPHONE ENCOUNTER
Mervat at [address] [place] [address] and dtr Hilda roberts also.  They will drop off specimen this afternoon at the lab.

## 2024-04-02 NOTE — PROGRESS NOTES
Pain Medicine Follow-Up Note    Assessment:  1. Spinal stenosis of lumbar region with neurogenic claudication    2. Nerve sheath tumor        Plan:  Ms. Quigley is a pleasant 89-year-old female significant past medical history of nerve sheath tumor presents to [facility] for follow-up and reevaluation regarding ongoing right-sided lower extremity pain.  Most recent imaging demonstrates a stable nerve sheath tumor at L2-L3.  All results were discussed with neurosurgery and she continues to not be a surgical candidate.  We will now proceed with previously discussed right-sided L3-L4 and L4-L5 TFESI under fluoroscopy guidance.  All questions answered, patient is agreeable with plan.    Complete risks and benefits including bleeding, infection, tissue reaction, nerve injury and allergic reaction were discussed. The approach was demonstrated using models and literature was provided. Verbal and written consent was obtained.    History of Present Illness:    Erika Quigley is a 89 y.o. female who presents to [facility] and [facility] for interval re-evaluation of the above stated pain complaints. The patient has a past medical and chronic pain history as outlined in the assessment section.  Patient presents to [facility] for follow-up and reevaluation regarding ongoing right hip and thigh pain currently rated 10 out of 10 and described as a intermittent shooting, throbbing pain.  Presents today for follow-up and reevaluation.  Other than as stated above, the patient denies any interval changes in medications, medical condition, mental condition, symptoms, or allergies since the last office visit.         Review of Systems:    Review of Systems   Constitutional:  Negative for unexpected weight change.   HENT:  Negative for ear pain.    Eyes:  Negative for visual disturbance.   Respiratory:  Negative for shortness of breath and wheezing.    Gastrointestinal:  Negative for  abdominal pain.   Musculoskeletal:  Positive for gait problem and joint swelling. Negative for back pain.   Neurological:  Positive for weakness. Negative for numbness.   Psychiatric/Behavioral:  Negative for decreased concentration.          Past Medical History:   Diagnosis Date    Anxiety 2010    Bladder infection 09/2019    Breast lump in female     HL (hearing loss) 2010    Hypertension 2010    Osteopenia     Ovarian cyst        Past Surgical History:   Procedure Laterality Date    BLADDER SURGERY      BREAST BIOPSY      CATARACT EXTRACTION Bilateral 01/01/2015    , Right Eye-2017, Left eye    ENDOMETRIAL BIOPSY      by Suction    HYSTERECTOMY         Family History   Problem Relation Age of Onset    Heart disease Mother     Heart disease Father     Hypertension Family     Osteoporosis Family        Social History     Occupational History    Not on file   Tobacco Use    Smoking status: Never    Smokeless tobacco: Never   Vaping Use    Vaping status: Never Used   Substance and Sexual Activity    Alcohol use: Not Currently    Drug use: No    Sexual activity: Not Currently         Current Outpatient Medications:     acetaminophen (TYLENOL) 500 mg tablet, Take 1,300 mg by mouth 2 (two) times a day as needed for mild pain, Disp: , Rfl:     amLODIPine-benazepril (LOTREL 5-20) 5-20 MG per capsule, Take 1 capsule by mouth daily, Disp: 30 capsule, Rfl: 0    apixaban (ELIQUIS) 2.5 mg, Take 1 tablet (2.5 mg total) by mouth 2 (two) times a day, Disp: 60 tablet, Rfl: 0    metoprolol tartrate (LOPRESSOR) 25 mg tablet, Take 1 tablet (25 mg total) by mouth every 12 (twelve) hours, Disp: 60 tablet, Rfl: 0    Multiple Vitamins-Minerals (MULTIVITAL) tablet, Take 1 tablet by mouth daily, Disp: , Rfl:     Omega-3 Fatty Acids (FISH OIL) 645 MG CAPS, Take 1 tablet by mouth daily, Disp: , Rfl:     Current Facility-Administered Medications:     onabotulinumtoxin A (BOTOX) injection 200 Units, 200 Units, Intramuscular, Q3 Months, Mira  Indu Hernandez MD, 200 Units at 01/08/24 1400    No Known Allergies    Physical Exam:    /84   Pulse 86     Constitutional:normal, well developed, well nourished, alert, in no distress and non-toxic and no overt pain behavior.  Eyes:anicteric  HEENT:grossly intact  Neck:supple, symmetric, trachea midline and no masses   Pulmonary:even and unlabored  Cardiovascular:No edema or pitting edema present  Skin:Normal without rashes or lesions and well hydrated  Psychiatric:Mood and affect appropriate  Neurologic:Cranial Nerves II-XII grossly intact  Musculoskeletal:antalgic and in wheelchair      Imaging  No orders to display         No orders of the defined types were placed in this encounter.

## 2024-04-03 ENCOUNTER — APPOINTMENT (OUTPATIENT)
Dept: LAB | Facility: MEDICAL CENTER | Age: 89
End: 2024-04-03
Payer: MEDICARE

## 2024-04-03 DIAGNOSIS — R30.0 DYSURIA: ICD-10-CM

## 2024-04-03 LAB
BACTERIA UR QL AUTO: ABNORMAL /HPF
BILIRUB UR QL STRIP: NEGATIVE
CAOX CRY URNS QL MICRO: ABNORMAL /HPF
CLARITY UR: CLEAR
COLOR UR: ABNORMAL
GLUCOSE UR STRIP-MCNC: NEGATIVE MG/DL
HGB UR QL STRIP.AUTO: NEGATIVE
HYALINE CASTS #/AREA URNS LPF: ABNORMAL /LPF
KETONES UR STRIP-MCNC: NEGATIVE MG/DL
LEUKOCYTE ESTERASE UR QL STRIP: NEGATIVE
MUCOUS THREADS UR QL AUTO: ABNORMAL
NITRITE UR QL STRIP: NEGATIVE
NON-SQ EPI CELLS URNS QL MICRO: ABNORMAL /HPF
PH UR STRIP.AUTO: 7 [PH]
PROT UR STRIP-MCNC: ABNORMAL MG/DL
RBC #/AREA URNS AUTO: ABNORMAL /HPF
SP GR UR STRIP.AUTO: 1.02 (ref 1–1.03)
UROBILINOGEN UR STRIP-ACNC: <2 MG/DL
WBC #/AREA URNS AUTO: ABNORMAL /HPF

## 2024-04-03 PROCEDURE — 81001 URINALYSIS AUTO W/SCOPE: CPT

## 2024-04-03 PROCEDURE — 87086 URINE CULTURE/COLONY COUNT: CPT

## 2024-04-03 NOTE — TELEPHONE ENCOUNTER
Pt is scheduled 4/22. Waiting for results of UA/culture currently in progress, to see if pt has active infection and is started on abx  Will then give eliquis hold instructions.

## 2024-04-04 DIAGNOSIS — R80.1 PERSISTENT PROTEINURIA: Primary | ICD-10-CM

## 2024-04-04 LAB — BACTERIA UR CULT: NORMAL

## 2024-04-04 NOTE — TELEPHONE ENCOUNTER
Epic and Milla dropped off at 1:40 pm patients daughter Hilda called back and she did speak with nurse.

## 2024-04-04 NOTE — TELEPHONE ENCOUNTER
S/w pt's daughter Hilda. Made aware Eliquis will need to be held 3 days prior to procedure, last dose of Eliquis will be 4/18. Hold Eliquis 4/19 until procedure. Verbalized understanding and appreciation.

## 2024-04-04 NOTE — TELEPHONE ENCOUNTER
Per communication consent, LMOM for daughter Hilda to CB. CB# [place] provided.    Procedure scheduled for 4/22 at [place].

## 2024-04-09 NOTE — PROGRESS NOTES
Cardiology  Hospital follow Up   Office Visit Note  Erika Quigley   89 y.o.   female   MRN: [id number]  [facility]  [address]    [facility] [address] [phone]    PCP: Audra Bellamy DO  Cardiologist: Will be Dr. Shepherd            Summary of recommendations  24-hour Holter monitor to assess heart rate control    Otherwise continue current cardiac medications  Periodic home blood pressure monitoring  Follow up will be scheduled with Dr Shepherd 3 months        Assessment/plan  PAF.  Recently diagnosed.  Recently admitted 3/20 - 3/22/2024with mental status changes.  [id number] 7-previously anticoagulated on Eliquis 5 mg BID for history of DVT  She had a single episode of PAF prior stroke noted on imaging of the brain.  Unknown duration.  Currently on apixaban, 2.5 mg twice daily given her advanced age, low body weight.  Now on a beta-blocker  We will obtain a 24-hour Holter to assess heart rate control, A-fib burden  Mental status changes when admitted; concern for stroke vs seizure like symptoms  Back to baseline; MRI with chronic right PICA distribution infarct but no acute abnormality  HTN.  /86 now on metoprolol tartrate 25 mg every 12, Lotrel 5/20 daily.  TIA/CVA on brain imaging  PAD  RLE DVT 2023  Prediabetes hemoglobin A1c 6.3, per primary care  Hyperlipidemia.  Given her advanced age and history of dementia, statin therapy not recommended.  Continue heart healthy diet  Right lower extremity pain secondary to a sheath umor treated with medical therapy  Dementia  Cardiac testing  TTE: 10/22/20: LVEF: 60%, normal RV, mildly elevated PA pressures  TTE 9/19/23:  LVEF: 60%.RV: normal.PASP: 39 mmHg.RVOT: no notching                  HPI  Erika Quigley is a 89-year-old female with HTN, HL, history RLE DVT 2023, dementia.  She has intermittent baseline speech difficulties and a chronic left facial droop.      ADM 3/20 - 3/22/2024  CC: Left upper extremity  shaking; left-sided facial droop ; witnessed decreased LOC  Altered mental status  Concern for seizure.  Continue for stroke.  Evaluated by Neurology  MRI brain: Chronic right PICA distribution infarct without acute pathology.  She developed atrial fibrillation with RVR ,converting to normal sinus after IV Cardizem  Evaluated by Cardiology  Of note the patient cannot provide any history.  Meds adjusted: Initiated on Eliquis 2.5 mg twice daily, amlodipine decreased to 5 mg daily, Metroprolol tartrate adjusted to 25 mg twice daily.  This was briefly uptitrated however decreased again due to bradycardia  Acute rehab recommended: patient discharged home with family support      4/10/2024   Hospital follow-up  She is accompanied by her daughter and her caregiver  The patient has no complaints.  The family and her caregiver offered no issues, of concern since discharge from a cardiac standpoint  She is adhering to the medical therapy prescribed at discharge  Her exam is unremarkable  Her blood pressure was 150/86, a little bit lower elsewhere in the last 2 checks.  Her heart rate is 70, and regular by exam  I recommend we continue the current plan and obtain a 24-hour Holter monitor to assess A-fib burden and heart rate control  She will return in 3 months unless she needs something prior to               Assessment  Diagnoses and all orders for this visit:    Hospital discharge follow-up    Pulmonary HTN ([facility])    PAF (paroxysmal atrial fibrillation) ([facility])  -     Holter monitor; Future    Mixed hyperlipidemia    MCI (mild cognitive impairment)    TIA (transient ischemic attack)    Deep vein thrombosis (DVT) of femoral vein of right lower extremity, unspecified chronicity ([facility])    Essential hypertension    PAD (peripheral artery disease) ([facility])          Past Medical History:   Diagnosis Date    Anxiety 2010    Bladder infection 09/2019    Breast lump in female     Deep vein thrombosis ([facility]) October, 2023    HL (hearing  loss) 2010    Hypertension 2010    Osteopenia     Ovarian cyst        Review of Systems   Unable to perform ROS: Dementia   All other systems reviewed and are negative.      No Known Allergies  .    Current Outpatient Medications:     acetaminophen (TYLENOL) 500 mg tablet, Take 1,300 mg by mouth 2 (two) times a day as needed for mild pain, Disp: , Rfl:     amLODIPine-benazepril (LOTREL 5-20) 5-20 MG per capsule, Take 1 capsule by mouth daily, Disp: 30 capsule, Rfl: 0    apixaban (ELIQUIS) 2.5 mg, Take 1 tablet (2.5 mg total) by mouth 2 (two) times a day, Disp: 60 tablet, Rfl: 0    metoprolol tartrate (LOPRESSOR) 25 mg tablet, Take 1 tablet (25 mg total) by mouth every 12 (twelve) hours, Disp: 60 tablet, Rfl: 0    Multiple Vitamins-Minerals (MULTIVITAL) tablet, Take 1 tablet by mouth daily, Disp: , Rfl:     Omega-3 Fatty Acids (FISH OIL) 645 MG CAPS, Take 1 tablet by mouth daily, Disp: , Rfl:     Current Facility-Administered Medications:     onabotulinumtoxin A (BOTOX) injection 200 Units, 200 Units, Intramuscular, Q3 Months, Mira Hernandez MD, 200 Units at 01/08/24 1400        Social History     Socioeconomic History    Marital status:      Spouse name: Not on file    Number of children: Not on file    Years of education: Not on file    Highest education level: Not on file   Occupational History    Not on file   Tobacco Use    Smoking status: Never    Smokeless tobacco: Never   Vaping Use    Vaping status: Never Used   Substance and Sexual Activity    Alcohol use: Not Currently    Drug use: No    Sexual activity: Not Currently   Other Topics Concern    Not on file   Social History Narrative    Denied consumption of coffee    Active daily tea consumption    Denied ingesting cola containing caffeine         Social Determinants of Health     Financial Resource Strain: Low Risk  (11/13/2023)    Overall Financial Resource Strain (CARDIA)     Difficulty of Paying Living Expenses: Not very hard   Food  "Insecurity: No Food Insecurity (3/21/2024)    Hunger Vital Sign     Worried About Running Out of Food in the Last Year: Never true     Ran Out of Food in the Last Year: Never true   Transportation Needs: No Transportation Needs (3/21/2024)    PRAPARE - Transportation     Lack of Transportation (Medical): No     Lack of Transportation (Non-Medical): No   Physical Activity: Not on file   Stress: Not on file   Social Connections: Not on file   Intimate Partner Violence: Not on file   Housing Stability: Low Risk  (3/21/2024)    Housing Stability Vital Sign     Unable to Pay for Housing in the Last Year: No     Number of Places Lived in the Last Year: 1     Unstable Housing in the Last Year: No       Family History   Problem Relation Age of Onset    Heart disease Mother     Heart disease Father     Hypertension Family     Osteoporosis Family        Physical Exam  Vitals and nursing note reviewed.   Constitutional:       General: She is not in acute distress.     Appearance: She is not diaphoretic.      Comments: Elderly female appearing her stated age.  In a wheelchair.  No acute distress.   HENT:      Head: Normocephalic and atraumatic.   Eyes:      Conjunctiva/sclera: Conjunctivae normal.   Cardiovascular:      Rate and Rhythm: Normal rate and regular rhythm.      Pulses: Intact distal pulses.      Heart sounds: Normal heart sounds.   Pulmonary:      Effort: Pulmonary effort is normal.      Breath sounds: Normal breath sounds.   Abdominal:      General: Bowel sounds are normal.      Palpations: Abdomen is soft.   Musculoskeletal:         General: Normal range of motion.      Cervical back: Normal range of motion and neck supple.   Skin:     General: Skin is warm and dry.   Neurological:      Mental Status: She is alert. Mental status is at baseline.      Comments: Baseline speech difficulty, left facial droop         Vitals: Blood pressure 161/81, pulse 70, height 4' 11\" (1.499 m), weight 57.7 kg (127 lb 4.8 oz), SpO2 " "97%, not currently breastfeeding.   Wt Readings from Last 3 Encounters:   04/10/24 57.7 kg (127 lb 4.8 oz)   24 58.1 kg (128 lb)   24 58.1 kg (128 lb)         Labs & Results:  Lab Results   Component Value Date    WBC 7.09 2024    HGB 11.1 (L) 2024    HCT 34.3 (L) 2024    MCV 96 2024     2024     No results found for: \"BNP\"  No components found for: \"CHEM\"  Total CK   Date Value Ref Range Status   2022 633 (H) 26 - 192 U/L Final     Troponin I   Date Value Ref Range Status   10/05/2019 <0.02 <=0.04 ng/mL Final     Comment:       [employer] Chemistry analyzer 99% cutoff is > 0.04 ng/mL in network labs     o cTnI 99% cutoff is useful only when applied to patients in the clinical setting of myocardial ischemia   o cTnI 99% cutoff should be interpreted in the context of clinical history, ECG findings and possibly cardiac imaging to establish correct diagnosis.   o cTnI 99% cutoff may be suggestive but clearly not indicative of a coronary event without the clinical setting of myocardial ischemia.       CK-MB Index   Date Value Ref Range Status   2022 1.2 0.0 - 2.5 % Final     Results for orders placed during the hospital encounter of 10/22/20    Echo complete with contrast if indicated    [id number] [place] [address] [phone]    Transthoracic Echocardiogram  2D, M-mode, Doppler, and Color Doppler    Study date:  22-Oct-2020    Patient: GAVIOTA MONTES DE OCA  MR number: [id number]  Account number: [id number]  : 1931  Age: 88 years  Gender: Female  Status: Outpatient  Location: [facility]  Height: 59 in  Weight: 130 lb  BP: 142/ 70 mmHg    Indications: Shortness of breath.    Diagnoses: R06.02 - Shortness of breath    Sonographer:  Chata Ortiz RDCS  Referring Physician:  Lucho Ochoa MD  Group:  [facility]  Interpreting Physician:  Jacky Delgado MD    SUMMARY    LEFT " VENTRICLE:  Size was normal.  Systolic function was normal. Ejection fraction was estimated to be 60 %.  Wall thickness was mildly increased.  Doppler parameters were consistent with abnormal left ventricular relaxation (grade 1 diastolic dysfunction).    RIGHT VENTRICLE:  The size was normal.  Systolic function was normal.    LEFT ATRIUM:  The atrium was mildly dilated.    MITRAL VALVE:  There was mild regurgitation.    AORTIC VALVE:  There was trace regurgitation.    TRICUSPID VALVE:  There was mild regurgitation.  Pulmonary artery systolic pressure was mildly increased.  The findings suggest mild pulmonary hypertension.    HISTORY: PRIOR HISTORY: hyperlipidemia, hypertension, vertigo    PROCEDURE: The study was performed in the [facility]. This was a routine study. The transthoracic approach was used. The study included complete 2D imaging, M-mode, complete spectral Doppler, and color Doppler. The heart rate was  75 bpm, at the start of the study. Images were obtained from the parasternal, apical, subcostal, and suprasternal notch acoustic windows. Image quality was adequate.    LEFT VENTRICLE: Size was normal. Systolic function was normal. Ejection fraction was estimated to be 60 %. There were no regional wall motion abnormalities. Wall thickness was mildly increased. DOPPLER: Doppler parameters were consistent  with abnormal left ventricular relaxation (grade 1 diastolic dysfunction).    RIGHT VENTRICLE: The size was normal. Systolic function was normal. Wall thickness was normal.    LEFT ATRIUM: The atrium was mildly dilated.    RIGHT ATRIUM: Size was normal.    MITRAL VALVE: Valve structure was normal. There was normal leaflet separation. DOPPLER: The transmitral velocity was within the normal range. There was no evidence for stenosis. There was mild regurgitation.    AORTIC VALVE: The valve was trileaflet. Leaflets exhibited mildly increased thickness, normal cuspal separation, and sclerosis.  DOPPLER: Transaortic velocity was within the normal range. There was no evidence for stenosis. There was trace  regurgitation.    TRICUSPID VALVE: The valve structure was normal. There was normal leaflet separation. DOPPLER: The transtricuspid velocity was within the normal range. There was no evidence for stenosis. There was mild regurgitation. Pulmonary artery  systolic pressure was mildly increased. Estimated peak PA pressure was 41 mmHg. The findings suggest mild pulmonary hypertension.    PULMONIC VALVE: Leaflets exhibited normal thickness, no calcification, and normal cuspal separation. DOPPLER: The transpulmonic velocity was within the normal range. There was mild regurgitation.    PERICARDIUM: There was no pericardial effusion. The pericardium was normal in appearance.    AORTA: The root exhibited normal size.    SYSTEMIC VEINS: IVC: The inferior vena cava was normal in size and course. Respirophasic changes were normal.    SYSTEM MEASUREMENT TABLES    2D  %FS: 40.92 %  AVC: 354.92 ms  Ao Diam: 2.69 cm  EDV(Teich): 78.84 ml  EF(Teich): 72.11 %  ESV(Teich): 21.98 ml  HR_2Ch_Q: 67.42 BPM  HR_4Ch_Q: 68.7 BPM  IVSd: 0.82 cm  LA Area: 23.73 cm2  LA Diam: 3.17 cm  LVCO_2Ch_Q: 2.5 L/min  LVCO_4Ch_Q: 2.88 L/min  LVCO_BiP_Q: 2.69 L/min  LVEDV MOD A4C: 96.52 ml  LVEF MOD A4C: 52.58 %  LVEF_2Ch_Q: 64.91 %  LVEF_4Ch_Q: 61.72 %  LVEF_BiP_Q: 62.96 %  LVESV MOD A4C: 45.77 ml  LVIDd: 4.21 cm  LVIDs: 2.48 cm  LVLd A4C: 7.28 cm  LVLd_2Ch_Q: 6.02 cm  LVLd_4Ch_Q: 7.17 cm  LVLs A4C: 6.07 cm  LVLs_2Ch_Q: 4.71 cm  LVLs_4Ch_Q: 5.64 cm  LVPWd: 0.9 cm  LVSV_2Ch_Q: 37.13 ml  LVSV_4Ch_Q: 41.88 ml  LVSV_BiP_Q: 39.12 ml  LVVED_2Ch_Q: 57.2 ml  LVVED_4Ch_Q: 67.85 ml  LVVED_BiP_Q: 62.13 ml  LVVES_2Ch_Q: 20.07 ml  LVVES_4Ch_Q: 25.97 ml  LVVES_BiP_Q: 23.02 ml  RA Area: 17.09 cm2  RVIDd: 3.26 cm  RWT: 0.43  SV MOD A4C: 50.74 ml  SV(Teich): 56.85 ml    CW  TR MaxP.21 mmHg  TR Vmax: 2.79 m/s    MM  TAPSE: 1.84 cm    PW  E' Sept: 0.06  m/s  E/E' Sept: 12.43  MV A [place]: 1.23 m/s  MV Dec [place]: 4.01 m/s2  MV DecT: 201.3 ms  MV E [place]: 0.8 m/s  MV E/A Ratio: 0.65    [employer] Accredited Echocardiography Laboratory    Prepared and electronically signed by    Jacky Delgado MD  Signed 22-Oct-2020 16:02:42    No results found for this or any previous visit.      This note was completed in part utilizing [employer] direct voice recognition software.   Grammatical errors, random word insertion, spelling mistakes, and incomplete sentences may be an occasional consequence of the system secondary to software limitations, ambient noise and hardware issues. At the time of dictation, efforts were made to edit, clarify and /or correct errors.  Please read the chart carefully and recognize, using context, where substitutions have occurred.  If you have any questions or concerns about the context, text or information contained within the body of this dictation, please contact myself, the provider, for further clarification

## 2024-04-10 ENCOUNTER — OFFICE VISIT (OUTPATIENT)
Dept: CARDIOLOGY CLINIC | Facility: CLINIC | Age: 89
End: 2024-04-10
Payer: MEDICARE

## 2024-04-10 VITALS
SYSTOLIC BLOOD PRESSURE: 150 MMHG | HEIGHT: 59 IN | BODY MASS INDEX: 25.66 KG/M2 | HEART RATE: 70 BPM | DIASTOLIC BLOOD PRESSURE: 86 MMHG | WEIGHT: 127.3 LBS | OXYGEN SATURATION: 97 %

## 2024-04-10 DIAGNOSIS — I82.411 DEEP VEIN THROMBOSIS (DVT) OF FEMORAL VEIN OF RIGHT LOWER EXTREMITY, UNSPECIFIED CHRONICITY (HCC): ICD-10-CM

## 2024-04-10 DIAGNOSIS — I48.0 PAF (PAROXYSMAL ATRIAL FIBRILLATION) (HCC): ICD-10-CM

## 2024-04-10 DIAGNOSIS — I10 ESSENTIAL HYPERTENSION: Chronic | ICD-10-CM

## 2024-04-10 DIAGNOSIS — E78.2 MIXED HYPERLIPIDEMIA: Chronic | ICD-10-CM

## 2024-04-10 DIAGNOSIS — G31.84 MCI (MILD COGNITIVE IMPAIRMENT): Chronic | ICD-10-CM

## 2024-04-10 DIAGNOSIS — I10 ESSENTIAL HYPERTENSION: ICD-10-CM

## 2024-04-10 DIAGNOSIS — I48.0 PAROXYSMAL ATRIAL FIBRILLATION (HCC): ICD-10-CM

## 2024-04-10 DIAGNOSIS — Z09 HOSPITAL DISCHARGE FOLLOW-UP: Primary | ICD-10-CM

## 2024-04-10 DIAGNOSIS — I73.9 PAD (PERIPHERAL ARTERY DISEASE) (HCC): Chronic | ICD-10-CM

## 2024-04-10 DIAGNOSIS — G45.9 TIA (TRANSIENT ISCHEMIC ATTACK): ICD-10-CM

## 2024-04-10 DIAGNOSIS — I27.20 PULMONARY HTN (HCC): ICD-10-CM

## 2024-04-10 PROCEDURE — 99214 OFFICE O/P EST MOD 30 MIN: CPT | Performed by: NURSE PRACTITIONER

## 2024-04-10 RX ORDER — AMLODIPINE BESYLATE AND BENAZEPRIL HYDROCHLORIDE 5; 20 MG/1; MG/1
1 CAPSULE ORAL DAILY
Qty: 90 CAPSULE | Refills: 1 | Status: SHIPPED | OUTPATIENT
Start: 2024-04-10 | End: 2024-10-07

## 2024-04-10 NOTE — LETTER
April 10, 2024     Audra Bellamy DO  [address] [place] [address]  [address]  [facility] [address]    Patient: Erika Quigley   YOB: 1931   Date of Visit: 4/10/2024       Dear Dr. Bellamy:    Thank you for referring Erika Quigley to me for evaluation. Below are my notes for this consultation.    If you have questions, please do not hesitate to call me. I look forward to following your patient along with you.         Sincerely,        MAGDALENE Eckert        CC: MD Jeannette Moulton CRNP  4/10/2024 10:27 AM  Sign when Signing Visit  Cardiology  Hospital follow Up   Office Visit Note  Erika Quigley   89 y.o.   female   MRN: [id number]  [facility] [place]  [address] [place] [address] [address]  [facility] [address]  [phone]    PCP: Audra Bellamy DO  Cardiologist: Will be Dr. Shepherd            Summary of recommendations  24-hour Holter monitor to assess heart rate control    Otherwise continue current cardiac medications  Periodic home blood pressure monitoring  Follow up will be scheduled with Dr Shepherd 3 months        Assessment/plan  PAF.  Recently diagnosed.  Recently admitted 3/20 - 3/22/2024with mental status changes.  [id number]-previously anticoagulated on Eliquis 5 mg BID for history of DVT  She had a single episode of PAF prior stroke noted on imaging of the brain.  Unknown duration.  Currently on apixaban, 2.5 mg twice daily given her advanced age, low body weight.  Now on a beta-blocker  We will obtain a 24-hour Holter to assess heart rate control, A-fib burden  Mental status changes when admitted; concern for stroke vs seizure like symptoms  Back to baseline; MRI with chronic right PICA distribution infarct but no acute abnormality  HTN.  /86 now on metoprolol tartrate 25 mg every 12, Lotrel 5/20 daily.  TIA/CVA on brain imaging  PAD  RLE DVT 2023  Prediabetes hemoglobin A1c 6.3, per primary care  Hyperlipidemia.  Given her advanced age and  history of dementia, statin therapy not recommended.  Continue heart healthy diet  Right lower extremity pain secondary to a sheath umor treated with medical therapy  Dementia  Cardiac testing  TTE: 10/22/20: LVEF: 60%, normal RV, mildly elevated PA pressures  TTE 9/19/23:  LVEF: 60%.RV: normal.PASP: 39 mmHg.RVOT: no notching                  HPI  Erika Quigley is a 89-year-old female with HTN, HL, history RLE DVT 2023, dementia.  She has intermittent baseline speech difficulties and a chronic left facial droop.      ADM 3/20 - 3/22/2024  CC: Left upper extremity shaking; left-sided facial droop ; witnessed decreased LOC  Altered mental status  Concern for seizure.  Continue for stroke.  Evaluated by Neurology  MRI brain: Chronic right PICA distribution infarct without acute pathology.  She developed atrial fibrillation with RVR ,converting to normal sinus after IV Cardizem  Evaluated by Cardiology  Of note the patient cannot provide any history.  Meds adjusted: Initiated on Eliquis 2.5 mg twice daily, amlodipine decreased to 5 mg daily, Metroprolol tartrate adjusted to 25 mg twice daily.  This was briefly uptitrated however decreased again due to bradycardia  Acute rehab recommended: patient discharged home with family support      4/10/2024   Hospital follow-up  She is accompanied by her daughter and her caregiver  The patient has no complaints.  The family and her caregiver offered no issues, of concern since discharge from a cardiac standpoint  She is adhering to the medical therapy prescribed at discharge  Her exam is unremarkable  Her blood pressure was 150/86, a little bit lower elsewhere in the last 2 checks.  Her heart rate is 70, and regular by exam  I recommend we continue the current plan and obtain a 24-hour Holter monitor to assess A-fib burden and heart rate control  She will return in 3 months unless she needs something prior to               Assessment  Diagnoses and all orders for this  visit:    Hospital discharge follow-up    Pulmonary HTN ([facility])    PAF (paroxysmal atrial fibrillation) ([facility]ly [facility])  -     Holter monitor; Future    Mixed hyperlipidemia    MCI (mild cognitive impairment)    TIA (transient ischemic attack)    Deep vein thrombosis (DVT) of femoral vein of right lower extremity, unspecified chronicity ([facility])    Essential hypertension    PAD (peripheral artery disease) ([facility])          Past Medical History:   Diagnosis Date   • Anxiety 2010   • Bladder infection 09/2019   • Breast lump in female    • Deep vein thrombosis ([facility]) October, 2023   • HL (hearing loss) 2010   • Hypertension 2010   • Osteopenia    • Ovarian cyst        Review of Systems   Unable to perform ROS: Dementia   All other systems reviewed and are negative.      No Known Allergies  .    Current Outpatient Medications:   •  acetaminophen (TYLENOL) 500 mg tablet, Take 1,300 mg by mouth 2 (two) times a day as needed for mild pain, Disp: , Rfl:   •  amLODIPine-benazepril (LOTREL 5-20) 5-20 MG per capsule, Take 1 capsule by mouth daily, Disp: 30 capsule, Rfl: 0  •  apixaban (ELIQUIS) 2.5 mg, Take 1 tablet (2.5 mg total) by mouth 2 (two) times a day, Disp: 60 tablet, Rfl: 0  •  metoprolol tartrate (LOPRESSOR) 25 mg tablet, Take 1 tablet (25 mg total) by mouth every 12 (twelve) hours, Disp: 60 tablet, Rfl: 0  •  Multiple Vitamins-Minerals (MULTIVITAL) tablet, Take 1 tablet by mouth daily, Disp: , Rfl:   •  Omega-3 Fatty Acids (FISH OIL) 645 MG CAPS, Take 1 tablet by mouth daily, Disp: , Rfl:     Current Facility-Administered Medications:   •  onabotulinumtoxin A (BOTOX) injection 200 Units, 200 Units, Intramuscular, Q3 Months, Mira Hernandez MD, 200 Units at 01/08/24 1400        Social History     Socioeconomic History   • Marital status:      Spouse name: Not on file   • Number of children: Not on file   • Years of education: Not on file   • Highest education level: Not on file   Occupational History   • Not on file    Tobacco Use   • Smoking status: Never   • Smokeless tobacco: Never   Vaping Use   • Vaping status: Never Used   Substance and Sexual Activity   • Alcohol use: Not Currently   • Drug use: No   • Sexual activity: Not Currently   Other Topics Concern   • Not on file   Social History Narrative    Denied consumption of coffee    Active daily tea consumption    Denied ingesting cola containing caffeine         Social Determinants of Health     Financial Resource Strain: Low Risk  (11/13/2023)    Overall Financial Resource Strain (CARDIA)    • Difficulty of Paying Living Expenses: Not very hard   Food Insecurity: No Food Insecurity (3/21/2024)    Hunger Vital Sign    • Worried About Running Out of Food in the Last Year: Never true    • Ran Out of Food in the Last Year: Never true   Transportation Needs: No Transportation Needs (3/21/2024)    PRAPARE - Transportation    • Lack of Transportation (Medical): No    • Lack of Transportation (Non-Medical): No   Physical Activity: Not on file   Stress: Not on file   Social Connections: Not on file   Intimate Partner Violence: Not on file   Housing Stability: Low Risk  (3/21/2024)    Housing Stability Vital Sign    • Unable to Pay for Housing in the Last Year: No    • Number of Places Lived in the Last Year: 1    • Unstable Housing in the Last Year: No       Family History   Problem Relation Age of Onset   • Heart disease Mother    • Heart disease Father    • Hypertension Family    • Osteoporosis Family        Physical Exam  Vitals and nursing note reviewed.   Constitutional:       General: She is not in acute distress.     Appearance: She is not diaphoretic.      Comments: Elderly female appearing her stated age.  In a wheelchair.  No acute distress.   HENT:      Head: Normocephalic and atraumatic.   Eyes:      Conjunctiva/sclera: Conjunctivae normal.   Cardiovascular:      Rate and Rhythm: Normal rate and regular rhythm.      Pulses: Intact distal pulses.      Heart sounds:  "Normal heart sounds.   Pulmonary:      Effort: Pulmonary effort is normal.      Breath sounds: Normal breath sounds.   Abdominal:      General: Bowel sounds are normal.      Palpations: Abdomen is soft.   Musculoskeletal:         General: Normal range of motion.      Cervical back: Normal range of motion and neck supple.   Skin:     General: Skin is warm and dry.   Neurological:      Mental Status: She is alert. Mental status is at baseline.      Comments: Baseline speech difficulty, left facial droop         Vitals: Blood pressure 161/81, pulse 70, height 4' 11\" (1.499 m), weight 57.7 kg (127 lb 4.8 oz), SpO2 97%, not currently breastfeeding.   Wt Readings from Last 3 Encounters:   04/10/24 57.7 kg (127 lb 4.8 oz)   04/01/24 58.1 kg (128 lb)   03/22/24 58.1 kg (128 lb)         Labs & Results:  Lab Results   Component Value Date    WBC 7.09 03/22/2024    HGB 11.1 (L) 03/22/2024    HCT 34.3 (L) 03/22/2024    MCV 96 03/22/2024     03/22/2024     No results found for: \"BNP\"  No components found for: \"CHEM\"  Total CK   Date Value Ref Range Status   12/04/2022 633 (H) 26 - 192 U/L Final     Troponin I   Date Value Ref Range Status   10/05/2019 <0.02 <=0.04 ng/mL Final     Comment:       [employer] Chemistry analyzer 99% cutoff is > 0.04 ng/mL in network labs     o cTnI 99% cutoff is useful only when applied to patients in the clinical setting of myocardial ischemia   o cTnI 99% cutoff should be interpreted in the context of clinical history, ECG findings and possibly cardiac imaging to establish correct diagnosis.   o cTnI 99% cutoff may be suggestive but clearly not indicative of a coronary event without the clinical setting of myocardial ischemia.       CK-MB Index   Date Value Ref Range Status   12/04/2022 1.2 0.0 - 2.5 % Final     Results for orders placed during the hospital encounter of 10/22/20    Echo complete with contrast if indicated    [facility]  [address]  [place] " [id number]  [phone]    Transthoracic Echocardiogram  2D, M-mode, Doppler, and Color Doppler    Study date:  22-Oct-2020    Patient: GAVIOTA MONTES DE OCA  MR number: [id number]  Account number: [id number]  : 1931  Age: 88 years  Gender: Female  Status: Outpatient  Location: [facility]  Height: 59 in  Weight: 130 lb  BP: 142/ 70 mmHg    Indications: Shortness of breath.    Diagnoses: R06.02 - Shortness of breath    Sonographer:  Chata Ortiz RDCS  Referring Physician:  Lucho Ochoa MD  Group:  [facility]  Interpreting Physician:  Jacky Delgado MD    SUMMARY    LEFT VENTRICLE:  Size was normal.  Systolic function was normal. Ejection fraction was estimated to be 60 %.  Wall thickness was mildly increased.  Doppler parameters were consistent with abnormal left ventricular relaxation (grade 1 diastolic dysfunction).    RIGHT VENTRICLE:  The size was normal.  Systolic function was normal.    LEFT ATRIUM:  The atrium was mildly dilated.    MITRAL VALVE:  There was mild regurgitation.    AORTIC VALVE:  There was trace regurgitation.    TRICUSPID VALVE:  There was mild regurgitation.  Pulmonary artery systolic pressure was mildly increased.  The findings suggest mild pulmonary hypertension.    HISTORY: PRIOR HISTORY: hyperlipidemia, hypertension, vertigo    PROCEDURE: The study was performed in the [facility]. This was a routine study. The transthoracic approach was used. The study included complete 2D imaging, M-mode, complete spectral Doppler, and color Doppler. The heart rate was  75 bpm, at the start of the study. Images were obtained from the parasternal, apical, subcostal, and suprasternal notch acoustic windows. Image quality was adequate.    LEFT VENTRICLE: Size was normal. Systolic function was normal. Ejection fraction was estimated to be 60 %. There were no regional wall motion abnormalities. Wall thickness was mildly increased. DOPPLER: Doppler parameters were  consistent  with abnormal left ventricular relaxation (grade 1 diastolic dysfunction).    RIGHT VENTRICLE: The size was normal. Systolic function was normal. Wall thickness was normal.    LEFT ATRIUM: The atrium was mildly dilated.    RIGHT ATRIUM: Size was normal.    MITRAL VALVE: Valve structure was normal. There was normal leaflet separation. DOPPLER: The transmitral velocity was within the normal range. There was no evidence for stenosis. There was mild regurgitation.    AORTIC VALVE: The valve was trileaflet. Leaflets exhibited mildly increased thickness, normal cuspal separation, and sclerosis. DOPPLER: Transaortic velocity was within the normal range. There was no evidence for stenosis. There was trace  regurgitation.    TRICUSPID VALVE: The valve structure was normal. There was normal leaflet separation. DOPPLER: The transtricuspid velocity was within the normal range. There was no evidence for stenosis. There was mild regurgitation. Pulmonary artery  systolic pressure was mildly increased. Estimated peak PA pressure was 41 mmHg. The findings suggest mild pulmonary hypertension.    PULMONIC VALVE: Leaflets exhibited normal thickness, no calcification, and normal cuspal separation. DOPPLER: The transpulmonic velocity was within the normal range. There was mild regurgitation.    PERICARDIUM: There was no pericardial effusion. The pericardium was normal in appearance.    AORTA: The root exhibited normal size.    SYSTEMIC VEINS: IVC: The inferior vena cava was normal in size and course. Respirophasic changes were normal.    SYSTEM MEASUREMENT TABLES    2D  %FS: 40.92 %  AVC: 354.92 ms  Ao Diam: 2.69 cm  EDV(Teich): 78.84 ml  EF(Teich): 72.11 %  ESV(Teich): 21.98 ml  HR_2Ch_Q: 67.42 BPM  HR_4Ch_Q: 68.7 BPM  IVSd: 0.82 cm  LA Area: 23.73 cm2  LA Diam: 3.17 cm  LVCO_2Ch_Q: 2.5 L/min  LVCO_4Ch_Q: 2.88 L/min  LVCO_BiP_Q: 2.69 L/min  LVEDV MOD A4C: 96.52 ml  LVEF MOD A4C: 52.58 %  LVEF_2Ch_Q: 64.91 %  LVEF_4Ch_Q:  61.72 %  LVEF_BiP_Q: 62.96 %  LVESV MOD A4C: 45.77 ml  LVIDd: 4.21 cm  LVIDs: 2.48 cm  LVLd A4C: 7.28 cm  LVLd_2Ch_Q: 6.02 cm  LVLd_4Ch_Q: 7.17 cm  LVLs A4C: 6.07 cm  LVLs_2Ch_Q: 4.71 cm  LVLs_4Ch_Q: 5.64 cm  LVPWd: 0.9 cm  LVSV_2Ch_Q: 37.13 ml  LVSV_4Ch_Q: 41.88 ml  LVSV_BiP_Q: 39.12 ml  LVVED_2Ch_Q: 57.2 ml  LVVED_4Ch_Q: 67.85 ml  LVVED_BiP_Q: 62.13 ml  LVVES_2Ch_Q: 20.07 ml  LVVES_4Ch_Q: 25.97 ml  LVVES_BiP_Q: 23.02 ml  RA Area: 17.09 cm2  RVIDd: 3.26 cm  RWT: 0.43  SV MOD A4C: 50.74 ml  SV(Teich): 56.85 ml    CW  TR MaxP.21 mmHg  TR Vmax: 2.79 m/s    MM  TAPSE: 1.84 cm    PW  E' Sept: 0.06 m/s  E/E' Sept: 12.43  MV A Chirag: 1.23 m/s  MV Dec Lipscomb: 4.01 m/s2  MV DecT: 201.3 ms  MV E Chirag: 0.8 m/s  MV E/A Ratio: 0.65    [employer] Commission Accredited Echocardiography Laboratory    Prepared and electronically signed by    Jacky Delgado MD  Signed 22-Oct-2020 16:02:42    No results found for this or any previous visit.      This note was completed in part utilizing [employer] direct voice recognition software.   Grammatical errors, random word insertion, spelling mistakes, and incomplete sentences may be an occasional consequence of the system secondary to software limitations, ambient noise and hardware issues. At the time of dictation, efforts were made to edit, clarify and /or correct errors.  Please read the chart carefully and recognize, using context, where substitutions have occurred.  If you have any questions or concerns about the context, text or information contained within the body of this dictation, please contact myself, the provider, for further clarification

## 2024-04-10 NOTE — TELEPHONE ENCOUNTER
Reason for call:   [x] Refill   [] Prior Auth  [] Other:     Office:   [x] PCP/Provider -   [] Specialty/Provider -     AMLODIPINE  5-20 MG    ELIQUIS  2.5 MG    LOPRESSOR  25 MG    PHARMACY  [facility]    Does the patient have enough for 3 days?   [x] Yes   [] No - Send as HP to POD

## 2024-04-15 ENCOUNTER — TELEPHONE (OUTPATIENT)
Dept: NEUROLOGY | Facility: CLINIC | Age: 89
End: 2024-04-15

## 2024-04-15 NOTE — TELEPHONE ENCOUNTER
Called patient and spoke with her regarding HFU. Patient declined to schedule. I did advise her that she can schedule an HFU up to 1 yr from her d/c date, anything after that would be a new patient appt. She verbalized understanding.    Not scheduling HFU at this time , SPOKE TO PATIENT'S DAUGHTER WHO STATED WOULD LIKE TO HOLD OFF ON THE HFU FOR NOW       Thank you,     Vicky       HFU/ ETELVINA  / Stroke/ Seizure-like symptoms     VT- HOME- 3/22/2024    Erika Quigley will need follow up in 6-8 weeks with movement disorder and memory attending. She will not require outpatient neurological testing.

## 2024-04-22 ENCOUNTER — HOSPITAL ENCOUNTER (OUTPATIENT)
Dept: RADIOLOGY | Facility: CLINIC | Age: 89
Discharge: HOME | End: 2024-04-22
Payer: MEDICARE

## 2024-04-22 VITALS
OXYGEN SATURATION: 90 % | SYSTOLIC BLOOD PRESSURE: 149 MMHG | DIASTOLIC BLOOD PRESSURE: 79 MMHG | HEART RATE: 73 BPM | TEMPERATURE: 99.4 F | RESPIRATION RATE: 18 BRPM

## 2024-04-22 DIAGNOSIS — M51.16 LUMBAR DISC DISEASE WITH RADICULOPATHY: ICD-10-CM

## 2024-04-22 PROCEDURE — 64484 NJX AA&/STRD TFRM EPI L/S EA: CPT | Performed by: PHYSICAL MEDICINE & REHABILITATION

## 2024-04-22 PROCEDURE — 64483 NJX AA&/STRD TFRM EPI L/S 1: CPT | Performed by: PHYSICAL MEDICINE & REHABILITATION

## 2024-04-22 RX ORDER — LIDOCAINE HYDROCHLORIDE 10 MG/ML
4 INJECTION, SOLUTION EPIDURAL; INFILTRATION; INTRACAUDAL; PERINEURAL ONCE
Status: COMPLETED | OUTPATIENT
Start: 2024-04-22 | End: 2024-04-22

## 2024-04-22 RX ORDER — PAPAVERINE HCL 150 MG
20 CAPSULE, EXTENDED RELEASE ORAL ONCE
Status: COMPLETED | OUTPATIENT
Start: 2024-04-22 | End: 2024-04-22

## 2024-04-22 RX ORDER — METHYLPREDNISOLONE ACETATE 40 MG/ML
40 INJECTION, SUSPENSION INTRA-ARTICULAR; INTRALESIONAL; INTRAMUSCULAR; PARENTERAL; SOFT TISSUE ONCE
Status: DISCONTINUED | OUTPATIENT
Start: 2024-04-22 | End: 2024-04-22

## 2024-04-22 RX ORDER — BUPIVACAINE HCL/PF 2.5 MG/ML
2 VIAL (ML) INJECTION ONCE
Status: COMPLETED | OUTPATIENT
Start: 2024-04-22 | End: 2024-04-22

## 2024-04-22 RX ADMIN — LIDOCAINE HYDROCHLORIDE 2 ML: 10 INJECTION, SOLUTION EPIDURAL; INFILTRATION; INTRACAUDAL; PERINEURAL at 10:35

## 2024-04-22 RX ADMIN — IOHEXOL 1 ML: 300 INJECTION, SOLUTION INTRAVENOUS at 10:39

## 2024-04-22 RX ADMIN — DEXAMETHASONE SODIUM PHOSPHATE 20 MG: 10 INJECTION, SOLUTION INTRAMUSCULAR; INTRAVENOUS at 10:39

## 2024-04-22 RX ADMIN — BUPIVACAINE HYDROCHLORIDE 1 ML: 2.5 INJECTION, SOLUTION EPIDURAL; INFILTRATION; INTRACAUDAL at 10:39

## 2024-04-22 NOTE — DISCHARGE INSTR - LAB
Epidural Steroid Injection   WHAT YOU NEED TO KNOW:   An epidural steroid injection (ERICK) is a procedure to inject steroid medicine into the epidural space. The epidural space is between your spinal cord and vertebrae. Steroids reduce inflammation and fluid buildup in your spine that may be causing pain. You may be given pain medicine along with the steroids.          ACTIVITY  Do not drive or operate machinery today.  No strenuous activity today - bending, lifting, etc.  You may resume normal activites starting tomorrow - start slowly and as tolerated.  You may shower today, but no tub baths or hot tubs.  You may have numbness for several hours from the local anesthetic. Please use caution and common sense, especially with weight-bearing activities.    CARE OF THE INJECTION SITE  If you have soreness or pain, apply ice to the area today (20 minutes on/20 minutes off).  Starting tomorrow, you may use warm, moist heat or ice if needed.  You may have an increase or change in your discomfort for 36-48 hours after your treatment.  Apply ice and continue with any pain medication you have been prescribed.  Notify the [facility] if you have any of the following: redness, drainage, swelling, headache, stiff neck or fever above 100°F.    SPECIAL INSTRUCTIONS  Our office will contact you in approximately 7 days for a progress report.    MEDICATIONS  Continue to take all routine medications.  Our office may have instructed you to hold some medications.    As no general anesthesia was used in today's procedure, you should not experience any side effects related to anesthesia.     If you are diabetic, the steroids used in today's injection may temporarily increase your blood sugar levels after the first few days after your injection. Please keep a close eye on your sugars and alert the doctor who manages your diabetes if your sugars are significantly high from your baseline or you are symptomatic.     If you have a  problem specifically related to your procedure, please call our office at [phone].  Problems not related to your procedure should be directed to your primary care physician.

## 2024-04-22 NOTE — H&P
History of Present Illness: The patient is a 89 y.o. female who presents with complaints of right sided low back pain    Past Medical History:   Diagnosis Date    Anxiety 2010    Bladder infection 09/2019    Breast lump in female     Deep vein thrombosis (HCC) October, 2023    HL (hearing loss) 2010    Hypertension 2010    Osteopenia     Ovarian cyst        Past Surgical History:   Procedure Laterality Date    BLADDER SURGERY      BREAST BIOPSY      CATARACT EXTRACTION Bilateral 01/01/2015    , Right Eye-2017, Left eye    ENDOMETRIAL BIOPSY      by Suction    HYSTERECTOMY           Current Outpatient Medications:     acetaminophen (TYLENOL) 500 mg tablet, Take 1,300 mg by mouth 2 (two) times a day as needed for mild pain, Disp: , Rfl:     amLODIPine-benazepril (LOTREL 5-20) 5-20 MG per capsule, Take 1 capsule by mouth daily, Disp: 90 capsule, Rfl: 1    apixaban (ELIQUIS) 2.5 mg, Take 1 tablet (2.5 mg total) by mouth 2 (two) times a day, Disp: 180 tablet, Rfl: 1    metoprolol tartrate (LOPRESSOR) 25 mg tablet, Take 1 tablet (25 mg total) by mouth every 12 (twelve) hours, Disp: 180 tablet, Rfl: 1    Multiple Vitamins-Minerals (MULTIVITAL) tablet, Take 1 tablet by mouth daily, Disp: , Rfl:     Omega-3 Fatty Acids (FISH OIL) 645 MG CAPS, Take 1 tablet by mouth daily, Disp: , Rfl:     Current Facility-Administered Medications:     bupivacaine (PF) (MARCAINE) 0.25 % injection 2 mL, 2 mL, Epidural, Once, Baudilio Keyes DO    iohexol (OMNIPAQUE) 300 mg/mL injection 50 mL, 50 mL, Epidural, Once, Baudilio Keyes DO    lidocaine (PF) (XYLOCAINE-MPF) 1 % injection 4 mL, 4 mL, Infiltration, Once, Baudilio Keyes DO    methylPREDNISolone acetate (DEPO-MEDROL) injection 40 mg, 40 mg, Perineural, Once, Baudilio Keyes DO    onabotulinumtoxin A (BOTOX) injection 200 Units, 200 Units, Intramuscular, Q3 Months, Mira Hernandez MD, 200 Units at 01/08/24 1400    No Known Allergies    Physical Exam:   Vitals:     04/22/24 1011   BP: 133/76   Pulse: 67   Resp: 18   Temp: 99.4 °F (37.4 °C)   SpO2: 95%     General: Awake, Alert, Oriented x 3, Mood and affect appropriate  Respiratory: Respirations even and unlabored  Cardiovascular: Peripheral pulses intact; no edema  Musculoskeletal Exam: Tenderness to palpation right sided lumbar paraspinals    ASA Score: 2    Patient/Chart Verification  Patient ID Verified: Verbal  ID Band Applied: No  Consents Confirmed: Procedural, To be obtained in the Pre-Procedure area  H&P( within 30 days) Verified: To be obtained in the Pre-Procedure area  Allergies Reviewed: Yes  Anticoag/NSAID held?: Yes (eliquis last dose 4/18 , denies other blood thinners)  Currently on antibiotics?: No    Assessment:   1. Lumbar disc disease with radiculopathy        Plan: INJECTION

## 2024-04-29 ENCOUNTER — TELEPHONE (OUTPATIENT)
Dept: PAIN MEDICINE | Facility: CLINIC | Age: 89
End: 2024-04-29

## 2024-04-29 ENCOUNTER — PROCEDURE VISIT (OUTPATIENT)
Dept: NEUROLOGY | Facility: CLINIC | Age: 89
End: 2024-04-29
Payer: MEDICARE

## 2024-04-29 VITALS — HEART RATE: 64 BPM | TEMPERATURE: 97.9 F | SYSTOLIC BLOOD PRESSURE: 138 MMHG | DIASTOLIC BLOOD PRESSURE: 64 MMHG

## 2024-04-29 DIAGNOSIS — G24.5 BLEPHAROSPASM: Primary | ICD-10-CM

## 2024-04-29 DIAGNOSIS — G24.3 CERVICAL DYSTONIA: ICD-10-CM

## 2024-04-29 PROCEDURE — 64612 DESTROY NERVE FACE MUSCLE: CPT | Performed by: PSYCHIATRY & NEUROLOGY

## 2024-04-29 NOTE — TELEPHONE ENCOUNTER
Pts daughter said she is complaining about pain again  She said she is doing slightly better  She is aware we will call next week for an update

## 2024-04-29 NOTE — PROGRESS NOTES
Universal Protocol   Consent: Written consent obtained.  Consent given by: patient      Chemodenervation     Date/Time  4/29/2024 11:30 AM     Performed by  Mira Hernandez MD   Authorized by  Mira Hernandez MD     Pre-procedure details      Prepped With: Alcohol     Anesthesia  (see MAR for exact dosages):     Anesthesia method:  None   Botox      Brand:  Botox    Final Concentration per CC:  50 units (2cc NS into every 100 units)   Procedures      Botox Procedures: blepharospasm and cervical dystonia      Indications: blepharospasm and spasmodic torticollis      Date of last injection:  1/8/2024   Post-procedure details      Chemodenervation:  Head or face and neck, excluding muscles of the larynx    Patient tolerance of procedure: More discomfort today.   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 Cervical fx in Spring 2023. Recent decline in gait.       Overall good response to injections per patient and daughter. Response lasts about 4 months with mild return of blinking.  Left SCM no longer hypertrophied.  More anterocollis.        Exam: Mild blinking.  Anterocollis.  Tightness of the right splenius capitis        Injections:   2.5 unit(s) in 1 injection(s) was injected into the right  muscle.   2.5 unit(s) in 1 injection(s) was injected into the left  muscle.   10 unit(s) in 4 (2.5 ) injection(s) was injected into the right orbicularis oculi (upper inner, upper and lower outer and lateral canthus)  10 unit(s) in 4 (x2.5) injection(s) was injected into the left orbicularis oculi      Injected: 25 units     Cervical dystonia injections:   25 unit(s) was injected into the left sternocleidomastoid muscle.--    50 (25 x2 side) unit(s) was injected into the right splenius capitus muscle.--    25 unit(s) was injected into the right levator scapulae muscle     Total injected 125  units  Discarded 75

## 2024-05-01 ENCOUNTER — HOSPITAL ENCOUNTER (OUTPATIENT)
Dept: NON INVASIVE DIAGNOSTICS | Facility: CLINIC | Age: 89
Discharge: HOME | End: 2024-05-01
Payer: MEDICARE

## 2024-05-01 DIAGNOSIS — I48.0 PAF (PAROXYSMAL ATRIAL FIBRILLATION) (HCC): ICD-10-CM

## 2024-05-01 PROCEDURE — 93225 XTRNL ECG REC<48 HRS REC: CPT

## 2024-05-01 PROCEDURE — 93226 XTRNL ECG REC<48 HR SCAN A/R: CPT

## 2024-05-06 PROCEDURE — 93227 XTRNL ECG REC<48 HR R&I: CPT | Performed by: INTERNAL MEDICINE

## 2024-05-09 ENCOUNTER — CONSULT (OUTPATIENT)
Dept: NEPHROLOGY | Facility: CLINIC | Age: 89
End: 2024-05-09
Payer: MEDICARE

## 2024-05-09 VITALS
HEART RATE: 89 BPM | WEIGHT: 130 LBS | HEIGHT: 59 IN | BODY MASS INDEX: 26.21 KG/M2 | DIASTOLIC BLOOD PRESSURE: 74 MMHG | SYSTOLIC BLOOD PRESSURE: 128 MMHG | OXYGEN SATURATION: 98 %

## 2024-05-09 DIAGNOSIS — I10 ESSENTIAL HYPERTENSION: Primary | Chronic | ICD-10-CM

## 2024-05-09 DIAGNOSIS — R80.1 PERSISTENT PROTEINURIA: ICD-10-CM

## 2024-05-09 PROCEDURE — 99203 OFFICE O/P NEW LOW 30 MIN: CPT | Performed by: INTERNAL MEDICINE

## 2024-05-09 NOTE — PROGRESS NOTES
NEPHROLOGY OUTPATIENT CONSULTATION   Erika Quigley 89 y.o. female MRN: [id number]  Date: 05/09/24  Reason for consultation: Initial evaluation of proteinuria    ASSESSMENT and PLAN:  Proteinuria:  Erika was noted to have trace proteinuria on multiple urinalyses in 2024.   She had 1+ protein on UAs from 2022 and 2023.   She has no clinical evidence of nephrotic syndrome.  She appears euvolemic and her serum albumin was 4.5 in March 20, 2024.  For evaluation, I will simply check a urine ACR at this time.   Continue Benazepril (part of Lotrel).     Hypertension:  BP at goal (<130/80)  Rx: Amlodipine/benazepril 5/20 mg daily, metoprolol tartrate 25 mg BID.   No changes.     Patient Instructions   I recommend no changes at this time.  Will approach this in a conservative fashion.  Please check a urinalysis and a urine ACR now.   Follow up in 6 months - check blood and urine tests 1 week prior to follow up in 6 months.     HISTORY OF PRESENT ILLNESS:  Requesting Physician: Audra Bellamy DO    Erika Quigley is a 89 y.o. female who was referred for initial evaluation of persistent proteinuria.  She is accompanied by her daughter and her caretaker today who provide most of the history.    Erika has a history of hypertension, prediabetes, hyperlipidemia, atrial fibrillation, deep venous thrombosis, cerebrovascular disease, cervical dystonia, and degenerative joint and disc disease.  They are unaware of a history of renal disease.    She was recently admitted to [facility] with strokelike symptoms between March 20 and March 22, 2024.  During that time, her creatinine ranged between 0.75 and 0.92.  A urinalysis during that time revealed trace proteinuria.  A repeat UA on April 3, 2024 revealed trace proteinuria prompting renal consultation.    She has been on Lotrel for at least 8 years now.   No history of gross hematuria.   No prior urinary protein quantification.     PAST MEDICAL HISTORY:  HTN: Diagnosed  about 30 years ago.  No admissions for hypertensive urgency.  Highest recalled SBP is around 180 mmHg.    PreDM  HLP: not on medications.   Atrial fibrillation: New dx in March 2024.   DVT: on Eliquis.   Cerebrovascular disease  Cervical dystonia: On Botox injections every 3 months.  DJD/DDD    No known history of CAD, CHF, PAD, COPD, SURINDER, asthma, liver disease, GERD, nephrolithiasis, malignancy.    PAST SURGICAL HISTORY:  Hysterectomy.     ALLERGIES:  No Known Allergies    SOCIAL HISTORY:  Former smoker  No alcohol use.     FAMILY HISTORY:  Negative for ESRD.     MEDICATIONS:    Current Outpatient Medications:     acetaminophen (TYLENOL) 500 mg tablet, Take 1,300 mg by mouth 2 (two) times a day as needed for mild pain, Disp: , Rfl:     amLODIPine-benazepril (LOTREL 5-20) 5-20 MG per capsule, Take 1 capsule by mouth daily, Disp: 90 capsule, Rfl: 1    apixaban (ELIQUIS) 2.5 mg, Take 1 tablet (2.5 mg total) by mouth 2 (two) times a day, Disp: 180 tablet, Rfl: 1    metoprolol tartrate (LOPRESSOR) 25 mg tablet, Take 1 tablet (25 mg total) by mouth every 12 (twelve) hours, Disp: 180 tablet, Rfl: 1    Multiple Vitamins-Minerals (MULTIVITAL) tablet, Take 1 tablet by mouth daily, Disp: , Rfl:     Omega-3 Fatty Acids (FISH OIL) 645 MG CAPS, Take 1 tablet by mouth daily, Disp: , Rfl:     Current Facility-Administered Medications:     onabotulinumtoxin A (BOTOX) injection 200 Units, 200 Units, Intramuscular, Q3 Months, Mira Hernandez MD, 200 Units at 01/08/24 1400    onabotulinumtoxin A (BOTOX) injection 200 Units, 200 Units, Intramuscular, Q3 Months, , 200 Units at 04/29/24 1224    REVIEW OF SYSTEMS:  Review of Systems   Constitutional:  Positive for fatigue. Negative for appetite change, chills and fever.   Respiratory:  Negative for cough and shortness of breath.    Cardiovascular:  Negative for chest pain and leg swelling.   Gastrointestinal:  Negative for abdominal pain, diarrhea, nausea and vomiting.  "  Genitourinary:  Negative for difficulty urinating, dysuria and hematuria.   Musculoskeletal:  Positive for arthralgias and back pain.   Skin:  Negative for wound.   Neurological:  Negative for dizziness and light-headedness.   Hematological:  Does not bruise/bleed easily.   Psychiatric/Behavioral:  Negative for dysphoric mood. The patient is not nervous/anxious.    All the systems were reviewed and were negative except as documented on the HPI.    PHYSICAL EXAMINATION:  /74 (BP Location: Left arm, Patient Position: Sitting, Cuff Size: Standard)   Pulse 89   Ht 4' 11\" (1.499 m)   Wt 59 kg (130 lb)   SpO2 98%   BMI 26.26 kg/m²   Current Weight:   Body mass index is 26.26 kg/m².  General: conscious, coherent, cooperative, not in distress. Examined in wheelchair.   Skin: warm, dry, good turgor.   Eyes: pink conjunctivae, no scleral icterus.   ENT: moist lips and mucous membranes.   Respiratory: equal chest expansion, clear breath sounds.   Cardiovascular: distinct heart sounds, normal rate, regular rhythm, no rub  Abdomen: soft, non-tender, non-distended, normoactive bowel sounds  Extremities: no edema.  Genitourinary: no allen catheter.   Neuro: awake, alert, oriented to time, place and person.   Psych: appropriate affect.     LABORATORY RESULTS:  Lab Results   Component Value Date    SODIUM 139 2024    K 4.2 2024     2024    CO2 24 2024    BUN 19 2024    CREATININE 0.85 2024    GLUC 107 2024    CALCIUM 8.8 2024     Lab Results   Component Value Date    WBC 7.09 2024    HGB 11.1 (L) 2024    HCT 34.3 (L) 2024    MCV 96 2024     2024     IMAGIN23 CT AP with contrast: No hydronephrosis.     "

## 2024-05-09 NOTE — PATIENT INSTRUCTIONS
I recommend no changes at this time.  Will approach this in a conservative fashion.  Please check a urinalysis and a urine ACR now.   Follow up in 6 months - check blood and urine tests 1 week prior to follow up in 6 months.

## 2024-05-15 ENCOUNTER — VBI (OUTPATIENT)
Dept: ADMINISTRATIVE | Facility: OTHER | Age: 89
End: 2024-05-15

## 2024-05-15 NOTE — TELEPHONE ENCOUNTER
05/15/24 10:30 AM    Patient contacted (spoke with patient) to bring Advance Directive, POLST, or Living Will document to next scheduled pcp visit.      Thank you.  Ritu Bryan [facility]

## 2024-05-16 ENCOUNTER — APPOINTMENT (OUTPATIENT)
Dept: LAB | Facility: MEDICAL CENTER | Age: 89
End: 2024-05-16
Payer: MEDICARE

## 2024-05-16 DIAGNOSIS — R80.1 PERSISTENT PROTEINURIA: ICD-10-CM

## 2024-05-16 LAB
BACTERIA UR QL AUTO: ABNORMAL /HPF
BILIRUB UR QL STRIP: NEGATIVE
CLARITY UR: CLEAR
COLOR UR: YELLOW
CREAT UR-MCNC: 115.1 MG/DL
GLUCOSE UR STRIP-MCNC: NEGATIVE MG/DL
HGB UR QL STRIP.AUTO: NEGATIVE
KETONES UR STRIP-MCNC: NEGATIVE MG/DL
LEUKOCYTE ESTERASE UR QL STRIP: NEGATIVE
MICROALBUMIN UR-MCNC: 16.2 MG/L
MICROALBUMIN/CREAT 24H UR: 14 MG/G CREATININE (ref 0–30)
NITRITE UR QL STRIP: NEGATIVE
NON-SQ EPI CELLS URNS QL MICRO: ABNORMAL /HPF
PH UR STRIP.AUTO: 6.5 [PH]
PROT UR STRIP-MCNC: ABNORMAL MG/DL
RBC #/AREA URNS AUTO: ABNORMAL /HPF
SP GR UR STRIP.AUTO: 1.02 (ref 1–1.03)
UROBILINOGEN UR STRIP-ACNC: <2 MG/DL
WBC #/AREA URNS AUTO: ABNORMAL /HPF

## 2024-05-16 PROCEDURE — 81001 URINALYSIS AUTO W/SCOPE: CPT

## 2024-05-16 PROCEDURE — 82570 ASSAY OF URINE CREATININE: CPT

## 2024-05-16 PROCEDURE — 82043 UR ALBUMIN QUANTITATIVE: CPT

## 2024-05-17 ENCOUNTER — TELEPHONE (OUTPATIENT)
Dept: NEPHROLOGY | Facility: CLINIC | Age: 89
End: 2024-05-17

## 2024-05-17 NOTE — TELEPHONE ENCOUNTER
----- Message from Jaron Koch MD sent at 5/16/2024  6:26 PM EDT -----  Please inform patient's daughter that most recent labs (5/16/24) show that urine testing shows a normal amount of protein in the urine. No changes recommended at this time.

## 2024-05-21 NOTE — ASSESSMENT & PLAN NOTE
Continue fish oil Received patient in signout from Dr. Merchant. In short the patient is a 21 y.o. female presenting with right lower quadrant pain. In the ED, symptoms improved after medication administration, pelvic exam remarkable for some right adnexal tenderness but otherwise unrevealing, CT abdomen and pelvis showing changes in the right kidney concerning for pyelonephritis.  CMP, cell count otherwise unremarkable.  Urinalysis showing moderate blood, 11-20 red cells, no white cells/nitrites.  Pelvic ultrasound to assess for ovarian/pelvic pathology pending at time of signout.     Patient's pelvic ultrasound showing right hemorrhagic cyst, small amount of fluid in the pelvis, no evidence of torsion noted on ultrasound read.  Suspect this explains patient's symptoms, on reassessment she is feeling much better after analgesic administration.  She denies any dysuria, flank pain, fevers, this in conjunction with urinalysis that is negative for leukocyte Estrace, white blood cells or nitrites I have a lower suspicion for pyelonephritis.  Given patient symptomatic improvement I feel she is appropriate for discharge, she conveys she will be able to follow-up with her primary care physician/obstetrician gynecologist.  I did discuss return precautions and follow-up instructions at length with the patient, she conveys understanding to these instructions. Patient discharged in good condition.

## 2024-05-22 ENCOUNTER — OFFICE VISIT (OUTPATIENT)
Dept: FAMILY MEDICINE CLINIC | Facility: MEDICAL CENTER | Age: 89
End: 2024-05-22

## 2024-05-22 VITALS
DIASTOLIC BLOOD PRESSURE: 80 MMHG | RESPIRATION RATE: 18 BRPM | TEMPERATURE: 98.6 F | OXYGEN SATURATION: 95 % | WEIGHT: 130 LBS | BODY MASS INDEX: 26.26 KG/M2 | HEART RATE: 86 BPM | SYSTOLIC BLOOD PRESSURE: 140 MMHG

## 2024-05-22 DIAGNOSIS — R73.03 PREDIABETES: ICD-10-CM

## 2024-05-22 DIAGNOSIS — I10 ESSENTIAL HYPERTENSION: Primary | Chronic | ICD-10-CM

## 2024-05-22 DIAGNOSIS — R45.86 MOOD CHANGE: ICD-10-CM

## 2024-05-22 LAB — SL AMB POCT HEMOGLOBIN AIC: 6.2 (ref ?–6.5)

## 2024-05-22 NOTE — PROGRESS NOTES
[facility] - Clinic Note  Audra Bellamy DO, 24     Erika Quigley MRN: [id number] : 1931 Age: 89 y.o.     Assessment/Plan     1. Essential hypertension    -Stable with current regimen, continue Lopressor 25 mg p.o. twice daily and Lotrel 5-20 1 capsule p.o. daily  -Recent kidney function reviewed    2. Prediabetes    - POCT hemoglobin A1c:  Component      Latest Ref Rng 2024   Hemoglobin A1C      6.5  6.2    -Patient 89 years old, advised to not be extremely restrictive however, did encourage well-balanced meals  - Hemoglobin A1C; Future    3. Mood change    -Closely monitor  -Patient lacking motivation to be as physically active  -Did encourage socialization opportunities and encouraged engaging in activities she enjoys  -Daughter will provide me update in 4 to 6 weeks in regards to her mood, we preemptively did discuss starting Lexapro if needed        Depression Screening and Follow-up Plan: Patient was screened for depression during today's encounter. They screened negative with a PHQ-9 score of 2.        Erika Quigley acknowledged understanding of treatment plan, all questions answered.    Subjective      Erika Quigley is a 89 y.o. female who presents for closer follow-up since hospitalization.  Patient presents with her daughter and caretaker who contribute to history.  Caretaker expresses that patient has not been as motivated to walk.  Patient is following with pain management.  Daughter states the patient does not have many hobbies, she used to like cleaning.  She does socialize on Sundays at [employer].  No urinary symptoms.      The following portions of the patient's history were reviewed and updated as appropriate: allergies, current medications, past family history, past medical history, past social history, past surgical history and problem list.     Past Medical History:   Diagnosis Date    Anxiety     Bladder infection 2019    Breast lump in female     Deep vein  thrombosis (HCC) October, 2023    HL (hearing loss) 2010    Hypertension 2010    Osteopenia     Ovarian cyst        No Known Allergies    Past Surgical History:   Procedure Laterality Date    BLADDER SURGERY      BREAST BIOPSY      CATARACT EXTRACTION Bilateral 01/01/2015    , Right Eye-2017, Left eye    ENDOMETRIAL BIOPSY      by Suction    HYSTERECTOMY         Family History   Problem Relation Age of Onset    Heart disease Mother     Heart disease Father     Hypertension Family     Osteoporosis Family        Social History     Socioeconomic History    Marital status:      Spouse name: None    Number of children: None    Years of education: None    Highest education level: None   Occupational History    None   Tobacco Use    Smoking status: Never    Smokeless tobacco: Never   Vaping Use    Vaping status: Never Used   Substance and Sexual Activity    Alcohol use: Not Currently    Drug use: No    Sexual activity: Not Currently   Other Topics Concern    None   Social History Narrative    Denied consumption of coffee    Active daily tea consumption    Denied ingesting cola containing caffeine         Social Determinants of Health     Financial Resource Strain: Low Risk  (11/13/2023)    Overall Financial Resource Strain (CARDIA)     Difficulty of Paying Living Expenses: Not very hard   Food Insecurity: No Food Insecurity (3/21/2024)    Hunger Vital Sign     Worried About Running Out of Food in the Last Year: Never true     Ran Out of Food in the Last Year: Never true   Transportation Needs: No Transportation Needs (3/21/2024)    PRAPARE - Transportation     Lack of Transportation (Medical): No     Lack of Transportation (Non-Medical): No   Physical Activity: Not on file   Stress: Not on file   Social Connections: Not on file   Intimate Partner Violence: Not on file   Housing Stability: Low Risk  (3/21/2024)    Housing Stability Vital Sign     Unable to Pay for Housing in the Last Year: No     Number of Places  Lived in the Last Year: 1     Unstable Housing in the Last Year: No       Current Outpatient Medications   Medication Sig Dispense Refill    acetaminophen (TYLENOL) 500 mg tablet Take 1,300 mg by mouth 2 (two) times a day as needed for mild pain      amLODIPine-benazepril (LOTREL 5-20) 5-20 MG per capsule Take 1 capsule by mouth daily 90 capsule 1    apixaban (ELIQUIS) 2.5 mg Take 1 tablet (2.5 mg total) by mouth 2 (two) times a day 180 tablet 1    metoprolol tartrate (LOPRESSOR) 25 mg tablet Take 1 tablet (25 mg total) by mouth every 12 (twelve) hours 180 tablet 1    Multiple Vitamins-Minerals (MULTIVITAL) tablet Take 1 tablet by mouth daily      Omega-3 Fatty Acids (FISH OIL) 645 MG CAPS Take 1 tablet by mouth daily       Current Facility-Administered Medications   Medication Dose Route Frequency Provider Last Rate Last Admin    onabotulinumtoxin A (BOTOX) injection 200 Units  200 Units Intramuscular Q3 Months Mira Hernandez MD   200 Units at 01/08/24 1400    onabotulinumtoxin A (BOTOX) injection 200 Units  200 Units Intramuscular Q3 Months    200 Units at 04/29/24 1224       Review of Systems     As noted in HPI    Objective      /80 (BP Location: Left arm, Patient Position: Sitting, Cuff Size: Standard)   Pulse 86   Temp 98.6 °F (37 °C) (Temporal)   Resp 18   Wt 59 kg (130 lb) Comment: estimated by caregiver  SpO2 95%   BMI 26.26 kg/m²     Physical Exam  Vitals reviewed.   Constitutional:       General: She is not in acute distress.     Appearance: Normal appearance.   HENT:      Head: Normocephalic and atraumatic.   Eyes:      Conjunctiva/sclera: Conjunctivae normal.   Cardiovascular:      Rate and Rhythm: Normal rate and regular rhythm.      Pulses: Normal pulses.   Pulmonary:      Effort: Pulmonary effort is normal. No respiratory distress.      Breath sounds: Normal breath sounds. No wheezing or rales.   Musculoskeletal:      Right lower leg: Right lower leg edema: scant.      Left lower  "leg: Left lower leg edema: scant.   Skin:     General: Skin is warm and dry.   Neurological:      Mental Status: She is alert. Mental status is at baseline.   Psychiatric:         Mood and Affect: Affect is not inappropriate.         Behavior: Behavior normal. Behavior is cooperative.         Thought Content: Thought content normal.             Some portions of this record may have been generated with voice recognition software. There may be translation, syntax, or grammatical errors. Occasional wrong word or \"sound-a-like\" substitutions may have occurred due to the inherent limitations of the voice recognition software. Read the chart carefully and recognize, using context, where substations may have occurred. If you have any questions, please contact the dictating provider for clarification or correction, as needed.  "

## 2024-05-24 ENCOUNTER — OFFICE VISIT (OUTPATIENT)
Dept: FAMILY MEDICINE CLINIC | Facility: MEDICAL CENTER | Age: 89
End: 2024-05-24

## 2024-05-24 ENCOUNTER — APPOINTMENT (OUTPATIENT)
Dept: RADIOLOGY | Facility: MEDICAL CENTER | Age: 89
End: 2024-05-24
Payer: MEDICARE

## 2024-05-24 VITALS
HEART RATE: 65 BPM | OXYGEN SATURATION: 98 % | DIASTOLIC BLOOD PRESSURE: 88 MMHG | RESPIRATION RATE: 16 BRPM | SYSTOLIC BLOOD PRESSURE: 142 MMHG | TEMPERATURE: 99 F

## 2024-05-24 DIAGNOSIS — M79.671 PAIN OF RIGHT HEEL: Primary | ICD-10-CM

## 2024-05-24 DIAGNOSIS — Z23 ENCOUNTER FOR IMMUNIZATION: ICD-10-CM

## 2024-05-24 DIAGNOSIS — L08.9 SKIN INFECTION: ICD-10-CM

## 2024-05-24 DIAGNOSIS — L08.9 SKIN INFECTION: Primary | ICD-10-CM

## 2024-05-24 PROCEDURE — 73620 X-RAY EXAM OF FOOT: CPT

## 2024-05-24 RX ORDER — SULFAMETHOXAZOLE AND TRIMETHOPRIM 800; 160 MG/1; MG/1
1 TABLET ORAL 2 TIMES DAILY
Qty: 14 TABLET | Refills: 0 | Status: SHIPPED | OUTPATIENT
Start: 2024-05-24 | End: 2024-05-31

## 2024-05-24 NOTE — PROGRESS NOTES
Virtual Regular Visit    Verification of patient location:    Patient is located at Home in the following state in which I hold an active license [place]      Assessment/Plan:    Problem List Items Addressed This Visit    None    Feels hard  Xray  Foot xraY        Reason for visit is   Chief Complaint   Patient presents with    Virtual Regular Visit          Encounter provider Adura Bellamy DO      Recent Visits  Date Type Provider Dept   05/22/24 Office Visit Audra Bellamy DO [facility]   Showing recent visits within past 7 days and meeting all other requirements  Today's Visits  Date Type Provider Dept   05/24/24 Telemedicine Audra Bellamy DO [facility]   Showing today's visits and meeting all other requirements  Future Appointments  No visits were found meeting these conditions.  Showing future appointments within next 150 days and meeting all other requirements       The patient was identified by name and date of birth. Erika Quigley was informed that this is a telemedicine visit and that the visit is being conducted through the [employer] platform. She agrees to proceed..  My office door was closed. The patient was notified the following individuals were present in the room :NP.  She acknowledged consent and understanding of privacy and security of the video platform. The patient has agreed to participate and understands they can discontinue the visit at any time.    Patient is aware this is a billable service.     Subjective    Erika Quigley is a 89 y.o. female who     Up this morning when outitng sock on   Foot botot back brsied swollen bite rhianna   No fevr  Right foot  One little spot           Past Medical History:   Diagnosis Date    Anxiety 2010    Bladder infection 09/2019    Breast lump in female     Deep vein thrombosis (HCC) October, 2023    HL (hearing loss) 2010    Hypertension 2010    Osteopenia     Ovarian cyst        Past Surgical History:   Procedure Laterality Date    BLADDER SURGERY       BREAST BIOPSY      CATARACT EXTRACTION Bilateral 01/01/2015    , Right Eye-2017, Left eye    ENDOMETRIAL BIOPSY      by Suction    HYSTERECTOMY         Current Outpatient Medications   Medication Sig Dispense Refill    acetaminophen (TYLENOL) 500 mg tablet Take 1,300 mg by mouth 2 (two) times a day as needed for mild pain      amLODIPine-benazepril (LOTREL 5-20) 5-20 MG per capsule Take 1 capsule by mouth daily 90 capsule 1    apixaban (ELIQUIS) 2.5 mg Take 1 tablet (2.5 mg total) by mouth 2 (two) times a day 180 tablet 1    metoprolol tartrate (LOPRESSOR) 25 mg tablet Take 1 tablet (25 mg total) by mouth every 12 (twelve) hours 180 tablet 1    Multiple Vitamins-Minerals (MULTIVITAL) tablet Take 1 tablet by mouth daily      Omega-3 Fatty Acids (FISH OIL) 645 MG CAPS Take 1 tablet by mouth daily       Current Facility-Administered Medications   Medication Dose Route Frequency Provider Last Rate Last Admin    onabotulinumtoxin A (BOTOX) injection 200 Units  200 Units Intramuscular Q3 Months Mira Hernandez MD   200 Units at 01/08/24 1400    onabotulinumtoxin A (BOTOX) injection 200 Units  200 Units Intramuscular Q3 Months    200 Units at 04/29/24 1224        No Known Allergies    Review of Systems    As noted in HPI    Video Exam    There were no vitals filed for this visit.    Physical Exam     Visit Time  Total Visit Duration: ***

## 2024-05-24 NOTE — PROGRESS NOTES
[facility] - Clinic Note  Audra BellamyDO, 24     Erika Quigley MRN: [id number] : 1931 Age: 89 y.o.     Assessment/Plan     1. Skin infection    - XR foot 2 vw right; Future  - sulfamethoxazole-trimethoprim (BACTRIM DS) 800-160 mg per tablet; Take 1 tablet by mouth 2 (two) times a day for 7 days  Dispense: 14 tablet; Refill: 0; advised about concurrent probiotic use  -Tetanus booster today in office  -Advised about red flags and worsening signs and symptoms in which case to seek medical promptattention, daughter expressed understanding    2. Encounter for immunization    -Discussed may not be covered in office, daughter is okay with proceeding with tetanus booster in office  - TDAP VACCINE GREATER THAN OR EQUAL TO 6YO IM      Erika Quigley acknowledged understanding of treatment plan, all questions answered.    Subjective      Erika Quigley is a 89 y.o. female who presents to office for acute visit.  Patient presents with her daughter who also contributes to history.  Daughter states when she was putting her mother socks on this morning she noticed right heel was bruised and swollen.  She noticed what could have been a bite rhianna.  Has not noted fevers, no chills.  Daughter states it feels hard.    The following portions of the patient's history were reviewed and updated as appropriate: allergies, current medications, past family history, past medical history, past social history, past surgical history and problem list.     Past Medical History:   Diagnosis Date    Anxiety 2010    Bladder infection 2019    Breast lump in female     Deep vein thrombosis (HCC)     HL (hearing loss)     Hypertension     Osteopenia     Ovarian cyst        No Known Allergies    Past Surgical History:   Procedure Laterality Date    BLADDER SURGERY      BREAST BIOPSY      CATARACT EXTRACTION Bilateral 2015    , Right Eye-, Left eye    ENDOMETRIAL BIOPSY      by Suction     HYSTERECTOMY         Family History   Problem Relation Age of Onset    Heart disease Mother     Heart disease Father     Hypertension Family     Osteoporosis Family        Social History     Socioeconomic History    Marital status:      Spouse name: None    Number of children: None    Years of education: None    Highest education level: None   Occupational History    None   Tobacco Use    Smoking status: Never    Smokeless tobacco: Never   Vaping Use    Vaping status: Never Used   Substance and Sexual Activity    Alcohol use: Not Currently    Drug use: No    Sexual activity: Not Currently   Other Topics Concern    None   Social History Narrative    Denied consumption of coffee    Active daily tea consumption    Denied ingesting cola containing caffeine         Social Determinants of Health     Financial Resource Strain: Low Risk  (11/13/2023)    Overall Financial Resource Strain (CARDIA)     Difficulty of Paying Living Expenses: Not very hard   Food Insecurity: No Food Insecurity (3/21/2024)    Hunger Vital Sign     Worried About Running Out of Food in the Last Year: Never true     Ran Out of Food in the Last Year: Never true   Transportation Needs: No Transportation Needs (3/21/2024)    PRAPARE - Transportation     Lack of Transportation (Medical): No     Lack of Transportation (Non-Medical): No   Physical Activity: Not on file   Stress: Not on file   Social Connections: Not on file   Intimate Partner Violence: Not on file   Housing Stability: Low Risk  (3/21/2024)    Housing Stability Vital Sign     Unable to Pay for Housing in the Last Year: No     Number of Places Lived in the Last Year: 1     Unstable Housing in the Last Year: No       Current Outpatient Medications   Medication Sig Dispense Refill    sulfamethoxazole-trimethoprim (BACTRIM DS) 800-160 mg per tablet Take 1 tablet by mouth 2 (two) times a day for 7 days 14 tablet 0    acetaminophen (TYLENOL) 500 mg tablet Take 1,300 mg by mouth 2 (two)  times a day as needed for mild pain      amLODIPine-benazepril (LOTREL 5-20) 5-20 MG per capsule Take 1 capsule by mouth daily 90 capsule 1    apixaban (ELIQUIS) 2.5 mg Take 1 tablet (2.5 mg total) by mouth 2 (two) times a day 180 tablet 1    metoprolol tartrate (LOPRESSOR) 25 mg tablet Take 1 tablet (25 mg total) by mouth every 12 (twelve) hours 180 tablet 1    Multiple Vitamins-Minerals (MULTIVITAL) tablet Take 1 tablet by mouth daily      Omega-3 Fatty Acids (FISH OIL) 645 MG CAPS Take 1 tablet by mouth daily       Current Facility-Administered Medications   Medication Dose Route Frequency Provider Last Rate Last Admin    onabotulinumtoxin A (BOTOX) injection 200 Units  200 Units Intramuscular Q3 Months Mira Hernandez MD   200 Units at 01/08/24 1400    onabotulinumtoxin A (BOTOX) injection 200 Units  200 Units Intramuscular Q3 Months    200 Units at 04/29/24 1224       Review of Systems     As noted in HPI    Objective      /88 (BP Location: Left arm, Patient Position: Sitting, Cuff Size: Standard)   Pulse 65   Temp 99 °F (37.2 °C) (Temporal)   Resp 16   SpO2 98%     Physical Exam  Vitals reviewed.   Constitutional:       General: She is not in acute distress.     Appearance: Normal appearance. She is not ill-appearing or toxic-appearing.   HENT:      Head: Normocephalic and atraumatic.   Eyes:      Conjunctiva/sclera: Conjunctivae normal.   Pulmonary:      Effort: Pulmonary effort is normal.   Skin:     General: Skin is warm and dry.      Findings: Erythema present.      Comments: Erythema right calcaneal region with some streaking, ecchymosis, appears to be 2 very small portals of entry   Neurological:      Mental Status: She is alert. Mental status is at baseline.   Psychiatric:         Behavior: Behavior normal.             Some portions of this record may have been generated with voice recognition software. There may be translation, syntax, or grammatical errors. Occasional wrong word or  "\"sound-a-like\" substitutions may have occurred due to the inherent limitations of the voice recognition software. Read the chart carefully and recognize, using context, where substations may have occurred. If you have any questions, please contact the dictating provider for clarification or correction, as needed.  "

## 2024-05-28 ENCOUNTER — TELEPHONE (OUTPATIENT)
Age: 89
End: 2024-05-28

## 2024-05-28 ENCOUNTER — APPOINTMENT (OUTPATIENT)
Dept: LAB | Facility: MEDICAL CENTER | Age: 89
End: 2024-05-28
Payer: MEDICARE

## 2024-05-28 DIAGNOSIS — I10 ESSENTIAL HYPERTENSION: Chronic | ICD-10-CM

## 2024-05-28 DIAGNOSIS — M79.671 PAIN OF RIGHT HEEL: ICD-10-CM

## 2024-05-28 DIAGNOSIS — S91.309A WOUND OF FOOT: Primary | ICD-10-CM

## 2024-05-28 PROCEDURE — 85025 COMPLETE CBC W/AUTO DIFF WBC: CPT

## 2024-05-28 PROCEDURE — 36415 COLL VENOUS BLD VENIPUNCTURE: CPT

## 2024-05-28 PROCEDURE — 86140 C-REACTIVE PROTEIN: CPT

## 2024-05-28 NOTE — TELEPHONE ENCOUNTER
Patient's daughter, Hilda, called and stated the patient was seen on Friday for a wound on her foot.  She would like to know if an order can be placed for a visiting nurse to come to the home to the check the patient's wound. Please advise if the order can be placed.  Thank you!

## 2024-05-28 NOTE — TELEPHONE ENCOUNTER
PT's daughter said the VN order went to the wrong agency, and should go to [facility] Fax # [phone].    Please advise    Thank you

## 2024-05-28 NOTE — TELEPHONE ENCOUNTER
Please change wound care order from [facility] to [address] [place] [address] Home [facility] and refax. They are due to see the patient today.

## 2024-05-28 NOTE — TELEPHONE ENCOUNTER
Mervat, nurse with [facility] called, pt daughter called and asked if they can re-visit pt with a new wound.  They called office as well.  Order was sent to [facility], pt uses another agency.  Please fax new orders to [phone] so they can go back into home and evaluate.

## 2024-05-29 LAB
BASOPHILS # BLD AUTO: 0.01 THOUSANDS/ÂΜL (ref 0–0.1)
BASOPHILS NFR BLD AUTO: 0 % (ref 0–1)
CRP SERPL QL: 16.1 MG/L
EOSINOPHIL # BLD AUTO: 0.01 THOUSAND/ÂΜL (ref 0–0.61)
EOSINOPHIL NFR BLD AUTO: 0 % (ref 0–6)
ERYTHROCYTE [DISTWIDTH] IN BLOOD BY AUTOMATED COUNT: 15.7 % (ref 11.6–15.1)
HCT VFR BLD AUTO: 38.8 % (ref 34.8–46.1)
HGB BLD-MCNC: 11.9 G/DL (ref 11.5–15.4)
IMM GRANULOCYTES # BLD AUTO: 0.12 THOUSAND/UL (ref 0–0.2)
IMM GRANULOCYTES NFR BLD AUTO: 2 % (ref 0–2)
LYMPHOCYTES # BLD AUTO: 0.89 THOUSANDS/ÂΜL (ref 0.6–4.47)
LYMPHOCYTES NFR BLD AUTO: 13 % (ref 14–44)
MCH RBC QN AUTO: 31.4 PG (ref 26.8–34.3)
MCHC RBC AUTO-ENTMCNC: 30.7 G/DL (ref 31.4–37.4)
MCV RBC AUTO: 102 FL (ref 82–98)
MONOCYTES # BLD AUTO: 0.97 THOUSAND/ÂΜL (ref 0.17–1.22)
MONOCYTES NFR BLD AUTO: 15 % (ref 4–12)
NEUTROPHILS # BLD AUTO: 4.63 THOUSANDS/ÂΜL (ref 1.85–7.62)
NEUTS SEG NFR BLD AUTO: 70 % (ref 43–75)
NRBC BLD AUTO-RTO: 0 /100 WBCS
PLATELET # BLD AUTO: 306 THOUSANDS/UL (ref 149–390)
PMV BLD AUTO: 10.3 FL (ref 8.9–12.7)
RBC # BLD AUTO: 3.79 MILLION/UL (ref 3.81–5.12)
WBC # BLD AUTO: 6.63 THOUSAND/UL (ref 4.31–10.16)

## 2024-05-29 NOTE — TELEPHONE ENCOUNTER
Pilar from [address] [place] [address] called she was checking the status of the order they did not receive it yet    please fax it to [phone] Hany Quezada  thank you

## 2024-06-12 ENCOUNTER — HOSPITAL ENCOUNTER (OUTPATIENT)
Dept: MRI IMAGING | Facility: HOSPITAL | Age: 89
Discharge: HOME | End: 2024-06-12
Payer: MEDICARE

## 2024-06-12 DIAGNOSIS — M79.671 PAIN OF RIGHT HEEL: ICD-10-CM

## 2024-06-12 PROCEDURE — 73718 MRI LOWER EXTREMITY W/O DYE: CPT

## 2024-07-09 ENCOUNTER — OFFICE VISIT (OUTPATIENT)
Dept: CARDIOLOGY CLINIC | Facility: MEDICAL CENTER | Age: 89
End: 2024-07-09
Payer: MEDICARE

## 2024-07-09 VITALS
HEART RATE: 64 BPM | DIASTOLIC BLOOD PRESSURE: 80 MMHG | BODY MASS INDEX: 26.21 KG/M2 | SYSTOLIC BLOOD PRESSURE: 116 MMHG | WEIGHT: 130 LBS | HEIGHT: 59 IN | OXYGEN SATURATION: 96 %

## 2024-07-09 DIAGNOSIS — E78.2 MIXED HYPERLIPIDEMIA: Chronic | ICD-10-CM

## 2024-07-09 DIAGNOSIS — I10 ESSENTIAL HYPERTENSION: Primary | Chronic | ICD-10-CM

## 2024-07-09 DIAGNOSIS — I48.0 PAROXYSMAL ATRIAL FIBRILLATION (HCC): ICD-10-CM

## 2024-07-09 PROCEDURE — 99214 OFFICE O/P EST MOD 30 MIN: CPT | Performed by: INTERNAL MEDICINE

## 2024-07-09 NOTE — PROGRESS NOTES
Cardiology Follow Up    Erika Quigley  11/27/1931  [id number]  [facility] [place]  [address]    [facility] [address] [phone]    1. Essential hypertension        2. Paroxysmal atrial fibrillation (HCC)        3. Mixed hyperlipidemia            Diagnoses and all orders for this visit:    Essential hypertension    Paroxysmal atrial fibrillation (HCC)    Mixed hyperlipidemia      I had the pleasure of seeing Erika Quigley for a follow up visit.     INTERVAL HISTORY: none    History of the presenting illness, Discussion/Summary and My Plan are as follows:::    Erika is a pleasant 89-year-old lady with history of hypertension, dyslipidemia, DVT with right lower extremity thrombus-2023 for which she was on Eliquis, dementia, baseline speech difficulties and left facial droop, cervical dystonia on Botox injections, who presented with speech difficulties and was ruled out for acute stroke but was demonstrated to have a chronic right PICA distribution infarct, was also found to be in atrial fibrillation with rapid rates, given metoprolol and an IV Cardizem and converted to sinus rhythm in which she was discharged.  Eliquis was decreased to 2.5 mg twice daily-this was all in March 2024.    She lives at home with her daughter, has a nurse coming in to see her once a week for wound care for her lower extremity wound and when she checks it, rate and rhythm have been within normal limits by exam.  She herself denies any symptoms.  Daughter also denies any new symptoms.    Plan:    Atrial fibrillation: Single episode-discovered in March 2024, in the setting of priorPICA stroke, no new stroke was noted in March 2024, continue apixaban at 2.5 mg twice daily, no bleeds or falls  Unremarkable echo and normal TSH    Hypertension: controlled and consistently only under 130 at home by visiting nurse    Dyslipidemia: Overall acceptable,  no need to treat    Follow-up in 6 months       Latest Reference Range & Units Most Recent   Cholesterol See Comment mg/dL 195  3/21/24 05:42   Triglycerides See Comment mg/dL 167 (H)  3/21/24 05:42   HDL >=50 mg/dL 54  3/21/24 05:42   LDL Calculated 0 - 100 mg/dL 108 (H)  3/21/24 05:42   (H): Data is abnormally high     Latest Reference Range & Units 03/21/24 05:42   Hemoglobin A1C Normal 4.0-5.6%; PreDiabetic 5.7-6.4%; Diabetic >=6.5%; Glycemic control for adults with diabetes <7.0% % 6.3 (H)   eAG, EST AVG Glucose mg/dl 134   TSH 3RD GENERATON 0.450 - 4.500 uIU/mL 2.327   (H): Data is abnormally high      Patient Active Problem List   Diagnosis    Blepharospasm    Spasmodic torticollis    Anxiety    Depression, recurrent (HCC)    Essential hypertension    Family history of malignant neoplasm of breast    Functional urinary incontinence    Hyperlipidemia    MCI (mild cognitive impairment)    Osteoporosis    Torsion dystonia fragments    Segmental dystonia    Sensorineural hearing loss (SNHL), bilateral    Asymmetrical hearing loss of right ear    Pulmonary HTN (HCC)    Presence of pessary    Osteoporosis without current pathological fracture    Nerve sheath tumor    Lumbar spine tumor    Primary osteoarthritis of right hip    PAD (peripheral artery disease) (HCC)    Spinal stenosis of lumbar region with neurogenic claudication    Lumbar disc disease with radiculopathy    Fall    Closed displaced fracture of fourth cervical vertebra (HCC)    Epidural hematoma (HCC)    Near syncope    Deep vein thrombosis (DVT) of femoral vein of right lower extremity (HCC)    Pancreatic abnormality    Mass of soft tissue    Normocytic anemia    UTI symptoms    Stroke/ Seizure-like symptoms    TIA (transient ischemic attack)    Paroxysmal atrial fibrillation (HCC)    Prediabetes    Diastolic dysfunction    Persistent proteinuria     Past Medical History:   Diagnosis Date    Anxiety 2010    Bladder infection 09/2019    Breast lump in  female     Deep vein thrombosis (HCC) October, 2023    HL (hearing loss) 2010    Hypertension 2010    Osteopenia     Ovarian cyst      Social History     Socioeconomic History    Marital status:      Spouse name: Not on file    Number of children: Not on file    Years of education: Not on file    Highest education level: Not on file   Occupational History    Not on file   Tobacco Use    Smoking status: Never    Smokeless tobacco: Never   Vaping Use    Vaping status: Never Used   Substance and Sexual Activity    Alcohol use: Not Currently    Drug use: No    Sexual activity: Not Currently   Other Topics Concern    Not on file   Social History Narrative    Denied consumption of coffee    Active daily tea consumption    Denied ingesting cola containing caffeine         Social Determinants of Health     Financial Resource Strain: Low Risk  (11/13/2023)    Overall Financial Resource Strain (CARDIA)     Difficulty of Paying Living Expenses: Not very hard   Food Insecurity: No Food Insecurity (3/21/2024)    Hunger Vital Sign     Worried About Running Out of Food in the Last Year: Never true     Ran Out of Food in the Last Year: Never true   Transportation Needs: No Transportation Needs (3/21/2024)    PRAPARE - Transportation     Lack of Transportation (Medical): No     Lack of Transportation (Non-Medical): No   Physical Activity: Not on file   Stress: Not on file   Social Connections: Not on file   Intimate Partner Violence: Not on file   Housing Stability: Low Risk  (3/21/2024)    Housing Stability Vital Sign     Unable to Pay for Housing in the Last Year: No     Number of Times Moved in the Last Year: 1     Homeless in the Last Year: No      Family History   Problem Relation Age of Onset    Heart disease Mother     Heart disease Father     Hypertension Family     Osteoporosis Family      Past Surgical History:   Procedure Laterality Date    BLADDER SURGERY      BREAST BIOPSY      CATARACT EXTRACTION Bilateral  01/01/2015    , Right Eye-2017, Left eye    ENDOMETRIAL BIOPSY      by Suction    HYSTERECTOMY         Current Outpatient Medications:     acetaminophen (TYLENOL) 500 mg tablet, Take 1,300 mg by mouth 2 (two) times a day as needed for mild pain, Disp: , Rfl:     amLODIPine-benazepril (LOTREL 5-20) 5-20 MG per capsule, Take 1 capsule by mouth daily, Disp: 90 capsule, Rfl: 1    apixaban (ELIQUIS) 2.5 mg, Take 1 tablet (2.5 mg total) by mouth 2 (two) times a day, Disp: 180 tablet, Rfl: 1    metoprolol tartrate (LOPRESSOR) 25 mg tablet, Take 1 tablet (25 mg total) by mouth every 12 (twelve) hours, Disp: 180 tablet, Rfl: 1    Multiple Vitamins-Minerals (MULTIVITAL) tablet, Take 1 tablet by mouth daily, Disp: , Rfl:     Omega-3 Fatty Acids (FISH OIL) 645 MG CAPS, Take 1 tablet by mouth daily, Disp: , Rfl:     Current Facility-Administered Medications:     onabotulinumtoxin A (BOTOX) injection 200 Units, 200 Units, Intramuscular, Q3 Months, Mira Hernandez MD, 200 Units at 01/08/24 1400    onabotulinumtoxin A (BOTOX) injection 200 Units, 200 Units, Intramuscular, Q3 Months, , 200 Units at 04/29/24 1224  No Known Allergies    Imaging: MRI foot/forefoot toest right wo contrast    Result Date: 6/14/2024  Narrative: MRI FOOT/FOREFOOT TOES RIGHT WO CONTRAST INDICATION:   M79.671: Pain in right foot. COMPARISON: Right foot radiograph 5/24/2024 TECHNIQUE:  Multiplanar/multisequence MR of the right foot was performed. FINDINGS: SUBCUTANEOUS TISSUES: There is mild subcutaneous edema about the ankle and dorsal foot. Mild fat stranding in the infracalcaneal plantar fat. There is no well-circumscribed fluid collection. BONES:  Normal signal intensity. FIRST MTP JOINT:  Intact. SESAMOID BONES:  Intact. OTHER ARTICULAR SURFACE: Scattered mild degenerative changes of the intertarsal joints. PLANTAR FASCIA: Mild thickening of the plantar fascia at the insertion of the plantar fascia LISFRANC LIGAMENT:  Intact. FOREFOOT TENDONS:  "Intact. INTERMETATARSAL REGIONS: No bursitis or Quiroga's neuroma. MUSCULATURE: Intact.     Impression: 1. No acute osseous abnormality. 2. No soft tissue mass or fluid collection. 3. Mild degenerative changes. 4. Mild thickening of the plantar fascia at the calcaneal insertion may represent plantar fasciitis. Correlate for point tenderness. Workstation performed: [id number]       Review of Systems:  Review of Systems   Constitutional: Negative.    HENT: Negative.  Negative for congestion.    Eyes: Negative.    Cardiovascular: Negative.    Endocrine: Negative.    Musculoskeletal: Negative.        Physical Exam:  /80 (BP Location: Left arm, Patient Position: Sitting, Cuff Size: Adult)   Pulse 64   Ht 4' 11\" (1.499 m)   Wt 59 kg (130 lb)   SpO2 96%   BMI 26.26 kg/m²   Physical Exam  Constitutional:       General: She is not in acute distress.     Appearance: She is not ill-appearing, toxic-appearing or diaphoretic.   HENT:      Nose: Nose normal. No congestion or rhinorrhea.      Mouth/Throat:      Mouth: Mucous membranes are moist.      Pharynx: No oropharyngeal exudate or posterior oropharyngeal erythema.   Neck:      Vascular: No carotid bruit.   Pulmonary:      Effort: Pulmonary effort is normal. No respiratory distress.      Breath sounds: No stridor. No wheezing or rhonchi.   Musculoskeletal:         General: No swelling, tenderness, deformity or signs of injury. Normal range of motion.      Cervical back: Normal range of motion. No rigidity or tenderness.   Lymphadenopathy:      Cervical: No cervical adenopathy.   Neurological:      Mental Status: She is alert.         This note was completed in part utilizing [employer] direct voice recognition software.   Grammatical errors, random word insertion, spelling mistakes, occasional wrong word or \"sound-alike\" substitutions and incomplete sentences may be an occasional consequence of the system secondary to software limitations, ambient noise and " hardware issues. At the time of dictation, efforts were made to edit, clarify and /or correct errors.  Please read the chart carefully and recognize, using context, where substitutions have occurred.  If you have any questions or concerns about the context, text or information contained within the body of this dictation, please contact myself, the provider, for further clarification.

## 2024-08-01 ENCOUNTER — TELEPHONE (OUTPATIENT)
Age: 89
End: 2024-08-01

## 2024-08-01 NOTE — TELEPHONE ENCOUNTER
Spoke with someone one at [facility]. To verify what orders they have not received and to verify fax number which was [phone]. I refaxed the needed orders as they were faxed about a week ago but not received.

## 2024-08-01 NOTE — TELEPHONE ENCOUNTER
Jacqueline from [facility] was calling to check on the status of the orders that they faxed over. Jacqueline stated that she needs these orders signed ASA since its been almost 30 days since they have been trying to get these orders signed. The order numbers are [id number], [id number], [id number]. The best fax number to fax over these orders back to is [phone] and best call back number with any questions or concerns is [phone] thank you.

## 2024-08-05 ENCOUNTER — PROCEDURE VISIT (OUTPATIENT)
Dept: NEUROLOGY | Facility: CLINIC | Age: 89
End: 2024-08-05
Payer: MEDICARE

## 2024-08-05 VITALS
TEMPERATURE: 98.4 F | SYSTOLIC BLOOD PRESSURE: 132 MMHG | DIASTOLIC BLOOD PRESSURE: 60 MMHG | HEART RATE: 66 BPM | OXYGEN SATURATION: 96 %

## 2024-08-05 DIAGNOSIS — G24.3 CERVICAL DYSTONIA: ICD-10-CM

## 2024-08-05 DIAGNOSIS — G24.5 BLEPHAROSPASM: Primary | ICD-10-CM

## 2024-08-05 DIAGNOSIS — G24.8 SEGMENTAL DYSTONIA: ICD-10-CM

## 2024-08-05 PROCEDURE — 64612 DESTROY NERVE FACE MUSCLE: CPT | Performed by: PSYCHIATRY & NEUROLOGY

## 2024-08-05 NOTE — PROGRESS NOTES
Universal Protocol   Consent: Written consent obtained.  Consent given by: patient      Chemodenervation     Date/Time  8/5/2024 11:30 AM     Performed by  Mira Hernandez MD   Authorized by  Mira Hernandez MD     Pre-procedure details      Prepped With: Alcohol     Anesthesia  (see MAR for exact dosages):     Anesthesia method:  None   Procedure details      Position:  Upright and supine   Botox      Brand:  Botox    mL's of preservative free sterile saline:  2    Final Concentration per CC:  50 units    Needle gauge: 30G.   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 Cervical fx in Spring 2023. Decline in gait in 2023 .       Overall good response to injections per patient and daughter. Response lasts about 4 months with mild return of blinking.   More anterocollis.        Exam: Mild blinking.  Anterocollis.  Tightness of the right splenius capitis        Injections:   2.5 unit(s) in 1 injection(s) was injected into the right  muscle.   2.5 unit(s) in 1 injection(s) was injected into the left  muscle.   10 unit(s) in 4 (2.5 ) injection(s) was injected into the right orbicularis oculi (upper inner, upper and lower outer and lateral canthus)  10 unit(s) in 4 (x2.5) injection(s) was injected into the left orbicularis oculi      Injected: 25 units     Cervical dystonia injections:   25 unit(s) was injected into the left sternocleidomastoid muscle.--    50 (25 x2 side) unit(s) was injected into the right splenius capitus muscle.--    25  unit(s) was injected into the right levator scapulae muscle     Total injected 125 units  Discarded 75

## 2024-08-20 NOTE — TELEPHONE ENCOUNTER
Spoke with [facility], obtained order numbers for missing orders.  Order# [id number] plan of care reprinted and saroj will refax.    Looking for order #[id number] once located will fax back to [facility].

## 2024-08-29 ENCOUNTER — TELEPHONE (OUTPATIENT)
Age: 89
End: 2024-08-29

## 2024-08-29 NOTE — TELEPHONE ENCOUNTER
Patients daughter calling in regarding forms she dropped off on 8/22/24 to be completed by the doctor to have her mother moved to [facility].    She states these forms need to be completed prior to next steps    Please advise

## 2024-08-29 NOTE — TELEPHONE ENCOUNTER
Pt called-Forms refaxed to [facility] [phone],printed form up front Pts daughter to p/u

## 2024-09-03 ENCOUNTER — TELEPHONE (OUTPATIENT)
Dept: FAMILY MEDICINE CLINIC | Facility: MEDICAL CENTER | Age: 89
End: 2024-09-03

## 2024-09-05 ENCOUNTER — TELEPHONE (OUTPATIENT)
Dept: FAMILY MEDICINE CLINIC | Facility: MEDICAL CENTER | Age: 89
End: 2024-09-05

## 2024-09-13 DIAGNOSIS — I10 ESSENTIAL HYPERTENSION: ICD-10-CM

## 2024-09-13 DIAGNOSIS — I48.0 PAROXYSMAL ATRIAL FIBRILLATION (HCC): ICD-10-CM

## 2024-09-13 RX ORDER — METOPROLOL TARTRATE 25 MG/1
25 TABLET, FILM COATED ORAL EVERY 12 HOURS SCHEDULED
Qty: 200 TABLET | Refills: 1 | Status: SHIPPED | OUTPATIENT
Start: 2024-09-13 | End: 2025-04-01

## 2024-09-13 RX ORDER — AMLODIPINE AND BENAZEPRIL HYDROCHLORIDE 5; 20 MG/1; MG/1
1 CAPSULE ORAL DAILY
Qty: 100 CAPSULE | Refills: 1 | Status: SHIPPED | OUTPATIENT
Start: 2024-09-13 | End: 2025-04-01

## 2024-09-13 NOTE — TELEPHONE ENCOUNTER
Reason for call:   [x] Refill   [] Prior Auth  [] Other:     Office:   [x] PCP/Provider -  Audra Bellamy DO / [place]   [] Specialty/Provider -     Medication: metoprolol tartrate (LOPRESSOR) 25 mg tablet     Dose/Frequency: Take 1 tablet (25 mg total) by mouth every 12 (twelve) hours,     Quantity: 200    Medication: amLODIPine-benazepril (LOTREL 5-20) 5-20 MG per capsule     Dose/Frequency: Take 1 capsule by mouth daily     Quantity: 100      Pharmacy: [facility] - [address]       Does the patient have enough for 3 days?   [] Yes   [x] No - Send as HP to POD

## 2024-09-17 ENCOUNTER — APPOINTMENT (OUTPATIENT)
Dept: LAB | Facility: MEDICAL CENTER | Age: 89
End: 2024-09-17
Payer: MEDICARE

## 2024-09-17 DIAGNOSIS — I10 ESSENTIAL HYPERTENSION: ICD-10-CM

## 2024-09-17 LAB
ANION GAP SERPL CALCULATED.3IONS-SCNC: 11 MMOL/L (ref 4–13)
BUN SERPL-MCNC: 22 MG/DL (ref 5–25)
CALCIUM SERPL-MCNC: 9.9 MG/DL (ref 8.4–10.2)
CHLORIDE SERPL-SCNC: 103 MMOL/L (ref 96–108)
CO2 SERPL-SCNC: 25 MMOL/L (ref 21–32)
CREAT SERPL-MCNC: 0.75 MG/DL (ref 0.6–1.3)
GFR SERPL CREATININE-BSD FRML MDRD: 69 ML/MIN/1.73SQ M
GLUCOSE P FAST SERPL-MCNC: 107 MG/DL (ref 65–99)
POTASSIUM SERPL-SCNC: 4.9 MMOL/L (ref 3.5–5.3)
SODIUM SERPL-SCNC: 139 MMOL/L (ref 135–147)

## 2024-10-08 ENCOUNTER — TELEPHONE (OUTPATIENT)
Age: 89
End: 2024-10-08

## 2024-10-08 NOTE — TELEPHONE ENCOUNTER
Mervat Nurse from [facility] calling to report that patient heel wond has reopened so she will be sending order to have that taken care of. Also she wanted to report that in the next two to three weeks patient will be placed in a nursing home. She stated that daughter has made the decision to place her in [facility].  Marcy Quiñones

## 2024-10-14 ENCOUNTER — TELEPHONE (OUTPATIENT)
Dept: FAMILY MEDICINE CLINIC | Facility: MEDICAL CENTER | Age: 89
End: 2024-10-14

## 2024-10-15 ENCOUNTER — TELEPHONE (OUTPATIENT)
Age: 89
End: 2024-10-15

## 2024-10-15 NOTE — TELEPHONE ENCOUNTER
[facility] call in to advise PCP that patient will be moving in [facility] tomorrow. The pressure ulcer on her left heel is just a scab at the moment.

## 2024-10-18 ENCOUNTER — NURSING HOME VISIT (OUTPATIENT)
Dept: GERIATRICS | Facility: OTHER | Age: 89
End: 2024-10-18
Payer: MEDICARE

## 2024-10-18 VITALS
OXYGEN SATURATION: 93 % | HEART RATE: 75 BPM | DIASTOLIC BLOOD PRESSURE: 76 MMHG | TEMPERATURE: 97.5 F | RESPIRATION RATE: 16 BRPM | SYSTOLIC BLOOD PRESSURE: 169 MMHG

## 2024-10-18 DIAGNOSIS — F41.9 ANXIETY: Chronic | ICD-10-CM

## 2024-10-18 DIAGNOSIS — I10 ESSENTIAL HYPERTENSION: Primary | Chronic | ICD-10-CM

## 2024-10-18 DIAGNOSIS — I48.0 PAROXYSMAL ATRIAL FIBRILLATION (HCC): ICD-10-CM

## 2024-10-18 PROCEDURE — 99309 SBSQ NF CARE MODERATE MDM 30: CPT | Performed by: NURSE PRACTITIONER

## 2024-10-18 NOTE — ASSESSMENT & PLAN NOTE
Patient is on Eliquis 2.5 mg twice daily for Afib, currently rate is controlled with current heart rate 75. Denies any chest pain or palpitations. No bleeding or  new bruising reported.

## 2024-10-18 NOTE — ASSESSMENT & PLAN NOTE
Patient was calm and cooperative during visit. Staff also noted no episodes of agitation or increased anxiety. Easily reoriented

## 2024-10-18 NOTE — ASSESSMENT & PLAN NOTE
Patient blood pressure readings reviewed systolic blood pressure 140-160 and diastolic in the 60-70 range. Patient is on Lotrel (5-20 mg)  and asymptomatic. Current blood pressure 169/70. Will continue to monitor.

## 2024-10-18 NOTE — PROGRESS NOTES
[facility] at [address] [place]  [address]  [phone]    Acute Visit Note  LTC: POS 32    ASSESSMENT AND PLAN:  1. Essential hypertension  Assessment & Plan:  Patient blood pressure readings reviewed systolic blood pressure 140-160 and diastolic in the 60-70 range. Patient is on Lotrel (5-20 mg)  and asymptomatic. Current blood pressure 169/70. Will continue to monitor.  2. Anxiety  Assessment & Plan:  Patient was calm and cooperative during visit. Staff also noted no episodes of agitation or increased anxiety. Easily reoriented   3. Paroxysmal atrial fibrillation (HCC)  Assessment & Plan:  Patient is on Eliquis 2.5 mg twice daily for Afib, currently rate is controlled with current heart rate 75. Denies any chest pain or palpitations. No bleeding or  new bruising reported.    Name: Erika Quigley                 : 1931               Sex: female    HPI:  Patient evaluated today and was alert,awake with confusion but easily redirected.She was cooperative during visit and denied any pain. Chart was reviewed for vital signs, medications and no new changes were made.    Refer to assessment and plan for further HPI. The following portions of the patient's history were reviewed and updated as appropriate: allergies, current medications, past family history, past medical history, past social history, past surgical history and problem list.    ROS: Review of Systems   Constitutional:  Negative for activity change and fever.   HENT:  Negative for congestion.    Respiratory:  Negative for cough and shortness of breath.    Cardiovascular:  Negative for chest pain and leg swelling.   Gastrointestinal:  Negative for constipation.   Genitourinary:  Negative for dysuria.   Musculoskeletal:  Negative for arthralgias.   Neurological:  Negative for weakness and headaches.   Psychiatric/Behavioral:  Negative for agitation. The patient is not nervous/anxious.        No  Known Allergies    Medications:    Current Outpatient Medications on File Prior to Visit   Medication Sig Dispense Refill    acetaminophen (TYLENOL) 500 mg tablet Take 1,300 mg by mouth 2 (two) times a day as needed for mild pain      amLODIPine-benazepril (LOTREL 5-20) 5-20 MG per capsule Take 1 capsule by mouth daily 100 capsule 1    apixaban (ELIQUIS) 2.5 mg Take 1 tablet (2.5 mg total) by mouth 2 (two) times a day 180 tablet 1    metoprolol tartrate (LOPRESSOR) 25 mg tablet Take 1 tablet (25 mg total) by mouth every 12 (twelve) hours 200 tablet 1    Multiple Vitamins-Minerals (MULTIVITAL) tablet Take 1 tablet by mouth daily      Omega-3 Fatty Acids (FISH OIL) 645 MG CAPS Take 1 tablet by mouth daily       Current Facility-Administered Medications on File Prior to Visit   Medication Dose Route Frequency Provider Last Rate Last Admin    onabotulinumtoxin A (BOTOX) injection 200 Units  200 Units Intramuscular Q3 Months Mira Hernandez MD   200 Units at 08/05/24 1432    onabotulinumtoxin A (BOTOX) injection 200 Units  200 Units Intramuscular Q3 Months    200 Units at 04/29/24 1224       History:  Past Medical History:   Diagnosis Date    Anxiety 2010    Bladder infection 09/2019    Breast lump in female     Deep vein thrombosis (HCC) October, 2023    HL (hearing loss) 2010    Hypertension 2010    Osteopenia     Ovarian cyst      Past Surgical History:   Procedure Laterality Date    BLADDER SURGERY      BREAST BIOPSY      CATARACT EXTRACTION Bilateral 01/01/2015    , Right Eye-2017, Left eye    ENDOMETRIAL BIOPSY      by Suction    HYSTERECTOMY       Family History   Problem Relation Age of Onset    Heart disease Mother     Heart disease Father     Hypertension Family     Osteoporosis Family      Social History     Socioeconomic History    Marital status:      Spouse name: Not on file    Number of children: Not on file    Years of education: Not on file    Highest education level: Not on file    Occupational History    Not on file   Tobacco Use    Smoking status: Never    Smokeless tobacco: Never   Vaping Use    Vaping status: Never Used   Substance and Sexual Activity    Alcohol use: Not Currently    Drug use: No    Sexual activity: Not Currently   Other Topics Concern    Not on file   Social History Narrative    Denied consumption of coffee    Active daily tea consumption    Denied ingesting cola containing caffeine         Social Determinants of Health     Financial Resource Strain: Low Risk  (11/13/2023)    Overall Financial Resource Strain (CARDIA)     Difficulty of Paying Living Expenses: Not very hard   Food Insecurity: No Food Insecurity (3/21/2024)    Hunger Vital Sign     Worried About Running Out of Food in the Last Year: Never true     Ran Out of Food in the Last Year: Never true   Transportation Needs: No Transportation Needs (3/21/2024)    PRAPARE - Transportation     Lack of Transportation (Medical): No     Lack of Transportation (Non-Medical): No   Physical Activity: Not on file   Stress: Not on file   Social Connections: Not on file   Intimate Partner Violence: Not on file   Housing Stability: Unknown (3/21/2024)    Housing Stability Vital Sign     Unable to Pay for Housing in the Last Year: No     Number of Times Moved in the Last Year: Not on file     Homeless in the Last Year: No     Past Surgical History:   Procedure Laterality Date    BLADDER SURGERY      BREAST BIOPSY      CATARACT EXTRACTION Bilateral 01/01/2015    , Right Eye-2017, Left eye    ENDOMETRIAL BIOPSY      by Suction    HYSTERECTOMY         OBJECTIVE:  Vitals:    10/18/24 1613   BP: 169/76   Pulse: 75   Resp: 16   Temp: 97.5 °F (36.4 °C)   SpO2: 93%     There is no height or weight on file to calculate BMI.  Physical Exam  Constitutional:       Appearance: Normal appearance.   HENT:      Head: Normocephalic.      Nose: Nose normal.      Mouth/Throat:      Mouth: Mucous membranes are moist.   Cardiovascular:      Rate  and Rhythm: Normal rate and regular rhythm.      Pulses: Normal pulses.      Heart sounds: Murmur heard.   Pulmonary:      Effort: Pulmonary effort is normal.      Breath sounds: Normal breath sounds.   Abdominal:      General: Bowel sounds are normal.      Palpations: Abdomen is soft.   Musculoskeletal:         General: No swelling.      Right lower leg: No edema.      Left lower leg: No edema.   Skin:     Capillary Refill: Capillary refill takes less than 2 seconds.      Findings: No bruising.   Neurological:      Mental Status: She is alert. Mental status is at baseline.   Psychiatric:         Mood and Affect: Mood normal.         Behavior: Behavior normal.       Labs & Imaging:  Lab Results   Component Value Date    WBC 6.63 05/28/2024    HGB 11.9 05/28/2024    HCT 38.8 05/28/2024     (H) 05/28/2024     05/28/2024     Lab Results   Component Value Date    SODIUM 139 09/17/2024    K 4.9 09/17/2024     09/17/2024    CO2 25 09/17/2024    BUN 22 09/17/2024    CREATININE 0.75 09/17/2024    GLUC 107 03/22/2024    CALCIUM 9.9 09/17/2024     Lab Results   Component Value Date    CELGWFQQ54 834 08/09/2018     Lab Results   Component Value Date    IPV7EPEXVRGD 2.327 03/21/2024     Lab Results   Component Value Date    CHNJ24BSDTNE 37.0 05/25/2021        MAGDALENE Sanderson  Geriatric Medicine  10/18/2024@1616

## 2024-10-21 ENCOUNTER — NURSING HOME VISIT (OUTPATIENT)
Dept: GERIATRICS | Facility: OTHER | Age: 89
End: 2024-10-21
Payer: MEDICARE

## 2024-10-21 DIAGNOSIS — I82.411 DEEP VEIN THROMBOSIS (DVT) OF FEMORAL VEIN OF RIGHT LOWER EXTREMITY, UNSPECIFIED CHRONICITY (HCC): ICD-10-CM

## 2024-10-21 DIAGNOSIS — I48.0 PAROXYSMAL ATRIAL FIBRILLATION (HCC): ICD-10-CM

## 2024-10-21 DIAGNOSIS — G45.9 TIA (TRANSIENT ISCHEMIC ATTACK): ICD-10-CM

## 2024-10-21 DIAGNOSIS — Z86.73 CHRONIC CEREBROVASCULAR ACCIDENT (CVA): ICD-10-CM

## 2024-10-21 DIAGNOSIS — H90.3 SENSORINEURAL HEARING LOSS (SNHL), BILATERAL: ICD-10-CM

## 2024-10-21 DIAGNOSIS — F41.9 ANXIETY: Chronic | ICD-10-CM

## 2024-10-21 DIAGNOSIS — R80.1 PERSISTENT PROTEINURIA: ICD-10-CM

## 2024-10-21 DIAGNOSIS — L89.610 PRESSURE INJURY OF RIGHT HEEL, UNSTAGEABLE (HCC): ICD-10-CM

## 2024-10-21 DIAGNOSIS — I10 ESSENTIAL HYPERTENSION: Primary | Chronic | ICD-10-CM

## 2024-10-21 DIAGNOSIS — G31.84 MCI (MILD COGNITIVE IMPAIRMENT): Chronic | ICD-10-CM

## 2024-10-21 DIAGNOSIS — R73.03 PREDIABETES: ICD-10-CM

## 2024-10-21 PROBLEM — L89.619 PRESSURE INJURY OF SKIN OF RIGHT HEEL: Status: ACTIVE | Noted: 2024-10-21

## 2024-10-21 PROCEDURE — 99305 1ST NF CARE MODERATE MDM 35: CPT | Performed by: FAMILY MEDICINE

## 2024-10-30 ENCOUNTER — TELEPHONE (OUTPATIENT)
Age: 89
End: 2024-10-30

## 2024-10-30 NOTE — TELEPHONE ENCOUNTER
Attempted to reach pt, LMOM wit CB# and OH.    Last OVS 4/2/2024  Last injection-L3-4 L4-5 TFESI 4/22/2024

## 2024-10-30 NOTE — TELEPHONE ENCOUNTER
Caller: Hilda (Daughter)     Doctor:      Reason for call: Patient had injection on 4/22/24.     It seemed to help and now the pain is back.      Looking to see if this can be repeated.       Call back#: [phone]

## 2024-10-31 ENCOUNTER — TELEPHONE (OUTPATIENT)
Dept: FAMILY MEDICINE CLINIC | Facility: MEDICAL CENTER | Age: 89
End: 2024-10-31

## 2024-10-31 NOTE — TELEPHONE ENCOUNTER
S/w pt's daughter. S/p L3-4 L4-5 TFESI 4/22/2024  Daughter states pt was doing well after procedure and not c/o pain until very recently. Daughter states pt now wincing with pain. States pain in same location. Unable to rate. Pt is on eliquis    Please advise if ok to repeat 4/22 TFESI    Last HgbA1c 6.2 on 5/22/24

## 2024-10-31 NOTE — TELEPHONE ENCOUNTER
Caller: lluvia Stevens's daughter    Doctor: Wali    Reason for call: returning the nurse's call    Call back#: [phone]

## 2024-11-01 ENCOUNTER — TELEPHONE (OUTPATIENT)
Dept: PAIN MEDICINE | Facility: CLINIC | Age: 89
End: 2024-11-01

## 2024-11-01 NOTE — TELEPHONE ENCOUNTER
This patient is being considered for a neuraxial injection for the management of their pain. However, the patient is currently on an anticoagulant per your orders. The American Society of Regional Anesthesia Guideline on neuraxial procedures and anti-coagulation indicates that medication should be held as follows: Eliquis 3 days prior to injection.   Please advise if you ok the hold of this medication.    Thank you,    Santos Martinez  Procedure   [facility]

## 2024-11-01 NOTE — TELEPHONE ENCOUNTER
Okay to hold 3 days prior to injection.  Resume thereafter as long as no major bleeding/contraindication.

## 2024-11-04 NOTE — TELEPHONE ENCOUNTER
[facility]  Patient has been scheduled with Dr. Keyes for TFESI on 11/18/24.  Patient is currently on Eliquis, hold approval was obtained and can be found in the patient's chart.  Patient is aware that our clinical department will be contacting them with further instructions on hold dates.    Thank you,  Santos

## 2024-11-04 NOTE — TELEPHONE ENCOUNTER
S/w Pt's daughter Hilda per [facility] Form. Pt is scheduled for TFESI on 11/18. Pt instructed last dose of Eliquis on 11/14 per SPA 3 day hold guidelines. Pt denies taking other anticoagulants. Pt verbalized understanding of preoperative instructions.

## 2024-11-18 ENCOUNTER — HOSPITAL ENCOUNTER (OUTPATIENT)
Dept: RADIOLOGY | Facility: CLINIC | Age: 89
Discharge: HOME | End: 2024-11-18
Attending: PHYSICAL MEDICINE & REHABILITATION
Payer: MEDICARE

## 2024-11-18 VITALS
RESPIRATION RATE: 20 BRPM | TEMPERATURE: 98.5 F | DIASTOLIC BLOOD PRESSURE: 63 MMHG | HEART RATE: 62 BPM | SYSTOLIC BLOOD PRESSURE: 153 MMHG | OXYGEN SATURATION: 95 %

## 2024-11-18 DIAGNOSIS — M51.16 INTERVERTEBRAL DISC DISORDER WITH RADICULOPATHY OF LUMBAR REGION: ICD-10-CM

## 2024-11-18 PROCEDURE — 64484 NJX AA&/STRD TFRM EPI L/S EA: CPT | Performed by: PHYSICAL MEDICINE & REHABILITATION

## 2024-11-18 PROCEDURE — 64483 NJX AA&/STRD TFRM EPI L/S 1: CPT | Performed by: PHYSICAL MEDICINE & REHABILITATION

## 2024-11-18 RX ORDER — PAPAVERINE HCL 150 MG
10 CAPSULE, EXTENDED RELEASE ORAL ONCE
Status: COMPLETED | OUTPATIENT
Start: 2024-11-18 | End: 2024-11-18

## 2024-11-18 RX ORDER — LIDOCAINE HYDROCHLORIDE 10 MG/ML
4 INJECTION, SOLUTION EPIDURAL; INFILTRATION; INTRACAUDAL; PERINEURAL ONCE
Status: COMPLETED | OUTPATIENT
Start: 2024-11-18 | End: 2024-11-18

## 2024-11-18 RX ORDER — BUPIVACAINE HCL/PF 2.5 MG/ML
2 VIAL (ML) INJECTION ONCE
Status: COMPLETED | OUTPATIENT
Start: 2024-11-18 | End: 2024-11-18

## 2024-11-18 RX ADMIN — IOHEXOL 1 ML: 300 INJECTION, SOLUTION INTRAVENOUS at 11:19

## 2024-11-18 RX ADMIN — BUPIVACAINE HYDROCHLORIDE 2 ML: 2.5 INJECTION, SOLUTION EPIDURAL; INFILTRATION; INTRACAUDAL at 11:20

## 2024-11-18 RX ADMIN — LIDOCAINE HYDROCHLORIDE 4 ML: 10 INJECTION, SOLUTION EPIDURAL; INFILTRATION; INTRACAUDAL; PERINEURAL at 11:16

## 2024-11-18 RX ADMIN — DEXAMETHASONE SODIUM PHOSPHATE 10 MG: 10 INJECTION, SOLUTION INTRAMUSCULAR; INTRAVENOUS at 11:20

## 2024-11-18 NOTE — DISCHARGE INSTR - LAB
Epidural Steroid Injection     ** Resume Eliquis **  WHAT YOU NEED TO KNOW:   An epidural steroid injection (ERICK) is a procedure to inject steroid medicine into the epidural space. The epidural space is between your spinal cord and vertebrae. Steroids reduce inflammation and fluid buildup in your spine that may be causing pain. You may be given pain medicine along with the steroids.          ACTIVITY  Do not drive or operate machinery today.  No strenuous activity today - bending, lifting, etc.  You may resume normal activites starting tomorrow - start slowly and as tolerated.  You may shower today, but no tub baths or hot tubs.  You may have numbness for several hours from the local anesthetic. Please use caution and common sense, especially with weight-bearing activities.    CARE OF THE INJECTION SITE  If you have soreness or pain, apply ice to the area today (20 minutes on/20 minutes off).  Starting tomorrow, you may use warm, moist heat or ice if needed.  You may have an increase or change in your discomfort for 36-48 hours after your treatment.  Apply ice and continue with any pain medication you have been prescribed.  Notify the [facility] if you have any of the following: redness, drainage, swelling, headache, stiff neck or fever above 100°F.    SPECIAL INSTRUCTIONS  Our office will contact you in approximately 14 days for a progress report.    MEDICATIONS  Continue to take all routine medications.  Our office may have instructed you to hold some medications.    As no general anesthesia was used in today's procedure, you should not experience any side effects related to anesthesia.     If you are diabetic, the steroids used in today's injection may temporarily increase your blood sugar levels after the first few days after your injection. Please keep a close eye on your sugars and alert the doctor who manages your diabetes if your sugars are significantly high from your baseline or you are  symptomatic.     If you have a problem specifically related to your procedure, please call our office at [phone].  Problems not related to your procedure should be directed to your primary care physician.

## 2024-11-18 NOTE — H&P
History of Present Illness: The patient is a 89 y.o. female who presents with complaints of right sided low back pain    Past Medical History:   Diagnosis Date    Anxiety 2010    Bladder infection 09/2019    Breast lump in female     Deep vein thrombosis (HCC) October, 2023    HL (hearing loss) 2010    Hypertension 2010    Osteopenia     Ovarian cyst        Past Surgical History:   Procedure Laterality Date    BLADDER SURGERY      BREAST BIOPSY      CATARACT EXTRACTION Bilateral 01/01/2015    , Right Eye-2017, Left eye    ENDOMETRIAL BIOPSY      by Suction    HYSTERECTOMY           Current Outpatient Medications:     acetaminophen (TYLENOL) 325 mg tablet, Take 650 mg by mouth 4 (four) times a day, Disp: , Rfl:     amLODIPine-benazepril (LOTREL 5-20) 5-20 MG per capsule, Take 1 capsule by mouth daily, Disp: 100 capsule, Rfl: 1    apixaban (ELIQUIS) 2.5 mg, Take 1 tablet (2.5 mg total) by mouth 2 (two) times a day, Disp: 180 tablet, Rfl: 1    metoprolol tartrate (LOPRESSOR) 25 mg tablet, Take 1 tablet (25 mg total) by mouth every 12 (twelve) hours, Disp: 200 tablet, Rfl: 1    Multiple Vitamins-Minerals (MULTIVITAL) tablet, Take 1 tablet by mouth daily, Disp: , Rfl:     Omega-3 Fatty Acids (FISH OIL) 645 MG CAPS, Take 1 tablet by mouth daily, Disp: , Rfl:     Current Facility-Administered Medications:     bupivacaine (PF) (MARCAINE) 0.25 % injection 2 mL, 2 mL, Epidural, Once, Baudilio Keyes DO    dexamethasone (PF) (DECADRON) injection 10 mg, 10 mg, Epidural, Once, Baudilio Keyes DO    iohexol (OMNIPAQUE) 300 mg/mL injection 50 mL, 50 mL, Epidural, Once, Baudilio Keyes DO    lidocaine (PF) (XYLOCAINE-MPF) 1 % injection 4 mL, 4 mL, Infiltration, Once, Baudilio Keyes DO    No Known Allergies    Physical Exam:   Vitals:    11/18/24 1039   BP: 159/70   Pulse: 65   Resp: 20   Temp: 98.5 °F (36.9 °C)   SpO2: 94%     General: Awake, Alert, Oriented x 3, Mood and affect appropriate  Respiratory:  Respirations even and unlabored  Cardiovascular: Peripheral pulses intact; no edema  Musculoskeletal Exam: Tenderness palpation left side lumbar paraspinals    ASA Score: 2    Patient/Chart Verification  Patient ID Verified: Verbal  ID Band Applied: No  Consents Confirmed: Procedural, To be obtained in the Pre-Procedure area  Interval H&P(within 24 hr) Complete (required for Outpatients and Surgery Admit only): To be obtained in the Procedural area  Allergies Reviewed: Yes  Anticoag/NSAID held?: Yes (Jillian KRAUSE 11/14, KW aware.)  Currently on antibiotics?: No    Assessment:   1. Intervertebral disc disorder with radiculopathy of lumbar region        Plan: Rt L3-4 L4-5 TFESI

## 2024-11-18 NOTE — PROGRESS NOTES
[facility]  Progress Note  POS: Nursing Facility/LTC-32    Chief Complaint/Reason for visit: Follow up of chronic medical conditions  History of Present Illness: 89 year old female evaluated for routine follow up of chronic medical conditions. Underwent steroid injections to her low back yesterday. No acute concerns reported per nursing. Wound care team following for R heel wound.        Review of systems: Review of Systems   Constitutional:  Negative for fever and unexpected weight change (weight gain of 2lb (desired) since SNF admission).   Skin:  Positive for wound.      Medications: No changes made  Consults reviewed: Spine & Pain (11/18/24)- Right L3-L4, L4-L5 TFESI  Labs/Diagnostics (reviewed by this provider): Copy in Chart  CBC, BMP (10/22/24)  Hgb 10.2 WBC 4.7 Plt 188  Na 140 K 4.4 BUN 23 Creat 0.80    Physical Exam    Vitals reviewed in SNF EMR    Physical Exam  Vitals and nursing note reviewed.   Constitutional:       General: She is awake. She is not in acute distress.     Appearance: She is well-groomed. She is not toxic-appearing or diaphoretic.   HENT:      Head: Normocephalic.      Mouth/Throat:      Mouth: Mucous membranes are moist.   Eyes:      General: No scleral icterus.        Right eye: No discharge.         Left eye: No discharge.   Pulmonary:      Effort: Pulmonary effort is normal. No respiratory distress.   Skin:     Coloration: Skin is not jaundiced or pale.   Neurological:      Mental Status: She is alert. Mental status is at baseline.   Psychiatric:         Behavior: Behavior is cooperative.       Assessment/Plan:  89 year old female with:    Essential hypertension  Goal <140/90; avoid hypotension  BP above goal, ranging 148//75 over past 3 weeks  Start monitor BP daily x7 days with manual BP cuff, ensuring proper technique including 5 minutes of quiet rest in seated position before measuring. If SBP consistently >155 with good  technique, will adjust amlodipine-benazepril to 5-40mg daily dosing with plan for BMP one week after medication adjustment to monitor renal function  Continue metoprolol tartrate 25mg twice daily- would not further increase this medication as HR consistently in the 60s  Ensure pain is adequately control    Paroxysmal atrial fibrillation (HCC)  Continue metoprolol for rate control  HR trending in 60s  Continue chronic anticoagulation with eliquis- recheck CBC and BMP in late February (2025), sooner if change in clinical condition     Intervertebral disc disorder with radiculopathy of lumbar region  S/p R L3-L4, L4-L5 TFESI on 11/18/24  Routine follow up with spine and pain  Continue scheduled acetaminophen    Anemia  Baseline Hgb 11-12  Hgb 10.2 per labs 11/22/24; likely some component of lab variance but will need monitoring  At risk for bleeding on chronic anticoagulation    Previous lab workup:    Lab Results   Component Value Date    [id number] 834 08/09/2018     Lab Results   Component Value Date    FOLATE >20.0 (H) 02/11/2016     Lab Results   Component Value Date    IRON 85 10/12/2023    TIBC 366 10/12/2023    FERRITIN 65 10/12/2023        Repeat CBC routine in February (2025); if further downtrend in Hgb noted, will check iron panel and check stool testing for fecal occult blood    Cognitive impairment  Likely vascular in nature with 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

## 2024-11-19 ENCOUNTER — NURSING HOME VISIT (OUTPATIENT)
Dept: GERIATRICS | Facility: OTHER | Age: 89
End: 2024-11-19
Payer: MEDICARE

## 2024-11-19 DIAGNOSIS — R53.81 DEBILITY: ICD-10-CM

## 2024-11-19 DIAGNOSIS — Z86.73 CHRONIC CEREBROVASCULAR ACCIDENT (CVA): ICD-10-CM

## 2024-11-19 DIAGNOSIS — D64.9 ANEMIA, UNSPECIFIED TYPE: ICD-10-CM

## 2024-11-19 DIAGNOSIS — I10 ESSENTIAL HYPERTENSION: Primary | Chronic | ICD-10-CM

## 2024-11-19 DIAGNOSIS — I48.0 PAROXYSMAL ATRIAL FIBRILLATION (HCC): ICD-10-CM

## 2024-11-19 DIAGNOSIS — R41.89 COGNITIVE IMPAIRMENT: ICD-10-CM

## 2024-11-19 DIAGNOSIS — M51.16 INTERVERTEBRAL DISC DISORDER WITH RADICULOPATHY OF LUMBAR REGION: ICD-10-CM

## 2024-11-19 PROBLEM — M79.89 MASS OF SOFT TISSUE: Status: RESOLVED | Noted: 2023-09-19 | Resolved: 2024-11-19

## 2024-11-19 PROBLEM — M81.0 OSTEOPOROSIS WITHOUT CURRENT PATHOLOGICAL FRACTURE: Status: RESOLVED | Noted: 2020-11-04 | Resolved: 2024-11-19

## 2024-11-19 PROBLEM — R29.90 STROKE-LIKE SYMPTOMS: Status: RESOLVED | Noted: 2024-03-20 | Resolved: 2024-11-19

## 2024-11-19 PROBLEM — R55 NEAR SYNCOPE: Status: RESOLVED | Noted: 2023-09-18 | Resolved: 2024-11-19

## 2024-11-19 PROBLEM — G24.8 SEGMENTAL DYSTONIA: Chronic | Status: RESOLVED | Noted: 2019-07-15 | Resolved: 2024-11-19

## 2024-11-19 PROBLEM — R39.9 UTI SYMPTOMS: Status: RESOLVED | Noted: 2024-03-05 | Resolved: 2024-11-19

## 2024-11-19 PROCEDURE — 99309 SBSQ NF CARE MODERATE MDM 30: CPT | Performed by: FAMILY MEDICINE

## 2024-11-19 NOTE — ASSESSMENT & PLAN NOTE
Continue metoprolol for rate control  HR trending in 60s  Continue chronic anticoagulation with eliquis- recheck CBC and BMP in late February (2025), sooner if change in clinical condition

## 2024-11-19 NOTE — ASSESSMENT & PLAN NOTE
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

## 2024-11-19 NOTE — ASSESSMENT & PLAN NOTE
Multifactorial  Continue SNF for LTC for 24/7 and ADL care  Supportive care, nutritional support  Management of chronic medical conditions as outlined  Fall precautions

## 2024-11-19 NOTE — ASSESSMENT & PLAN NOTE
Baseline Hgb 11-12  Hgb 10.2 per labs 11/22/24; likely some component of lab variance but will need monitoring  At risk for bleeding on chronic anticoagulation    Previous lab workup:    Lab Results   Component Value Date    [id number] 834 08/09/2018     Lab Results   Component Value Date    FOLATE >20.0 (H) 02/11/2016     Lab Results   Component Value Date    IRON 85 10/12/2023    TIBC 366 10/12/2023    FERRITIN 65 10/12/2023        Repeat CBC routine in February (2025); if further downtrend in Hgb noted, will check iron panel and check stool testing for fecal occult blood

## 2024-11-19 NOTE — ASSESSMENT & PLAN NOTE
Goal <140/90; avoid hypotension  BP above goal, ranging 148//75 over past 3 weeks  Start monitor BP daily x7 days with manual BP cuff, ensuring proper technique including 5 minutes of quiet rest in seated position before measuring. If SBP consistently >155 with good technique, will adjust amlodipine-benazepril to 5-40mg daily dosing with plan for BMP one week after medication adjustment to monitor renal function  Continue metoprolol tartrate 25mg twice daily- would not further increase this medication as HR consistently in the 60s  Ensure pain is adequately control

## 2024-11-19 NOTE — ASSESSMENT & PLAN NOTE
S/p R L3-L4, L4-L5 TFESI on 11/18/24  Routine follow up with spine and pain  Continue scheduled acetaminophen

## 2024-12-02 ENCOUNTER — TELEPHONE (OUTPATIENT)
Dept: PAIN MEDICINE | Facility: CLINIC | Age: 89
End: 2024-12-02

## 2024-12-02 NOTE — TELEPHONE ENCOUNTER
Caller: Erika  Doctor/office: Wali JOSEPH#: [phone]    % of improvement: 80%  Pain Scale (1-10): 3/10

## 2024-12-16 ENCOUNTER — PROCEDURE VISIT (OUTPATIENT)
Dept: NEUROLOGY | Facility: CLINIC | Age: 89
End: 2024-12-16
Payer: MEDICARE

## 2024-12-16 VITALS — HEART RATE: 64 BPM | SYSTOLIC BLOOD PRESSURE: 134 MMHG | TEMPERATURE: 98.3 F | DIASTOLIC BLOOD PRESSURE: 68 MMHG

## 2024-12-16 DIAGNOSIS — G24.3 CERVICAL DYSTONIA: ICD-10-CM

## 2024-12-16 DIAGNOSIS — G24.5 BLEPHAROSPASM: Primary | ICD-10-CM

## 2024-12-16 PROCEDURE — 64612 DESTROY NERVE FACE MUSCLE: CPT | Performed by: PSYCHIATRY & NEUROLOGY

## 2024-12-16 NOTE — PROGRESS NOTES
Universal Protocol   Consent: Written consent obtained.  Consent given by: patient      Chemodenervation     Date/Time  12/16/2024 2:00 PM     Performed by  Mira Hernandez MD   Authorized by  Mira Hernandez MD     Pre-procedure details      Prepped With: Alcohol     Anesthesia  (see MAR for exact dosages):     Anesthesia method:  None   Procedure details      Position:  Upright   Botox      Brand:  Botox    Final Concentration per CC:  50 units (2cc per 100 units)    Needle gauge: 30G.   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 Cervical fx in Spring 2023. Decline in gait in 2023 .       Overall good response to injections per patient and daughter. Response lasts about 4 months with mild return of blinking.   More anterocollis.        Exam: Mild blinking.  Anterocollis.  Tightness of the right splenius capitis        Injections:   2.5 unit(s) in 1 injection(s) was injected into the right  muscle.   2.5 unit(s) in 1 injection(s) was injected into the left  muscle.   10 unit(s) in 4 (2.5 ) injection(s) was injected into the right orbicularis oculi (upper inner, upper and lower outer and lateral canthus)  10 unit(s) in 4 (x2.5) injection(s) was injected into the left orbicularis oculi      Injected: 25 units     Cervical dystonia injections:   25 unit(s) was injected into the left sternocleidomastoid muscle.--    50 (25 x2 side) unit(s) was injected into the right splenius capitus muscle.--    25 unit(s) was injected into the right levator  scapulae muscle     Total injected 125 units  Discarded 75

## 2024-12-18 ENCOUNTER — NURSING HOME VISIT (OUTPATIENT)
Dept: GERIATRICS | Facility: OTHER | Age: 89
End: 2024-12-18
Payer: MEDICARE

## 2024-12-18 DIAGNOSIS — R53.81 DEBILITY: ICD-10-CM

## 2024-12-18 DIAGNOSIS — I48.0 PAROXYSMAL ATRIAL FIBRILLATION (HCC): ICD-10-CM

## 2024-12-18 DIAGNOSIS — R13.10 DYSPHAGIA, UNSPECIFIED TYPE: ICD-10-CM

## 2024-12-18 DIAGNOSIS — I10 ESSENTIAL HYPERTENSION: Primary | Chronic | ICD-10-CM

## 2024-12-18 PROCEDURE — 99309 SBSQ NF CARE MODERATE MDM 30: CPT | Performed by: FAMILY MEDICINE

## 2024-12-18 NOTE — PROGRESS NOTES
[facility]  Progress Note  POS: Nursing Facility/LTC-32    Chief Complaint/Reason for visit: Follow up of chronic medical conditions  History of Present Illness: 89 year old female evaluated for routine follow up of chronic medical conditions. Noted to have consistent elevation in systolic blood pressure- see trends below. Speech therapy consult with recent episodes of coughing reported in the dining room. See A&P below for additional HPI.      Review of systems: Review of Systems   Constitutional:  Negative for activity change, appetite change and fever.   Respiratory:  Positive for cough.       Medications: Changes made- see written orders  Labs/Diagnostics (reviewed by this provider):   No new labs/imaging since last visit    Physical Exam    Vitals reviewed in SNF EMR    Physical Exam  Vitals and nursing note reviewed.   Constitutional:       General: She is awake. She is not in acute distress.     Appearance: She is well-groomed. She is not ill-appearing, toxic-appearing or diaphoretic.   HENT:      Head: Normocephalic and atraumatic.      Nose: No rhinorrhea.      Mouth/Throat:      Mouth: Mucous membranes are moist.   Eyes:      General: No scleral icterus.        Right eye: No discharge.         Left eye: No discharge.      Conjunctiva/sclera: Conjunctivae normal.   Pulmonary:      Effort: Pulmonary effort is normal. No respiratory distress.   Musculoskeletal:      Cervical back: No rigidity.   Skin:     Coloration: Skin is not jaundiced or pale.   Neurological:      Mental Status: She is alert. Mental status is at baseline.   Psychiatric:         Attention and Perception: Attention and perception normal.         Behavior: Behavior is cooperative.       Assessment/Plan:  89 year old female with:    Essential hypertension  Goal <140/90 (ideally <130/80); avoid hypotension  BP remains above goal, ranging 139//70 over past 4 weeks; SBP consistently above  150  Continue to monitor daily manual BPs, ensuring proper technique including 5 minutes of quiet rest in seated position before measuring  Add amlodipine 2.5mg daily, hold for SBP <110  Continue amlodipine-benazepril to 5-40mg daily  Continue metoprolol tartrate 25mg twice daily    Dysphagia  Speech therapy consult placed and following  Aspiration precautions and dietary modifications    Paroxysmal atrial fibrillation (HCC)  Continue metoprolol for rate control  HR trending 60s-70s  Continue chronic anticoagulation with eliquis- recheck CBC and BMP in late February (2025), sooner if change in clinical condition     Debility  Multifactorial  Continue SNF for LTC for 24/7 and ADL care  Supportive care, nutritional support  Management of chronic medical conditions as outlined  Fall precautions      Jesica Lenz,   12/18/24

## 2024-12-19 DIAGNOSIS — I48.0 PAROXYSMAL ATRIAL FIBRILLATION (HCC): ICD-10-CM

## 2024-12-20 RX ORDER — AMLODIPINE BESYLATE 2.5 MG/1
2.5 TABLET ORAL DAILY
Start: 2024-12-20

## 2024-12-24 PROBLEM — R13.10 DYSPHAGIA: Status: ACTIVE | Noted: 2024-12-24

## 2024-12-24 NOTE — ASSESSMENT & PLAN NOTE
Continue metoprolol for rate control  HR trending 60s-70s  Continue chronic anticoagulation with eliquis- recheck CBC and BMP in late February (2025), sooner if change in clinical condition

## 2024-12-24 NOTE — ASSESSMENT & PLAN NOTE
Goal <140/90 (ideally <130/80); avoid hypotension  BP remains above goal, ranging 139//70 over past 4 weeks; SBP consistently above 150  Continue to monitor daily manual BPs, ensuring proper technique including 5 minutes of quiet rest in seated position before measuring  Add amlodipine 2.5mg daily, hold for SBP <110  Continue amlodipine-benazepril to 5-40mg daily  Continue metoprolol tartrate 25mg twice daily

## 2025-01-14 ENCOUNTER — NURSING HOME VISIT (OUTPATIENT)
Dept: GERIATRICS | Facility: OTHER | Age: OVER 89
End: 2025-01-14
Payer: MEDICARE

## 2025-01-14 VITALS
TEMPERATURE: 97.6 F | SYSTOLIC BLOOD PRESSURE: 158 MMHG | DIASTOLIC BLOOD PRESSURE: 72 MMHG | OXYGEN SATURATION: 96 % | HEART RATE: 59 BPM

## 2025-01-14 DIAGNOSIS — R53.81 DEBILITY: ICD-10-CM

## 2025-01-14 DIAGNOSIS — I48.0 PAROXYSMAL ATRIAL FIBRILLATION (HCC): ICD-10-CM

## 2025-01-14 DIAGNOSIS — F41.9 ANXIETY: Chronic | ICD-10-CM

## 2025-01-14 DIAGNOSIS — I10 ESSENTIAL HYPERTENSION: Primary | Chronic | ICD-10-CM

## 2025-01-14 PROCEDURE — 99309 SBSQ NF CARE MODERATE MDM 30: CPT

## 2025-01-14 NOTE — ASSESSMENT & PLAN NOTE
BP stable   Continue amlodipine 2.5 mg daily with hold parameters  Continue Lotrel and metoprolol   Monitor BP

## 2025-01-14 NOTE — PROGRESS NOTES
[facility]  [address], [place], [place]  [phone]  Facility: S  POS 32    NAME: Erika Quigley  AGE: 89 y.o. SEX: female CODE STATUS: CPR  : 1931     DATE: 2025     Assessment and Plan:     Problem List Items Addressed This Visit       Anxiety (Chronic)    Hx of anxiety not currently on medications   Mood appears stable   Continue to provide psychosocial support          Essential hypertension - Primary (Chronic)    BP stable   Continue amlodipine 2.5 mg daily with hold parameters  Continue Lotrel and metoprolol   Monitor BP         Paroxysmal atrial fibrillation (HCC)    HR stable  Continue metoprolol for rate control  Continue chronic anticoagulation with eliquis         Debility    Secondary to chronic medical conditions  Continues to require 24/7 care and support at long-term care facility  Continue supportive care and ADL assistance  Management of acute and chronic medical conditions as outlined                 Chief Complaint:     Follow up visit     History of Present Illness:     Patient being seen for long term care follow up. She appears to be doing well. Denies pain. Denies CP/SOB. Per PCC documentation meal completion of %. No acute concerns from nursing.         The following portions of the patient's history were reviewed and updated as appropriate: allergies, current medications, past family history, past medical history, past social history, past surgical history and problem list.     Review of Systems:     Review of Systems   All other systems reviewed and are negative.       Problem List:     Patient Active Problem List   Diagnosis    Spasmodic torticollis    Anxiety    Depression, recurrent (HCC)    Essential hypertension    Family history of malignant neoplasm of breast    Functional urinary incontinence    Hyperlipidemia    Cognitive impairment    Osteoporosis    Torsion dystonia fragments    Sensorineural hearing loss (SNHL), bilateral     Asymmetrical hearing loss of right ear    Pulmonary HTN (HCC)    Presence of pessary    Nerve sheath tumor    Lumbar spine tumor    Primary osteoarthritis of right hip    PAD (peripheral artery disease) (HCC)    Intervertebral disc disorder with radiculopathy of lumbar region    Fall    Closed displaced fracture of fourth cervical vertebra (HCC)    Epidural hematoma (HCC)    Deep vein thrombosis (DVT) of femoral vein of right lower extremity (HCC)    Pancreatic abnormality    Anemia    TIA (transient ischemic attack)    Paroxysmal atrial fibrillation (HCC)    Prediabetes    Diastolic dysfunction    Persistent proteinuria    Pressure injury of skin of right heel    Chronic cerebrovascular accident (CVA)    Debility    Dysphagia        Objective:     /72   Pulse 59   Temp 97.6 °F (36.4 °C)   SpO2 96%     Physical Exam  Vitals and nursing note reviewed.   Constitutional:       General: She is not in acute distress.     Appearance: She is well-developed.   HENT:      Head: Normocephalic and atraumatic.   Eyes:      Conjunctiva/sclera: Conjunctivae normal.   Cardiovascular:      Rate and Rhythm: Normal rate and regular rhythm.      Heart sounds: No murmur heard.  Pulmonary:      Effort: Pulmonary effort is normal. No respiratory distress.      Breath sounds: Normal breath sounds.   Abdominal:      Palpations: Abdomen is soft.      Tenderness: There is no abdominal tenderness.   Musculoskeletal:         General: No swelling.      Cervical back: Neck supple.   Skin:     General: Skin is warm and dry.      Capillary Refill: Capillary refill takes less than 2 seconds.   Neurological:      Mental Status: She is alert.   Psychiatric:         Mood and Affect: Mood normal.         Pertinent Laboratory/Diagnostic Studies:    Laboratory Results: I have personally reviewed the pertinent laboratory results/reports     Radiology/Other Diagnostic Testing Results: I have personally reviewed the pertinent diagnostic  reports/results.       MAGDALENE Calderón  Geriatric Medicine

## 2025-01-14 NOTE — ASSESSMENT & PLAN NOTE
Secondary to chronic medical conditions  Continues to require 24/7 care and support at long-term care facility  Continue supportive care and ADL assistance  Management of acute and chronic medical conditions as outlined

## 2025-01-14 NOTE — ASSESSMENT & PLAN NOTE
Hx of anxiety not currently on medications   Mood appears stable   Continue to provide psychosocial support

## 2025-01-23 ENCOUNTER — TELEPHONE (OUTPATIENT)
Age: OVER 89
End: 2025-01-23

## 2025-01-23 NOTE — TELEPHONE ENCOUNTER
Nurse from [facility] calling to ask what labs the patient needs prior to her appointment. Informed her of the labs ordered. She states they will fax over the results once they receive them.  KONSTANTIN

## 2025-01-24 ENCOUNTER — TELEPHONE (OUTPATIENT)
Dept: NEPHROLOGY | Facility: CLINIC | Age: OVER 89
End: 2025-01-24

## 2025-01-24 ENCOUNTER — DOCUMENTATION (OUTPATIENT)
Dept: NEPHROLOGY | Facility: CLINIC | Age: OVER 89
End: 2025-01-24

## 2025-01-24 LAB
CREAT ?TM UR-SCNC: 97.7 UMOL/L
EXT ALBUMIN URINE RANDOM: 1.4
EXT BLOOD, UA: NORMAL
EXT GLUCOSE BLD: 90
EXT GLUCOSE, UA: NORMAL
EXT KETONES: NORMAL
EXT NITRITE, UA: NORMAL
EXT PH, UA: 5
EXT PROTEIN, UA: NORMAL
EXT SPECIFIC GRAVITY, UA: 1.02
EXTERNAL ALBUMIN: 3.6
EXTERNAL ALK PHOS: 43
EXTERNAL ALT: 10
EXTERNAL ANION GAP: 11
EXTERNAL AST: 13
EXTERNAL BACTERIA (UA): PRESENT
EXTERNAL BICARBONATE: 26
EXTERNAL BUN: 20
EXTERNAL CALCIUM: 8.9
EXTERNAL CASTS (UA): NORMAL
EXTERNAL CHLORIDE: 105
EXTERNAL CREATININE: 0.66
EXTERNAL EGFR: 82
EXTERNAL POTASSIUM: 4.6
EXTERNAL RBC (UA): NORMAL
EXTERNAL SODIUM: 142
EXTERNAL T.BILIRUBIN: 0.4
EXTERNAL TOTAL PROTEIN: 6
EXTERNAL WBC (UA): NORMAL
MICROALBUMIN/CREAT UR: 14.3 MG/G{CREAT}
WBC # BLD EST: NORMAL 10*3/UL

## 2025-01-24 NOTE — PROGRESS NOTES
Name: Erika Quigley      : 1931      MRN: [id number]  Encounter Provider: Sandra Mott PA-C  Encounter Date: 2025   Encounter department: [facility]  :  Assessment & Plan  Persistent proteinuria  Hx proteinuria multiple urinalysis in .  She had 1+ protein on UA send  in   No evidence of nephrotic syndrome with volume status euvolemic  Albumin 3.6, stable  UA with no proteinuria, 21-50 RBC, 3-5 WBC, +bacteria, few casts.  UACR 14.3 mg/g, no significant proteinuria.  No significant edema.  Current creatinine 0.66, eGFR 82.  Stable at baseline  Continue RAAS blockade with benazepril  Optimize glycemic and BP control  continue to monitor  Follow-up in office in 6 months with Dr Koch with repeat blood and urine studies.  Orders:    Albumin / creatinine urine ratio; Future    Protein / creatinine ratio, urine; Future    Basic metabolic panel; Future    Urinalysis with microscopic; Future    Essential hypertension  BP acceptable and at goal  volume status euvolemic  Current medications: Amlodipine/benazepril 5-20 mg daily, metoprolol 25 mg twice daily.  Changes: None  Avoid hypotension or large fluctuations in blood pressure.  Avoid NSAIDs  low sodium (2 gm) diet  continue to monitor BP at home  Orders:    Albumin / creatinine urine ratio; Future    Protein / creatinine ratio, urine; Future    Basic metabolic panel; Future    Urinalysis with microscopic; Future    Paroxysmal atrial fibrillation (HCC)  stable, rate controlled  on apixaban and beta-blockers  management per Cardiology     Mixed hyperlipidemia  stable  Continue Omega 3 fatty acids  low-cholesterol and low-fat diet, aerobic exercise  management per PCP           Plan Summary:  -No significant proteinuria and renal function stable  -follow UA given hematuria.  -Follow up with Dr. Koch in 6 months with repeat blood and urine studies.  Please call the office with any questions or concerns.        Reason  for Visit: Follow-up, Hypertension, and Proteinuria    HPI: Erika Quigley is a 89 y.o. female with proteinuria, HTN, HLD, prediabetes, DVT, cervical dystonia, joint and disc disease, PAF, anxiety who presents for follow up of HTN and proteinuria. Patient followed by Dr. Koch, last seen 5/9/2024 for initial consultation due to UA with trace proteinuria during hospitalization in March '24.  Most recent creatinine 0.66, normal and at baseline.  Urine studies with no proteinuria but +hematuria..  Patient denies recent hospitalizations or ER visits.  Patient denies NSAID use.  Patient denies nausea, vomiting, diarrhea, dyspnea, orthopnea, edema, hematuria or foamy urine.      ROS: A complete review of systems was performed and was negative unless otherwise noted in the history of present illness.    Allergies:   Patient has no known allergies.    Medications:     Current Outpatient Medications:     acetaminophen (TYLENOL) 325 mg tablet, Take 650 mg by mouth 4 (four) times a day, Disp: , Rfl:     amLODIPine (NORVASC) 2.5 mg tablet, Take 1 tablet (2.5 mg total) by mouth daily, Disp: , Rfl:     amLODIPine-benazepril (LOTREL 5-20) 5-20 MG per capsule, Take 1 capsule by mouth daily, Disp: 100 capsule, Rfl: 1    apixaban (ELIQUIS) 2.5 mg, Take 1 tablet (2.5 mg total) by mouth 2 (two) times a day, Disp: 180 tablet, Rfl: 1    metoprolol tartrate (LOPRESSOR) 25 mg tablet, Take 1 tablet (25 mg total) by mouth every 12 (twelve) hours, Disp: 200 tablet, Rfl: 1    Multiple Vitamins-Minerals (MULTIVITAL) tablet, Take 1 tablet by mouth daily, Disp: , Rfl:     NON FORMULARY, Elderberry syrup 1 tsp QID, Disp: , Rfl:     Omega-3 Fatty Acids (FISH OIL) 645 MG CAPS, Take 1 tablet by mouth daily, Disp: , Rfl:     Current Facility-Administered Medications:     Botulinum Toxin Type A SOLR 200 Units, 200 Units, Injection, Q3 Months, , 200 Units at 12/16/24 1508    Past Medical History:   Diagnosis Date    Anxiety 2010    Bladder infection  09/2019    Breast lump in female     Deep vein thrombosis (HCC) October, 2023    HL (hearing loss) 2010    Hypertension 2010    Osteopenia     Ovarian cyst      Past Surgical History:   Procedure Laterality Date    BLADDER SURGERY      BREAST BIOPSY      CATARACT EXTRACTION Bilateral 01/01/2015    , Right Eye-2017, Left eye    ENDOMETRIAL BIOPSY      by Suction    HYSTERECTOMY       Family History   Problem Relation Age of Onset    Heart disease Mother     Heart disease Father     Hypertension Family     Osteoporosis Family       reports that she has never smoked. She has never used smokeless tobacco. She reports that she does not currently use alcohol. She reports that she does not use drugs.    Physical Exam:   Vitals:    01/27/25 1259   BP: 124/64   BP Location: Left arm   Patient Position: Sitting   Cuff Size: Standard   Pulse: 62   SpO2: 96%     There is no height or weight on file to calculate BMI.    General:  Awake, alert, appears comfortable and in no acute distress.  Nontoxic.  Skin:  No rash, warm, good skin turgor   Eyes:  PERRL, EOMI, sclerae nonicteric.  no periorbital edema   ENT:  Moist mucous membranes  Neck:  Trachea midline, symmetric.  No JVD.  No carotid bruits.  Chest:  Clear to auscultation bilaterally without wheezes, crackles or rhonchi  CVS:  Regular rate and rhythm without murmur, gallop or rub.  S1 and S2 identified and normal.  No S3, S4.   Abdomen:  Soft, nontender, nondistended without masses.  Normal bowel sounds x 4 quadrants.  No bruit.  Extremities:  Warm, pink, motor and sensory intact and well perfused.  No cyanosis, pallor.  No BLE edema.  Neuro:  Awake, alert, oriented x3.  Grossly intact  Psych:  Appropriate affect.  Mentating appropriately.  Normal mental status exam      Procedure:  No results found for this or any previous visit.    Lab Results   Component Value Date    GLUCOSE 102 04/07/2015    CALCIUM 8.9 01/23/2025     04/07/2015    K 4.6 01/23/2025    CO2 26  10/22/2024     01/23/2025    BUN 20 01/23/2025    CREATININE 0.66 01/23/2025       Results from last 7 days   Lab Units 01/23/25  0000   POTASSIUM  4.6   CHLORIDE  105   BUN  20   CREATININE  0.66   CALCIUM  8.9       EMR, including Epic, Care Everywhere and outside scanned documents reviewed.  I have personally reviewed the blood work as stated above and in my note.  I have personally reviewed PCP, consultants and prior nephrology notes.    I have spent a total time of 35 minutes in caring for this patient on the day of the visit/encounter including Diagnostic results, Prognosis, Risks and benefits of tx options, Instructions for management, Patient and family education, Importance of tx compliance, Risk factor reductions, Impressions, Counseling / Coordination of care, Documenting in the medical record, Reviewing / ordering tests, medicine, procedures  , and Obtaining or reviewing history  .

## 2025-01-24 NOTE — ASSESSMENT & PLAN NOTE
Hx proteinuria multiple urinalysis in 2024.  She had 1+ protein on UA send 2022 in 2023  No evidence of nephrotic syndrome with volume status euvolemic  Albumin 3.6, stable  UA with no proteinuria, 21-50 RBC, 3-5 WBC, +bacteria, few casts.  UACR 14.3 mg/g, no significant proteinuria.  No significant edema.  Current creatinine 0.66, eGFR 82.  Stable at baseline  Continue RAAS blockade with benazepril  Optimize glycemic and BP control  continue to monitor  Follow-up in office in 6 months with Dr Koch with repeat blood and urine studies.  Orders:    Albumin / creatinine urine ratio; Future    Protein / creatinine ratio, urine; Future    Basic metabolic panel; Future    Urinalysis with microscopic; Future

## 2025-01-24 NOTE — ASSESSMENT & PLAN NOTE
stable  Continue Omega 3 fatty acids  low-cholesterol and low-fat diet, aerobic exercise  management per PCP

## 2025-01-24 NOTE — ASSESSMENT & PLAN NOTE
BP acceptable and at goal  volume status euvolemic  Current medications: Amlodipine/benazepril 5-20 mg daily, metoprolol 25 mg twice daily.  Changes: None  Avoid hypotension or large fluctuations in blood pressure.  Avoid NSAIDs  low sodium (2 gm) diet  continue to monitor BP at home  Orders:    Albumin / creatinine urine ratio; Future    Protein / creatinine ratio, urine; Future    Basic metabolic panel; Future    Urinalysis with microscopic; Future

## 2025-01-24 NOTE — PATIENT INSTRUCTIONS
There is no significant protein in your urine.  We will need to follow the blood in your urine.  Continue Lotrel to reduce/prevent the protein in your urine.  Your kidney function remains stable.  Most recent creatinine 0.66, at baseline.  We will continue routine surveillance as outlined below.  Avoid all NSAIDs to include ibuprofen, Motrin, Aleve, Advil, Naproxen, Celebrex, Indomethacin, Toradol.  Stay well hydrated.   Continue all prescribed medications.  Call if blood pressure consistently more than 140/90 or less than 110/50s.  High and low blood pressures may affect your kidney function.  Recommend low salt diet (2 gm sodium diet).  Follow up in 6 months with Dr. Koch with repeat labs prior to appointment.  Please contact the office with new symptoms or concerns.

## 2025-01-27 ENCOUNTER — OFFICE VISIT (OUTPATIENT)
Dept: NEPHROLOGY | Facility: CLINIC | Age: OVER 89
End: 2025-01-27
Payer: MEDICARE

## 2025-01-27 VITALS — DIASTOLIC BLOOD PRESSURE: 64 MMHG | HEART RATE: 62 BPM | OXYGEN SATURATION: 96 % | SYSTOLIC BLOOD PRESSURE: 124 MMHG

## 2025-01-27 DIAGNOSIS — R80.1 PERSISTENT PROTEINURIA: Primary | ICD-10-CM

## 2025-01-27 DIAGNOSIS — I48.0 PAROXYSMAL ATRIAL FIBRILLATION (HCC): ICD-10-CM

## 2025-01-27 DIAGNOSIS — I10 ESSENTIAL HYPERTENSION: Chronic | ICD-10-CM

## 2025-01-27 DIAGNOSIS — E78.2 MIXED HYPERLIPIDEMIA: Chronic | ICD-10-CM

## 2025-01-27 PROCEDURE — 99214 OFFICE O/P EST MOD 30 MIN: CPT | Performed by: PHYSICIAN ASSISTANT

## 2025-02-13 ENCOUNTER — OFFICE VISIT (OUTPATIENT)
Dept: CARDIOLOGY CLINIC | Facility: MEDICAL CENTER | Age: OVER 89
End: 2025-02-13
Payer: MEDICARE

## 2025-02-13 VITALS
BODY MASS INDEX: 25.32 KG/M2 | DIASTOLIC BLOOD PRESSURE: 62 MMHG | WEIGHT: 125.6 LBS | HEIGHT: 59 IN | OXYGEN SATURATION: 98 % | HEART RATE: 85 BPM | SYSTOLIC BLOOD PRESSURE: 118 MMHG

## 2025-02-13 DIAGNOSIS — I48.0 PAROXYSMAL ATRIAL FIBRILLATION (HCC): ICD-10-CM

## 2025-02-13 DIAGNOSIS — I10 ESSENTIAL HYPERTENSION: Primary | Chronic | ICD-10-CM

## 2025-02-13 DIAGNOSIS — E78.2 MIXED HYPERLIPIDEMIA: Chronic | ICD-10-CM

## 2025-02-13 DIAGNOSIS — I82.411 DEEP VEIN THROMBOSIS (DVT) OF FEMORAL VEIN OF RIGHT LOWER EXTREMITY, UNSPECIFIED CHRONICITY (HCC): ICD-10-CM

## 2025-02-13 PROCEDURE — 99214 OFFICE O/P EST MOD 30 MIN: CPT | Performed by: INTERNAL MEDICINE

## 2025-02-13 RX ORDER — MAGNESIUM HYDROXIDE 1200 MG/15ML
1 LIQUID ORAL ONCE
COMMUNITY

## 2025-02-13 RX ORDER — ZINC OXIDE 13 %
CREAM (GRAM) TOPICAL
COMMUNITY

## 2025-02-13 NOTE — PROGRESS NOTES
Cardiology Follow Up    Erika Quigley  11/27/1931  [id number]  [facility] [place]  [address]    [facility] [address] [phone]    No diagnosis found.      There are no diagnoses linked to this encounter.    I had the pleasure of seeing Erika Quigley for a follow up visit.     INTERVAL HISTORY: none    History of the presenting illness, Discussion/Summary and My Plan are as follows:::    Erika is a pleasant 89-year-old lady with history of hypertension, dyslipidemia, DVT with right lower extremity thrombus-2023 for which she was on Eliquis, dementia, baseline speech difficulties and left facial droop, cervical dystonia on Botox injections, who presented with speech difficulties and was ruled out for acute stroke but was demonstrated to have a chronic right PICA distribution infarct, was also found to be in atrial fibrillation with rapid rates, given metoprolol and an IV Cardizem and converted to sinus rhythm in which she was discharged.  Eliquis was decreased to 2.5 mg twice daily-this was all in March 2024.    She now is a resident at [facility].  Previously had a home care nurse.  No recent hospitalizations or admissions.She herself denies any symptoms.  Daughter also denies any new symptoms.    Plan:    Atrial fibrillation: Single episode-discovered in March 2024, in the setting of priorPICA stroke, no new stroke was noted in March 2024, continue apixaban at 2.5 mg twice daily, no bleeds or falls  Unremarkable echo and normal TSH    Hypertension: controlled     Dyslipidemia: Overall acceptable, no need to treat    Follow-up in 9 months     Latest Reference Range & Units 09/02/20 07:37 03/21/24 05:42   Cholesterol See Comment mg/dL 251 (H) 195   Triglycerides See Comment mg/dL 118 167 (H)   HDL >=50 mg/dL 72 54   LDL Calculated 0 - 100 mg/dL 155 (H) 108 (H)   (H): Data is abnormally high     Latest  Reference Range & Units 05/28/24 15:39   Hemoglobin 11.5 - 15.4 g/dL 11.9   Hematocrit 34.8 - 46.1 % 38.8      Latest Reference Range & Units 10/22/24 06:40 01/23/25 00:00   BUN 7 - 25 mg/dL 23 (E) 20 (E)   Creatinine 0.40 - 1.10 mg/dL 0.80 (E) 0.66 (E)   (E): External lab result    Patient Active Problem List   Diagnosis    Spasmodic torticollis    Anxiety    Depression, recurrent (HCC)    Essential hypertension    Family history of malignant neoplasm of breast    Functional urinary incontinence    Hyperlipidemia    Cognitive impairment    Osteoporosis    Torsion dystonia fragments    Sensorineural hearing loss (SNHL), bilateral    Asymmetrical hearing loss of right ear    Pulmonary HTN (HCC)    Presence of pessary    Nerve sheath tumor    Lumbar spine tumor    Primary osteoarthritis of right hip    PAD (peripheral artery disease) (HCC)    Intervertebral disc disorder with radiculopathy of lumbar region    Fall    Closed displaced fracture of fourth cervical vertebra (HCC)    Epidural hematoma (HCC)    Deep vein thrombosis (DVT) of femoral vein of right lower extremity (HCC)    Pancreatic abnormality    Anemia    TIA (transient ischemic attack)    Paroxysmal atrial fibrillation (HCC)    Prediabetes    Diastolic dysfunction    Persistent proteinuria    Pressure injury of skin of right heel    Chronic cerebrovascular accident (CVA)    Debility    Dysphagia     Past Medical History:   Diagnosis Date    Anxiety 2010    Bladder infection 09/2019    Breast lump in female     Deep vein thrombosis (HCC) October, 2023    HL (hearing loss) 2010    Hypertension 2010    Osteopenia     Ovarian cyst      Social History     Socioeconomic History    Marital status:      Spouse name: Not on file    Number of children: Not on file    Years of education: Not on file    Highest education level: Not on file   Occupational History    Not on file   Tobacco Use    Smoking status: Never    Smokeless tobacco: Never   Vaping Use     Vaping status: Never Used   Substance and Sexual Activity    Alcohol use: Not Currently    Drug use: No    Sexual activity: Not Currently   Other Topics Concern    Not on file   Social History Narrative    Denied consumption of coffee    Active daily tea consumption    Denied ingesting cola containing caffeine         Social Drivers of Health     Financial Resource Strain: Low Risk  (11/13/2023)    Overall Financial Resource Strain (CARDIA)     Difficulty of Paying Living Expenses: Not very hard   Food Insecurity: No Food Insecurity (3/21/2024)    Nursing - Inadequate Food Risk Classification     Worried About Running Out of Food in the Last Year: Never true     Ran Out of Food in the Last Year: Never true     Ran Out of Food in the Last Year: Not on file   Transportation Needs: No Transportation Needs (3/21/2024)    PRAPARE - Transportation     Lack of Transportation (Medical): No     Lack of Transportation (Non-Medical): No   Physical Activity: Not on file   Stress: Not on file   Social Connections: Not on file   Intimate Partner Violence: Not on file   Housing Stability: Unknown (3/21/2024)    Housing Stability Vital Sign     Unable to Pay for Housing in the Last Year: No     Number of Times Moved in the Last Year: Not on file     Homeless in the Last Year: No      Family History   Problem Relation Age of Onset    Heart disease Mother     Heart disease Father     Hypertension Family     Osteoporosis Family      Past Surgical History:   Procedure Laterality Date    BLADDER SURGERY      BREAST BIOPSY      CATARACT EXTRACTION Bilateral 01/01/2015    , Right Eye-2017, Left eye    ENDOMETRIAL BIOPSY      by Suction    HYSTERECTOMY         Current Outpatient Medications:     acetaminophen (TYLENOL) 325 mg tablet, Take 650 mg by mouth 4 (four) times a day, Disp: , Rfl:     amLODIPine (NORVASC) 2.5 mg tablet, Take 1 tablet (2.5 mg total) by mouth daily, Disp: , Rfl:     amLODIPine-benazepril (LOTREL 5-20) 5-20 MG per  capsule, Take 1 capsule by mouth daily, Disp: 100 capsule, Rfl: 1    apixaban (ELIQUIS) 2.5 mg, Take 1 tablet (2.5 mg total) by mouth 2 (two) times a day, Disp: 180 tablet, Rfl: 1    metoprolol tartrate (LOPRESSOR) 25 mg tablet, Take 1 tablet (25 mg total) by mouth every 12 (twelve) hours, Disp: 200 tablet, Rfl: 1    Multiple Vitamins-Minerals (MULTIVITAL) tablet, Take 1 tablet by mouth daily, Disp: , Rfl:     NON FORMULARY, Elderberry syrup 1 tsp QID, Disp: , Rfl:     Omega-3 Fatty Acids (FISH OIL) 645 MG CAPS, Take 1 tablet by mouth daily, Disp: , Rfl:     sodium phosphate (FLEET) 7-19 G 118 mL enema, Insert 1 enema into the rectum once 7-19GM/118mL, Disp: , Rfl:     zinc oxide (Desitin) 13 % cream, Apply topically, Disp: , Rfl:     Current Facility-Administered Medications:     Botulinum Toxin Type A SOLR 200 Units, 200 Units, Injection, Q3 Months, , 200 Units at 12/16/24 1508  No Known Allergies    Imaging: MRI foot/forefoot toest right wo contrast    Result Date: 6/14/2024  Narrative: MRI FOOT/FOREFOOT TOES RIGHT WO CONTRAST INDICATION:   M79.671: Pain in right foot. COMPARISON: Right foot radiograph 5/24/2024 TECHNIQUE:  Multiplanar/multisequence MR of the right foot was performed. FINDINGS: SUBCUTANEOUS TISSUES: There is mild subcutaneous edema about the ankle and dorsal foot. Mild fat stranding in the infracalcaneal plantar fat. There is no well-circumscribed fluid collection. BONES:  Normal signal intensity. FIRST MTP JOINT:  Intact. SESAMOID BONES:  Intact. OTHER ARTICULAR SURFACE: Scattered mild degenerative changes of the intertarsal joints. PLANTAR FASCIA: Mild thickening of the plantar fascia at the insertion of the plantar fascia LISFRANC LIGAMENT:  Intact. FOREFOOT TENDONS: Intact. INTERMETATARSAL REGIONS: No bursitis or Quiroga's neuroma. MUSCULATURE: Intact.     Impression: 1. No acute osseous abnormality. 2. No soft tissue mass or fluid collection. 3. Mild degenerative changes. 4. Mild thickening  "of the plantar fascia at the calcaneal insertion may represent plantar fasciitis. Correlate for point tenderness. Workstation performed: [id number]       Review of Systems:  Review of Systems   Constitutional: Negative.    HENT: Negative.  Negative for congestion.    Eyes: Negative.    Cardiovascular: Negative.    Endocrine: Negative.    Musculoskeletal: Negative.        Physical Exam:  /62 (BP Location: Right arm, Patient Position: Sitting, Cuff Size: Standard)   Pulse 85   Ht 4' 11\" (1.499 m)   Wt 57 kg (125 lb 9.6 oz) Comment: wheelchair - last weighed 2/2/25  SpO2 98%   BMI 25.37 kg/m²   Physical Exam  Constitutional:       General: She is not in acute distress.     Appearance: She is not ill-appearing, toxic-appearing or diaphoretic.   HENT:      Nose: Nose normal. No congestion or rhinorrhea.      Mouth/Throat:      Mouth: Mucous membranes are moist.      Pharynx: No oropharyngeal exudate or posterior oropharyngeal erythema.   Neck:      Vascular: No carotid bruit.   Pulmonary:      Effort: Pulmonary effort is normal. No respiratory distress.      Breath sounds: No stridor. No wheezing or rhonchi.   Musculoskeletal:         General: No swelling, tenderness, deformity or signs of injury. Normal range of motion.      Cervical back: Normal range of motion. No rigidity or tenderness.   Lymphadenopathy:      Cervical: No cervical adenopathy.   Neurological:      Mental Status: She is alert.         This note was completed in part utilizing [employer] direct voice recognition software.   Grammatical errors, random word insertion, spelling mistakes, occasional wrong word or \"sound-alike\" substitutions and incomplete sentences may be an occasional consequence of the system secondary to software limitations, ambient noise and hardware issues. At the time of dictation, efforts were made to edit, clarify and /or correct errors.  Please read the chart carefully and recognize, using context, where " substitutions have occurred.  If you have any questions or concerns about the context, text or information contained within the body of this dictation, please contact myself, the provider, for further clarification.

## 2025-03-06 ENCOUNTER — TELEPHONE (OUTPATIENT)
Dept: OTHER | Facility: OTHER | Age: OVER 89
End: 2025-03-06

## 2025-03-07 NOTE — TELEPHONE ENCOUNTER
"Nurse from [facility] calling in for on call provider for [facility] regarding patients toenail. Nurse states patients toenail is \"lifting\" and turning purple. Need advice on whether to leave open or cover toe. On call provider made aware.   "

## 2025-03-19 ENCOUNTER — NURSING HOME VISIT (OUTPATIENT)
Dept: GERIATRICS | Facility: OTHER | Age: OVER 89
End: 2025-03-19
Payer: MEDICARE

## 2025-03-19 DIAGNOSIS — I10 ESSENTIAL HYPERTENSION: Chronic | ICD-10-CM

## 2025-03-19 DIAGNOSIS — R53.81 DEBILITY: ICD-10-CM

## 2025-03-19 DIAGNOSIS — R73.03 PREDIABETES: Primary | ICD-10-CM

## 2025-03-19 DIAGNOSIS — D64.9 ANEMIA, UNSPECIFIED TYPE: ICD-10-CM

## 2025-03-19 PROCEDURE — 99309 SBSQ NF CARE MODERATE MDM 30: CPT | Performed by: FAMILY MEDICINE

## 2025-03-19 NOTE — PROGRESS NOTES
[facility]  Progress Note  POS: Nursing Facility/LTC-32    Chief Complaint/Reason for visit: Follow up of chronic medical conditions  History of Present Illness: 89 year old female evaluated for follow up of chronic medical conditions. Use of PRN bowel medications noted for constipation.        Medications: No changes made  Consults reviewed:Nephrology, Cardiology  Labs/Diagnostics (reviewed by this provider): Copy in Chart  CMP (1/24/25) Na 142 K 4.6 BUN 20 Creat 0.66    Physical Exam    Vitals reviewed in SNF EMR    Physical Exam  Vitals and nursing note reviewed.   Constitutional:       General: She is awake. She is not in acute distress.     Appearance: She is well-groomed. She is not toxic-appearing or diaphoretic.   HENT:      Head: Normocephalic.      Mouth/Throat:      Mouth: Mucous membranes are moist.   Pulmonary:      Effort: Pulmonary effort is normal. No respiratory distress.   Musculoskeletal:      Cervical back: No rigidity.   Skin:     Coloration: Skin is not jaundiced or pale.   Neurological:      Mental Status: She is alert. Mental status is at baseline.   Psychiatric:         Behavior: Behavior is cooperative.       Assessment/Plan:  89 year old female with:    Prediabetes  Lab Results   Component Value Date    HGBA1C 6.2 05/22/2024     Diet controlled; no indication for initiation of medication therapy at this time  Goal A1c <8.5 in setting of advanced age  A1c, BMP ordered for 4/21/25    Anemia  Baseline Hgb 11-12  Due for CBC- will order with routine labs on 4/21/25    Essential hypertension  Goal <140/90 (ideally <130/80); avoid hypotension  BP log variable, has been WNL/at goal at last two specialist appointments when measured in office  Continue current medication regimen    Debility  Multifactorial  Continue SNF for LTC for 24/7 and ADL care  Supportive care, nutritional support  Management of chronic medical conditions as outlined  Fall  precautions      Jesica Lenz, DO  3/19/25

## 2025-04-04 NOTE — ASSESSMENT & PLAN NOTE
Lab Results   Component Value Date    HGBA1C 6.2 05/22/2024     Diet controlled; no indication for initiation of medication therapy at this time  Goal A1c <8.5 in setting of advanced age  A1c, BMP ordered for 4/21/25

## 2025-04-04 NOTE — ASSESSMENT & PLAN NOTE
Goal <140/90 (ideally <130/80); avoid hypotension  BP log variable, has been WNL/at goal at last two specialist appointments when measured in office  Continue current medication regimen

## 2025-04-07 ENCOUNTER — NURSING HOME VISIT (OUTPATIENT)
Dept: GERIATRICS | Facility: OTHER | Age: OVER 89
End: 2025-04-07
Payer: MEDICARE

## 2025-04-07 DIAGNOSIS — N90.89 SKIN TAG OF FEMALE PERINEUM: Primary | ICD-10-CM

## 2025-04-07 DIAGNOSIS — R22.9 LOCALIZED SKIN MASS, LUMP, OR SWELLING: ICD-10-CM

## 2025-04-07 PROCEDURE — 99309 SBSQ NF CARE MODERATE MDM 30: CPT

## 2025-04-09 ENCOUNTER — NURSING HOME VISIT (OUTPATIENT)
Dept: GERIATRICS | Facility: OTHER | Age: OVER 89
End: 2025-04-09
Payer: MEDICARE

## 2025-04-09 DIAGNOSIS — M15.0 PRIMARY OSTEOARTHRITIS INVOLVING MULTIPLE JOINTS: ICD-10-CM

## 2025-04-09 DIAGNOSIS — M51.16 INTERVERTEBRAL DISC DISORDER WITH RADICULOPATHY OF LUMBAR REGION: Primary | ICD-10-CM

## 2025-04-09 PROCEDURE — 99309 SBSQ NF CARE MODERATE MDM 30: CPT | Performed by: FAMILY MEDICINE

## 2025-04-09 NOTE — PROGRESS NOTES
[facility]  Progress Note- Acute Visit  POS: Nursing Facility/LTC-32    Chief Complaint/Reason for visit: Pain  History of Present Illness: 89 year old female evaluated on 4/9/25 for acute visit with complaints of pain and resistance to care due to pain, especially in the morning.        Review of systems: Review of Systems   Constitutional:  Negative for activity change, appetite change and fever.   Musculoskeletal:  Positive for arthralgias.      Medications: Changes made- see written orders  Labs/Diagnostics (reviewed by this provider):  No new labs/imaging since last visit    Physical Exam    Vitals reviewed in [facility] EMR    Physical Exam  Vitals and nursing note reviewed.   Constitutional:       General: She is awake. She is not in acute distress.     Appearance: She is well-groomed. She is not toxic-appearing or diaphoretic.   HENT:      Head: Normocephalic.      Mouth/Throat:      Mouth: Mucous membranes are moist.   Pulmonary:      Effort: Pulmonary effort is normal. No respiratory distress.   Skin:     Coloration: Skin is not jaundiced or pale.   Neurological:      Mental Status: She is alert. Mental status is at baseline.   Psychiatric:         Behavior: Behavior is cooperative.       Assessment/Plan:  89 year old female with:    Osteoarthritis  Osteoarthritis of multiple joints including the hips  Also with lumbar radiculopathy  Continue scheduled acetaminophen  Add topical aspercreme 4% to knees daily in the morning, goal to assist in pain control for associated mild flexion contractures/decreased mobility  Will also add gabapentin 100mg QHS to assist with pain control and may also help with anxiety related to pain with morning care     Jesica Lenz, DO  4/9/25

## 2025-04-12 PROBLEM — R22.9 LOCALIZED SKIN MASS, LUMP, OR SWELLING: Status: ACTIVE | Noted: 2023-09-19

## 2025-04-12 PROBLEM — N90.89 SKIN TAG OF FEMALE PERINEUM: Status: ACTIVE | Noted: 2025-04-12

## 2025-04-12 NOTE — ASSESSMENT & PLAN NOTE
Skin tag that is red, painful on palpitation with occasional  bleeding.    Plan    -Dermatology consult -evaluate and treat skin tag.

## 2025-04-12 NOTE — PROGRESS NOTES
[facility]  [address] [place] [id number]  [phone]  FACILITY: [facility]   Code [facility]-32  ACUTE VISIT    Assessment/Plan:    1. Skin tag of female perineum  Assessment & Plan:  Skin tag that is red, painful on palpitation with occasional  bleeding.    Plan    -Dermatology consult -evaluate and treat skin tag.  2. Localized skin mass, lump, or swelling  Assessment & Plan:  Skin swelling noted below right buttock skin intact , no redness, drainage or pain on palpation. Surrounding skin is intact.    Plan  -Start Mupirocin ointment 2% cream three times a day  apply to affected area x 10 days         Subjective:      Patient ID: Erika Quigley is a 89 y.o. female. CODE STATUS: CPR       PAST MEDICAL HISTORY:  Past Medical History:   Diagnosis Date    Anxiety 2010    Bladder infection 09/2019    Breast lump in female     Deep vein thrombosis (HCC) October, 2023    HL (hearing loss) 2010    Hypertension 2010    Osteopenia     Ovarian cyst      Past Surgical History:   Procedure Laterality Date    BLADDER SURGERY      BREAST BIOPSY      CATARACT EXTRACTION Bilateral 01/01/2015    , Right Eye-2017, Left eye    ENDOMETRIAL BIOPSY      by Suction    HYSTERECTOMY           Erika Quigley is a 89 year old female patient who has a past medical history of anxiety, hypertension, hx of DVT now on Eliquis and osteopenia. Patient is seen today for two skin concerns first one is the  skin tag noted on right  groin area which was noted to be red, painful on touch with ocassional bleeding .Skin around skin tag is intact with no redness or erythema noted. Second skin concern is a swollen area that is soft and mobile  when palpated , no redness or drainage noted. Dermatology consult sought for skin tag and mupirocin cream ordered for swelling below right buttock area. Call placed to daughter Hilda to update on plan of care.          Review of Systems   Constitutional:  Negative for activity change, fatigue and  fever.   Respiratory:  Negative for shortness of breath.    Cardiovascular:  Negative for chest pain, palpitations and leg swelling.   Gastrointestinal:  Negative for abdominal distention, constipation, diarrhea and nausea.   Genitourinary:  Negative for dysuria.   Musculoskeletal:  Positive for arthralgias.   Skin:  Positive for color change.   Psychiatric/Behavioral:  Negative for behavioral problems. The patient is not nervous/anxious.          Objective:    VITALS: There were no vitals filed for this visit.       Physical Exam  HENT:      Head: Normocephalic.   Cardiovascular:      Pulses: Normal pulses.      Heart sounds: Normal heart sounds.   Pulmonary:      Effort: Pulmonary effort is normal.      Breath sounds: Normal breath sounds.   Abdominal:      Palpations: Abdomen is soft.   Skin:     General: Skin is warm and dry.      Findings: Erythema present.          Neurological:      Mental Status: She is alert.             Current Outpatient Medications:     acetaminophen (TYLENOL) 325 mg tablet, Take 650 mg by mouth 4 (four) times a day, Disp: , Rfl:     amLODIPine (NORVASC) 2.5 mg tablet, Take 1 tablet (2.5 mg total) by mouth daily, Disp: , Rfl:     amLODIPine-benazepril (LOTREL 5-20) 5-20 MG per capsule, Take 1 capsule by mouth daily, Disp: 100 capsule, Rfl: 1    apixaban (ELIQUIS) 2.5 mg, Take 1 tablet (2.5 mg total) by mouth 2 (two) times a day, Disp: 180 tablet, Rfl: 1    metoprolol tartrate (LOPRESSOR) 25 mg tablet, Take 1 tablet (25 mg total) by mouth every 12 (twelve) hours, Disp: 200 tablet, Rfl: 1    Multiple Vitamins-Minerals (MULTIVITAL) tablet, Take 1 tablet by mouth daily, Disp: , Rfl:     NON FORMULARY, Elderberry syrup 1 tsp QID, Disp: , Rfl:     Omega-3 Fatty Acids (FISH OIL) 645 MG CAPS, Take 1 tablet by mouth daily, Disp: , Rfl:     sodium phosphate (FLEET) 7-19 G 118 mL enema, Insert 1 enema into the rectum once 7-19GM/118mL, Disp: , Rfl:     zinc oxide (Desitin) 13 % cream, Apply  "topically, Disp: , Rfl:     Current Facility-Administered Medications:     Botulinum Toxin Type A SOLR 200 Units, 200 Units, Injection, Q3 Months, , 200 Units at 12/16/24 1508      Please note:  Voice-recognition software may have been used in the preparation of this document.  Occasional wrong word or \"sound-alike\" substitutions may have occurred due to the inherent limitations of voice recognition software.  Interpretation should be guided by context.       MAGDALENE Kwong  4/7/2025  2:15 AM      "

## 2025-04-12 NOTE — ASSESSMENT & PLAN NOTE
Skin swelling noted below right buttock skin intact , no redness, drainage or pain on palpation. Surrounding skin is intact.    Plan  -Start Mupirocin ointment 2% cream three times a day  apply to affected area x 10 days

## 2025-04-14 ENCOUNTER — PROCEDURE VISIT (OUTPATIENT)
Dept: NEUROLOGY | Facility: CLINIC | Age: OVER 89
End: 2025-04-14
Payer: MEDICARE

## 2025-04-14 VITALS — SYSTOLIC BLOOD PRESSURE: 136 MMHG | DIASTOLIC BLOOD PRESSURE: 64 MMHG | HEART RATE: 60 BPM | TEMPERATURE: 97.9 F

## 2025-04-14 DIAGNOSIS — G24.5 BLEPHAROSPASM: Primary | ICD-10-CM

## 2025-04-14 DIAGNOSIS — G24.3 CERVICAL DYSTONIA: ICD-10-CM

## 2025-04-14 PROCEDURE — 64612 DESTROY NERVE FACE MUSCLE: CPT | Performed by: PSYCHIATRY & NEUROLOGY

## 2025-05-04 PROBLEM — M19.90 OSTEOARTHRITIS: Status: ACTIVE | Noted: 2025-05-04

## 2025-05-04 RX ORDER — GABAPENTIN 100 MG/1
100 CAPSULE ORAL
COMMUNITY
Start: 2025-04-10

## 2025-05-04 RX ORDER — LIDOCAINE HCL 4% 4 G/100G
1 CREAM TOPICAL DAILY
COMMUNITY
Start: 2025-04-10

## 2025-05-04 NOTE — ASSESSMENT & PLAN NOTE
Osteoarthritis of multiple joints including the hips  Also with lumbar radiculopathy  Continue scheduled acetaminophen  Add topical aspercreme 4% to knees daily in the morning, goal to assist in pain control for associated mild flexion contractures/decreased mobility  Will also add gabapentin 100mg QHS to assist with pain control and may also help with anxiety related to pain with morning care

## 2025-05-14 ENCOUNTER — NURSING HOME VISIT (OUTPATIENT)
Dept: GERIATRICS | Facility: OTHER | Age: OVER 89
End: 2025-05-14
Payer: MEDICARE

## 2025-05-14 DIAGNOSIS — R53.81 DEBILITY: ICD-10-CM

## 2025-05-14 DIAGNOSIS — M15.0 PRIMARY OSTEOARTHRITIS INVOLVING MULTIPLE JOINTS: Primary | ICD-10-CM

## 2025-05-14 DIAGNOSIS — I10 ESSENTIAL HYPERTENSION: Chronic | ICD-10-CM

## 2025-05-14 DIAGNOSIS — R73.03 PREDIABETES: ICD-10-CM

## 2025-05-14 PROCEDURE — 99309 SBSQ NF CARE MODERATE MDM 30: CPT | Performed by: FAMILY MEDICINE

## 2025-05-14 NOTE — PROGRESS NOTES
[facility]  Progress Note  POS: Nursing Facility/LT-32    Chief Complaint/Reason for visit: Follow up  History of Present Illness: 89 year old female evaluated for follow up of chronic medical conditions. Additionally, nursing reports increased complaints of pain especially with morning care and resistance to care due to pain. Recently started on gabapentin 100mg QHS and has been tolerating well.        Review of systems: Review of Systems   Constitutional:  Negative for activity change and appetite change.   Musculoskeletal:  Positive for arthralgias.      Medications: No changes made  Labs/Diagnostics (reviewed by this provider): Copy in Chart  Lab Results   Component Value Date    HGBA1C 6 (H) 04/21/2025     Lab Results   Component Value Date    SODIUM 143 04/21/2025    K 4.5 04/21/2025     04/21/2025    CO2 28 04/21/2025    BUN 23 04/21/2025    CREATININE 0.72 04/21/2025    GLUC 108 (H) 04/21/2025    CALCIUM 9.7 04/21/2025     CBC (4/21/25) Hgb 11.8 WBC 3.8 Plt 226 MCV 94    Physical Exam    Vitals reviewed in SNF EMR    Physical Exam  Vitals and nursing note reviewed.   Constitutional:       General: She is awake. She is not in acute distress.     Appearance: She is well-groomed. She is not toxic-appearing or diaphoretic.   HENT:      Head: Normocephalic.      Mouth/Throat:      Mouth: Mucous membranes are moist.     Eyes:      General: No scleral icterus.        Right eye: No discharge.         Left eye: No discharge.     Pulmonary:      Effort: Pulmonary effort is normal. No respiratory distress.     Skin:     Coloration: Skin is not jaundiced or pale.     Neurological:      Mental Status: She is alert. Mental status is at baseline.       Assessment/Plan:  89 year old female with:    Osteoarthritis  Osteoarthritis of multiple joints including the hips  Also with lumbar radiculopathy  Continue scheduled acetaminophen, topical aspercreme 4% to knees daily in  the morning  Continue gabapentin 100mg QHS to assist with pain control/ help with anxiety related to pain with morning care- will plan to increase to 200mg QHS vs adding additional 100mg dose in the morning. Will reassess 5/19 for medication adjustment  MyChart message correspondence with daughter to discuss medication adjustments    Prediabetes  Lab Results   Component Value Date    HGBA1C 6 (H) 04/21/2025     Recent labs reviewed  Remains diet controlled; no indication for initiation of medication therapy at this time  At goal A1c <8.5 in setting of advanced age    Essential hypertension  Goal <140/90 (ideally <130/80 in setting of previous CVA); avoid hypotension  BP log variable but overall at goal  Continue current medication regimen    Debility  Multifactorial  Continue SNF for LTC for 24/7 and ADL care  Supportive care, nutritional support  Management of chronic medical conditions as outlined  Fall precautions      Jesica Lenz, DO  5/14/25

## 2025-05-18 NOTE — ASSESSMENT & PLAN NOTE
Goal <140/90 (ideally <130/80 in setting of previous CVA); avoid hypotension  BP log variable but overall at goal  Continue current medication regimen

## 2025-05-18 NOTE — ASSESSMENT & PLAN NOTE
Lab Results   Component Value Date    HGBA1C 6 (H) 04/21/2025     Recent labs reviewed  Remains diet controlled; no indication for initiation of medication therapy at this time  At goal A1c <8.5 in setting of advanced age

## 2025-05-18 NOTE — ASSESSMENT & PLAN NOTE
Osteoarthritis of multiple joints including the hips  Also with lumbar radiculopathy  Continue scheduled acetaminophen, topical aspercreme 4% to knees daily in the morning  Continue gabapentin 100mg QHS to assist with pain control/ help with anxiety related to pain with morning care- will plan to increase to 200mg QHS vs adding additional 100mg dose in the morning. Will reassess 5/19 for medication adjustment  MyChart message correspondence with daughter to discuss medication adjustments

## 2025-06-27 ENCOUNTER — NURSING HOME VISIT (OUTPATIENT)
Dept: GERIATRICS | Facility: OTHER | Age: OVER 89
End: 2025-06-27
Payer: MEDICARE

## 2025-06-27 DIAGNOSIS — M24.50 JOINT CONTRACTION: Primary | ICD-10-CM

## 2025-06-27 PROCEDURE — 99308 SBSQ NF CARE LOW MDM 20: CPT

## 2025-07-04 PROBLEM — M24.50: Status: ACTIVE | Noted: 2025-07-04

## 2025-07-04 PROBLEM — R52 PAIN: Status: ACTIVE | Noted: 2025-07-04

## 2025-07-04 NOTE — ASSESSMENT & PLAN NOTE
Patient with joint contraction leading to limited mobility, increasing discomfort for patient during care and positioning.    Plan  -Increase Gabapentin to 200 mg po at HS.  -Awaiting pain management response.

## 2025-07-04 NOTE — PROGRESS NOTES
[facility]  [address] [place] [id number]  [phone]  FACILITY: [facility]  Code [facility]-32  ACUTE VISIT    Assessment/Plan:    1. Joint contraction  Assessment & Plan:  Patient with joint contraction leading to limited mobility, increasing discomfort for patient during care and positioning.    Plan  -Increase Gabapentin to 200 mg po at HS.  -Awaiting pain management response.           Subjective:      Patient ID: Erika Quigley is a 89 y.o. female. CODE STATUS: CPR        PAST MEDICAL HISTORY:  Past Medical History[1]  Past Surgical History[2]      Erika Quigley is a 89 year old female who is a long term resident of [facility], who is seen today for pain management to ensure comfort during care and positioning Nursing team offers concern that patient has episodes of increased agitation during care and positioning that could be related to pain. This writer and ADON also considered therapy's input since patient will not be a good candidate for bracing to help manage contractures , and therefore recommend pain medication  .In consideration of each team member's input and concern we discussed with patient's daughter and ADON plan of care for patient which included no narcotics as per daughter's preference due to concern for patient being sedated. However, daughter was amenable to reaching out to Dr Keyes from pain management as patient is known to his practice where she got steroid shots in her knees but now  cannot visit due to mobility limitations. Agreed to increase bedtime gabapentin to 200 mg po at hs   and evaluate to see how patient responds to treatment. Also this writer reached out to pain management ,awaiting a response.  Patient's vital signs, lab results , medication and medical history were reviewed in patient's facility EMR.                    Review of Systems   Musculoskeletal:  Positive for arthralgias and gait problem. Negative for back pain.   Psychiatric/Behavioral:  Positive for  "agitation. Negative for self-injury.          Objective:    VITALS: There were no vitals filed for this visit.       Physical Exam  HENT:      Head: Normocephalic.      Nose: Nose normal.     Musculoskeletal:         General: Deformity present. No tenderness or signs of injury.      Right lower leg: No edema.      Left lower leg: No edema.     Neurological:      Mental Status: She is alert.      Motor: Weakness present.      Gait: Gait abnormal.           Current Medications[3]      Please note:  Voice-recognition software may have been used in the preparation of this document.  Occasional wrong word or \"sound-alike\" substitutions may have occurred due to the inherent limitations of voice recognition software.  Interpretation should be guided by context.       MAGDALENE Kwong    7:33 PM           [1]   Past Medical History:  Diagnosis Date    Anxiety 2010    Bladder infection 09/2019    Breast lump in female     Deep vein thrombosis (HCC) October, 2023    HL (hearing loss) 2010    Hypertension 2010    Osteopenia     Ovarian cyst    [2]   Past Surgical History:  Procedure Laterality Date    BLADDER SURGERY      BREAST BIOPSY      CATARACT EXTRACTION Bilateral 01/01/2015    , Right Eye-2017, Left eye    ENDOMETRIAL BIOPSY      by Suction    HYSTERECTOMY     [3]   Current Outpatient Medications:     acetaminophen (TYLENOL) 325 mg tablet, Take 650 mg by mouth 4 (four) times a day, Disp: , Rfl:     amLODIPine (NORVASC) 2.5 mg tablet, Take 1 tablet (2.5 mg total) by mouth daily, Disp: , Rfl:     amLODIPine-benazepril (LOTREL 5-20) 5-20 MG per capsule, Take 1 capsule by mouth daily, Disp: 100 capsule, Rfl: 1    apixaban (ELIQUIS) 2.5 mg, Take 1 tablet (2.5 mg total) by mouth 2 (two) times a day, Disp: 180 tablet, Rfl: 1    gabapentin (NEURONTIN) 100 mg capsule, Take 100 mg by mouth daily at bedtime, Disp: , Rfl:     Lidocaine HCl (Aspercreme Lidocaine) 4 % CREA, Apply 1 Application topically daily Bilateral knees " daily, Disp: , Rfl:     metoprolol tartrate (LOPRESSOR) 25 mg tablet, Take 1 tablet (25 mg total) by mouth every 12 (twelve) hours, Disp: 200 tablet, Rfl: 1    Multiple Vitamins-Minerals (MULTIVITAL) tablet, Take 1 tablet by mouth daily, Disp: , Rfl:     NON FORMULARY, Elderberry syrup 1 tsp QID, Disp: , Rfl:     Omega-3 Fatty Acids (FISH OIL) 645 MG CAPS, Take 1 tablet by mouth daily, Disp: , Rfl:     sodium phosphate (FLEET) 7-19 G 118 mL enema, Insert 1 enema into the rectum once 7-19GM/118mL, Disp: , Rfl:     zinc oxide (Desitin) 13 % cream, Apply topically, Disp: , Rfl:     Current Facility-Administered Medications:     Botulinum Toxin Type A SOLR 200 Units, 200 Units, Injection, Q3 Months, , 200 Units at 12/16/24 1508    Botulinum Toxin Type A SOLR 200 Units, 200 Units, Injection, Q3 Months, , 200 Units at 04/14/25 1100

## 2025-07-07 ENCOUNTER — TELEPHONE (OUTPATIENT)
Age: OVER 89
End: 2025-07-07

## 2025-07-07 NOTE — TELEPHONE ENCOUNTER
S/W Ness at [facility] stating that patient's family advised that she had gotten steroid injections in her knee in the past.  This RN noted that she was treated by [facility] on 11/18/24 for L3-5 TFESI and last office visit was 4/2/24 also for lumbar pain.  Advised that patient would need to be seen for new pain issue.  Verbalized understanding.

## 2025-07-07 NOTE — TELEPHONE ENCOUNTER
Caller: [facility]    Doctor: Dr. ORNELAS    Reason for call: Facility called and stated patient is not able to come in to the office due to being wheel chair bound     Patient is having a lot of knee pain and would like to know recommendations    Last OV 2/29/2024    Call back#: [phone]

## 2025-07-08 ENCOUNTER — TELEPHONE (OUTPATIENT)
Dept: NEPHROLOGY | Facility: CLINIC | Age: OVER 89
End: 2025-07-08

## 2025-07-08 NOTE — TELEPHONE ENCOUNTER
Spoke to daughter aries about the lab reminder.Patient daughter states that patient had labs in [facility] I received the labs .

## 2025-07-17 NOTE — ASSESSMENT & PLAN NOTE
Hx proteinuria on multiple urinalysis in 2024.  She had 1+ protein on UA send 2022 in 2023  No evidence of nephrotic syndrome with volume status euvolemic  Albumin previously normal  UA bland  UACR unable to calculate, no significant proteinuria.  UPCr 0.17 g, minimal  No significant edema.  Current creatinine 0.73, eGFR 76.  Stable at baseline  Continue RAAS blockade with benazepril  Optimize glycemic and BP control  continue to monitor  Follow-up in office in 6 months with Dr Koch with repeat blood and urine studies.  Orders:    Basic metabolic panel; Future    Albumin / creatinine urine ratio; Future    Urinalysis with microscopic; Future

## 2025-07-17 NOTE — ASSESSMENT & PLAN NOTE
BP acceptable for age  volume status euvolemic  Current medications: Amlodipine/benazepril 5-20 mg daily, metoprolol 25 mg twice daily.  Changes: None  Avoid hypotension or large fluctuations in blood pressure.  Avoid NSAIDs  low sodium (2 gm) diet  continue to monitor BP at home  Orders:    Basic metabolic panel; Future    Albumin / creatinine urine ratio; Future    Urinalysis with microscopic; Future

## 2025-07-17 NOTE — PATIENT INSTRUCTIONS
Urinalysis does not demonstrate any significant protein in the urine.  Other urine studies (protein ratios) show very minimal amount of protein.    Your kidney function remains stable and normal.  Most recent creatinine 0.73, normal and at baseline.    Continue benazepril (part of Lotrel).  Avoid all NSAIDs to include ibuprofen, Motrin, Aleve, Advil, Naproxen, Celebrex, Indomethacin, Toradol.  Stay well hydrated.   Continue all prescribed medications.  Call if blood pressure consistently more than 140/90 or less than 110/50s.  High and low blood pressures may affect your kidney function.  Recommend low salt diet (2 gm sodium diet).  Follow up in 6 months with Dr. Koch with repeat labs prior to appointment.  Please contact the office with new symptoms or concerns.

## 2025-07-17 NOTE — PROGRESS NOTES
Name: Erika Quigley      : 1931      MRN: [id number]  Encounter Provider: Sandra Mott PA-C  Encounter Date: 2025   Encounter department: [facility]  :  Assessment & Plan  Essential hypertension  BP acceptable for age  volume status euvolemic  Current medications: Amlodipine/benazepril 5-20 mg daily, metoprolol 25 mg twice daily.  Changes: None  Avoid hypotension or large fluctuations in blood pressure.  Avoid NSAIDs  low sodium (2 gm) diet  continue to monitor BP at home  Orders:    Basic metabolic panel; Future    Albumin / creatinine urine ratio; Future    Urinalysis with microscopic; Future      Persistent proteinuria  Hx proteinuria on multiple urinalysis in .  She had 1+ protein on UA send  in   No evidence of nephrotic syndrome with volume status euvolemic  Albumin previously normal  UA bland  UACR unable to calculate, no significant proteinuria.  UPCr 0.17 g, minimal  No significant edema.  Current creatinine 0.73, eGFR 76.  Stable at baseline  Continue RAAS blockade with benazepril  Optimize glycemic and BP control  continue to monitor  Follow-up in office in 6 months with Dr Koch with repeat blood and urine studies.  Orders:    Basic metabolic panel; Future    Albumin / creatinine urine ratio; Future    Urinalysis with microscopic; Future      Paroxysmal atrial fibrillation (HCC)  stable, rate controlled  On beta blockers and Eliquis  management per Cardiology       Pulmonary HTN (HCC)  Management per cardiology         Plan Summary:  - Renal function stable.  UA bland and UACR without significant proteinuria  -Continue benazepril  -Follow up with Dr. Koch in 6 months with repeat blood and urine studies.  Please call the office with any questions or concerns.        Reason for Visit: Follow-up    HPI: Erika Quigley is a 89 y.o. female with hx of proteinuria, HTN, HLD, prediabetes, DVT, cervical dystonia, joint disc disease, PAF, anxiety who  presents for follow up of HTN and proteinuria. Patient followed by Dr. Koch, last seen 1/27/2025 by me.  Erika was referred to Dr. Koch in May 2024 due to UA with trace proteinuria during hospitalization in March 2024 and hx 1+ protein since Dec '22.  Repeat urine studies have all demonstrated no significant proteinuria, UACr 14 mg/g and normal renal function.  Most recent creatinine 0.73, eGFR 76 with UPCr 0.17 g and UACr unable to calculate.  UA bland.  She is maintained on Lotrel a nd metoprolol with BP at goal.  Patient denies recent hospitalizations or ER visits.  Patient denies NSAID use.  Patient denies nausea, vomiting, diarrhea, dyspnea, orthopnea, edema, hematuria or foamy urine.      ROS: A complete review of systems was performed and was negative unless otherwise noted in the history of present illness.    Allergies:   Patient has no known allergies.    Medications:   Current Medications[1]    Past Medical History[2]  Past Surgical History[3]  Family History[4]   reports that she has never smoked. She has never used smokeless tobacco. She reports that she does not currently use alcohol. She reports that she does not use drugs.    Physical Exam:   Vitals:    07/18/25 1344   BP: 132/74   BP Location: Left arm   Patient Position: Sitting   Cuff Size: Adult   Pulse: 65   SpO2: 95%     There is no height or weight on file to calculate BMI.    General:  Awake, alert, appears comfortable and in no acute distress.  Nontoxic.  Skin:  No rash, warm, good skin turgor   Eyes:  PERRL, EOMI, sclerae nonicteric.  no periorbital edema   ENT:  Moist mucous membranes  Neck:  Trachea midline, symmetric.  No JVD.  No carotid bruits.  Chest:  Clear to auscultation bilaterally without wheezes, crackles or rhonchi  CVS:  Regular rate and rhythm without murmur, gallop or rub.  S1 and S2 identified and normal.  No S3, S4.   Abdomen:  Soft, nontender, nondistended without masses.  Normal bowel sounds x 4 quadrants.  No  "bruit.  Extremities:  Warm, pink, motor and sensory intact and well perfused.  No cyanosis, pallor.  No BLE edema.  Neuro:  Awake, alert, oriented x3.  Grossly intact  Psych:  Appropriate affect.  Mentating appropriately.  Normal mental status exam      Procedure:  No results found for this or any previous visit.    Lab Results   Component Value Date    GLUCOSE 102 04/07/2015    CALCIUM 9.5 07/07/2025     04/07/2015    K 4.8 07/07/2025    CO2 26 07/07/2025     07/07/2025    BUN 26 (H) 07/07/2025    CREATININE 0.73 07/07/2025             Invalid input(s): \"ALBUMIN\"    EMR, including Epic, [employer] Everywhere and outside scanned documents reviewed.  I have personally reviewed the blood work as stated above and in my note.  I have personally reviewed PCP, consultants and prior nephrology notes.    I have spent a total time of 35 minutes in caring for this patient on the day of the visit/encounter including Diagnostic results, Prognosis, Risks and benefits of tx options, Instructions for management, Patient and family education, Importance of tx compliance, Risk factor reductions, Impressions, Counseling / Coordination of care, Documenting in the medical record, Reviewing/placing orders in the medical record (including tests, medications, and/or procedures), and Obtaining or reviewing history  .         [1]   Current Outpatient Medications:     acetaminophen (TYLENOL) 325 mg tablet, Take 650 mg by mouth in the morning and 650 mg at noon and 650 mg in the evening and 650 mg before bedtime., Disp: , Rfl:     amLODIPine (NORVASC) 2.5 mg tablet, Take 1 tablet (2.5 mg total) by mouth daily, Disp: , Rfl:     amLODIPine-benazepril (LOTREL 5-20) 5-20 MG per capsule, Take 1 capsule by mouth daily, Disp: 100 capsule, Rfl: 1    apixaban (ELIQUIS) 2.5 mg, Take 1 tablet (2.5 mg total) by mouth 2 (two) times a day, Disp: 180 tablet, Rfl: 1    gabapentin (NEURONTIN) 100 mg capsule, Take 100 mg by mouth daily at bedtime, Disp: " , Rfl:     Lidocaine HCl (Aspercreme Lidocaine) 4 % CREA, Apply 1 Application topically in the morning. Bilateral knees daily., Disp: , Rfl:     metoprolol tartrate (LOPRESSOR) 25 mg tablet, Take 1 tablet (25 mg total) by mouth every 12 (twelve) hours, Disp: 200 tablet, Rfl: 1    Multiple Vitamins-Minerals (MULTIVITAL) tablet, Take 1 tablet by mouth in the morning., Disp: , Rfl:     NON FORMULARY, Elderberry syrup 1 tsp QID, Disp: , Rfl:     Omega-3 Fatty Acids (FISH OIL) 645 MG CAPS, Take 1 tablet by mouth in the morning., Disp: , Rfl:     sodium phosphate (FLEET) 7-19 G 118 mL enema, Insert 1 enema into the rectum once 7-19GM/118mL, Disp: , Rfl:     zinc oxide (Desitin) 13 % cream, Apply topically, Disp: , Rfl:     Current Facility-Administered Medications:     Botulinum Toxin Type A SOLR 200 Units, 200 Units, Injection, Q3 Months, , 200 Units at 12/16/24 1508    Botulinum Toxin Type A SOLR 200 Units, 200 Units, Injection, Q3 Months, , 200 Units at 04/14/25 1100  [2]   Past Medical History:  Diagnosis Date    Anxiety 2010    Bladder infection 09/2019    Breast lump in female     Deep vein thrombosis (HCC) October, 2023    HL (hearing loss) 2010    Hypertension 2010    Osteopenia     Ovarian cyst    [3]   Past Surgical History:  Procedure Laterality Date    BLADDER SURGERY      BREAST BIOPSY      CATARACT EXTRACTION Bilateral 01/01/2015    , Right Eye-2017, Left eye    ENDOMETRIAL BIOPSY      by Suction    HYSTERECTOMY     [4]   Family History  Problem Relation Name Age of Onset    Heart disease Mother [place]     Heart disease Father [place]     Hypertension Family      Osteoporosis Family

## 2025-07-18 ENCOUNTER — OFFICE VISIT (OUTPATIENT)
Dept: NEPHROLOGY | Facility: CLINIC | Age: OVER 89
End: 2025-07-18
Payer: MEDICARE

## 2025-07-18 VITALS — DIASTOLIC BLOOD PRESSURE: 74 MMHG | HEART RATE: 65 BPM | OXYGEN SATURATION: 95 % | SYSTOLIC BLOOD PRESSURE: 132 MMHG

## 2025-07-18 DIAGNOSIS — I27.20 PULMONARY HTN (HCC): ICD-10-CM

## 2025-07-18 DIAGNOSIS — R80.1 PERSISTENT PROTEINURIA: ICD-10-CM

## 2025-07-18 DIAGNOSIS — I10 ESSENTIAL HYPERTENSION: Primary | Chronic | ICD-10-CM

## 2025-07-18 DIAGNOSIS — I48.0 PAROXYSMAL ATRIAL FIBRILLATION (HCC): ICD-10-CM

## 2025-07-18 PROCEDURE — 99214 OFFICE O/P EST MOD 30 MIN: CPT | Performed by: PHYSICIAN ASSISTANT

## 2025-07-25 ENCOUNTER — TELEPHONE (OUTPATIENT)
Dept: NEUROLOGY | Facility: CLINIC | Age: OVER 89
End: 2025-07-25

## 2025-07-25 NOTE — TELEPHONE ENCOUNTER
Called patient to confirm appointment with Dr. Rodriguez on 08/04/2025 at the [facility]. Patient did not answer, left a voicemail with call back number for patient to call back to confirm appointment.

## 2025-07-25 NOTE — TELEPHONE ENCOUNTER
Patients daughter called back to confirm att on 8/4/2025 at 10: 30 am with Dr. Rodriguez for BOTOX INJECTION.

## 2025-07-26 ENCOUNTER — TELEPHONE (OUTPATIENT)
Dept: OTHER | Facility: OTHER | Age: OVER 89
End: 2025-07-26

## 2025-07-26 NOTE — TELEPHONE ENCOUNTER
Aliza from [facility] called reporting she found faint scratches in brief area.    On call notified via epic chat

## 2025-08-14 ENCOUNTER — VBI (OUTPATIENT)
Dept: ADMINISTRATIVE | Facility: OTHER | Age: OVER 89
End: 2025-08-14